# Patient Record
Sex: MALE | Race: WHITE | NOT HISPANIC OR LATINO | Employment: UNEMPLOYED | URBAN - METROPOLITAN AREA
[De-identification: names, ages, dates, MRNs, and addresses within clinical notes are randomized per-mention and may not be internally consistent; named-entity substitution may affect disease eponyms.]

---

## 2019-09-29 ENCOUNTER — OFFICE VISIT (OUTPATIENT)
Dept: URGENT CARE | Facility: URGENT CARE | Age: 29
End: 2019-09-29
Payer: COMMERCIAL

## 2019-09-29 VITALS
BODY MASS INDEX: 42.88 KG/M2 | WEIGHT: 314 LBS | RESPIRATION RATE: 19 BRPM | DIASTOLIC BLOOD PRESSURE: 97 MMHG | TEMPERATURE: 99 F | SYSTOLIC BLOOD PRESSURE: 151 MMHG | HEART RATE: 91 BPM | OXYGEN SATURATION: 96 %

## 2019-09-29 DIAGNOSIS — R07.0 THROAT PAIN: ICD-10-CM

## 2019-09-29 DIAGNOSIS — R60.0 SUBMANDIBULAR GLAND SWELLING: Primary | ICD-10-CM

## 2019-09-29 LAB
DEPRECATED S PYO AG THROAT QL EIA: NORMAL
SPECIMEN SOURCE: NORMAL

## 2019-09-29 PROCEDURE — 99203 OFFICE O/P NEW LOW 30 MIN: CPT | Performed by: FAMILY MEDICINE

## 2019-09-29 PROCEDURE — 87081 CULTURE SCREEN ONLY: CPT | Performed by: FAMILY MEDICINE

## 2019-09-29 PROCEDURE — 87880 STREP A ASSAY W/OPTIC: CPT | Performed by: FAMILY MEDICINE

## 2019-09-29 RX ORDER — NAPROXEN 500 MG/1
500 TABLET ORAL 2 TIMES DAILY WITH MEALS
Qty: 14 TABLET | Refills: 0 | Status: SHIPPED | OUTPATIENT
Start: 2019-09-29 | End: 2019-10-06

## 2019-09-29 NOTE — PROGRESS NOTES
SUBJECTIVE: Ravi Ahmadi is a 29 year old male presenting with a chief complaint of sore throat and swollen neck gland.  Onset of symptoms was 1 day(s) ago.  Course of illness is worsening.    Severity moderate  Current and Associated symptoms: sore throat  Treatment measures tried include Tylenol.  Predisposing factors include None.    Past Medical History:   Diagnosis Date     Atopic dermatitis      Intermittent asthma     exercise     Allergies   Allergen Reactions     Nkda [No Known Drug Allergies]      Social History     Tobacco Use     Smoking status: Former Smoker     Packs/day: 0.50     Years: 7.00     Pack years: 3.50     Types: Cigarettes     Smokeless tobacco: Never Used     Tobacco comment: 7 cigarettes per day   Substance Use Topics     Alcohol use: Yes     Alcohol/week: 12.5 standard drinks     Types: 15 Standard drinks or equivalent per week       ROS:  SKIN: no rash  GI: no vomiting    OBJECTIVE:  BP (!) 151/97   Pulse 91   Temp 99  F (37.2  C) (Oral)   Resp 19   Wt 142.4 kg (314 lb)   SpO2 96%   BMI 42.88 kg/m  GENERAL APPEARANCE: healthy, alert and no distress  EYES: EOMI,  PERRL, conjunctiva clear  HENT: TM's normal bilaterally, rhinorrhea clear, tonsillar hypertrophy and tonsillar erythema  NECK: cervical adenopathy left mostly  RESP: lungs clear to auscultation - no rales, rhonchi or wheezes  SKIN: no suspicious lesions or rashes      ICD-10-CM    1. Submandibular gland swelling R60.9 naproxen (NAPROSYN) 500 MG tablet     amoxicillin-clavulanate (AUGMENTIN) 875-125 MG tablet     OTOLARYNGOLOGY REFERRAL   2. Throat pain R07.0 Rapid strep screen     Beta strep group A culture     naproxen (NAPROSYN) 500 MG tablet       Fluids/Rest, f/u if worse/not any better

## 2019-09-30 LAB
BACTERIA SPEC CULT: NORMAL
SPECIMEN SOURCE: NORMAL

## 2020-04-20 ENCOUNTER — OFFICE VISIT (OUTPATIENT)
Dept: INTERNAL MEDICINE | Facility: CLINIC | Age: 30
End: 2020-04-20
Payer: COMMERCIAL

## 2020-04-20 VITALS — TEMPERATURE: 99.5 F | OXYGEN SATURATION: 96 % | HEART RATE: 94 BPM

## 2020-04-20 DIAGNOSIS — R19.7 VOMITING AND DIARRHEA: ICD-10-CM

## 2020-04-20 DIAGNOSIS — Z20.828 CONTACT WITH OR EXPOSURE TO VIRAL DISEASE: ICD-10-CM

## 2020-04-20 DIAGNOSIS — J45.20 MILD INTERMITTENT ASTHMA WITHOUT COMPLICATION: ICD-10-CM

## 2020-04-20 DIAGNOSIS — J22 LOWER RESPIRATORY TRACT INFECTION: Primary | ICD-10-CM

## 2020-04-20 DIAGNOSIS — R11.10 VOMITING AND DIARRHEA: ICD-10-CM

## 2020-04-20 PROCEDURE — 99214 OFFICE O/P EST MOD 30 MIN: CPT | Performed by: INTERNAL MEDICINE

## 2020-04-20 RX ORDER — ALBUTEROL SULFATE 90 UG/1
1-2 AEROSOL, METERED RESPIRATORY (INHALATION) 4 TIMES DAILY PRN
Qty: 1 INHALER | Refills: 5 | Status: SHIPPED | OUTPATIENT
Start: 2020-04-20 | End: 2021-01-07

## 2020-04-20 ASSESSMENT — ENCOUNTER SYMPTOMS
VOMITING: 1
HEMATOCHEZIA: 0
CHEST TIGHTNESS: 0
FATIGUE: 1
SHORTNESS OF BREATH: 1
WHEEZING: 0
COUGH: 0
FEVER: 1
MUSCULOSKELETAL NEGATIVE: 1
DIARRHEA: 1
NAUSEA: 1
DIAPHORESIS: 1
CHILLS: 1

## 2020-04-20 NOTE — PROGRESS NOTES
Subjective     Ravi Ahmadi is a 30 year old male who presents to clinic today for the following health issues:    HPI   RESPIRATORY SYMPTOMS      Duration: 1 week    Description  cough, fever, headache, fatigue/malaise, nausea and stomach ache    Severity: mild    Accompanying signs and symptoms: None    History (predisposing factors):  asthma    Precipitating or alleviating factors: None    Therapies tried and outcome:  none              Reviewed and updated as needed this visit by Provider         Review of Systems   Constitutional: Positive for chills, diaphoresis, fatigue and fever.   HENT: Negative.    Respiratory: Positive for shortness of breath. Negative for cough, chest tightness and wheezing.    Gastrointestinal: Positive for diarrhea, nausea and vomiting. Negative for hematochezia.   Musculoskeletal: Negative.    Skin: Negative.             Objective    Pulse 94   Temp 99.5  F (37.5  C) (Temporal)   SpO2 96%   There is no height or weight on file to calculate BMI.  Physical Exam   GENERAL APPEARANCE: alert, no distress and over weight  HENT: normal cephalic/atraumatic  NECK: no adenopathy and no asymmetry, masses, or scars  RESP: a few scattered wheezes  CV: regular rates and rhythm, normal S1 S2, no S3 or S4 and no murmur, click or rub  ABDOMEN: soft, nontender with moderate pressure, some lower abd tenderness w/deep palpation the LLQ and RLQ, without hepatosplenomegaly or masses and bowel sounds normal  MS: extremities normal- no gross deformities noted  SKIN: no suspicious lesions or rashes  NEURO: Normal strength and tone, mentation intact and speech normal  PSYCH: mentation appears normal and affect normal/bright    none         Assessment & Plan     1. Lower respiratory tract infection  2. Contact with or exposure to viral disease  3. Vomiting and diarrhea  -Differential here is either gastroenteritis on top of his underlying asthma or viral syndrome which gives both GI and respiratory  symptoms.  COVID is in the differential and therefore was given recommendations for self-isolation.  See patient education materials.     4. Mild intermittent asthma without complication  - albuterol (PROAIR HFA) 108 (90 Base) MCG/ACT inhaler; Inhale 1-2 puffs into the lungs 4 times daily as needed for shortness of breath / dyspnea or wheezing  Dispense: 1 Inhaler; Refill: 5     Tobacco Cessation:   reports that he has been smoking cigarettes. He has a 3.50 pack-year smoking history. He has never used smokeless tobacco.  Tobacco Cessation Action Plan: Information offered: Patient not interested at this time    Return for Follow up visit via [email or web address] if needed.    Juancarlos Sood MD  [facility]

## 2020-04-20 NOTE — PATIENT INSTRUCTIONS
Based on the information you have provided, you do have symptoms that are consistent with Coronavirus (COVID-19).    The coronavirus causes mild to severe respiratory illness with the most common symptoms including fever, cough and difficulty breathing. Unfortunately, many viruses cause similar symptoms and it can be difficult to distinguish between viruses, especially in mild cases, so we are presuming that anyone with cough or fever has coronavirus at this time.    Coronavirus/COVID-19 has reached the point of community spread in [place], meaning that we are finding the virus in people with no known exposure risk for jesus manuel the virus. Given the increasing commonness of coronavirus in the community we are no longer testing patients who are not critically ill.    If you are a health care worker, you should refer to your employee health office for instructions about returning to work.    For everyone else who has cough or fever, you should assume you are infected with coronavirus. Accordingly, you should self-quarantine for seven days from the first day your symptoms started OR 72 hours after your cough and fever completely resolve - WHICHEVER is LONGER. You should call if you find increasing shortness of breath, wheezing or sustained fever above 101.5. If you are significantly short of breath or experience chest pain you should call 911 or report to the nearest emergency department for urgent evaluation.    Isolate yourself at home.  Do Not allow any visitors  Do Not go to work or school  Do Not go to Islam,  centers, shopping, or other public places.  Do Not shake hands.  Avoid close contact with others (hugging, kissing).  Protect Others:    Cover Your Mouth and Nose with a mask, disposable tissue or wash cloth to avoid spreading germs to others.  Wash your hands and face frequently with soap and water.  If you develop significant shortness of breath that prevents you from doing normal  activities, please call 911 or proceed to the nearest emergency room and alert them immediately that you have been in self-isolation for possible coronavirus.    For more information about COVID19 and options for caring for yourself at home, please visit the CDC website at https://www.cdc.gov/coronavirus/2019-ncov/about/steps-when-sick.htmlFor more options for care at [facility], please visit our website at [email or web address]    For your parents/anyone who lives with you:    Based on the information you have provided about potential exposure to Coronavirus (Covid-19) but without any symptoms of the virus (cough, fever, shortness of breath are the most common symptoms), it does not appear you need Coronavirus (COVID-19) testing at this time.    But your possible exposure to Coronavirus means that we do recommend self-isolation for 14 days from the last day you may have been exposed.    What does this mean?    Isolate Yourself:    Isolate yourself at home.  Do Not allow any visitors  Do Not go to work or school  Do Not go to Yaz[employer],  centers, shopping, or other public places.  Do Not shake hands.  Avoid close contact with others (hugging, kissing).  Protect Others:    Cover Your Mouth and Nose with a mask, disposable tissue or wash cloth to avoid spreading germs to others.  Wash your hands and face frequently with soap and water.  If you have not developed a cough with fever by day 15 of isolation you are considered uninfected.    Thank you for limiting contact with others, wearing a simple mask to cover your cough, practice good hand hygiene habits and accessing our virtual services where possible to limit the spread of this virus.    For more information about COVID19 and options for caring for yourself at home, please visit the CDC website at https://www.cdc.gov/coronavirus/2019-ncov/about/steps-when-sick.html    For more options for care at [facility], please  visit our website at [email or web address]      Patient Education     Diet for Vomiting or Diarrhea (Adult)    Your symptoms may return or get worse after eating certain foods listed below. If this happens, stop eating these foods until your symptoms ease and you feel better.  Once the vomiting stops, follow the steps below.   During the first 12 to 24 hours  During the first 12 to 24 hours, follow this diet:    Drinks. Plain water, sport drinks like electrolyte solutions, soft drinks without caffeine, mineral water (plain or flavored), clear fruit juices, and decaffeinated tea and coffee.    Soups. Clear broth.    Desserts. Plain gelatin, popsicles, and fruit juice bars. As you feel better, you may add 6 to 8 ounces of yogurt per day. If you have diarrhea, don't have foods or drinks that contain sugar, high-fructose corn syrup, or sugar alcohols.  During the next 24 hours  During the next 24 hours you may add the following to the above:    Hot cereal, plain toast, bread, rolls, and crackers    Plain noodles, rice, mashed potatoes, and chicken noodle or rice soup    Unsweetened canned fruit (but not pineapple) and bananas  Don't eat more than 15 grams of fat a day. Do this by staying away from margarine, butter, oils, mayonnaise, sauces, gravies, fried foods, peanut butter, meat, poultry, and fish.  Don't eat much fiber. Stay away from raw or cooked vegetables, fresh fruits (except bananas), and bran cereals.  Limit how much caffeine and chocolate you have. Do not use any spices or seasonings except salt.  During the next 24 hours  Slowly go back to your normal diet, as you feel better and your symptoms ease.  Date Last Reviewed: 8/1/2016 2000-2019 [employer]. [address] [place] [address], [place] [address]. All rights reserved. This information is not intended as a substitute for professional medical care. Always follow your healthcare professional's instructions.

## 2020-04-30 ENCOUNTER — TELEPHONE (OUTPATIENT)
Dept: INTERNAL MEDICINE | Facility: CLINIC | Age: 30
End: 2020-04-30

## 2020-04-30 NOTE — TELEPHONE ENCOUNTER
Patient's mother Julia called and states dizzy spells on/ off for past few days.  Decreased appetite.  Sched to return to work Monday 5/4/20.  Pls advise.      Julia's contact phone on file.  Okay to lv msg

## 2020-04-30 NOTE — TELEPHONE ENCOUNTER
Julia called back.     Patient was sleeping when triage RN called. Patient became upset when mother spoke with him.    Patient in back ground telling mom to hang up phone. Per mom she was concerned He has told mom that he was dizzy and groggy.    Per mom he has reported having diarrhea and coughing: patient broke temp last week.    Triage advised to continue to monitor sx. Can try OTC Mucinex DM for mucus like cough. Encourage rest and fluids. Advised to f/u if sx persist and if needs to return to work  When upper respiratory sx have resolved.    Patient refused to speak with nurse. Advised mom can call 911 if patient becomes disoriented and/ or shortness of breath.      Aleisha CLAUDION, RN, PHN

## 2020-07-15 ENCOUNTER — VIRTUAL VISIT (OUTPATIENT)
Dept: FAMILY MEDICINE | Facility: OTHER | Age: 30
End: 2020-07-15
Payer: COMMERCIAL

## 2020-07-15 PROCEDURE — 99421 OL DIG E/M SVC 5-10 MIN: CPT | Performed by: PHYSICIAN ASSISTANT

## 2020-07-15 NOTE — PROGRESS NOTES
"Date: 07/15/2020 13:07:09  Clinician: Flaco Leblanc  Clinician NPI: [id number]  Patient: Ravi Ahmadi  Patient : 1990  Patient Address: [address], [place] [address]  Patient Phone: [phone]  Visit Protocol: GERD  Patient Summary:  Ravi is a 30 year old ( : 1990 ) male who initiated a Visit for evaluation of heartburn or reflux (GERD).  When asked the question \"Please sign me up to receive news, health information and promotions from [employer].\", Ravi responded \"No\".    Symptom Details  His symptoms have persisted for 1 - 2 months.  The patient currently feels his symptoms are about the same as usual and last for only a few minutes.   Over the past 7 days, the patient had experienced:      Heartburn: once     Regurgitation: for 2-3 days     Nausea: for 2-3 days     Pain the center of the upper stomach: once     Difficulty getting a good night's sleep because of heartburn and/or regurgitation: for 2-3 days      He took medications to treat heartburn and/or regurgitation other than what was previously prescribed for 2-3 days over the past 7 days.    Ravi claims:     Lying down aggravates symptoms     Bending over does not aggravate symptoms.      He denies:     Heartburn with unintentional weight loss.    Difficulty swallowing solid foods    Vomiting blood    Persistent vomiting.    Chest Pain    Wheezing associated with heartburn    Crohn's disease    Ulcerative colitis    Tumors in colon     He has not tried modifying his diet to treat symptoms.   The patient weighs approximately: 320 pounds and is 6 inches tall.   PAST TREATMENTS:      Tums or Rolaids: somewhat effective (patient used for 1 day)    Ravi smokes or uses smokeless tobacco.   Height: 0 ft 6 in  Weight: 320 lbs    MEDICATIONS: No current medications, ALLERGIES: NKDA  Clinician Response:  Dear Ravi,  Based on the information you provided, you likely have GERD (Esophageal reflux), " also known as heartburn.  GERD or heartburn occurs when stomach acid comes up from the stomach into the throat or esophagus. This often causes burning in the chest behind the breastbone and a burning sensation in the throat.    Medication Information  I am prescribing:     Pantoprazole (Protonix) 40 mg oral tablet, delayed release (DR/EC). Take 1 tablet by mouth 1 time per day for 30 days. There are no refills with this prescription.   Unless you are allergic to the over-the-counter medication(s) below, I recommend using:       Tums or Rolaids take 2 to 4 tablets as needed, up to 12 tablets in 24 hours.       Maalox, Mylanta, or store brand take 10 to 20ml up to 4 times a day.     Over-the-counter medications do not require a prescription. Ask the pharmacist if you have any questions.  Self Care  Try the following to help reduce your GERD symptoms:     Elevate the head of your bed with wood blocks by 6 inches if you are having nighttime symptoms    Decrease fat intake    Avoid eating large meals    Smoking, chewing tobacco, caffeine, alcohol, and anti-inflammatory medications (such as ibuprofen or aspirin) can increase stomach acid which can make GERD worse.    Stay upright for 2-3 hours after eating. Eating a large meal and then laying down, increases GERD symptoms.    Avoid spicy foods and acidic foods such as tomato and citrus      When to Seek Care  Sometimes long-standing uncontrolled GERD (symptoms more than one year) can cause stomach ulcers, cancerous, or precancerous changes in the throat. Be seen in a clinic if you notice any of the following symptoms:      Weight loss    Blood in your stool or throwing up blood    Vomiting    Trouble swallowing    Pain with swallowing     Rarely, heart-related symptoms can be mistaken for GERD. Heart-related symptoms would include chest pain with exertion or chest pain with radiation to the jaw or arm, shortness of breath, or sweats. If you are having any of these  symptoms, seek immediate medical care by calling 911 or contact your clinic.  Finally, becoming tobacco-free is one of the best things you can do to improve your health. For help with quitting tobacco, you can call 0-269-QUIT-NOW (1-791.379.5878) or visit smokefree.gov.   Diagnosis: GERD (Esophageal reflux)  Diagnosis ICD: K21.9  Prescription: pantoprazole (Protonix) 40 mg oral tablet,delayed release (DR/EC) 30 tablet, 30 days supply. Take 1 tablet by mouth 1 time per day for 30 days. Refills: 0, Refill as needed: no, Allow substitutions: yes

## 2020-07-16 ENCOUNTER — TELEPHONE (OUTPATIENT)
Dept: INTERNAL MEDICINE | Facility: CLINIC | Age: 30
End: 2020-07-16

## 2020-07-16 ENCOUNTER — VIRTUAL VISIT (OUTPATIENT)
Dept: FAMILY MEDICINE | Facility: OTHER | Age: 30
End: 2020-07-16

## 2020-07-16 NOTE — TELEPHONE ENCOUNTER
Reason for Call:  Other call back    Detailed comments: Patient received care through [facility] 7/15 during this visit he was given prescription for gerd.  Patient indicated he wants a note from Dr. Sood indicating he is okay to return to work.  Patient did not want to contact [facility] again, wants note from his primary care physician.  Patient cannot return to work without this note.    Phone Number Patient can be reached at: Cell number on file:    Telephone Information:   Mobile [phone]       Best Time:any time  Can we leave a detailed message on this number? YES    Call taken on 7/16/2020 at 9:58 AM by Olivia Nugent

## 2020-07-16 NOTE — PROGRESS NOTES
"Date: 2020 14:23:52  Clinician: Lazaro Lynch  Clinician NPI: [id number]  Patient: Ravi Ahmadi  Patient : 1990  Patient Address: [address], [place] [address]  Patient Phone: [phone]  Visit Protocol: GERD  Patient Summary:  Ravi is a 30 year old ( : 1990 ) male who initiated a Visit for evaluation of heartburn or reflux (GERD).  When asked the question \"Please sign me up to receive news, health information and promotions from [employer].\", Ravi responded \"No\".    Symptom Details  His symptoms have persisted for 1 - 2 months.  The patient currently feels his symptoms are worse than usual and last for only a few minutes.   Over the past 7 days, the patient had experienced:      Heartburn: once     Regurgitation: for 2-3 days     Nausea: for 2-3 days     Pain the center of the upper stomach: for 2-3 days     Difficulty getting a good night's sleep because of heartburn and/or regurgitation: for 2-3 days      He took medications to treat heartburn and/or regurgitation other than what was previously prescribed for 2-3 days over the past 7 days.    Ravi claims:     Lying down aggravates symptoms     Bending over does not aggravate symptoms.      He denies:     Heartburn with unintentional weight loss.    Difficulty swallowing solid foods    Vomiting blood    Persistent vomiting.    Chest Pain    Wheezing associated with heartburn    Crohn's disease    Ulcerative colitis    Tumors in colon     He has not tried modifying his diet to treat symptoms.   The patient weighs approximately: 320 pounds and is 72 inches tall.   PAST TREATMENTS:      Tums or Rolaids: somewhat effective (patient used for less than 3 days)    Ravi smokes or uses smokeless tobacco.   Height: 6 ft 0 in  Weight: 320 lbs    MEDICATIONS: No current medications, ALLERGIES: NKDA  Clinician Response:  Dear Ravi,   I am sorry you are not feeling well. To determine the most appropriate " care for you, I would like you to be seen in person to further discuss your health history and symptoms.  You will not be charged for this Visit. Thank you for trusting us with your care.   Diagnosis: Refer for additional evaluation  Diagnosis ICD: R69

## 2020-09-02 ENCOUNTER — TELEPHONE (OUTPATIENT)
Dept: INTERNAL MEDICINE | Facility: CLINIC | Age: 30
End: 2020-09-02

## 2020-09-02 NOTE — LETTER
September 2, 2020    Ravi Ahmadi  [address]  [facility] [place] [address]      Dear Ravi,      In order to ensure we are providing the best quality care, we have reviewed your chart and see that you are due for:  1. Preventative Care Visit (Physical)  2. Asthma Control Test  3. Vaccines    Please call the clinic at your earliest convenience to schedule an appointment.    Thank you for trusting us with your health care.    Sincerely,    Care Team for Dr. Sood

## 2020-09-02 NOTE — TELEPHONE ENCOUNTER
Patient Quality Outreach      Summary:    Patient is due/failing the following:   ACT needed and Asthma follow-up visit    Type of outreach:    Sent letter.    Questions for provider review:    None                                                                                   **Start Working phrase here:**       Patient has the following on his problem list/HM:     Asthma review       ACT Total Scores 2/5/2015   ACT TOTAL SCORE 20   ASTHMA ER VISITS 0 = None   ASTHMA HOSPITALIZATIONS 0 = None                                                      Joanna Flowers CMA       Chart routed to n/a.

## 2020-12-10 ENCOUNTER — VIRTUAL VISIT (OUTPATIENT)
Dept: INTERNAL MEDICINE | Facility: CLINIC | Age: 30
End: 2020-12-10
Payer: COMMERCIAL

## 2020-12-10 DIAGNOSIS — R51.9 ACUTE NONINTRACTABLE HEADACHE, UNSPECIFIED HEADACHE TYPE: Primary | ICD-10-CM

## 2020-12-10 DIAGNOSIS — R11.0 NAUSEA: ICD-10-CM

## 2020-12-10 PROCEDURE — 99214 OFFICE O/P EST MOD 30 MIN: CPT | Mod: 95 | Performed by: INTERNAL MEDICINE

## 2020-12-10 RX ORDER — METOCLOPRAMIDE 10 MG/1
TABLET ORAL
Qty: 10 TABLET | Refills: 0 | Status: SHIPPED | OUTPATIENT
Start: 2020-12-10 | End: 2021-01-07 | Stop reason: SINTOL

## 2020-12-10 ASSESSMENT — ASTHMA QUESTIONNAIRES
QUESTION_1 LAST FOUR WEEKS HOW MUCH OF THE TIME DID YOUR ASTHMA KEEP YOU FROM GETTING AS MUCH DONE AT WORK, SCHOOL OR AT HOME: NONE OF THE TIME
QUESTION_2 LAST FOUR WEEKS HOW OFTEN HAVE YOU HAD SHORTNESS OF BREATH: ONCE OR TWICE A WEEK
QUESTION_4 LAST FOUR WEEKS HOW OFTEN HAVE YOU USED YOUR RESCUE INHALER OR NEBULIZER MEDICATION (SUCH AS ALBUTEROL): ONCE A WEEK OR LESS
QUESTION_5 LAST FOUR WEEKS HOW WOULD YOU RATE YOUR ASTHMA CONTROL: SOMEWHAT CONTROLLED
ACT_TOTALSCORE: 21
QUESTION_3 LAST FOUR WEEKS HOW OFTEN DID YOUR ASTHMA SYMPTOMS (WHEEZING, COUGHING, SHORTNESS OF BREATH, CHEST TIGHTNESS OR PAIN) WAKE YOU UP AT NIGHT OR EARLIER THAN USUAL IN THE MORNING: NOT AT ALL

## 2020-12-10 NOTE — PROGRESS NOTES
"Ravi Ahmadi is a 30 year old male who is being evaluated via a billable video visit.      The patient has been notified of following:     \"This video visit will be conducted via a call between you and your physician/provider. We have found that certain health care needs can be provided without the need for an in-person physical exam.  This service lets us provide the care you need with a video conversation.  If a prescription is necessary we can send it directly to your pharmacy.  If lab work is needed we can place an order for that and you can then stop by our lab to have the test done at a later time.    Video visits are billed at different rates depending on your insurance coverage.  Please reach out to your insurance provider with any questions.    If during the course of the call the physician/provider feels a video visit is not appropriate, you will not be charged for this service.\"    Patient has given verbal consent for Video visit? Yes  How would you like to obtain your AVS? MyChart  If you are dropped from the video visit, the video invite should be resent to: Text to cell phone: [phone]  Will anyone else be joining your video visit? No      Subjective     Ravi Ahmadi is a 30 year old male who presents today via video visit for the following health issues:    HPI     Headache  Onset/Duration: 4 days  Description  Location: unilateral in the right frontal area, unilateral in the right occipital area   Character: throbbing pain, dull pain  Frequency:  Better since last night, and better today than earlier this week  Duration:  4  Wake with headaches: YES  Able to do daily activities when headache present: no   Intensity:  moderate  Progression of Symptoms:  improving  Accompanying signs and symptoms:  Stiff neck: YES  Neck or upper back pain: YES- upper back pain, minor, moderate lower back pain  Sinus or URI symptoms no   Fever: no  Nausea or vomiting: YES  Dizziness: YES- 2 d " ago  Numbness/tingling: YES  Weakness: YES  Visual changes: none  History  Head trauma: no  Family history of migraines: YES- grandmother and mother  Daily pain medication use: YES- taking tylenol about 3-4 times week  Previous tests for headaches: no  Neurologist evaluation: no  Precipitating or Alleviating factors (light/sound/sleep/caffeine): light sensitive  Therapies tried and outcome: Tylenol and Excedrin              Outcome - somewhat effective  Frequent/daily pain medication use: YES- tylenol about 3-4 times week    Denies below:  Systemic symptoms including fever  ?Neoplasm history  ?Neurologic deficit (including decreased consciousness)  ?Positional headache  ?Precipitated by sneezing, coughing, or exercise  ?Painful eyes     No stimulants  Alcohol: 7-8 drinks/wk.      Video Start Time: 3:03 PM        Review of Systems   Constitutional, HEENT, cardiovascular, pulmonary, gi and gu systems are negative, except as otherwise noted.      Objective           Vitals:  No vitals were obtained today due to virtual visit.    Physical Exam     GENERAL: alert, no distress and over weight  EYES: Eyes grossly normal to inspection.  No discharge or erythema, or obvious scleral/conjunctival abnormalities.  HENT: Normal cephalic/atraumatic.  External ears, nose and mouth without ulcers or lesions.  No nasal drainage visible.  NECK: No asymmetry, visible masses or scars  RESP: No audible wheeze, cough, or visible cyanosis.  No visible retractions or increased work of breathing.    SKIN: Visible skin clear. No significant rash, abnormal pigmentation or lesions.  NEURO: Cranial nerves grossly intact.  Mentation and speech appropriate for age.  PSYCH: Mentation appears normal, affect normal/bright, judgement and insight intact, normal speech and appearance well-groomed.            Assessment & Plan     Acute nonintractable headache, unspecified headache type  Nausea  Seems most consistent with migraine.  Like to have his  blood pressure checked before considering the use of triptans.  He will come in tomorrow to the pharmacy to have that checked when he picks up the metoclopramide.  That can be used for nausea may even have some benefit on a limited basis for the headache as well.  If his BP is normal we will go ahead and start the triptan.  If BP is elevated considerably consider Treatment of the BP itself.  If lack of response CNS imaging  - metoclopramide (REGLAN) 10 MG tablet; 1 tablet at onset of HA/nausea, may be repeated 4 hours later.        See Patient Instructions    No follow-ups on file.    Juancarlos Sood MD  [facility]      Video-Visit Details    Type of service:  Video Visit    Video End Time:3:41 PM    Originating Location (pt. Location): Home    Distant Location (provider location):  [facility]     Platform used for Video Visit: Kerwin

## 2020-12-11 ENCOUNTER — TELEPHONE (OUTPATIENT)
Dept: INTERNAL MEDICINE | Facility: CLINIC | Age: 30
End: 2020-12-11

## 2020-12-11 ENCOUNTER — ALLIED HEALTH/NURSE VISIT (OUTPATIENT)
Dept: INTERNAL MEDICINE | Facility: CLINIC | Age: 30
End: 2020-12-11
Payer: COMMERCIAL

## 2020-12-11 VITALS — DIASTOLIC BLOOD PRESSURE: 94 MMHG | SYSTOLIC BLOOD PRESSURE: 144 MMHG

## 2020-12-11 DIAGNOSIS — Z01.30 BP CHECK: Primary | ICD-10-CM

## 2020-12-11 PROCEDURE — 99207 PR NO CHARGE NURSE ONLY: CPT | Performed by: INTERNAL MEDICINE

## 2020-12-11 RX ORDER — SUMATRIPTAN 50 MG/1
50 TABLET, FILM COATED ORAL
Qty: 10 TABLET | Refills: 0 | Status: SHIPPED | OUTPATIENT
Start: 2020-12-11 | End: 2023-06-12

## 2020-12-11 ASSESSMENT — ASTHMA QUESTIONNAIRES: ACT_TOTALSCORE: 21

## 2020-12-11 NOTE — TELEPHONE ENCOUNTER
Reason for Call:  Other AVS & ltr for being out of work for this week    Detailed comments: the patient walked into the clinic today and is requesting a AVS for dos 12.10.2020, and needs a doctor written note for him being out of work this week, he is returning back to work on 12.14.2020.  pls call him when ready, hopefully today, and he will come in and pick them up at the bouncing station on the first floor.    Phone Number Patient can be reached at: Home number on file [phone] (home)    Best Time: anytime    Can we leave a detailed message on this number? YES    Call taken on 12/11/2020 at 11:40 AM by Felicia De La Torre

## 2020-12-11 NOTE — TELEPHONE ENCOUNTER
MR, see request for notes below    Pt did come to pharm today and had bp rechecked  144/94.      Letter pended, please auth if appropriate and I can inform pt, and also print AVS with letter    Joanna Flowers CMA

## 2020-12-11 NOTE — TELEPHONE ENCOUNTER
Pt can access letter on [employer] and print as well.   Given BP - rec'd f/u 1 mo   Ok to use triptan for HAs. rx for sumatriptan written sent to pharmacy. Can bed used along with antiemetic already rx'd.   If this doesn't help HA contact us.

## 2020-12-11 NOTE — PROGRESS NOTES
Routing message to PCP for review because BP checked at pharmacy is above goal. Recommended patient to follow-up with PCP.  PCP please close this encounter.      ~Thank you  Yessi Ware, PharmD  Retail Pharmacist  For [facility]

## 2021-01-07 ENCOUNTER — OFFICE VISIT (OUTPATIENT)
Dept: INTERNAL MEDICINE | Facility: CLINIC | Age: 31
End: 2021-01-07
Payer: COMMERCIAL

## 2021-01-07 VITALS
WEIGHT: 315 LBS | SYSTOLIC BLOOD PRESSURE: 146 MMHG | BODY MASS INDEX: 42.66 KG/M2 | HEIGHT: 72 IN | DIASTOLIC BLOOD PRESSURE: 98 MMHG | TEMPERATURE: 97.4 F | HEART RATE: 92 BPM | OXYGEN SATURATION: 95 %

## 2021-01-07 DIAGNOSIS — E66.01 MORBID OBESITY (H): ICD-10-CM

## 2021-01-07 DIAGNOSIS — Z00.00 ROUTINE GENERAL MEDICAL EXAMINATION AT A HEALTH CARE FACILITY: Primary | ICD-10-CM

## 2021-01-07 DIAGNOSIS — Z11.59 NEED FOR HEPATITIS C SCREENING TEST: ICD-10-CM

## 2021-01-07 DIAGNOSIS — J45.20 MILD INTERMITTENT ASTHMA WITHOUT COMPLICATION: ICD-10-CM

## 2021-01-07 DIAGNOSIS — Z11.3 SCREEN FOR STD (SEXUALLY TRANSMITTED DISEASE): ICD-10-CM

## 2021-01-07 DIAGNOSIS — I10 BENIGN ESSENTIAL HYPERTENSION: ICD-10-CM

## 2021-01-07 LAB
ANION GAP SERPL CALCULATED.3IONS-SCNC: 6 MMOL/L (ref 3–14)
BUN SERPL-MCNC: 10 MG/DL (ref 7–30)
CALCIUM SERPL-MCNC: 9.3 MG/DL (ref 8.5–10.1)
CHLORIDE SERPL-SCNC: 106 MMOL/L (ref 94–109)
CO2 SERPL-SCNC: 28 MMOL/L (ref 20–32)
CREAT SERPL-MCNC: 0.81 MG/DL (ref 0.66–1.25)
GFR SERPL CREATININE-BSD FRML MDRD: >90 ML/MIN/{1.73_M2}
GLUCOSE SERPL-MCNC: 133 MG/DL (ref 70–99)
HCV AB SERPL QL IA: NONREACTIVE
HIV 1+2 AB+HIV1 P24 AG SERPL QL IA: NONREACTIVE
POTASSIUM SERPL-SCNC: 3.9 MMOL/L (ref 3.4–5.3)
SODIUM SERPL-SCNC: 140 MMOL/L (ref 133–144)

## 2021-01-07 PROCEDURE — 99213 OFFICE O/P EST LOW 20 MIN: CPT | Mod: 25 | Performed by: INTERNAL MEDICINE

## 2021-01-07 PROCEDURE — 87389 HIV-1 AG W/HIV-1&-2 AB AG IA: CPT | Performed by: INTERNAL MEDICINE

## 2021-01-07 PROCEDURE — 86803 HEPATITIS C AB TEST: CPT | Performed by: INTERNAL MEDICINE

## 2021-01-07 PROCEDURE — 99395 PREV VISIT EST AGE 18-39: CPT | Performed by: INTERNAL MEDICINE

## 2021-01-07 PROCEDURE — 36415 COLL VENOUS BLD VENIPUNCTURE: CPT | Performed by: INTERNAL MEDICINE

## 2021-01-07 PROCEDURE — 80048 BASIC METABOLIC PNL TOTAL CA: CPT | Performed by: INTERNAL MEDICINE

## 2021-01-07 RX ORDER — LISINOPRIL/HYDROCHLOROTHIAZIDE 10-12.5 MG
1 TABLET ORAL DAILY
Qty: 30 TABLET | Refills: 1 | Status: SHIPPED | OUTPATIENT
Start: 2021-01-07 | End: 2023-06-12

## 2021-01-07 RX ORDER — ALBUTEROL SULFATE 90 UG/1
1-2 AEROSOL, METERED RESPIRATORY (INHALATION) 4 TIMES DAILY PRN
Qty: 1 INHALER | Refills: 11 | Status: ON HOLD | OUTPATIENT
Start: 2021-01-07 | End: 2023-10-25

## 2021-01-07 SDOH — HEALTH STABILITY: MENTAL HEALTH: HOW OFTEN DO YOU HAVE A DRINK CONTAINING ALCOHOL?: 4 OR MORE TIMES A WEEK

## 2021-01-07 SDOH — HEALTH STABILITY: MENTAL HEALTH: HOW MANY STANDARD DRINKS CONTAINING ALCOHOL DO YOU HAVE ON A TYPICAL DAY?: 1 OR 2

## 2021-01-07 SDOH — HEALTH STABILITY: MENTAL HEALTH: HOW OFTEN DO YOU HAVE 6 OR MORE DRINKS ON ONE OCCASION?: MONTHLY

## 2021-01-07 ASSESSMENT — ENCOUNTER SYMPTOMS
EYES NEGATIVE: 1
MUSCULOSKELETAL NEGATIVE: 1
SHORTNESS OF BREATH: 1
ENDOCRINE NEGATIVE: 1
WHEEZING: 1
NEUROLOGICAL NEGATIVE: 1
COUGH: 1
CONSTITUTIONAL NEGATIVE: 1

## 2021-01-07 ASSESSMENT — MIFFLIN-ST. JEOR: SCORE: 2532.16

## 2021-01-07 NOTE — PATIENT INSTRUCTIONS
Preventive Health Recommendations  Male Ages 26 - 39    Yearly exam:             See your health care provider every year in order to  o   Review health changes.   o   Discuss preventive care.    o   Review your medicines if your doctor has prescribed any.    You should be tested each year for STDs (sexually transmitted diseases), if you re at risk.     After age 35, talk to your provider about cholesterol testing. If you are at risk for heart disease, have your cholesterol tested at least every 5 years.     If you are at risk for diabetes, you should have a diabetes test (fasting glucose).  Shots: Get a flu shot each year. Get a tetanus shot every 10 years.     Nutrition:    Eat at least 5 servings of fruits and vegetables daily.     Eat whole-grain bread, whole-wheat pasta and brown rice instead of white grains and rice.     Get adequate Calcium and Vitamin D.     Lifestyle    Exercise for at least 150 minutes a week (30 minutes a day, 5 days a week). This will help you control your weight and prevent disease.     Limit alcohol to one drink per day.     No smoking.     Wear sunscreen to prevent skin cancer.     See your dentist every six months for an exam and cleaning.     Patient Education     Eating Heart-Healthy Food: Using the DASH Plan    Eating for your heart doesn t have to be hard or boring. You just need to know how to make healthier choices. The DASH eating plan has been developed to help you do just that. DASH stands for Dietary Approaches to Stop Hypertension. It is a plan that has been proven to be healthier for your heart and to lower your risk for high blood pressure. It can also help lower your risk for cancer, heart disease, osteoporosis, and diabetes.  Choosing from each food group  Choose foods from each of the food groups below each day. Try to get the recommended number of servings for each food group. The serving numbers are based on a diet of 2,000 calories a day. Talk with your  healthcare provider if you re not sure about your calorie needs. Along with getting the correct servings, the DASH plan also advises less than 2,300 mg of salt (sodium) per day. Lowering sodium intake to 1,500 mg per day lowers blood pressure even more. (There's about 2,300 mg of sodium in 1 teaspoon of salt.)      Grains  Servings: 6 to 8 a day  A serving is:    1 slice bread    1 ounce dry cereal    Half a cup cooked rice, pasta or cereal  Best choices: Whole grains and any grains high in fiber. Vegetables  Servings: 4 to 5 a day  A serving is:    1 cup raw leafy vegetable    Half a cup cut-up raw or cooked vegetable    Half a cup vegetable juice  Best choices: Fresh or frozen vegetables prepared without added salt or fat.   Fruits  Servings: 4 to 5 a day  A serving is:    1 medium fruit    One-quarter cup dried fruit    Half a cup fresh, frozen, or canned fruit    Half a cup of 100% fruit juices  Best choices: A variety of fresh fruits of different colors. Whole fruits are a better choice than fruit juices. Low-fat or fat-free dairy  Servings: 2 to 3 a day  A serving is:    1 cup milk    1 cup yogurt    One and a half ounces cheese  Best choices: Skim or 1% milk, low-fat or fat-free yogurt or buttermilk, and low-fat cheeses.         Lean meats, poultry, fish  Servings: 6 or fewer a day  A serving is:    1 ounce cooked meats, poultry, or fish    1 egg  Best choices: Lean poultry and fish. Trim away visible fat. Broil, grill, roast, or boil instead of frying. Remove skin from poultry before eating. Limit how much red meat you eat.  Nuts, seeds, beans  Servings: 4 to 5 a week  A serving is:    One-third cup nuts (one and a half ounces)    2 tablespoons nut butter or seeds    Half a cup cooked dry beans or legumes  Best choices: Dry roasted nuts with no salt added, lentils, kidney beans, garbanzo beans, and whole hdz beans.   Fats and oils  Servings: 2 to 3 a day  A serving is:    1 teaspoon vegetable oil    1  teaspoon soft margarine    1 tablespoon mayonnaise    2 tablespoons salad dressing  Best choices: Nut and vegetable oils (nontropical vegetable oils), such as olive and canola oil. Sweets  Servings: 5 a week or fewer  A serving is:    1 tablespoon sugar, maple syrup, or honey    1 tablespoon jam or jelly    1 half-ounce jelly beans (about 15)    1 cup lemonade  Best choices: Dried fruit can be a satisfying sweet. Choose low-fat sweets. And watch your serving sizes!      For more on the DASH eating plan, visit:  www.nhlbi.nih.gov/health/health-topics/topics/dash   Marisol last reviewed this educational content on 7/1/2019 2000-2020 [employer]. [address] [place] [address], [place] [address]. All rights reserved. This information is not intended as a substitute for professional medical care. Always follow your healthcare professional's instructions.

## 2021-01-07 NOTE — PROGRESS NOTES
SUBJECTIVE:   CC: Ravi Ahmadi is an 30 year old male who presents for preventative health visit.       Patient has been advised of split billing requirements and indicates understanding: Yes  HPI        Today's PHQ-2 Score:   PHQ-2 ( 1999 Pfizer) 1/7/2021   Q1: Little interest or pleasure in doing things 0   Q2: Feeling down, depressed or hopeless 0   PHQ-2 Score 0   Q1: Little interest or pleasure in doing things Not at all   Q2: Feeling down, depressed or hopeless Not at all   PHQ-2 Score 0       Abuse: Current or Past(Physical, Sexual or Emotional)- NO  Do you feel safe in your environment? YES        Social History     Tobacco Use     Smoking status: Current Some Day Smoker     Packs/day: 0.50     Years: 7.00     Pack years: 3.50     Types: Cigarettes     Smokeless tobacco: Never Used     Tobacco comment: 7 cigarettes per day   Substance Use Topics     Alcohol use: Yes     Alcohol/week: 12.5 standard drinks     Types: 15 Standard drinks or equivalent per week     Frequency: 4 or more times a week     Drinks per session: 1 or 2     Binge frequency: Monthly         Alcohol Use 1/7/2021   Prescreen: >3 drinks/day or >7 drinks/week? Yes   Prescreen: >3 drinks/day or >7 drinks/week? -   AUDIT SCORE  13       Last PSA: No results found for: PSA    Reviewed orders with patient. Reviewed health maintenance and updated orders accordingly - Yes  Lab work is in process    Reviewed and updated as needed this visit by clinical staff  Tobacco  Allergies  Meds  Problems  Med Hx  Surg Hx  Fam Hx  Soc Hx          Reviewed and updated as needed this visit by Provider   Allergies  Meds  Problems                Review of Systems   Constitutional: Negative.    HENT: Negative.    Eyes: Negative.    Respiratory: Positive for cough, shortness of breath and wheezing.    Endocrine: Negative.    Musculoskeletal: Negative.    Skin: Negative.    Neurological: Negative.          OBJECTIVE:   BP (!) 146/98   Pulse 92   Temp  "97.4  F (36.3  C) (Temporal)   Ht 1.822 m (5' 11.75\")   Wt (!) 154.8 kg (341 lb 4.8 oz)   SpO2 95%   BMI 46.61 kg/m      Physical Exam  GENERAL: alert, no distress and obese  EYES: Eyes grossly normal to inspection, PERRL and conjunctivae and sclerae normal  HENT: ear canals and TM's normal, nose and mouth without ulcers or lesions  NECK: no adenopathy, no asymmetry, masses, or scars and thyroid normal to palpation  RESP: lungs clear to auscultation - no rales, rhonchi or wheezes  CV: regular rate and rhythm, normal S1 S2, no S3 or S4, no murmur, click or rub, no peripheral edema and peripheral pulses strong  ABDOMEN: soft, nontender, no hepatosplenomegaly, no masses and bowel sounds normal  MS: no gross musculoskeletal defects noted, no edema  SKIN: no suspicious lesions or rashes  NEURO: Normal strength and tone, mentation intact and speech normal  PSYCH: mentation appears normal, affect normal/bright  LYMPH: no cervical, supraclavicular, axillary, or inguinal adenopathy    Labs reviewed in Epic  No results found for this or any previous visit (from the past 24 hour(s)).    ASSESSMENT/PLAN:   1. Routine general medical examination at a health care facility  Updated screening, immunizations, prevention.  Please see health maintenance list, care gaps   Focus on weight loss    2. Mild intermittent asthma without complication  Weight loss rec'd . otherwise mostly exercise induced  - albuterol (PROAIR HFA) 108 (90 Base) MCG/ACT inhaler; Inhale 1-2 puffs into the lungs 4 times daily as needed for shortness of breath / dyspnea or wheezing  Dispense: 1 Inhaler; Refill: 11    3. Benign essential hypertension  New- cut back on alcohol , must focus on diet/weight loss  Start rx- f/u 1 mo  - lisinopril-hydrochlorothiazide (ZESTORETIC) 10-12.5 MG tablet; Take 1 tablet by mouth daily  Dispense: 30 tablet; Refill: 1  - Basic metabolic panel    4. Morbid obesity (H)  Weight loss  - COMPREHENSIVE WEIGHT MANAGEMENT    5. Need " "for hepatitis C screening test  - Hepatitis C Screen Reflex to HCV RNA Quant and Genotype    6. Screen for STD (sexually transmitted disease)  - HIV Antigen Antibody Combo    Patient has been advised of split billing requirements and indicates understanding: Yes  COUNSELING:   Reviewed preventive health counseling, as reflected in patient instructions    Estimated body mass index is 46.61 kg/m  as calculated from the following:    Height as of this encounter: 1.822 m (5' 11.75\").    Weight as of this encounter: 154.8 kg (341 lb 4.8 oz).     Weight management plan: Patient referred to endocrine and/or weight management specialty Discussed healthy diet and exercise guidelines    He reports that he has been smoking cigarettes. He has a 3.50 pack-year smoking history. He has never used smokeless tobacco.  Tobacco Cessation Action Plan:   cut already cutting back      Counseling Resources:  ATP IV Guidelines  Pooled Cohorts Equation Calculator  FRAX Risk Assessment  ICSI Preventive Guidelines  Dietary Guidelines for Americans, 2010  USDA's MyPlate  ASA Prophylaxis  Lung CA Screening    Juancarlos Sood MD  [facility]  "

## 2021-03-17 ENCOUNTER — TELEPHONE (OUTPATIENT)
Dept: URGENT CARE | Facility: URGENT CARE | Age: 31
End: 2021-03-17

## 2021-03-17 ENCOUNTER — MYC MEDICAL ADVICE (OUTPATIENT)
Dept: URGENT CARE | Facility: URGENT CARE | Age: 31
End: 2021-03-17

## 2021-03-17 DIAGNOSIS — R11.10 VOMITING AND DIARRHEA: ICD-10-CM

## 2021-03-17 DIAGNOSIS — R19.7 VOMITING AND DIARRHEA: ICD-10-CM

## 2021-03-17 LAB
LABORATORY COMMENT REPORT: NORMAL
SARS-COV-2 RNA RESP QL NAA+PROBE: NEGATIVE
SARS-COV-2 RNA RESP QL NAA+PROBE: NORMAL
SPECIMEN SOURCE: NORMAL
SPECIMEN SOURCE: NORMAL

## 2021-03-17 PROCEDURE — U0005 INFEC AGEN DETEC AMPLI PROBE: HCPCS | Performed by: NURSE PRACTITIONER

## 2021-03-17 PROCEDURE — U0003 INFECTIOUS AGENT DETECTION BY NUCLEIC ACID (DNA OR RNA); SEVERE ACUTE RESPIRATORY SYNDROME CORONAVIRUS 2 (SARS-COV-2) (CORONAVIRUS DISEASE [COVID-19]), AMPLIFIED PROBE TECHNIQUE, MAKING USE OF HIGH THROUGHPUT TECHNOLOGIES AS DESCRIBED BY CMS-2020-01-R: HCPCS | Performed by: NURSE PRACTITIONER

## 2021-03-17 PROCEDURE — 99207 PR NO CHARGE LOS: CPT

## 2021-03-17 NOTE — TELEPHONE ENCOUNTER
LM on VM.    Letter created in Epic. Would like to make sure if patient is able to see it in [employer]. If not we can email to him from fax machine if he is ok with that or mail to his home.    Aleisha KOLB, RN, PHN

## 2021-03-17 NOTE — TELEPHONE ENCOUNTER
Patient tried checking my chart for letter but asked triage if letter can be emailed to him. Letter dated 03/17/2021 was emailed to patient at [email or web address].

## 2021-03-17 NOTE — TELEPHONE ENCOUNTER
Patient calling after he submitted an E-visit.     Patient in need a letter for work since he was out of work. Mon-Wednesday. Hoping to return to work tomorrow. Please release letter to [employer].     If any questions please call.    Aleisha KOLB, RN, PHN

## 2021-03-17 NOTE — PATIENT INSTRUCTIONS
March 17, 2021      Ravi Ahmadi  [address]  [place] [address]        To Whom It May Concern:    Ravi Ahmadi  was seen on 3/17/2021.  Please excuse him  until he has a negative COVID 19 test . He will be tested today or tomorrow and should be excused for the last 3 days as well due to illness.        Sincerely,        MAKENNA Beard, CNP  [facility]

## 2021-03-17 NOTE — LETTER
[facility]  [address]  [address]  [place] [address]  Phone: [phone]  Fax: [phone]    March 17, 2021        Ravi VELEZ Daiana  [address]  [place] [facility] [address]    March 17, 2021        Ravi R Daiana  [address]  [place] [facility] [address]           To Whom It May Concern:     Ravi Ahmadi  was seen on 3/17/2021.  Please excuse him until he has a negative COVID 19 test . He will be tested today or tomorrow and should be excused for the last 3 days as well due to illness.           Sincerely,           MAKENNA Beard, CNP  [facility]

## 2021-04-20 ENCOUNTER — E-VISIT (OUTPATIENT)
Dept: INTERNAL MEDICINE | Facility: CLINIC | Age: 31
End: 2021-04-20
Payer: COMMERCIAL

## 2021-04-20 DIAGNOSIS — R51.9 ACUTE NONINTRACTABLE HEADACHE, UNSPECIFIED HEADACHE TYPE: Primary | ICD-10-CM

## 2021-04-20 PROCEDURE — 99421 OL DIG E/M SVC 5-10 MIN: CPT | Performed by: INTERNAL MEDICINE

## 2021-04-20 NOTE — LETTER
April 20, 2021      Ravi VELEZ Daiana  [address]  [place] [address]        To Whom It May Concern:    Ravi VELEZ Daiana reported to us that he was ill requiring his absence from work on 4/19 and 4/20.  He may return to work without restrictions.      Sincerely,       Juancarlos Sood MD

## 2021-10-24 ENCOUNTER — HEALTH MAINTENANCE LETTER (OUTPATIENT)
Age: 31
End: 2021-10-24

## 2022-01-07 ENCOUNTER — MYC MEDICAL ADVICE (OUTPATIENT)
Dept: INTERNAL MEDICINE | Facility: CLINIC | Age: 32
End: 2022-01-07
Payer: COMMERCIAL

## 2022-01-07 NOTE — TELEPHONE ENCOUNTER
Patient Quality Outreach      Summary:    Patient has the following on his problem list/HM:   Asthma review       ACT Total Scores 12/10/2020   ACT TOTAL SCORE -   ASTHMA ER VISITS -   ASTHMA HOSPITALIZATIONS -   ACT TOTAL SCORE (Goal Greater than or Equal to 20) 21   In the past 12 months, how many times did you visit the emergency room for your asthma without being admitted to the hospital? 0   In the past 12 months, how many times were you hospitalized overnight because of your asthma? 0          Patient is due/failing the following:   Asthma  -  ACT needed    Type of outreach:    Sent Broad[employer] message.    Questions for provider review:    None                                                                                                                                     Joanna Flowesr CMA       Chart routed to .           (1) obesity (BMI greater than 25)

## 2022-01-17 ENCOUNTER — LAB (OUTPATIENT)
Dept: URGENT CARE | Facility: URGENT CARE | Age: 32
End: 2022-01-17

## 2022-01-17 DIAGNOSIS — Z20.822 SUSPECTED COVID-19 VIRUS INFECTION: ICD-10-CM

## 2022-01-17 LAB — SARS-COV-2 RNA RESP QL NAA+PROBE: NEGATIVE

## 2022-01-17 PROCEDURE — U0003 INFECTIOUS AGENT DETECTION BY NUCLEIC ACID (DNA OR RNA); SEVERE ACUTE RESPIRATORY SYNDROME CORONAVIRUS 2 (SARS-COV-2) (CORONAVIRUS DISEASE [COVID-19]), AMPLIFIED PROBE TECHNIQUE, MAKING USE OF HIGH THROUGHPUT TECHNOLOGIES AS DESCRIBED BY CMS-2020-01-R: HCPCS

## 2022-01-17 PROCEDURE — U0005 INFEC AGEN DETEC AMPLI PROBE: HCPCS

## 2022-02-13 ENCOUNTER — HEALTH MAINTENANCE LETTER (OUTPATIENT)
Age: 32
End: 2022-02-13

## 2022-10-15 ENCOUNTER — HEALTH MAINTENANCE LETTER (OUTPATIENT)
Age: 32
End: 2022-10-15

## 2023-03-26 ENCOUNTER — HEALTH MAINTENANCE LETTER (OUTPATIENT)
Age: 33
End: 2023-03-26

## 2023-06-12 ENCOUNTER — APPOINTMENT (OUTPATIENT)
Dept: CT IMAGING | Facility: CLINIC | Age: 33
End: 2023-06-12
Attending: INTERNAL MEDICINE
Payer: MEDICAID

## 2023-06-12 ENCOUNTER — HOSPITAL ENCOUNTER (INPATIENT)
Facility: CLINIC | Age: 33
LOS: 3 days | Discharge: HOME | End: 2023-06-15
Attending: EMERGENCY MEDICINE | Admitting: INTERNAL MEDICINE
Payer: MEDICAID

## 2023-06-12 DIAGNOSIS — R11.2 NAUSEA AND VOMITING, UNSPECIFIED VOMITING TYPE: ICD-10-CM

## 2023-06-12 DIAGNOSIS — E87.6 HYPOKALEMIA: ICD-10-CM

## 2023-06-12 DIAGNOSIS — R73.9 HYPERGLYCEMIA: ICD-10-CM

## 2023-06-12 DIAGNOSIS — I10 BENIGN ESSENTIAL HYPERTENSION: ICD-10-CM

## 2023-06-12 DIAGNOSIS — F10.90 ALCOHOL USE DISORDER: ICD-10-CM

## 2023-06-12 DIAGNOSIS — F10.930 ALCOHOL WITHDRAWAL SYNDROME WITHOUT COMPLICATION (H): ICD-10-CM

## 2023-06-12 DIAGNOSIS — E13.65 UNCONTROLLED OTHER SPECIFIED DIABETES MELLITUS WITH HYPERGLYCEMIA (H): Primary | ICD-10-CM

## 2023-06-12 PROBLEM — K70.10 ALCOHOLIC HEPATITIS WITHOUT ASCITES (H): Status: ACTIVE | Noted: 2023-06-12

## 2023-06-12 PROBLEM — F10.929 ALCOHOLIC INTOXICATION WITH COMPLICATION (H): Status: ACTIVE | Noted: 2023-06-12

## 2023-06-12 PROBLEM — K85.20 ALCOHOL-INDUCED ACUTE PANCREATITIS: Status: ACTIVE | Noted: 2023-06-12

## 2023-06-12 LAB
ALBUMIN SERPL BCG-MCNC: 4.4 G/DL (ref 3.5–5.2)
ALP SERPL-CCNC: 121 U/L (ref 40–129)
ALT SERPL W P-5'-P-CCNC: 129 U/L (ref 10–50)
ANION GAP SERPL CALCULATED.3IONS-SCNC: 23 MMOL/L (ref 7–15)
AST SERPL W P-5'-P-CCNC: 235 U/L (ref 10–50)
BASOPHILS # BLD AUTO: 0.1 10E3/UL (ref 0–0.2)
BASOPHILS NFR BLD AUTO: 1 %
BILIRUB SERPL-MCNC: 1.5 MG/DL
BUN SERPL-MCNC: 4 MG/DL (ref 6–20)
CALCIUM SERPL-MCNC: 9.5 MG/DL (ref 8.6–10)
CHLORIDE SERPL-SCNC: 92 MMOL/L (ref 98–107)
CREAT SERPL-MCNC: 0.47 MG/DL (ref 0.67–1.17)
DEPRECATED HCO3 PLAS-SCNC: 20 MMOL/L (ref 22–29)
EOSINOPHIL # BLD AUTO: 0.1 10E3/UL (ref 0–0.7)
EOSINOPHIL NFR BLD AUTO: 1 %
ERYTHROCYTE [DISTWIDTH] IN BLOOD BY AUTOMATED COUNT: 13.4 % (ref 10–15)
ETHANOL SERPL-MCNC: 0.26 G/DL
GFR SERPL CREATININE-BSD FRML MDRD: >90 ML/MIN/1.73M2
GLUCOSE BLDC GLUCOMTR-MCNC: 217 MG/DL (ref 70–99)
GLUCOSE BLDC GLUCOMTR-MCNC: 278 MG/DL (ref 70–99)
GLUCOSE BLDC GLUCOMTR-MCNC: 307 MG/DL (ref 70–99)
GLUCOSE SERPL-MCNC: 310 MG/DL (ref 70–99)
HBA1C MFR BLD: 10.8 %
HCT VFR BLD AUTO: 46.7 % (ref 40–53)
HGB BLD-MCNC: 15.6 G/DL (ref 13.3–17.7)
IMM GRANULOCYTES # BLD: 0 10E3/UL
IMM GRANULOCYTES NFR BLD: 0 %
LIPASE SERPL-CCNC: 95 U/L (ref 13–60)
LYMPHOCYTES # BLD AUTO: 2 10E3/UL (ref 0.8–5.3)
LYMPHOCYTES NFR BLD AUTO: 21 %
MAGNESIUM SERPL-MCNC: 2.1 MG/DL (ref 1.7–2.3)
MAGNESIUM SERPL-MCNC: 2.2 MG/DL (ref 1.7–2.3)
MCH RBC QN AUTO: 30.4 PG (ref 26.5–33)
MCHC RBC AUTO-ENTMCNC: 33.4 G/DL (ref 31.5–36.5)
MCV RBC AUTO: 91 FL (ref 78–100)
MONOCYTES # BLD AUTO: 0.7 10E3/UL (ref 0–1.3)
MONOCYTES NFR BLD AUTO: 7 %
NEUTROPHILS # BLD AUTO: 6.5 10E3/UL (ref 1.6–8.3)
NEUTROPHILS NFR BLD AUTO: 70 %
NRBC # BLD AUTO: 0 10E3/UL
NRBC BLD AUTO-RTO: 0 /100
PHOSPHATE SERPL-MCNC: 3.6 MG/DL (ref 2.5–4.5)
PHOSPHATE SERPL-MCNC: 4 MG/DL (ref 2.5–4.5)
PLATELET # BLD AUTO: 213 10E3/UL (ref 150–450)
POTASSIUM SERPL-SCNC: 3.2 MMOL/L (ref 3.4–5.3)
POTASSIUM SERPL-SCNC: 3.5 MMOL/L (ref 3.4–5.3)
PROT SERPL-MCNC: 8.1 G/DL (ref 6.4–8.3)
RBC # BLD AUTO: 5.13 10E6/UL (ref 4.4–5.9)
SODIUM SERPL-SCNC: 135 MMOL/L (ref 136–145)
WBC # BLD AUTO: 9.5 10E3/UL (ref 4–11)

## 2023-06-12 PROCEDURE — 85025 COMPLETE CBC W/AUTO DIFF WBC: CPT | Performed by: EMERGENCY MEDICINE

## 2023-06-12 PROCEDURE — 250N000011 HC RX IP 250 OP 636: Performed by: EMERGENCY MEDICINE

## 2023-06-12 PROCEDURE — 83036 HEMOGLOBIN GLYCOSYLATED A1C: CPT | Performed by: INTERNAL MEDICINE

## 2023-06-12 PROCEDURE — 83690 ASSAY OF LIPASE: CPT | Performed by: EMERGENCY MEDICINE

## 2023-06-12 PROCEDURE — 99223 1ST HOSP IP/OBS HIGH 75: CPT | Performed by: INTERNAL MEDICINE

## 2023-06-12 PROCEDURE — HZ2ZZZZ DETOXIFICATION SERVICES FOR SUBSTANCE ABUSE TREATMENT: ICD-10-PCS | Performed by: INTERNAL MEDICINE

## 2023-06-12 PROCEDURE — 250N000011 HC RX IP 250 OP 636: Performed by: INTERNAL MEDICINE

## 2023-06-12 PROCEDURE — 83735 ASSAY OF MAGNESIUM: CPT | Performed by: INTERNAL MEDICINE

## 2023-06-12 PROCEDURE — 82077 ASSAY SPEC XCP UR&BREATH IA: CPT | Performed by: EMERGENCY MEDICINE

## 2023-06-12 PROCEDURE — 74177 CT ABD & PELVIS W/CONTRAST: CPT

## 2023-06-12 PROCEDURE — 120N000001 HC R&B MED SURG/OB

## 2023-06-12 PROCEDURE — 84100 ASSAY OF PHOSPHORUS: CPT | Performed by: EMERGENCY MEDICINE

## 2023-06-12 PROCEDURE — 83735 ASSAY OF MAGNESIUM: CPT | Performed by: EMERGENCY MEDICINE

## 2023-06-12 PROCEDURE — 250N000013 HC RX MED GY IP 250 OP 250 PS 637: Performed by: INTERNAL MEDICINE

## 2023-06-12 PROCEDURE — 250N000012 HC RX MED GY IP 250 OP 636 PS 637: Performed by: INTERNAL MEDICINE

## 2023-06-12 PROCEDURE — 36415 COLL VENOUS BLD VENIPUNCTURE: CPT | Performed by: EMERGENCY MEDICINE

## 2023-06-12 PROCEDURE — 96365 THER/PROPH/DIAG IV INF INIT: CPT

## 2023-06-12 PROCEDURE — 84132 ASSAY OF SERUM POTASSIUM: CPT | Performed by: INTERNAL MEDICINE

## 2023-06-12 PROCEDURE — 258N000003 HC RX IP 258 OP 636: Performed by: EMERGENCY MEDICINE

## 2023-06-12 PROCEDURE — 258N000003 HC RX IP 258 OP 636: Performed by: INTERNAL MEDICINE

## 2023-06-12 PROCEDURE — 250N000009 HC RX 250: Performed by: EMERGENCY MEDICINE

## 2023-06-12 PROCEDURE — 36415 COLL VENOUS BLD VENIPUNCTURE: CPT | Performed by: INTERNAL MEDICINE

## 2023-06-12 PROCEDURE — 250N000009 HC RX 250: Performed by: INTERNAL MEDICINE

## 2023-06-12 PROCEDURE — 84100 ASSAY OF PHOSPHORUS: CPT | Performed by: INTERNAL MEDICINE

## 2023-06-12 PROCEDURE — 80053 COMPREHEN METABOLIC PANEL: CPT | Performed by: EMERGENCY MEDICINE

## 2023-06-12 PROCEDURE — 99285 EMERGENCY DEPT VISIT HI MDM: CPT | Mod: 25

## 2023-06-12 RX ORDER — POLYETHYLENE GLYCOL 3350 17 G/17G
17 POWDER, FOR SOLUTION ORAL DAILY PRN
Status: DISCONTINUED | OUTPATIENT
Start: 2023-06-12 | End: 2023-06-15 | Stop reason: HOSPADM

## 2023-06-12 RX ORDER — DIAZEPAM 10 MG/2ML
5-10 INJECTION, SOLUTION INTRAMUSCULAR; INTRAVENOUS EVERY 30 MIN PRN
Status: DISCONTINUED | OUTPATIENT
Start: 2023-06-12 | End: 2023-06-14

## 2023-06-12 RX ORDER — NALOXONE HYDROCHLORIDE 0.4 MG/ML
0.4 INJECTION, SOLUTION INTRAMUSCULAR; INTRAVENOUS; SUBCUTANEOUS
Status: DISCONTINUED | OUTPATIENT
Start: 2023-06-12 | End: 2023-06-15 | Stop reason: HOSPADM

## 2023-06-12 RX ORDER — DIAZEPAM 5 MG
10 TABLET ORAL EVERY 30 MIN PRN
Status: DISCONTINUED | OUTPATIENT
Start: 2023-06-12 | End: 2023-06-15 | Stop reason: HOSPADM

## 2023-06-12 RX ORDER — IOPAMIDOL 755 MG/ML
135 INJECTION, SOLUTION INTRAVASCULAR ONCE
Status: COMPLETED | OUTPATIENT
Start: 2023-06-12 | End: 2023-06-12

## 2023-06-12 RX ORDER — FLUMAZENIL 0.1 MG/ML
0.2 INJECTION, SOLUTION INTRAVENOUS
Status: DISCONTINUED | OUTPATIENT
Start: 2023-06-12 | End: 2023-06-15 | Stop reason: HOSPADM

## 2023-06-12 RX ORDER — POTASSIUM CHLORIDE 7.45 MG/ML
10 INJECTION INTRAVENOUS ONCE
Status: COMPLETED | OUTPATIENT
Start: 2023-06-12 | End: 2023-06-12

## 2023-06-12 RX ORDER — DIAZEPAM 5 MG
10 TABLET ORAL EVERY 30 MIN PRN
Status: DISCONTINUED | OUTPATIENT
Start: 2023-06-12 | End: 2023-06-14

## 2023-06-12 RX ORDER — AMOXICILLIN 250 MG
2 CAPSULE ORAL 2 TIMES DAILY
Status: DISCONTINUED | OUTPATIENT
Start: 2023-06-12 | End: 2023-06-15 | Stop reason: HOSPADM

## 2023-06-12 RX ORDER — NICOTINE 21 MG/24HR
1 PATCH, TRANSDERMAL 24 HOURS TRANSDERMAL DAILY PRN
Status: DISCONTINUED | OUTPATIENT
Start: 2023-06-12 | End: 2023-06-15 | Stop reason: HOSPADM

## 2023-06-12 RX ORDER — DEXTROSE MONOHYDRATE 25 G/50ML
25-50 INJECTION, SOLUTION INTRAVENOUS
Status: DISCONTINUED | OUTPATIENT
Start: 2023-06-12 | End: 2023-06-15 | Stop reason: HOSPADM

## 2023-06-12 RX ORDER — GABAPENTIN 600 MG/1
1200 TABLET ORAL ONCE
Status: COMPLETED | OUTPATIENT
Start: 2023-06-12 | End: 2023-06-12

## 2023-06-12 RX ORDER — FOLIC ACID 1 MG/1
1 TABLET ORAL DAILY
Status: DISCONTINUED | OUTPATIENT
Start: 2023-06-13 | End: 2023-06-15 | Stop reason: HOSPADM

## 2023-06-12 RX ORDER — LORAZEPAM 0.5 MG/1
1 TABLET ORAL ONCE
Status: COMPLETED | OUTPATIENT
Start: 2023-06-12 | End: 2023-06-12

## 2023-06-12 RX ORDER — GABAPENTIN 300 MG/1
900 CAPSULE ORAL EVERY 8 HOURS
Status: COMPLETED | OUTPATIENT
Start: 2023-06-12 | End: 2023-06-15

## 2023-06-12 RX ORDER — DIAZEPAM 10 MG/2ML
5-10 INJECTION, SOLUTION INTRAMUSCULAR; INTRAVENOUS EVERY 30 MIN PRN
Status: DISCONTINUED | OUTPATIENT
Start: 2023-06-12 | End: 2023-06-15 | Stop reason: HOSPADM

## 2023-06-12 RX ORDER — HYDROMORPHONE HCL IN WATER/PF 6 MG/30 ML
0.4 PATIENT CONTROLLED ANALGESIA SYRINGE INTRAVENOUS
Status: DISCONTINUED | OUTPATIENT
Start: 2023-06-12 | End: 2023-06-15 | Stop reason: HOSPADM

## 2023-06-12 RX ORDER — OLANZAPINE 5 MG/1
5-10 TABLET, ORALLY DISINTEGRATING ORAL EVERY 6 HOURS PRN
Status: DISCONTINUED | OUTPATIENT
Start: 2023-06-12 | End: 2023-06-15 | Stop reason: HOSPADM

## 2023-06-12 RX ORDER — HALOPERIDOL 5 MG/ML
2.5-5 INJECTION INTRAMUSCULAR EVERY 6 HOURS PRN
Status: DISCONTINUED | OUTPATIENT
Start: 2023-06-12 | End: 2023-06-15 | Stop reason: HOSPADM

## 2023-06-12 RX ORDER — HYDROMORPHONE HCL IN WATER/PF 6 MG/30 ML
0.2 PATIENT CONTROLLED ANALGESIA SYRINGE INTRAVENOUS
Status: DISCONTINUED | OUTPATIENT
Start: 2023-06-12 | End: 2023-06-15 | Stop reason: HOSPADM

## 2023-06-12 RX ORDER — ONDANSETRON 2 MG/ML
4 INJECTION INTRAMUSCULAR; INTRAVENOUS EVERY 6 HOURS PRN
Status: DISCONTINUED | OUTPATIENT
Start: 2023-06-12 | End: 2023-06-15 | Stop reason: HOSPADM

## 2023-06-12 RX ORDER — GABAPENTIN 300 MG/1
300 CAPSULE ORAL EVERY 8 HOURS
Status: DISCONTINUED | OUTPATIENT
Start: 2023-06-17 | End: 2023-06-15 | Stop reason: HOSPADM

## 2023-06-12 RX ORDER — NALOXONE HYDROCHLORIDE 0.4 MG/ML
0.2 INJECTION, SOLUTION INTRAMUSCULAR; INTRAVENOUS; SUBCUTANEOUS
Status: DISCONTINUED | OUTPATIENT
Start: 2023-06-12 | End: 2023-06-15 | Stop reason: HOSPADM

## 2023-06-12 RX ORDER — LIDOCAINE 40 MG/G
CREAM TOPICAL
Status: DISCONTINUED | OUTPATIENT
Start: 2023-06-12 | End: 2023-06-15 | Stop reason: HOSPADM

## 2023-06-12 RX ORDER — SODIUM CHLORIDE AND POTASSIUM CHLORIDE 150; 900 MG/100ML; MG/100ML
INJECTION, SOLUTION INTRAVENOUS CONTINUOUS
Status: DISCONTINUED | OUTPATIENT
Start: 2023-06-12 | End: 2023-06-13

## 2023-06-12 RX ORDER — MULTIPLE VITAMINS W/ MINERALS TAB 9MG-400MCG
1 TAB ORAL DAILY
Status: DISCONTINUED | OUTPATIENT
Start: 2023-06-13 | End: 2023-06-15 | Stop reason: HOSPADM

## 2023-06-12 RX ORDER — HALOPERIDOL 5 MG/ML
2.5-5 INJECTION INTRAMUSCULAR EVERY 6 HOURS PRN
Status: DISCONTINUED | OUTPATIENT
Start: 2023-06-12 | End: 2023-06-14

## 2023-06-12 RX ORDER — FLUMAZENIL 0.1 MG/ML
0.2 INJECTION, SOLUTION INTRAVENOUS
Status: DISCONTINUED | OUTPATIENT
Start: 2023-06-12 | End: 2023-06-14

## 2023-06-12 RX ORDER — OLANZAPINE 5 MG/1
5-10 TABLET, ORALLY DISINTEGRATING ORAL EVERY 6 HOURS PRN
Status: DISCONTINUED | OUTPATIENT
Start: 2023-06-12 | End: 2023-06-14

## 2023-06-12 RX ORDER — AMOXICILLIN 250 MG
1 CAPSULE ORAL 2 TIMES DAILY
Status: DISCONTINUED | OUTPATIENT
Start: 2023-06-12 | End: 2023-06-15 | Stop reason: HOSPADM

## 2023-06-12 RX ORDER — OXYCODONE HYDROCHLORIDE 5 MG/1
5 TABLET ORAL EVERY 4 HOURS PRN
Status: DISCONTINUED | OUTPATIENT
Start: 2023-06-12 | End: 2023-06-15 | Stop reason: HOSPADM

## 2023-06-12 RX ORDER — ONDANSETRON 4 MG/1
4 TABLET, ORALLY DISINTEGRATING ORAL EVERY 6 HOURS PRN
Status: DISCONTINUED | OUTPATIENT
Start: 2023-06-12 | End: 2023-06-15 | Stop reason: HOSPADM

## 2023-06-12 RX ORDER — SODIUM CHLORIDE 9 MG/ML
INJECTION, SOLUTION INTRAVENOUS CONTINUOUS
Status: DISCONTINUED | OUTPATIENT
Start: 2023-06-12 | End: 2023-06-13

## 2023-06-12 RX ORDER — PROCHLORPERAZINE 25 MG
25 SUPPOSITORY, RECTAL RECTAL EVERY 12 HOURS PRN
Status: DISCONTINUED | OUTPATIENT
Start: 2023-06-12 | End: 2023-06-15 | Stop reason: HOSPADM

## 2023-06-12 RX ORDER — GABAPENTIN 100 MG/1
100 CAPSULE ORAL EVERY 8 HOURS
Status: DISCONTINUED | OUTPATIENT
Start: 2023-06-19 | End: 2023-06-15 | Stop reason: HOSPADM

## 2023-06-12 RX ORDER — PROCHLORPERAZINE MALEATE 10 MG
10 TABLET ORAL EVERY 6 HOURS PRN
Status: DISCONTINUED | OUTPATIENT
Start: 2023-06-12 | End: 2023-06-15 | Stop reason: HOSPADM

## 2023-06-12 RX ORDER — NICOTINE POLACRILEX 4 MG
15-30 LOZENGE BUCCAL
Status: DISCONTINUED | OUTPATIENT
Start: 2023-06-12 | End: 2023-06-15 | Stop reason: HOSPADM

## 2023-06-12 RX ORDER — GABAPENTIN 300 MG/1
600 CAPSULE ORAL EVERY 8 HOURS
Status: DISCONTINUED | OUTPATIENT
Start: 2023-06-15 | End: 2023-06-15 | Stop reason: HOSPADM

## 2023-06-12 RX ADMIN — IOPAMIDOL 135 ML: 755 INJECTION, SOLUTION INTRAVENOUS at 20:12

## 2023-06-12 RX ADMIN — POTASSIUM CHLORIDE 10 MEQ: 10 INJECTION, SOLUTION INTRAVENOUS at 07:39

## 2023-06-12 RX ADMIN — POTASSIUM CHLORIDE AND SODIUM CHLORIDE: 900; 150 INJECTION, SOLUTION INTRAVENOUS at 11:28

## 2023-06-12 RX ADMIN — FOLIC ACID: 5 INJECTION, SOLUTION INTRAMUSCULAR; INTRAVENOUS; SUBCUTANEOUS at 07:26

## 2023-06-12 RX ADMIN — GABAPENTIN 1200 MG: 600 TABLET, FILM COATED ORAL at 12:17

## 2023-06-12 RX ADMIN — GABAPENTIN 900 MG: 300 CAPSULE ORAL at 17:22

## 2023-06-12 RX ADMIN — INSULIN GLARGINE 12 UNITS: 100 INJECTION, SOLUTION SUBCUTANEOUS at 22:10

## 2023-06-12 RX ADMIN — SODIUM CHLORIDE 79 ML: 9 INJECTION, SOLUTION INTRAVENOUS at 20:12

## 2023-06-12 RX ADMIN — FOLIC ACID: 5 INJECTION, SOLUTION INTRAMUSCULAR; INTRAVENOUS; SUBCUTANEOUS at 12:17

## 2023-06-12 RX ADMIN — INSULIN ASPART 3 UNITS: 100 INJECTION, SOLUTION INTRAVENOUS; SUBCUTANEOUS at 17:22

## 2023-06-12 RX ADMIN — SODIUM CHLORIDE: 9 INJECTION, SOLUTION INTRAVENOUS at 08:40

## 2023-06-12 ASSESSMENT — ACTIVITIES OF DAILY LIVING (ADL)
ADLS_ACUITY_SCORE: 35
DIFFICULTY_EATING/SWALLOWING: NO
CONCENTRATING,_REMEMBERING_OR_MAKING_DECISIONS_DIFFICULTY: NO
ADLS_ACUITY_SCORE: 22
DIFFICULTY_COMMUNICATING: NO
CHANGE_IN_FUNCTIONAL_STATUS_SINCE_ONSET_OF_CURRENT_ILLNESS/INJURY: NO
WEAR_GLASSES_OR_BLIND: YES
ADLS_ACUITY_SCORE: 22
DOING_ERRANDS_INDEPENDENTLY_DIFFICULTY: NO
ADLS_ACUITY_SCORE: 22
ADLS_ACUITY_SCORE: 35
WALKING_OR_CLIMBING_STAIRS_DIFFICULTY: NO
FALL_HISTORY_WITHIN_LAST_SIX_MONTHS: NO
ADLS_ACUITY_SCORE: 22
TOILETING_ISSUES: NO
DRESSING/BATHING_DIFFICULTY: NO
HEARING_DIFFICULTY_OR_DEAF: NO
VISION_MANAGEMENT: GLASSES
ADLS_ACUITY_SCORE: 22
ADLS_ACUITY_SCORE: 22
ADLS_ACUITY_SCORE: 35

## 2023-06-12 NOTE — PROGRESS NOTES
Admission    Patient arrives to [address] from ED  Care plan note:done  Inpatient nursing criteria listed below were met:    Did you put disposition on whiteboard and in sticky note: no  Full skin assessment done yes  Isolation education started/completed NA   Patient allergies verified with patient: NA  Fall Risk? yes}  Primary Care Plan initiated: yes  Home medications documented in belongings flowsheet:yes  Patient belongings documented in belongings flowsheet:yes  Reminder note (belongings/ medications) placed in discharge instructionsyes  Admission profile/ required documentation complete:yes  If patient is a 72 hour hold/Commitment are belongings removed from room and locked up?NA

## 2023-06-12 NOTE — PROGRESS NOTES
RECEIVING UNIT ED HANDOFF REVIEW    ED Nurse Handoff Report was reviewed by: Kira Kunz RN on June 12, 2023 at 9:57 AM

## 2023-06-12 NOTE — PHARMACY-ADMISSION MEDICATION HISTORY
Pharmacist Admission Medication History    Admission medication history is complete. The information provided in this note is only as accurate as the sources available at the time of the update.    Medication reconciliation/reorder completed by provider prior to medication history? No    Information Source(s): Patient, Hospital records and CareEverywhere/SureScripts via in-person    Pertinent Information: None    Changes made to PTA medication list:    Added: None    Deleted: Lisinopril/HCTZ, loperamide, Imitrex    Changed: None    Medication Affordability:  Not including over the counter (OTC) medications, was there a time in the past 3 months when you did not take your medications as prescribed because of cost?: No    Allergies reviewed with patient and updates made in EHR: yes    Medication History Completed By: Abilio Mcnamara RPH 6/12/2023 9:59 AM    Prior to Admission medications    Medication Sig Last Dose Taking? Auth Provider Long Term End Date   albuterol (PROAIR HFA) 108 (90 Base) MCG/ACT inhaler Inhale 1-2 puffs into the lungs 4 times daily as needed for shortness of breath / dyspnea or wheezing Past Month at prn Yes Juancarlos Sood MD Yes

## 2023-06-12 NOTE — H&P
[facility]    History and Physical - Hospitalist Service       Date of Admission:  6/12/2023    Assessment & Plan      Ravi Ahmadi is a 33 year old male with multiple medical problems including hypertension, morbid obesity, tobacco use disorder, mild intermittent asthma, alcohol use disorder, migraine headaches who presents the emergency department complaining of persistent nausea vomiting.  Symptoms began on Friday when he stopped drinking.     Principal Problem:    Alcohol withdrawal syndrome with complication (H)    Alcoholic intoxication with complication (H)    Alcohol use disorder    Probable alcoholic hepatitis without ascites    Admit as inpatient    Treat alcohol withdrawal per [facility] protocol    chem dep consult requested, he is agreeable    Recheck comprehensive metabolic panel in a.m.  Active Problems:   Nausea and vomiting, unspecified vomiting type   Alcohol-induced acute pancreatitis    Labs suggest at least mild alcoholic pancreatitis.  Based on persistent symptoms, including hyperglycemia suggesting pancreatic insufficiency, check CT scan of the abdomen/pelvis    Treat with IV fluids, antiemetics    Allow clear liquids, sparingly    Hyperglycemia   Diabetes mellitus, new diagnosis    Initially felt to be likely due to alcoholic ketosis.  Checked hemoglobin A1c and it is very elevated consistent with a new diagnosis of diabetes mellitus    Treat with IV fluids, volume expansion will likely go a long way to lowering blood sugars    Begin basal and corrective dose insulin.  He will need diabetes education and early follow up with PMD    RN care coordinator consult requested    Hypokalemia    Replace potassium per protocol    Benign essential hypertension    Resume ACE inhibitor    Hold thiazide diuretic in the setting of volume depletion    Intermittent asthma    Resume metered-dose inhalers as needed    Tobacco abuse    Encourage smoking cessation    Nicotine replacement  "therapy ordered as needed    Morbid obesity (H)    Patient would benefit from even modest weight loss    # Hypokalemia: Lowest K = 3.2 mmol/L in last 2 days, will replace as needed      # Anion Gap Metabolic Acidosis: Highest Anion Gap = 23 mmol/L in last 2 days, will monitor and treat as appropriate      # Hypertension: Noted on problem list      # Obesity: Estimated body mass index is 36.62 kg/m  as calculated from the following:    Height as of this encounter: 1.829 m (6').    Weight as of this encounter: 122.5 kg (270 lb).          Diet: Regular Diet Adult    DVT Prophylaxis: Pneumatic Compression Devices  Abernathy Catheter: Not present  Lines: None     Cardiac Monitoring: None  Code Status:   Full    Clinically Significant Risk Factors Present on Admission        Disposition Plan      Expected Discharge Date: 06/14/2023                Katerin Romero MD  Hospitalist Service  [facility]  Securely message with [employer] (more info)  Text page via [employer] Paging/Directory     ______________________________________________________________________    Chief Complaint   \"I was throwing up nonstop, then just gagging.\"    History of Present Illness   Ravi Ahmadi is a 33 year old male with multiple medical problems including hypertension, morbid obesity, tobacco use disorder, mild intermittent asthma, alcohol use disorder, migraine headaches who presents the emergency department complaining of persistent nausea vomiting.      Patient says he wants to stop drinking alcohol and stopped drinking on Friday.  After his last drink, he developed progressive nausea and vomiting, could not keep anything down.  Says that he drinks some water and it came up almost immediately.  Heeports he has a \"pressure\" feeling in the left upper abdomen, \"like discomfort, not really pain.\"    He says he wanted to be able to go to work this morning so he \"finally caved in\" and had about 8 ounces of whiskey, \"I was " "microdosing.\"  He has some discomfort in the left upper abdomen.  He feels \"twitchy,\" and has cold sweats.  He says he has never had DTs, hallucinations, or seizures.  He has never been in chemical dependency treatment.    He came to the emergency department for evaluation where labs show elevated liver function tests, suggesting alcoholic hepatitis.  Lipase is elevated.  He has hyperglycemia.  He is admitted for further evaluation and treatment of alcohol withdrawal, possible alcoholic hepatitis and pancreatitis.    Past Medical History    Past Medical History:   Diagnosis Date     Atopic dermatitis      Intermittent asthma     exercise       Past Surgical History   Past Surgical History:   Procedure Laterality Date     CL AFF SURGICAL PATHOLOGY      gravel/foreign body removal in abdomen after accident       Prior to Admission Medications   Prior to Admission Medications   Prescriptions Last Dose Informant Patient Reported? Taking?   SUMAtriptan (IMITREX) 50 MG tablet   No No   Sig: Take 1 tablet (50 mg) by mouth at onset of headache for migraine May repeat in 2 hours. Max 4 tablets/24 hours.   albuterol (PROAIR HFA) 108 (90 Base) MCG/ACT inhaler   No No   Sig: Inhale 1-2 puffs into the lungs 4 times daily as needed for shortness of breath / dyspnea or wheezing   lisinopril-hydrochlorothiazide (ZESTORETIC) 10-12.5 MG tablet   No No   Sig: Take 1 tablet by mouth daily   loperamide (LOPERAMIDE A-D) 2 MG tablet   No No   Sig: Take 1 tablet (2 mg) by mouth 4 times daily as needed for diarrhea      Facility-Administered Medications: None        Physical Exam   Vital Signs: Temp: 98  F (36.7  C) Temp src: Oral BP: 136/82 Pulse: 93   Resp: 16 SpO2: 91 % O2 Device: None (Room air)    Weight: 270 lbs 0 oz    Constitutional: Awake, alert, conversant  Respiratory: Clear to auscultation bilaterally, no crackles or wheezing  Cardiovascular: Regular rate and rhythm, normal S1 and S2, and no murmur noted  GI: Abdomen is obese, " distended, firm, tender to deep palpation in the left upper quadrant without rebound or guarding  Skin/Integumen: No rash on exposed skin.  No lower extremity edema  Other: Mood is pleasant, he seems slightly anxious      Medical Decision Making       70 MINUTES SPENT BY ME on the date of service doing chart review, history, exam, documentation & further activities per the note.      Data     I have personally reviewed the following data over the past 24 hrs:    9.5  \   15.6   / 213     135 (L) 92 (L) 4.0 (L) /  310 (H)   3.2 (L) 20 (L) 0.47 (L) \       ALT: 129 (H) AST: 235 (H) AP: 121 TBILI: 1.5 (H)   ALB: 4.4 TOT PROTEIN: 8.1 LIPASE: 95 (H)       Imaging results reviewed over the past 24 hrs:   No results found for this or any previous visit (from the past 24 hour(s)).

## 2023-06-12 NOTE — ED NOTES
[facility]  ED Nurse Handoff Report    ED Chief complaint: Nausea & Vomiting      ED Diagnosis:   Final diagnoses:   Alcohol use disorder   Alcohol withdrawal syndrome without complication (H)   Nausea and vomiting, unspecified vomiting type   Benign essential hypertension   Hyperglycemia   Hypokalemia       Code Status: Full Code    Allergies:   Allergies   Allergen Reactions     Nkda [No Known Drug Allergy]        Patient Story: Patient here with nausea and vomiting which started on Friday. Patient stated he stopped drinking. Pt states he had his last drink one hour ago and it consisted of 8 oz. Of whiskey, his drink of choice.  Pt states he has never been to treatment but wants to stop drinking.  Pt is polite and cooperative during assessments.  Focused Assessment:  CIWA- 3, mild nausea, alert and orientated X4, stady on feet. Mildly anxious    Treatments and/or interventions provided: IV potassium, IV fluids, labs  Patient's response to treatments and/or interventions: patient states he feels better. Would like to go to treatment to stop drinking. Eager to accept education.    To be done/followed up on inpatient unit:  Monitor CIWA     Does this patient have any cognitive concerns?: none     Activity level - Baseline/Home:  Independent  Activity Level - Current:   Independent    Patient's Preferred language: English   Needed?: No    Isolation: None  Infection: Not Applicable  Patient tested for COVID 19 prior to admission: NO  Bariatric?: No    Vital Signs:   Vitals:    06/12/23 0700 06/12/23 0705 06/12/23 0730 06/12/23 0731   BP: 136/80 136/80 136/82    Pulse:  95 93    Resp:  16     Temp:       TempSrc:       SpO2:  93%  91%   Weight:       Height:           Cardiac Rhythm:     Was the PSS-3 completed:   Yes  What interventions are required if any?               Family Comments: N/A  OBS brochure/video discussed/provided to patient/family: N/A                 For the majority of the  shift this patient's behavior was Green.   Behavioral interventions performed were none.    ED NURSE PHONE NUMBER: [phone]

## 2023-06-12 NOTE — ED NOTES
Pt states he had his last drink one hour ago and it consisted of 8 oz. Of whiskey, his drink of choice.  Pt states he has never been to treatment but wants to stop drinking.  Pt is polite and cooperative during assessments.

## 2023-06-12 NOTE — PLAN OF CARE
Goal Outcome Evaluation:         Summary:  admitted  from Ed with ETOH, nausea, vomiting  DATE & TIME: 6/12/23 1000- 1530  Cognitive Concerns/ Orientation :A&O x 4  BEHAVIOR & AGGRESSION TOOL COLOR: calm, co operative  CIWA SCORE: 1,3  ABNL VS/O2:vss, on RA, / 95, HR tachy in 100s  MOBILITY:SBA, unsteady on feet.  PAIN MANAGMENT:denies  DIET: CL  BOWEL/BLADDER: continent  ABNL LAB/BG: Lipase 95, LFTs elevated, , Hgb a1c 10.8  DRAIN/DEVICES;PIV infusing  TELEMETRY RHYTHM: NA  SKIN:scattered, scabs and bruising  TESTS/PROCEDURES: CT abdomen  D/C DAY/GOALS/PLACE: pending progress  OTHER IMPORTANT INFO: new diabetic, needs education, started on sliding scale and Lantus, Chem dep and SW consult pending.

## 2023-06-12 NOTE — ED TRIAGE NOTES
Patient here with nausea and vomiting which started on Friday. Patient stated he stopped drinking  Last monday     Triage Assessment     Row Name 06/12/23 0621       Triage Assessment (Adult)    Airway WDL WDL       Respiratory WDL    Respiratory WDL WDL       Skin Circulation/Temperature WDL    Skin Circulation/Temperature WDL WDL       Cardiac WDL    Cardiac WDL WDL       Peripheral/Neurovascular WDL    Peripheral Neurovascular WDL WDL       Cognitive/Neuro/Behavioral WDL    Cognitive/Neuro/Behavioral WDL WDL

## 2023-06-12 NOTE — ED PROVIDER NOTES
"  History     Chief Complaint:  Nausea & Vomiting     The history is provided by the patient.      Ravi Ahmadi is a 33 year old male with a history of hypertension who presents with nausea and vomiting. The patient reports that he has been struggling with alcohol addiction, but has been struggling to quit due to withdrawal symptoms. He reports that he started micro dosing 2-3 weeks ago, but had decided to cut alcohol out completely 7 days ago before beginning to experience cold sweats, \"twitching,\" and nausea and vomiting every few hours throughout the day. He states that these symptoms occurring during his workdays prompted him to restart micro dosing 2 days ago, adding that he doesn't drink a specific amount, just drinking until he feels better. His last drink was at [id number] this morning. He notes that prior to cutting back on alcohol, he had been drinking half of a 175 on a daily basis. He adds that with his symptoms, he has been eating less, as eating makes vomit. He reports that he has not sought out treatment for alcohol addiction before. Of note, he also reports that he fell four days ago, stating that his mattress had shifted due to him twitching in his sleep, and when he got up,, the mattress collapsed towards the floor, causing him to fall. He did not lose consciousness. He denies any history of abdominal surgeries. He also denies any drug use and is not on a blood thinner.  No history of seizures with withdrawal.    Independent Historian:   None - Patient Only    Review of External Notes:   ED visit from 4/20/2021 and 3/17/2021    Medications:    Albuterol  Lisinopril-hydrochlorothiazide    Past Medical History:    Atopic dermatitis  Asthma  Tobacco abuse  Benign essential hypertension    Past Surgical History:    Foreign body removal     Physical Exam     Patient Vitals for the past 24 hrs:   BP Temp Temp src Pulse Resp SpO2 Height Weight   06/12/23 0731 -- -- -- -- -- 91 % -- --   06/12/23 0730 " 136/82 -- -- 93 -- -- -- --   06/12/23 0705 136/80 -- -- 95 16 93 % -- --   06/12/23 0700 136/80 -- -- -- -- -- -- --   06/12/23 0624 (!) 157/101 98  F (36.7  C) Oral 110 18 96 % 1.829 m (6') 122.5 kg (270 lb)        Physical Exam  General: Patient is alert and mildly anxious appearing  HEENT: Head atraumatic    Eyes: pupils equal and reactive. Conjunctiva clear   Nares: patent   Oropharynx: no lesions, uvula midline, no palatal draping, normal voice, no trismus  Neck: Supple without lymphadenopathy, no meningismus  Chest: Tachycardic but regular  Lungs: Equal clear to auscultation with no wheeze or rales  Abdomen: Soft, non tender, nondistended, normal bowel sounds  Back: No costovertebral angle tenderness, no midline C, T or L spine tenderness  Neuro: Mildly tremulous, grossly nonfocal, normal speech, strength equal bilaterally, CN 2-12 intact  Extremities: No deformities, equal radial and DP pulses. No clubbing, cyanosis.  No edema  Skin: Warm and dry with no rash.       Emergency Department Course     Laboratory:  Labs Ordered and Resulted from Time of ED Arrival to Time of ED Departure   COMPREHENSIVE METABOLIC PANEL - Abnormal       Result Value    Sodium 135 (*)     Potassium 3.2 (*)     Chloride 92 (*)     Carbon Dioxide (CO2) 20 (*)     Anion Gap 23 (*)     Urea Nitrogen 4.0 (*)     Creatinine 0.47 (*)     Calcium 9.5      Glucose 310 (*)     Alkaline Phosphatase 121       (*)      (*)     Protein Total 8.1      Albumin 4.4      Bilirubin Total 1.5 (*)     GFR Estimate >90     LIPASE - Abnormal    Lipase 95 (*)    ETHYL ALCOHOL LEVEL - Abnormal    Alcohol ethyl 0.26 (*)    MAGNESIUM - Normal    Magnesium 2.2     CBC WITH PLATELETS AND DIFFERENTIAL    WBC Count 9.5      RBC Count 5.13      Hemoglobin 15.6      Hematocrit 46.7      MCV 91      MCH 30.4      MCHC 33.4      RDW 13.4      Platelet Count 213      % Neutrophils 70      % Lymphocytes 21      % Monocytes 7      % Eosinophils 1      %  "Basophils 1      % Immature Granulocytes 0      NRBCs per 100 WBC 0      Absolute Neutrophils 6.5      Absolute Lymphocytes 2.0      Absolute Monocytes 0.7      Absolute Eosinophils 0.1      Absolute Basophils 0.1      Absolute Immature Granulocytes 0.0      Absolute NRBCs 0.0     PHOSPHORUS        Emergency Department Course & Assessments:     Interventions:  Medications   potassium chloride 10 mEq in 100 mL sterile water infusion (10 mEq Intravenous $New Bag 6/12/23 0739)   OLANZapine zydis (zyPREXA) ODT tab 5-10 mg (has no administration in time range)     Or   haloperidol lactate (HALDOL) injection 2.5-5 mg (has no administration in time range)   flumazenil (ROMAZICON) injection 0.2 mg (has no administration in time range)   diazepam (VALIUM) tablet 10 mg (has no administration in time range)     Or   diazepam (VALIUM) injection 5-10 mg (has no administration in time range)   sodium chloride 0.9% infusion (has no administration in time range)   sodium chloride 0.9 % 1,000 mL with Infuvite Adult 10 mL, thiamine 100 mg, folic acid 1 mg infusion ( Intravenous $New Bag 6/12/23 0791)   LORazepam (ATIVAN) tablet 1 mg (1 mg Oral Not Given 6/12/23 0657)      Independent Interpretation (X-rays, CTs, rhythm strip):  None    Consultations/Discussion of Management or Tests:  0805 I spoke with Dr. Romero, hospitalist, who accepts the patient for admission.     Assessments:  ED Course as of 06/12/23 0807   Mon Jun 12, 2023 0627 I obtained history and examined the patient as noted above.    0800 I reevaluated the patient    Social Determinants of Health affecting care:   None    Disposition:  The patient was admitted to the hospital under the care of Dr. Romero.     Impression & Plan      Medical Decision Making:  Ravi Ahmadi is a 33 year old male who presents for evaluation of alcohol abuse.  He is  intoxicated here in ED by blood work and states he has been \"microdosing\" to treat withdrawal symptoms which were " "severe over the weekend including nausea, vomiting, tremulousness.  Patient with evidence of elevated liver function test, hyponatremia, hypokalemia and mild hyperglycemia.  Suspect all reactive to chronic alcohol use disorder and recent nausea, vomiting and withdrawal..    He appears on exam to be going through acute alcohol withdrawal despite an alcohol level of 0.26 from \"microdosing\" to treat his withdrawal symptoms..  We discussed this and decided on treatment w/ benzodiazepines, see medications given in ED above.     There are no signs of co-ingestion including acetaminophen, drugs, medications, volatile alcohols. He has no signs of trauma related to alcohol use and no further workup is needed including head CT.     Admit to medicine for further cares given history, tachycardia and withdrawal here. Likely will be a prolonged withdrawal course given this and do not feel patient can safely manage at home nor a more ambulatory setting like detox.           Diagnosis:    ICD-10-CM    1. Alcohol use disorder  F10.90       2. Alcohol withdrawal syndrome without complication (H)  F10.930       3. Nausea and vomiting, unspecified vomiting type  R11.2       4. Benign essential hypertension  I10            Scribe Disclosure:  YOLA MOORE, am serving as a scribe at 6:24 AM on 6/12/2023 to document services personally performed by Marily Mckinney MD based on my observations and the provider's statements to me.      6/12/2023   Marily Mckinney MD Neuner, Maria Bea, MD  06/12/23 0822    "

## 2023-06-13 LAB
ALBUMIN SERPL BCG-MCNC: 3.9 G/DL (ref 3.5–5.2)
ALP SERPL-CCNC: 106 U/L (ref 40–129)
ALT SERPL W P-5'-P-CCNC: 104 U/L (ref 10–50)
ANION GAP SERPL CALCULATED.3IONS-SCNC: 16 MMOL/L (ref 7–15)
AST SERPL W P-5'-P-CCNC: 151 U/L (ref 10–50)
BILIRUB SERPL-MCNC: 2.7 MG/DL
BUN SERPL-MCNC: 3.9 MG/DL (ref 6–20)
CALCIUM SERPL-MCNC: 9.7 MG/DL (ref 8.6–10)
CHLORIDE SERPL-SCNC: 95 MMOL/L (ref 98–107)
CREAT SERPL-MCNC: 0.47 MG/DL (ref 0.67–1.17)
DEPRECATED HCO3 PLAS-SCNC: 23 MMOL/L (ref 22–29)
GFR SERPL CREATININE-BSD FRML MDRD: >90 ML/MIN/1.73M2
GLUCOSE BLDC GLUCOMTR-MCNC: 199 MG/DL (ref 70–99)
GLUCOSE BLDC GLUCOMTR-MCNC: 219 MG/DL (ref 70–99)
GLUCOSE BLDC GLUCOMTR-MCNC: 221 MG/DL (ref 70–99)
GLUCOSE BLDC GLUCOMTR-MCNC: 251 MG/DL (ref 70–99)
GLUCOSE BLDC GLUCOMTR-MCNC: 301 MG/DL (ref 70–99)
GLUCOSE SERPL-MCNC: 216 MG/DL (ref 70–99)
LIPASE SERPL-CCNC: 94 U/L (ref 13–60)
POTASSIUM SERPL-SCNC: 3.9 MMOL/L (ref 3.4–5.3)
PROT SERPL-MCNC: 7.3 G/DL (ref 6.4–8.3)
SODIUM SERPL-SCNC: 134 MMOL/L (ref 136–145)

## 2023-06-13 PROCEDURE — 99233 SBSQ HOSP IP/OBS HIGH 50: CPT | Performed by: INTERNAL MEDICINE

## 2023-06-13 PROCEDURE — 250N000013 HC RX MED GY IP 250 OP 250 PS 637: Performed by: INTERNAL MEDICINE

## 2023-06-13 PROCEDURE — 250N000011 HC RX IP 250 OP 636: Performed by: INTERNAL MEDICINE

## 2023-06-13 PROCEDURE — 36415 COLL VENOUS BLD VENIPUNCTURE: CPT | Performed by: INTERNAL MEDICINE

## 2023-06-13 PROCEDURE — 83690 ASSAY OF LIPASE: CPT | Performed by: INTERNAL MEDICINE

## 2023-06-13 PROCEDURE — 80053 COMPREHEN METABOLIC PANEL: CPT | Performed by: INTERNAL MEDICINE

## 2023-06-13 PROCEDURE — 999N000216 HC STATISTIC ADULT CD FACE TO FACE-NO CHRG

## 2023-06-13 PROCEDURE — 120N000001 HC R&B MED SURG/OB

## 2023-06-13 RX ORDER — LISINOPRIL 5 MG/1
5 TABLET ORAL DAILY
Status: DISCONTINUED | OUTPATIENT
Start: 2023-06-13 | End: 2023-06-14

## 2023-06-13 RX ADMIN — MELATONIN 5 MG TABLET 5 MG: at 01:52

## 2023-06-13 RX ADMIN — POTASSIUM CHLORIDE AND SODIUM CHLORIDE: 900; 150 INJECTION, SOLUTION INTRAVENOUS at 08:20

## 2023-06-13 RX ADMIN — INSULIN ASPART 2 UNITS: 100 INJECTION, SOLUTION INTRAVENOUS; SUBCUTANEOUS at 08:31

## 2023-06-13 RX ADMIN — GABAPENTIN 900 MG: 300 CAPSULE ORAL at 08:36

## 2023-06-13 RX ADMIN — GABAPENTIN 900 MG: 300 CAPSULE ORAL at 17:07

## 2023-06-13 RX ADMIN — GABAPENTIN 900 MG: 300 CAPSULE ORAL at 01:52

## 2023-06-13 RX ADMIN — INSULIN ASPART 2 UNITS: 100 INJECTION, SOLUTION INTRAVENOUS; SUBCUTANEOUS at 17:59

## 2023-06-13 RX ADMIN — MULTIPLE VITAMINS W/ MINERALS TAB 1 TABLET: TAB at 08:22

## 2023-06-13 RX ADMIN — THIAMINE HCL TAB 100 MG 100 MG: 100 TAB at 08:21

## 2023-06-13 RX ADMIN — SENNOSIDES AND DOCUSATE SODIUM 2 TABLET: 50; 8.6 TABLET ORAL at 08:21

## 2023-06-13 RX ADMIN — DIAZEPAM 10 MG: 5 TABLET ORAL at 12:41

## 2023-06-13 RX ADMIN — LISINOPRIL 5 MG: 5 TABLET ORAL at 17:06

## 2023-06-13 RX ADMIN — INSULIN ASPART 5 UNITS: 100 INJECTION, SOLUTION INTRAVENOUS; SUBCUTANEOUS at 12:34

## 2023-06-13 RX ADMIN — FOLIC ACID 1 MG: 1 TABLET ORAL at 08:21

## 2023-06-13 ASSESSMENT — ACTIVITIES OF DAILY LIVING (ADL)
ADLS_ACUITY_SCORE: 22
DEPENDENT_IADLS:: INDEPENDENT
ADLS_ACUITY_SCORE: 22

## 2023-06-13 NOTE — PROGRESS NOTES
[facility]    Hospitalist Progress Note    Interval History   - Mild withdrawal today, received diazepam at 1240. He notes he had withdrawal at home about 1.5 months ago, that lasted 5 days.  - Reports approx 80lbs weight loss in the past year (was 350 a year ago).  - Discussed new diagnosis of diabetes  - He seems interested in maintaining sobriety    Assessment & Plan   Summary: Ravi Ahmadi is a 33 year old male with PMH  hypertension, morbid obesity, tobacco use disorder, mild intermittent asthma, alcohol use disorder, migraine headaches who was admitted on 6/12/2023 complaining of nausea, vomiting, and alcohol withdrawal.    Alcohol withdrawal syndrome with complication (H)  Alcoholic intoxication with complication (H)  Alcohol-induced acute pancreatitis  Alcoholic hepatitis without ascites  LFts improving on 6/13 but bili climbing up--likely delayed bili rise, expected to improve. Nausea vomiting already improved and taking PO as of 6/13.  - Continue CIWA protocol, diazepam PRN, gabapentin, Vitamins  - No clonidine was ordered  - Chem dep  - Okay to stop IVF    Alcohol use disorder    Hyperglycemia  Diabetes mellitus, new diagnosis  Initially felt to be likely due to alcoholic ketosis.  Checked hemoglobin A1c and it is very elevated consistent with a new diagnosis of diabetes mellitus. Could be related to pancreatic insufficiency.  - Increase Glargine to 18 units at bedtime  - MDSSI + add carb counting    Hypokalemia: Replace potassium per protocol  - Monitor for refeeding    Benign essential hypertension: /98  - Monitor, consider starting ACEi at discharge if BP remains significantly elevated, given new diagnosis of diabetes    Intermittent asthma: Resume metered-dose inhalers as needed    Tobacco abuse: Encourage smoking cessation    Morbid obesity (H): Reports 80lbs weight loss in one year from 350lbs    DVT Prophylaxis: Pneumatic Compression Devices  Code Status: Full  Code  PT/OT: not needed  Diet: Clear Liquid Diet      Disposition: Expected discharge 1-2 days pending improvement in withdrawal    Nadir Lynch MD, MD  Text Page  (7am to 6pm)  -Data reviewed today: I reviewed all new labs and imaging results over the last 24 hours.    Physical Exam   Temp: 98.2  F (36.8  C) Temp src: Oral BP: (!) 160/98 Pulse: 88   Resp: 18 SpO2: 94 % O2 Device: None (Room air)    Vitals:    06/12/23 0624   Weight: 122.5 kg (270 lb)     Vital Signs with Ranges  Temp:  [97.7  F (36.5  C)-98.3  F (36.8  C)] 98.2  F (36.8  C)  Pulse:  [] 88  Resp:  [17-18] 18  BP: (130-163)/() 160/98  SpO2:  [94 %-96 %] 94 %  I/O last 3 completed shifts:  In: 240 [P.O.:240]  Out: -   O2 requirements: none    Constitutional: Male in NAD, mildly tremulous  HEENT: Eyes nonicteric, oral mucosa moist  Cardiovascular: RRR, normal S1/2, no m/r/g  Respiratory: CTAB, no wheezing or crackles  Vascular: No LE pitting edema  GI: Normoactive bowel sounds, nontender, nondistended  Skin/Integumen: No rashes, hyperpigmented scabs LE which patient reports he picks at  Neuro/Psych: Appropriate affect and mood. A&Ox3, moves all extremities    Medications     0.9% sodium chloride + KCl 20 mEq/L 100 mL/hr at 06/13/23 0820     sodium chloride 100 mL/hr at 06/12/23 0840       folic acid  1 mg Oral Daily     [START ON 6/19/2023] gabapentin  100 mg Oral Q8H     [START ON 6/17/2023] gabapentin  300 mg Oral Q8H     [START ON 6/15/2023] gabapentin  600 mg Oral Q8H     gabapentin  900 mg Oral Q8H     insulin aspart  1-7 Units Subcutaneous TID AC     insulin aspart  1-5 Units Subcutaneous At Bedtime     insulin glargine  18 Units Subcutaneous At Bedtime     multivitamin w/minerals  1 tablet Oral Daily     nicotine   Transdermal Q8H     senna-docusate  1 tablet Oral BID    Or     senna-docusate  2 tablet Oral BID     sodium chloride (PF)  3 mL Intracatheter Q8H     thiamine  100 mg Oral Daily       Data   Recent Labs   Lab  06/13/23  1149 06/13/23  0814 06/13/23  0710 06/12/23  1249 06/12/23  1116 06/12/23  0654   WBC  --   --   --   --   --  9.5   HGB  --   --   --   --   --  15.6   MCV  --   --   --   --   --  91   PLT  --   --   --   --   --  213   NA  --   --  134*  --   --  135*   POTASSIUM  --   --  3.9  --  3.5 3.2*   CHLORIDE  --   --  95*  --   --  92*   CO2  --   --  23  --   --  20*   BUN  --   --  3.9*  --   --  4.0*   CR  --   --  0.47*  --   --  0.47*   ANIONGAP  --   --  16*  --   --  23*   RAEANN  --   --  9.7  --   --  9.5   * 199* 216*   < >  --  310*   ALBUMIN  --   --  3.9  --   --  4.4   PROTTOTAL  --   --  7.3  --   --  8.1   BILITOTAL  --   --  2.7*  --   --  1.5*   ALKPHOS  --   --  106  --   --  121   ALT  --   --  104*  --   --  129*   AST  --   --  151*  --   --  235*   LIPASE  --   --  94*  --   --  95*    < > = values in this interval not displayed.       Imaging:   Recent Results (from the past 24 hour(s))   CT Abdomen Pelvis w Contrast    Narrative    EXAM: CT ABDOMEN PELVIS W CONTRAST  LOCATION: [facility]  DATE/TIME: 6/12/2023 8:27 PM CDT    INDICATION: abdominal pain, elevated lipase, pancreatitis?  COMPARISON: None.  TECHNIQUE: CT scan of the abdomen and pelvis was performed following injection of IV contrast. Multiplanar reformats were obtained. Dose reduction techniques were used.  CONTRAST: 135 mL Isovue 370      FINDINGS:   LOWER CHEST: Normal.    HEPATOBILIARY: Hepatomegaly and hepatic steatosis.     PANCREAS: Normal appearance of the pancreas. No adjacent stranding or fluid collections.     SPLEEN: Mild elongation of the spleen.     ADRENAL GLANDS: Normal.    KIDNEYS/BLADDER: Normal.    BOWEL: Mild colonic diverticulosis. Normal appendix.     LYMPH NODES: Normal.    VASCULATURE: Unremarkable.    PELVIC ORGANS: Normal.    MUSCULOSKELETAL: Normal.        Impression    IMPRESSION:   1.  No CT evidence of acute pancreatitis. No adjacent stranding or fluid collections.    2.  Hepatomegaly and hepatic steatosis.  3.  Mild splenomegaly.

## 2023-06-13 NOTE — CONSULTS
Care Management Initial Consult    General Information  Assessment completed with: Patient,    Type of CM/SW Visit: Offer D/C Planning    Primary Care Provider verified and updated as needed: Yes   Readmission within the last 30 days: no previous admission in last 30 days      Reason for Consult: discharge planning  Advance Care Planning:            Communication Assessment  Patient's communication style: spoken language (English or Bilingual)    Hearing Difficulty or Deaf: no   Wear Glasses or Blind: yes    Cognitive  Cognitive/Neuro/Behavioral: WDL                      Living Environment:   People in home: parent(s)     Current living Arrangements: apartment      Able to return to prior arrangements: yes       Family/Social Support:  Care provided by: self  Provides care for: no one  Marital Status: Single  Parent(s)          Description of Support System: Supportive    Support Assessment: Adequate family and caregiver support    Current Resources:   Patient receiving home care services: No     Community Resources:    Equipment currently used at home: none  Supplies currently used at home:      Employment/Financial:  Employment Status: employed full-time        Financial Concerns:             Does the patient's insurance plan have a 3 day qualifying hospital stay waiver?  No    Lifestyle & Psychosocial Needs:  Social Determinants of Health     Tobacco Use: High Risk (1/7/2021)    Patient History      Smoking Tobacco Use: Some Days      Smokeless Tobacco Use: Never      Passive Exposure: Not on file   Alcohol Use: Heavy Drinker (1/7/2021)    AUDIT-C      Frequency of Alcohol Consumption: 4 or more times a week      Average Number of Drinks: 1 or 2      Frequency of Binge Drinking: Monthly   Financial Resource Strain: Not on file   Food Insecurity: Not on file   Transportation Needs: Not on file   Physical Activity: Not on file   Stress: Not on file   Social Connections: Not on file   Intimate Partner Violence: Not  on file   Depression: Not at risk (1/7/2021)    PHQ-2      PHQ-2 Score: 0   Housing Stability: Not on file       Functional Status:  Prior to admission patient needed assistance:   Dependent ADLs:: Independent  Dependent IADLs:: Independent  Assesssment of Functional Status: At functional baseline    Mental Health Status:  Mental Health Status: No Current Concerns       Chemical Dependency Status:  Chemical Dependency Status: Current Concern  Chemical Dependency Management: Sponsor          Values/Beliefs:  Spiritual, Cultural Beliefs, Christian Practices, Values that affect care: no               Additional Information:  Met with patient in room to discuss plan of care and discharge plans. Patient stating,he was adm due to persistent nausea ,dizziness and headache,states he has a with alcohol and has been sucessful in quitting on his own. CD counselor has met with patient and offered resources.   Patient does have a primary physician at [facility] and intends to follow through as outpt..  Patient reports that he has made a choice on living healthy and has a good friend that he is accountable to.   Patient will need a glucometer with strips and lancets prior to discharge. Bedside RN to start education prior to discharge     Ashu Oleary RN [phone]

## 2023-06-13 NOTE — CONSULTS
6/13/2023    Met with pt via telephone to offer CD services. Pt reports he has never attended CD treatment. Pt reports he has quit drinking on his own several times in the past. Pt reports he would not be interested in attending CD treatment at this time. Pt reports he doesn't have insurance. Pt reports he has supportive friends who have been through treatment and are willing to help him. Pt reports he would like to receive CD resources. Email has been sent to pt with CD resources. Pt is able to call Mental Health and Addiction Services Line: [phone] and make an appt through [employer] when he gets his funding in place if he would like an evaluation and referrals to CD treatment.    Home  Oration  https://www.[email or web address]    Supportive Services   [employer]  https://www.smartrecovery.org    Alcoholics Anonymous  http://www.aa.org  Community Drug & Alcohol Support Resources   Alcoholics Anonymous   24/7 Phone Line: [phone]   https://[place].org/   For additional list of online meetings: http://aa-intergroup.org      [place] [facility]   [address]   [place] [address]   Phone: [phone]  [email or web address]      EILEEN Romo  Phone: [phone]  Email: [email or web address]

## 2023-06-13 NOTE — PLAN OF CARE
Summary: ETOH, nausea, vomiting  DATE & TIME: 6/12/23 4605-2432  Cognitive Concerns/ Orientation :A&O x 4, calm and cooperative  BEHAVIOR & AGGRESSION TOOL COLOR: Green  CIWA SCORE: 3, 1 (for sweat, nausea, tremor and headache  ABNL VS/O2:vss, on RA, /99, HR tachy in 100s  MOBILITY:SBA, unsteady on feet.  PAIN MANAGMENT:denies  DIET: clear liquid  BOWEL/BLADDER: continent, BM x1  ABNL LAB/BG: Lipase 95, LFTs elevated, , 307, Hgb a1c 10.8  DRAIN/DEVICES;PIV infusing NS w/ 20 mEq KCL @ 100 mL/hr  TELEMETRY RHYTHM: NA  SKIN:scattered, scabs and bruising  TESTS/PROCEDURES: CT abdomen done this evening  D/C DAY/GOALS/PLACE: pending progress  OTHER IMPORTANT INFO: new diabetic, needs education, started on sliding scale and Lantus, Chem dep and SW consult pending.

## 2023-06-13 NOTE — PROVIDER NOTIFICATION
MD Notification    Notified Person: MD    Notified Person Name: Dr. Lynch    Notification Date/Time: 6/13/23 1530    Notification Interaction:  Arianna    Purpose of Notification:  Hi, do you want 601 to be on a Mod Carb diet since he is diabetic? Also can you please order him a BG glucometer for home? Thanks   I'll change it     Hi,  601 BP is pretty high. Can you order something PRN for him. R. arm was 164/113 and left arm was 184/126, it has been consistently high throughout the day.     Orders Received: Lisinopril 5 mg    Comments:

## 2023-06-13 NOTE — UTILIZATION REVIEW
Admission Status; Secondary Review Determination    Under the authority of the Utilization Management Committee, the utilization review process indicated a secondary review on the above patient. The review outcome is based on review of the medical records, discussions with staff, and applying clinical experience noted on the date of the review.    (x) Inpatient Status Appropriate - This patient's medical care is consistent with medical management for inpatient care and reasonable inpatient medical practice.    RATIONALE FOR DETERMINATION: 33-year-old male with history of hypertension and alcoholism who had attempted to reduced his consumption gradually but discontinued completely 3 to 4 days prior to admission now presented to hospital due to recurrent diaphoresis, restlessness and twitching, nausea and vomiting intermittently throughout the day.  Prior to this patient had been drinking approximately 85 mm of hard alcohol daily.  With patient's significant nausea recurrent vomiting, requiring IV fluids, found to have new onset diabetes requiring acute management as well as significant withdrawal symptoms and hospital day 2 afternoon requiring 10 mg of diazepam.  Therefore patient expected to require greater than 2 nights in the hospital with active management.  If patient's symptoms resolve quickly and is able to be discharged on hospital day 2, then at that time patient would be most appropriate to be observation care.    At the time of admission with the information available to the attending physician more than 2 nights Hospital complex care was anticipated, based on patient risk of adverse outcome if treated as outpatient and complex care required. Inpatient admission is appropriate based on the Medicare guidelines.    This document was produced using voice recognition software    The information on this document is developed by the utilization review team in order for the business office to ensure  compliance. This only denotes the appropriateness of proper admission status and does not reflect the quality of care rendered.    The definitions of Inpatient Status and Observation Status used in making the determination above are those provided in the CMS Coverage Manual, Chapter 1 and Chapter 6, section 70.4.    Sincerely,    Lazaro Deleon MD  Utilization Review  Physician Advisor  [facility].

## 2023-06-13 NOTE — PLAN OF CARE
Goal Outcome Evaluation:  Summary: ETOH withdrawal, nausea, vomiting  DATE & TIME: 6/13/23 6171-7004  Cognitive Concerns/ Orientation: A&O x 4, calm and cooperative  BEHAVIOR & AGGRESSION TOOL COLOR: Green  CIWA SCORE: 6,9,2,2  (scoring for sweat, nausea, tremor and headache) PO Valium given x1. Effective  ABNL VS/O2: VSS on RA, Elevated /113, MD aware Lisinopril ordered and given x1, (effective) recheck /99, HR tachy at times  MOBILITY: Independent   PAIN MANAGMENT: denies  DIET: Mod Carb, tolerating (on carb count)  BOWEL/BLADDER: Continent  ABNL LAB/BG: ALT//151, , 301, 221, Na 134, Bili 2.7  DRAIN/DEVICES: L PIV SL (fluids discontinued this shift)  TELEMETRY RHYTHM: NA  SKIN: scattered, scabs, and bruising  TESTS/PROCEDURES: None  D/C DAY/GOALS/PLACE: pending progress  OTHER IMPORTANT INFO: new diabetic, needs education, started on sliding scale and Lantus, Chem dep following (pt is not interested in treatment per CD) and SW consult pending. Pt declined nicotine patch.

## 2023-06-13 NOTE — PLAN OF CARE
Goal Outcome Evaluation:       Summary: ETOH withdrawal, nausea, vomiting  DATE & TIME: 6/12/23 6777-4769  Cognitive Concerns/ Orientation: A&O x 4, calm and cooperative  BEHAVIOR & AGGRESSION TOOL COLOR: Green  CIWA SCORE: 4, 6 (for sweat, nausea, tremor and headache  ABNL VS/O2: VSS on RA, /103, HR tachy at times  MOBILITY: Independent with IV pole  PAIN MANAGMENT: denies  DIET: clear liquid  BOWEL/BLADDER: continent  ABNL LAB/BG: LFTs elevated, , Hgb a1c 10.8  DRAIN/DEVICES: L PIV infusing NS w/ 20 mEq KCL @ 100 mL/hr  TELEMETRY RHYTHM: NA  SKIN: scattered, scabs, and bruising  TESTS/PROCEDURES: None  D/C DAY/GOALS/PLACE: pending progress  OTHER IMPORTANT INFO: new diabetic, needs education, started on sliding scale and Lantus, Chem dep and SW consult pending.

## 2023-06-14 ENCOUNTER — APPOINTMENT (OUTPATIENT)
Dept: ULTRASOUND IMAGING | Facility: CLINIC | Age: 33
End: 2023-06-14
Attending: INTERNAL MEDICINE
Payer: MEDICAID

## 2023-06-14 LAB
ALBUMIN SERPL BCG-MCNC: 4.2 G/DL (ref 3.5–5.2)
ALP SERPL-CCNC: 112 U/L (ref 40–129)
ALT SERPL W P-5'-P-CCNC: 94 U/L (ref 0–70)
ANION GAP SERPL CALCULATED.3IONS-SCNC: 16 MMOL/L (ref 7–15)
AST SERPL W P-5'-P-CCNC: 121 U/L (ref 0–45)
BILIRUB SERPL-MCNC: 2.9 MG/DL
BUN SERPL-MCNC: 4.7 MG/DL (ref 6–20)
CALCIUM SERPL-MCNC: 9.8 MG/DL (ref 8.6–10)
CHLORIDE SERPL-SCNC: 97 MMOL/L (ref 98–107)
CREAT SERPL-MCNC: 0.48 MG/DL (ref 0.67–1.17)
DEPRECATED HCO3 PLAS-SCNC: 23 MMOL/L (ref 22–29)
GFR SERPL CREATININE-BSD FRML MDRD: >90 ML/MIN/1.73M2
GLUCOSE BLDC GLUCOMTR-MCNC: 183 MG/DL (ref 70–99)
GLUCOSE BLDC GLUCOMTR-MCNC: 183 MG/DL (ref 70–99)
GLUCOSE BLDC GLUCOMTR-MCNC: 184 MG/DL (ref 70–99)
GLUCOSE BLDC GLUCOMTR-MCNC: 185 MG/DL (ref 70–99)
GLUCOSE BLDC GLUCOMTR-MCNC: 195 MG/DL (ref 70–99)
GLUCOSE SERPL-MCNC: 171 MG/DL (ref 70–99)
MAGNESIUM SERPL-MCNC: 2.2 MG/DL (ref 1.7–2.3)
POTASSIUM SERPL-SCNC: 3.9 MMOL/L (ref 3.4–5.3)
PROT SERPL-MCNC: 7.5 G/DL (ref 6.4–8.3)
SODIUM SERPL-SCNC: 136 MMOL/L (ref 136–145)

## 2023-06-14 PROCEDURE — 250N000013 HC RX MED GY IP 250 OP 250 PS 637: Performed by: INTERNAL MEDICINE

## 2023-06-14 PROCEDURE — 76705 ECHO EXAM OF ABDOMEN: CPT

## 2023-06-14 PROCEDURE — 36415 COLL VENOUS BLD VENIPUNCTURE: CPT | Performed by: INTERNAL MEDICINE

## 2023-06-14 PROCEDURE — 83735 ASSAY OF MAGNESIUM: CPT | Performed by: INTERNAL MEDICINE

## 2023-06-14 PROCEDURE — 80053 COMPREHEN METABOLIC PANEL: CPT | Performed by: INTERNAL MEDICINE

## 2023-06-14 PROCEDURE — 99232 SBSQ HOSP IP/OBS MODERATE 35: CPT | Performed by: INTERNAL MEDICINE

## 2023-06-14 PROCEDURE — 120N000001 HC R&B MED SURG/OB

## 2023-06-14 RX ORDER — CONTAINER,EMPTY
EACH MISCELLANEOUS
Qty: 1 EACH | Refills: 0 | Status: ON HOLD | OUTPATIENT
Start: 2023-06-14 | End: 2023-12-12

## 2023-06-14 RX ORDER — LISINOPRIL 10 MG/1
10 TABLET ORAL DAILY
Status: DISCONTINUED | OUTPATIENT
Start: 2023-06-15 | End: 2023-06-15 | Stop reason: HOSPADM

## 2023-06-14 RX ORDER — BLOOD PRESSURE TEST KIT
KIT MISCELLANEOUS
Qty: 100 EACH | Refills: 0 | Status: ON HOLD | OUTPATIENT
Start: 2023-06-14 | End: 2023-12-12

## 2023-06-14 RX ADMIN — LISINOPRIL 5 MG: 5 TABLET ORAL at 08:32

## 2023-06-14 RX ADMIN — GABAPENTIN 900 MG: 300 CAPSULE ORAL at 18:22

## 2023-06-14 RX ADMIN — GABAPENTIN 900 MG: 300 CAPSULE ORAL at 08:41

## 2023-06-14 RX ADMIN — FOLIC ACID 1 MG: 1 TABLET ORAL at 08:32

## 2023-06-14 RX ADMIN — SENNOSIDES AND DOCUSATE SODIUM 1 TABLET: 50; 8.6 TABLET ORAL at 08:32

## 2023-06-14 RX ADMIN — MULTIPLE VITAMINS W/ MINERALS TAB 1 TABLET: TAB at 08:32

## 2023-06-14 RX ADMIN — THIAMINE HCL TAB 100 MG 100 MG: 100 TAB at 08:32

## 2023-06-14 RX ADMIN — INSULIN ASPART 1 UNITS: 100 INJECTION, SOLUTION INTRAVENOUS; SUBCUTANEOUS at 19:18

## 2023-06-14 RX ADMIN — GABAPENTIN 900 MG: 300 CAPSULE ORAL at 01:35

## 2023-06-14 RX ADMIN — INSULIN ASPART 1 UNITS: 100 INJECTION, SOLUTION INTRAVENOUS; SUBCUTANEOUS at 08:32

## 2023-06-14 RX ADMIN — SENNOSIDES AND DOCUSATE SODIUM 1 TABLET: 50; 8.6 TABLET ORAL at 20:59

## 2023-06-14 ASSESSMENT — ACTIVITIES OF DAILY LIVING (ADL)
ADLS_ACUITY_SCORE: 22

## 2023-06-14 NOTE — PLAN OF CARE
Goal Outcome Evaluation:       Summary: ETOH withdrawal, nausea, vomiting  DATE & TIME: 6/14/23 1480-9165  Cognitive Concerns/ Orientation: A&O x 4, calm and cooperative  BEHAVIOR & AGGRESSION TOOL COLOR: Green  CIWA SCORE: 0, 0, 0  ABNL VS/O2: VSS on RA. Tachy at times  MOBILITY: Independent, ambulating in room/hallways, showered   PAIN MANAGMENT: Denies  DIET: Mod Carb, tolerating (on carb count, new diabetic)  BOWEL/BLADDER: Continent  ABNL LAB/BG: Elevated LFT's/-ALT 94, , bili 2.9 , 185, 183,  K 3.9, Mg 2.2-recheck levels in am per protocol  DRAIN/DEVICES: L PIV SL   SKIN: Scattered, scabs, and bruising  D/C DAY/GOALS/PLACE: Potentially tomorrow 6/15  OTHER IMPORTANT INFO: Diabetic education started.  Diabetes booklet given to patient and reviewed.  Pt demonstrated back to nursing how to given self insulin injections.  Glucometer ordered and will educate this evening on use. Abdominal US ordered today.

## 2023-06-14 NOTE — PLAN OF CARE
Goal Outcome Evaluation:      Plan of Care Reviewed With: patient    Overall Patient Progress: improving    DATE & TIME: 6/13/23 1437-0026  Cognitive Concerns/ Orientation: A&O x 4, calm and cooperative  BEHAVIOR & AGGRESSION TOOL COLOR: Green  CIWA SCORE: 2, 2, 1  ABNL VS/O2: VSS on RA. Tachy at times  MOBILITY: Independent   PAIN MANAGMENT: Denies  DIET: Mod Carb, tolerating (on carb count, new diabetic)  BOWEL/BLADDER: Continent  ABNL LAB/BG: Elevated LFT's,  & 195  DRAIN/DEVICES: L PIV SL   SKIN: Scattered, scabs, and bruising  D/C DAY/GOALS/PLACE: Potentially Wednesday  OTHER IMPORTANT INFO: Needs continued diabetic education.

## 2023-06-14 NOTE — PROGRESS NOTES
[facility]    Hospitalist Progress Note    Assessment & Plan   Summary: Ravi Ahmadi is a 33 year old male with PMH  hypertension, morbid obesity, tobacco use disorder, mild intermittent asthma, alcohol use disorder, migraine headaches who was admitted on 6/12/2023 complaining of nausea, vomiting, and alcohol withdrawal.    Alcohol withdrawal syndrome with complication (H)  Alcoholic intoxication with complication (H)  Alcohol-induced acute pancreatitis  Alcoholic hepatitis without ascites  Alcohol use disorder  LFts improving on 6/13 but bili climbing up--likely delayed bili rise, expected to improve. Nausea vomiting already improved and taking PO as of 6/13.    Continue CIWA protocol, diazepam PRN, gabapentin, Vitamins    Chem dep consult completed    Transaminases are improving but bilirubin is increasing.  Check right upper quadrant ultrasound, exclude obstructing common duct stone    Hyperglycemia  Diabetes mellitus, new diagnosis  Initially felt to be likely due to alcoholic ketosis.  Checked hemoglobin A1c and it is very elevated consistent with a new diagnosis of diabetes mellitus. Could be related to pancreatic insufficiency.    Increased Glargine to 18 units at bedtime    MDSSI + add carb counting    Ordered glucometer and all diabetes supplies for home.  RN will instruct patient on use prior to discharge    Hypokalemia: Replace potassium per protocol    Benign essential hypertension: /98    Added ACEi  Since BP remains significantly elevated, given new diagnosis of diabetes    Intermittent asthma: Resume metered-dose inhalers as needed    Tobacco abuse: Encourage smoking cessation    Morbid obesity (H): Reports 80lbs weight loss in one year from 350lbs    DVT Prophylaxis: Pneumatic Compression Devices  Code Status: Full Code  PT/OT: not needed  Diet: Moderate Consistent Carb (60 g CHO per Meal) Diet      Disposition: Anticipate discharge home tomorrow unless right upper  "quadrant ultrasound suggests symptomatic choledocholithiasis/cholecystitis or blood sugars are uncontrolled    Katerin Romero M.D.  Hospitalist  [facility]  Securely message with the Vocera Web Console (learn more here)  Text page via [employer] Paging/Directory       Interval History   \"That pain is totally gone.\"  Goran says he has no abdominal discomfort.  He does not feel worried about learning principles of diabetes management, including glucometer use, asks, \"is there an fernando that will tell me what I should eat?\"  He asks about returning to work.  He has no new respiratory complaints.    -Data reviewed today: I reviewed all new labs and imaging results over the last 24 hours.    Physical Exam   Temp: 97.9  F (36.6  C) Temp src: Oral BP: (!) 153/100 Pulse: 82   Resp: 17 SpO2: 95 % O2 Device: None (Room air)    Vitals:    06/12/23 0624   Weight: 122.5 kg (270 lb)     Vital Signs with Ranges  Temp:  [97.9  F (36.6  C)-98.2  F (36.8  C)] 97.9  F (36.6  C)  Pulse:  [81-88] 82  Resp:  [16-18] 17  BP: (142-184)/() 153/100  SpO2:  [93 %-96 %] 95 %  No intake/output data recorded.  O2 requirements: none    Constitutional: Awake, alert.  Looks comfortable.  HEENT: Eyes nonicteric, oral mucosa moist  Cardiovascular: RRR, normal S1/2, no m/r/g  Respiratory: CTAB, no wheezing or crackles  Vascular: No LE pitting edema  GI: Normoactive bowel sounds, nontender, nondistended  Skin/Integumen: No rashes, hyperpigmented scabs LE which patient reports he picks at  Neuro/Psych: Mood is very pleasant and cooperative    Medications       folic acid  1 mg Oral Daily     [START ON 6/19/2023] gabapentin  100 mg Oral Q8H     [START ON 6/17/2023] gabapentin  300 mg Oral Q8H     [START ON 6/15/2023] gabapentin  600 mg Oral Q8H     gabapentin  900 mg Oral Q8H     insulin aspart   Subcutaneous TID w/meals     insulin aspart  1-7 Units Subcutaneous TID AC     insulin aspart  1-5 Units Subcutaneous At Bedtime     insulin " glargine  18 Units Subcutaneous At Bedtime     lisinopril  5 mg Oral Daily     multivitamin w/minerals  1 tablet Oral Daily     nicotine   Transdermal Q8H     senna-docusate  1 tablet Oral BID    Or     senna-docusate  2 tablet Oral BID     sodium chloride (PF)  3 mL Intracatheter Q8H     thiamine  100 mg Oral Daily       Data   Recent Labs   Lab 06/14/23  0748 06/14/23  0137 06/13/23  0814 06/13/23  0710 06/12/23  1249 06/12/23  1116 06/12/23  0654   WBC  --   --   --   --   --   --  9.5   HGB  --   --   --   --   --   --  15.6   MCV  --   --   --   --   --   --  91   PLT  --   --   --   --   --   --  213     --   --  134*  --   --  135*   POTASSIUM 3.9  --   --  3.9  --  3.5 3.2*   CHLORIDE 97*  --   --  95*  --   --  92*   CO2 23  --   --  23  --   --  20*   BUN 4.7*  --   --  3.9*  --   --  4.0*   CR 0.48*  --   --  0.47*  --   --  0.47*   ANIONGAP 16*  --   --  16*  --   --  23*   RAEANN 9.8  --   --  9.7  --   --  9.5   *  171* 195*   < > 216*   < >  --  310*   ALBUMIN 4.2  --   --  3.9  --   --  4.4   PROTTOTAL 7.5  --   --  7.3  --   --  8.1   BILITOTAL 2.9*  --   --  2.7*  --   --  1.5*   ALKPHOS 112  --   --  106  --   --  121   ALT 94*  --   --  104*  --   --  129*   *  --   --  151*  --   --  235*   LIPASE  --   --   --  94*  --   --  95*    < > = values in this interval not displayed.       Imaging:   No results found for this or any previous visit (from the past 24 hour(s)).

## 2023-06-15 VITALS
OXYGEN SATURATION: 97 % | HEART RATE: 85 BPM | DIASTOLIC BLOOD PRESSURE: 92 MMHG | BODY MASS INDEX: 36.57 KG/M2 | TEMPERATURE: 98.1 F | SYSTOLIC BLOOD PRESSURE: 133 MMHG | HEIGHT: 72 IN | WEIGHT: 270 LBS | RESPIRATION RATE: 16 BRPM

## 2023-06-15 LAB
GLUCOSE BLDC GLUCOMTR-MCNC: 160 MG/DL (ref 70–99)
MAGNESIUM SERPL-MCNC: 2.3 MG/DL (ref 1.7–2.3)
POTASSIUM SERPL-SCNC: 4 MMOL/L (ref 3.4–5.3)

## 2023-06-15 PROCEDURE — 250N000013 HC RX MED GY IP 250 OP 250 PS 637: Performed by: INTERNAL MEDICINE

## 2023-06-15 PROCEDURE — 36415 COLL VENOUS BLD VENIPUNCTURE: CPT | Performed by: INTERNAL MEDICINE

## 2023-06-15 PROCEDURE — 99239 HOSP IP/OBS DSCHRG MGMT >30: CPT | Performed by: INTERNAL MEDICINE

## 2023-06-15 PROCEDURE — 83735 ASSAY OF MAGNESIUM: CPT | Performed by: INTERNAL MEDICINE

## 2023-06-15 PROCEDURE — 84132 ASSAY OF SERUM POTASSIUM: CPT | Performed by: INTERNAL MEDICINE

## 2023-06-15 RX ORDER — LANOLIN ALCOHOL/MO/W.PET/CERES
100 CREAM (GRAM) TOPICAL DAILY
Qty: 4 TABLET | Refills: 0 | Status: SHIPPED | OUTPATIENT
Start: 2023-06-16 | End: 2023-06-20

## 2023-06-15 RX ORDER — LISINOPRIL 10 MG/1
10 TABLET ORAL DAILY
Qty: 30 TABLET | Refills: 0 | Status: ON HOLD | OUTPATIENT
Start: 2023-06-16 | End: 2023-10-25

## 2023-06-15 RX ADMIN — INSULIN ASPART 2 UNITS: 100 INJECTION, SOLUTION INTRAVENOUS; SUBCUTANEOUS at 08:36

## 2023-06-15 RX ADMIN — LISINOPRIL 10 MG: 10 TABLET ORAL at 08:35

## 2023-06-15 RX ADMIN — FOLIC ACID 1 MG: 1 TABLET ORAL at 08:35

## 2023-06-15 RX ADMIN — MULTIPLE VITAMINS W/ MINERALS TAB 1 TABLET: TAB at 08:34

## 2023-06-15 RX ADMIN — GABAPENTIN 900 MG: 300 CAPSULE ORAL at 01:46

## 2023-06-15 RX ADMIN — SENNOSIDES AND DOCUSATE SODIUM 1 TABLET: 50; 8.6 TABLET ORAL at 08:36

## 2023-06-15 RX ADMIN — GABAPENTIN 900 MG: 300 CAPSULE ORAL at 08:34

## 2023-06-15 RX ADMIN — THIAMINE HCL TAB 100 MG 100 MG: 100 TAB at 08:34

## 2023-06-15 ASSESSMENT — ACTIVITIES OF DAILY LIVING (ADL)
ADLS_ACUITY_SCORE: 22

## 2023-06-15 NOTE — PROGRESS NOTES
Care Management Discharge Note    Discharge Date: 06/15/2023       Discharge Disposition: Home    Discharge Services: Chemical Dependency Resources    Discharge DME: Diabetc supplioes    Discharge Transportation: family or friend will provide    Private pay costs discussed: Not applicable    Does the patient's insurance plan have a 3 day qualifying hospital stay waiver?  No         Education Provided on the Discharge Plan: Yes  Persons Notified of Discharge Plans: YES  Patient/Family in Agreement with the Plan: yes    Handoff Referral Completed: Yes    Additional Information:  Patient discharging today. Patient stating he is confident in monitoring his glucoses and administering insulin.   Family transporting patient. Follow up appointment completed    June 26, 2023 ED/Hospital Follow Up 1:15 PM   Mayte Hill MD  [address] [id number] [phone]             Ashu Oleary RN [phone]

## 2023-06-15 NOTE — PLAN OF CARE
Summary: N/V, ETOH withdrawal  DATE & TIME: 230-2330  Cognitive Concerns/ Orientation: A&Ox4  BEHAVIOR & AGGRESSION TOOL COLOR: Green  CIWA SCORE: 0  ABNL VS/O2: VSS on RA.   MOBILITY: Independent/steady  PAIN MANAGMENT: Denies  DIET: Mod Carb, carb count and insulin sliding scale  BOWEL/BLADDER: Continent  ABNL LAB/BG: Elevated LFT's/-ALT 94, , bili 2.9 B,  K 3.9, Mg 2.2-recheck levels in am per protocol  DRAIN/DEVICES: L PIV SL   SKIN: Scattered bruising  D/C DAY/GOALS/PLACE: Potentially tomorrow 6/15  OTHER IMPORTANT INFO: Newly Dx diabetic, education started by previous RN. US of abdomen completed--see results.

## 2023-06-15 NOTE — PLAN OF CARE
Goal Outcome Evaluation:         Summary: ETOH withdrawal, nausea, vomiting  DATE & TIME: 6/15/23 3291-0972  Cognitive Concerns/ Orientation: A&O x 4, calm and cooperative  BEHAVIOR & AGGRESSION TOOL COLOR: Green  CIWA SCORE: 0  ABNL VS/O2: VSS on RA.   MOBILITY: Independent  PAIN MANAGMENT: Denies  DIET: Mod Carb, tolerating (on carb count, new diabetic)  BOWEL/BLADDER: Continent  ABNL LAB/BG:   DRAIN/DEVICES: L PIV SL removed   SKIN: Scattered, scabs, and bruising  D/C DAY/GOALS/PLACE: Home today 6/15/23  OTHER IMPORTANT INFO: New diabetic. Education materials at bedside.  All education completed with patient.    .Discharge    Patient discharged to home via car with family  Care plan note  complete    Listed belongings gathered and given to patient (including from security/pharmacy). Yes  Care Plan and Patient education resolved: Yes  Prescriptions if needed, hard copies sent with patient  Yes  Medication Bin checked and emptied on discharge Yes  SW/care coordinator/charge RN aware of discharge: Yes

## 2023-06-15 NOTE — PLAN OF CARE
Summary: ETOH withdrawal, nausea, vomiting  DATE & TIME: 6/15/23 6268-1156  Cognitive Concerns/ Orientation: A&O x 4, calm and cooperative  BEHAVIOR & AGGRESSION TOOL COLOR: Green  CIWA SCORE: 1  ABNL VS/O2: VSS on RA.   MOBILITY: Independent  PAIN MANAGMENT: Denies  DIET: Mod Carb, tolerating (on carb count, new diabetic)  BOWEL/BLADDER: Continent  ABNL LAB/BG:   DRAIN/DEVICES: L PIV SL   SKIN: Scattered, scabs, and bruising  D/C DAY/GOALS/PLACE: Potential discharge today  OTHER IMPORTANT INFO: New diabetic. Education materials at bedside.

## 2023-06-15 NOTE — DISCHARGE SUMMARY
[facility]  Hospitalist Discharge Summary      Date of Admission:  6/12/2023  Date of Discharge:  6/15/2023  Discharging Provider: Katerin Romero MD  Discharge Service: Hospitalist Service    Discharge Diagnoses   Alcohol withdrawal syndrome with complication (H)  Alcoholic intoxication with complication (H)  Alcohol-induced acute pancreatitis  Alcoholic hepatitis without ascites  Alcohol use disorder  Hyperglycemia  Diabetes mellitus, new diagnosis  Hypokalemia  Hypertension  Intermittent asthma  Tobacco abuse  Morbid obesity    Clinically Significant Risk Factors     # DMII: A1C = 10.8 % (Ref range: <5.7 %) within past 6 months  # Obesity: Estimated body mass index is 36.62 kg/m  as calculated from the following:    Height as of this encounter: 1.829 m (6').    Weight as of this encounter: 122.5 kg (270 lb).       Follow-ups Needed After Discharge   Follow-up Appointments     Follow-up and recommended labs and tests       Follow up with primary care provider, Juancarlos Sood, within 7 days   to evaluate medication change, for hospital follow- up, and regarding new   diagnosis.  The following labs/tests are recommended: BMP (newly on Ace   inhibitor).             Unresulted Labs Ordered in the Past 30 Days of this Admission     No orders found from 5/13/2023 to 6/13/2023.          Discharge Disposition   Discharged to home  Condition at discharge: Stable    Hospital Course   Summary: Ravi Ahmadi is a 33 year old male with PMH  hypertension, morbid obesity, tobacco use disorder, mild intermittent asthma, alcohol use disorder, migraine headaches who was admitted on 6/12/2023 complaining of nausea, vomiting, and alcohol withdrawal.    Alcohol withdrawal syndrome with complication (H)  Alcoholic intoxication with complication (H)  Alcohol-induced acute pancreatitis  Alcoholic hepatitis without ascites  Alcohol use disorder  LFts improving on 6/13 but bili climbing up--likely delayed  bili rise, expected to improve. Nausea vomiting already improved and taking PO as of 6/13.    Treated according to CIWA protocol, diazepam PRN, gabapentin, Vitamins    Chem dep consult completed    Transaminases are improving but bilirubin increased.  Checkws right upper quadrant ultrasound, exclude obstructing common duct stone.  It is within normal limits.    Hyperglycemia  Diabetes mellitus, new diagnosis  Initially felt to be likely due to alcoholic ketosis.  Checked hemoglobin A1c and it is very elevated consistent with a new diagnosis of diabetes mellitus. Could be related to pancreatic insufficiency.    Increased Glargine to 18 units at bedtime    MDSSI + add carb counting    Ordered glucometer and all diabetes supplies for home.  RN instructed patient on use prior to discharge    Hypokalemia: Replaced potassium per protocol    Benign essential hypertension: /98    Added ACEI since BP remains significantly elevated, and given new diagnosis of diabetes    Intermittent asthma: Resume metered-dose inhalers as needed    Tobacco abuse: Encouraged smoking cessation    Morbid obesity (H): Reports 80lbs weight loss in one year from 350lbs    Consultations This Hospital Stay   DIABETES EDUCATION IP CONSULT  CARE MANAGEMENT / SOCIAL WORK IP CONSULT  CHEMICAL DEPENDENCY IP CONSULT    Code Status   Full Code    Time Spent on this Encounter   I, Katerin Romero MD, personally saw the patient today and spent greater than 30 minutes discharging this patient.       Katerin Romero MD  [place] [address] [facility]  [address] [place] [address]  Phone: [phone]  ______________________________________________________________________    Physical Exam   Vital Signs: Temp: 98.1  F (36.7  C) Temp src: Oral BP: (!) 133/92 Pulse: 85   Resp: 16 SpO2: 97 % O2 Device: None (Room air)    Weight: 270 lbs 0 oz  I saw and examined the patient on the date of discharge.         Primary Care  Physician   Juancarlos Sood    Discharge Orders      Diabetes Educator Referral      Reason for your hospital stay    You had nausea, vomiting and other uncomfortable symptoms related to alcohol withdrawal.  Your blood sugar was high and you discovered you have diabetes.     Follow-up and recommended labs and tests     Follow up with primary care provider, Juancarlos Sood, within 7 days to evaluate medication change, for hospital follow- up, and regarding new diagnosis.  The following labs/tests are recommended: BMP (newly on Ace inhibitor).     Activity    Your activity upon discharge: activity as tolerated     Diet    Follow this diet upon discharge: Orders Placed This Encounter      Moderate Consistent Carb (60 g CHO per Meal) Diet       Significant Results and Procedures   Most Recent 3 CBC's:Recent Labs   Lab Test 06/12/23  0654   WBC 9.5   HGB 15.6   MCV 91        Most Recent 3 BMP's:Recent Labs   Lab Test 06/15/23  0747 06/15/23  0201 06/14/23  2147 06/14/23  1720 06/14/23  1223 06/14/23  0748 06/13/23  0814 06/13/23  0710 06/12/23  1116 06/12/23  0654   NA  --   --   --   --   --  136  --  134*  --  135*   POTASSIUM 4.0  --   --   --   --  3.9  --  3.9   < > 3.2*   CHLORIDE  --   --   --   --   --  97*  --  95*  --  92*   CO2  --   --   --   --   --  23  --  23  --  20*   BUN  --   --   --   --   --  4.7*  --  3.9*  --  4.0*   CR  --   --   --   --   --  0.48*  --  0.47*  --  0.47*   ANIONGAP  --   --   --   --   --  16*  --  16*  --  23*   RAEANN  --   --   --   --   --  9.8  --  9.7  --  9.5   GLC  --  160* 184* 183*   < > 183*  171*   < > 216*   < > 310*    < > = values in this interval not displayed.     Most Recent Hemoglobin A1c:Recent Labs   Lab Test 06/12/23  0654   A1C 10.8*   ,   Results for orders placed or performed during the hospital encounter of 06/12/23   CT Abdomen Pelvis w Contrast    Narrative    EXAM: CT ABDOMEN PELVIS W CONTRAST  LOCATION: [facility]  HOSPITAL  DATE/TIME: 6/12/2023 8:27 PM CDT    INDICATION: abdominal pain, elevated lipase, pancreatitis?  COMPARISON: None.  TECHNIQUE: CT scan of the abdomen and pelvis was performed following injection of IV contrast. Multiplanar reformats were obtained. Dose reduction techniques were used.  CONTRAST: 135 mL Isovue 370      FINDINGS:   LOWER CHEST: Normal.    HEPATOBILIARY: Hepatomegaly and hepatic steatosis.     PANCREAS: Normal appearance of the pancreas. No adjacent stranding or fluid collections.     SPLEEN: Mild elongation of the spleen.     ADRENAL GLANDS: Normal.    KIDNEYS/BLADDER: Normal.    BOWEL: Mild colonic diverticulosis. Normal appendix.     LYMPH NODES: Normal.    VASCULATURE: Unremarkable.    PELVIC ORGANS: Normal.    MUSCULOSKELETAL: Normal.        Impression    IMPRESSION:   1.  No CT evidence of acute pancreatitis. No adjacent stranding or fluid collections.   2.  Hepatomegaly and hepatic steatosis.  3.  Mild splenomegaly.    US Abdomen Limited    Narrative    EXAM: US ABDOMEN LIMITED  LOCATION: [facility]  DATE: 6/14/2023    INDICATION: Heavy alcohol use. Rising bilirubin. Right upper quadrant abdominal pain.  COMPARISON: CT abdomen pelvis 06/12/2023  TECHNIQUE: Limited abdominal ultrasound.    FINDINGS:    GALLBLADDER: Normal. No gallstones, wall thickening, or pericholecystic fluid. Negative sonographic Dewey's sign.    BILE DUCTS: No biliary dilatation. The common duct measures 5 mm.    LIVER: Diffuse hepatic steatosis with increased echogenicity throughout both lobes. Hepatomegaly. This makes evaluation for hepatic mass difficult, no mass identified.    RIGHT KIDNEY: No hydronephrosis.    PANCREAS: The pancreas is largely obscured by overlying gas.    No ascites.      Impression    IMPRESSION:  1.  Hepatic steatosis and hepatomegaly similar to the CT.  2.  No cholelithiasis and no bile duct dilatation.             Discharge Medications   Current Discharge  Medication List      START taking these medications    Details   Alcohol Swabs PADS Use to swab the area of the injection or steven as directed  Per insurance coverage  Qty: 100 each, Refills: 0    Associated Diagnoses: Uncontrolled other specified diabetes mellitus with hyperglycemia (H)      blood glucose (NO BRAND SPECIFIED) lancets standard To use to test glucose level in the blood.  Use to test blood sugar  4  times daily as directed. To accompany glucose monitor brands per insurance coverage.  Qty: 200 each, Refills: 0    Associated Diagnoses: Uncontrolled other specified diabetes mellitus with hyperglycemia (H)      blood glucose (NO BRAND SPECIFIED) test strip To use to test glucose level in the blood. Use to test blood sugar  4 times daily as directed. To accompany glucose monitor brands per insurance coverage.  Qty: 200 strip, Refills: 0    Associated Diagnoses: Uncontrolled other specified diabetes mellitus with hyperglycemia (H)      blood glucose calibration (NO BRAND SPECIFIED) solution Used to calibrate the blood glucose monitor as needed and as directed.  To accompany  blood glucose brands per insurance coverage  Qty: 1 each, Refills: 0    Associated Diagnoses: Uncontrolled other specified diabetes mellitus with hyperglycemia (H)      blood glucose monitoring (NO BRAND SPECIFIED) meter device kit Use as directed Per insurance coverage  Qty: 1 kit, Refills: 0    Associated Diagnoses: Uncontrolled other specified diabetes mellitus with hyperglycemia (H)      glucose (BD GLUCOSE) 4 g chewable tablet Take 4 tablets by mouth every 15 minutes as needed for low blood sugar  Qty: 50 tablet, Refills: 0    Associated Diagnoses: Uncontrolled other specified diabetes mellitus with hyperglycemia (H)      insulin glargine (LANTUS PEN) 100 UNIT/ML pen Inject 17 Units Subcutaneous At Bedtime  Qty: 30 mL, Refills: 0    Comments: If Lantus is not covered by insurance, may substitute Basaglar or Semglee or other insulin  glargine product per insurance preference at same dose and frequency.    Associated Diagnoses: Uncontrolled other specified diabetes mellitus with hyperglycemia (H)      insulin pen needle (32G X 4 MM) 32G X 4 MM miscellaneous Use as directed by provider Per insurance coverage  Qty: 200 each, Refills: 0    Associated Diagnoses: Uncontrolled other specified diabetes mellitus with hyperglycemia (H)      lisinopril (ZESTRIL) 10 MG tablet Take 1 tablet (10 mg) by mouth daily  Qty: 30 tablet, Refills: 0    Associated Diagnoses: Benign essential hypertension      Sharps Container MISC Use as directed to dispose of needles, lancets and other sharps  Qty: 1 each, Refills: 0    Associated Diagnoses: Uncontrolled other specified diabetes mellitus with hyperglycemia (H)      thiamine (B-1) 100 MG tablet Take 1 tablet (100 mg) by mouth daily for 4 days  Qty: 4 tablet, Refills: 0    Associated Diagnoses: Alcohol withdrawal syndrome without complication (H)         CONTINUE these medications which have NOT CHANGED    Details   albuterol (PROAIR HFA) 108 (90 Base) MCG/ACT inhaler Inhale 1-2 puffs into the lungs 4 times daily as needed for shortness of breath / dyspnea or wheezing  Qty: 1 Inhaler, Refills: 11    Comments: Pharmacy may dispense brand covered by insurance (Proair, or proventil or ventolin or generic albuterol inhaler)  Associated Diagnoses: Mild intermittent asthma without complication           Allergies   Allergies   Allergen Reactions     Nkda [No Known Drug Allergy]

## 2023-06-16 ENCOUNTER — PATIENT OUTREACH (OUTPATIENT)
Dept: INTERNAL MEDICINE | Facility: CLINIC | Age: 33
End: 2023-06-16
Payer: MEDICAID

## 2023-06-16 NOTE — TELEPHONE ENCOUNTER
What type of discharge? Inpatient  Risk of Hospital admission or ED visit: 58%  Is a TCM episode required? Yes  When should the patient follow up with PCP? 7 days of discharge.    Jazmin Garzon RN  [facility]

## 2023-06-16 NOTE — TELEPHONE ENCOUNTER
"Currently patient is scheduled for an appointment with Dr. Hill on 6/26/23 for hospital follow-up. Patient has a new diagnosis of Diabetes. Will route message to PCP to see if patient can be worked in sooner (possibly next week) in same day spot or plan to keep appointment with Dr. Hill as scheduled?    ED for acute condition Discharge Protocol    \"Hi, my name is Olga Rowland RN, a registered nurse, and I am calling from [facility].  I am calling to follow up and see how things are going for you after your recent emergency visit.\"    Tell me how you are doing now that you are home?\"     Feeling much better, new diagnosis of Diabetes, every time he eats something his blood sugar goes up to 200, checking blood sugars every 3 hours, patient has already made a lot of dietary changes    Blood Sugar Readings:  Today: 199 (2 AM), 226 (6 AM, post meal), 169 (9 AM)    *Advised patient to keep log of blood sugars and bring with him to the office visit with Dr. Hill for further review    Discharge Instructions    \"Let's review your discharge instructions.  What is/are the follow-up recommendations?  Pt. Response: Follow-up with Dr. Sood    \"Has an appointment with your primary care provider been scheduled?\"  Yes. (confirm and remind to bring meds)    Medications    \"Tell me what changed about your medicines when you discharged?\"   B1 vitamin, Lantus, Diabetic Supplies (meter, lancets,  test strips, glucose tabs)    \"What questions do you have about your medications?\"   None   Call Summary    \"What questions or concerns do you have about your recent visit and your follow-up care?\"      Reviewed some diabetic information with the patient. Advised patient he would benefit from meeting with a diabetic educator and patient was completely on board with this plan. Patient was asking about short acting insulin: advised patient to get a log of his blood sugars and bring to his appointment as this could be useful " "information in regards to medication changes/adjustments.     \"If you have questions or things don't continue to improve, we encourage you contact us through the main clinic number (give number).  Even if the clinic is not open, triage nurses are available 24/7 to help you.     We would like you to know that our clinic has extended hours (provide information).  We also have urgent care (provide details on closest location and hours/contact info)\"    \"Thank you for your time and take care!\"    Olga Rowland RN  "

## 2023-06-21 NOTE — TELEPHONE ENCOUNTER
Huddled with Dr Sood due to Fridays appt slot being full.     Per Dr Sood- Can see the pt tomorrow 6/22/23 in the 1300 huddle spot.     Attempted to contact pt to relay Dr Carey message. No answer. Unable to leave VM as VM is not set up.     On callback- please relay Dr Carey message and assist with scheduling.       Patient Contact    Attempt # 1    Was call answered?  No.  Left message on voicemail with information to call me back.

## 2023-06-22 NOTE — TELEPHONE ENCOUNTER
Called and spoke to the patient. Patient states he is not able to come into the clinic today as his car is broken down and he is relying on family members for rides. Patient states he will plan to keep his appointment with Dr. Hill as scheduled and will plan to get a follow-up appointment scheduled with Dr. Sood before he leaves the clinic on Monday.    Olga Rowland RN

## 2023-06-26 ENCOUNTER — OFFICE VISIT (OUTPATIENT)
Dept: INTERNAL MEDICINE | Facility: CLINIC | Age: 33
End: 2023-06-26
Payer: MEDICAID

## 2023-06-26 VITALS
DIASTOLIC BLOOD PRESSURE: 78 MMHG | TEMPERATURE: 97.5 F | SYSTOLIC BLOOD PRESSURE: 136 MMHG | BODY MASS INDEX: 37.34 KG/M2 | HEART RATE: 103 BPM | WEIGHT: 275.3 LBS | OXYGEN SATURATION: 95 %

## 2023-06-26 DIAGNOSIS — Z79.4 TYPE 2 DIABETES MELLITUS WITHOUT COMPLICATION, WITH LONG-TERM CURRENT USE OF INSULIN (H): ICD-10-CM

## 2023-06-26 DIAGNOSIS — I10 BENIGN ESSENTIAL HYPERTENSION: ICD-10-CM

## 2023-06-26 DIAGNOSIS — F10.239 ALCOHOL DEPENDENCE WITH WITHDRAWAL WITH COMPLICATION (H): ICD-10-CM

## 2023-06-26 DIAGNOSIS — Z09 HOSPITAL DISCHARGE FOLLOW-UP: Primary | ICD-10-CM

## 2023-06-26 DIAGNOSIS — E11.9 TYPE 2 DIABETES MELLITUS WITHOUT COMPLICATION, WITH LONG-TERM CURRENT USE OF INSULIN (H): ICD-10-CM

## 2023-06-26 LAB
ALBUMIN SERPL BCG-MCNC: 4.3 G/DL (ref 3.5–5.2)
ALP SERPL-CCNC: 113 U/L (ref 40–129)
ALT SERPL W P-5'-P-CCNC: 82 U/L (ref 0–70)
ANION GAP SERPL CALCULATED.3IONS-SCNC: 15 MMOL/L (ref 7–15)
AST SERPL W P-5'-P-CCNC: 93 U/L (ref 0–45)
BILIRUB SERPL-MCNC: 1.1 MG/DL
BUN SERPL-MCNC: 4.2 MG/DL (ref 6–20)
CALCIUM SERPL-MCNC: 9.5 MG/DL (ref 8.6–10)
CHLORIDE SERPL-SCNC: 98 MMOL/L (ref 98–107)
CHOLEST SERPL-MCNC: 242 MG/DL
CREAT SERPL-MCNC: 0.59 MG/DL (ref 0.67–1.17)
DEPRECATED HCO3 PLAS-SCNC: 24 MMOL/L (ref 22–29)
GFR SERPL CREATININE-BSD FRML MDRD: >90 ML/MIN/1.73M2
GLUCOSE SERPL-MCNC: 258 MG/DL (ref 70–99)
HDLC SERPL-MCNC: 56 MG/DL
LDLC SERPL CALC-MCNC: 150 MG/DL
NONHDLC SERPL-MCNC: 186 MG/DL
POTASSIUM SERPL-SCNC: 3.7 MMOL/L (ref 3.4–5.3)
PROT SERPL-MCNC: 7.6 G/DL (ref 6.4–8.3)
SODIUM SERPL-SCNC: 137 MMOL/L (ref 136–145)
TRIGL SERPL-MCNC: 179 MG/DL

## 2023-06-26 PROCEDURE — 80053 COMPREHEN METABOLIC PANEL: CPT | Performed by: INTERNAL MEDICINE

## 2023-06-26 PROCEDURE — 80061 LIPID PANEL: CPT | Performed by: INTERNAL MEDICINE

## 2023-06-26 PROCEDURE — 36415 COLL VENOUS BLD VENIPUNCTURE: CPT | Performed by: INTERNAL MEDICINE

## 2023-06-26 PROCEDURE — 99495 TRANSJ CARE MGMT MOD F2F 14D: CPT | Performed by: INTERNAL MEDICINE

## 2023-06-26 ASSESSMENT — ASTHMA QUESTIONNAIRES
QUESTION_4 LAST FOUR WEEKS HOW OFTEN HAVE YOU USED YOUR RESCUE INHALER OR NEBULIZER MEDICATION (SUCH AS ALBUTEROL): ONE OR TWO TIMES PER DAY
QUESTION_2 LAST FOUR WEEKS HOW OFTEN HAVE YOU HAD SHORTNESS OF BREATH: THREE TO SIX TIMES A WEEK
ACT_TOTALSCORE: 15
ACT_TOTALSCORE: 15
QUESTION_5 LAST FOUR WEEKS HOW WOULD YOU RATE YOUR ASTHMA CONTROL: SOMEWHAT CONTROLLED
QUESTION_3 LAST FOUR WEEKS HOW OFTEN DID YOUR ASTHMA SYMPTOMS (WHEEZING, COUGHING, SHORTNESS OF BREATH, CHEST TIGHTNESS OR PAIN) WAKE YOU UP AT NIGHT OR EARLIER THAN USUAL IN THE MORNING: TWO OR THREE NIGHTS A WEEK
QUESTION_1 LAST FOUR WEEKS HOW MUCH OF THE TIME DID YOUR ASTHMA KEEP YOU FROM GETTING AS MUCH DONE AT WORK, SCHOOL OR AT HOME: NONE OF THE TIME

## 2023-06-26 ASSESSMENT — PATIENT HEALTH QUESTIONNAIRE - PHQ9
SUM OF ALL RESPONSES TO PHQ QUESTIONS 1-9: 13
SUM OF ALL RESPONSES TO PHQ QUESTIONS 1-9: 13
10. IF YOU CHECKED OFF ANY PROBLEMS, HOW DIFFICULT HAVE THESE PROBLEMS MADE IT FOR YOU TO DO YOUR WORK, TAKE CARE OF THINGS AT HOME, OR GET ALONG WITH OTHER PEOPLE: VERY DIFFICULT

## 2023-06-26 NOTE — PATIENT INSTRUCTIONS
"  Please schedule follow-up with Dr. Sood on the way out today.    Labs today.    Please let your eye doctor know that you have diabetes so they can perform a \"diabetic eye exam.\"    Please check the bottoms of your feet each and every day.    Please INCREASE Lantus from 17 to 25 units daily.    You may take Lantus in the morning or in the evening as long as you are consistent with the timing.    Please don't avoid carbs - just be more careful and focus on whole grains (whole grain breads and cereals, brown rice, and oatmeal) and smaller proportion sizes.     "

## 2023-06-26 NOTE — PROGRESS NOTES
ASSESSMENT/PLAN      (Z09) Hospital discharge follow-up  (primary encounter diagnosis)  Comment: clinically stable.  Plan: see below for details.    (F10.239) Alcohol dependence with withdrawal with complication (H)  Comment: patient continues to abstain from alcohol use since discharge.    (E11.9,  Z79.4) Type 2 diabetes mellitus without complication, with long-term current use of insulin (H)  Comment: recently diagnosed; poorly controlled on Lantus 17 units daily; suspect lightheadedness and headaches are related to excessive carb/calorie restriction.  Plan: INCREASE Lantus from 17 to 25 units daily (can be given morning or at night as long as he is consistent with timing); patient may ultimately benefit from twice daily dosing of Lantus; patient may ultimately benefit from mealtime insulin; diabetes educator visit scheduled for 7/13/2023; patient may benefit from CGM; patient encouraged to not avoid carbs but, rather, to make better carb choices and consume smaller carb portions; patient encouraged to obtain his annual diabetic eye exam (patient declines referral -will see his own eye provider); patient encouraged to check the bottoms of his feet daily; patient to follow-up with PCP in the next several months for routine physical and diabetes follow-up.    (I10) Benign essential hypertension  Comment: recently diagnosed; reasonably controlled on lisinopril 10 mg daily.  Plan: continue present management.    Mayte Hill MD   [address] [address]  T: [phone], F: [phone]    SUBJECTIVE     Ravi Ahmadi is a very pleasant 33 year old male who presents for hospital follow-up:    Accompanied by uncle.    Hospital: [facility]  Date of Admission: 6/12/2023  Date of Discharge: 6/15/2023  Reason(s) for Admission: alcohol withdrawal     Diagnostic tests, treatments/interventions, and discharge summary reviewed.    Summary of hospitalization:  -  presented with nausea, vomiting, and alcohol withdrawal  - admitted and started on CIWA protocol  - chemical dependency consulted  - new diagnosis of type 2 diabetes (HgbA1c 10.8%) - started on Lantus  - new diagnosis of high blood pressure - started on lisinopril    Medication changes since discharge: none  Adherent to discharge medications: yes  Problems taking discharge medications: no    Follow-up: scheduled to meet with diabetes educator 7/13/2023    Update since discharge:   - experiencing frequent lightheadedness and headaches  - blood sugars continue to be elevated  - often in the high 100s and low to mid 200s  - no episodes of hypoglycemia  - patient has been excessively carb restricting - sometimes only eating a few vegetables for a meal  - started smoking again after not smoking for several years  - has continueed to abstain from alcohol since discharge  - back to work    Patient declines tetanus booster and Prevnar 20 vaccines today.    OBJECTIVE      /78   Pulse 103   Temp 97.5  F (36.4  C)   Wt 124.9 kg (275 lb 4.8 oz)   SpO2 95%   BMI 37.34 kg/m    Constitutional: well-appearing  Respiratory: normal respiratory effort; clear to auscultation bilaterally  Cardiovascular: regular rate and rhythm; no edema  Psych: normal judgment and insight; normal mood and affect; recent and remote memory intact  ---  (Note was completed, in part, with [employer] voice-recognition software. Documentation was reviewed, but some grammatical, spelling, and word errors may remain.)

## 2023-06-30 ENCOUNTER — NURSE TRIAGE (OUTPATIENT)
Dept: INTERNAL MEDICINE | Facility: CLINIC | Age: 33
End: 2023-06-30
Payer: MEDICAID

## 2023-06-30 DIAGNOSIS — Z79.4 TYPE 2 DIABETES MELLITUS WITHOUT COMPLICATION, WITH LONG-TERM CURRENT USE OF INSULIN (H): ICD-10-CM

## 2023-06-30 DIAGNOSIS — E11.9 TYPE 2 DIABETES MELLITUS WITHOUT COMPLICATION, WITH LONG-TERM CURRENT USE OF INSULIN (H): ICD-10-CM

## 2023-06-30 NOTE — TELEPHONE ENCOUNTER
"Patient calling following up on recent visit    Since increasing Lantus at recent visit to 25 units at bedtime blood sugars have been:     6/30/23:  Now: 262 mg/dL   401 mg/dL 12:35am (states \"I don't recall checking this\")   321 mg/dL 2am this morning     6/29/23:   296 mg/dL- supper time     6/28/23:   197 mg/dL 11:04am   271 mg/dL  260 mg/dL     6/27/23  309 mg/dL - 9:45pm   259 mg/dL   559 mg/dL 6pm   264 mg/dL 10am   268 mg/dL 5am     6/26/23:    209 mg/dL before bed 7pm   213 mg/dL 5pm   216 mg/dL 10am   259 mg/dL 9am     Patient states he has been feeling goofy/off intermittently since visit. Notices when sugar goes up around 250 mg/dl starts to feel off - \"feels like crud but also trying to work out the alcohol thing\" - some days feels \"like absolute garbage\" - used to not feeling anything due to being drunk most of the time. Pt unsure if symptoms are just sugars or also due to alcohol withdrawal. States he was given B1 for a few days and HTN medications. Not taking anything for withdrawal.     Pt wondering if needing fast-acting insulin. Every time he eats sugar \"peaks\"     Due to sleeping in and fatigue, has been missing lots of work, waking up at \"weird times\" - doesn't remember being awake at late hours     Doing fingerstick sugar checks, during daytime is doing every 3 hours, checks when he does not feel well also more often     Checking before meals and 1 hour post meals     Feeling groggy/sleepy now - slept in until now. Not currently confused or disoriented, but slept through entire work shift     Does not have a working vehicle, uncle taking him to and from work. Called uncle and apologized as he slept through his alarms. Called work. They advised he go to [employer] to go on an administrative leave.     Patient asking what to do/if he needs an adjustment further in insulin. Has diabetic educator visit but not able to get appt until 7/13/23. Did advise if any further episodes of " confusion/disorientation to go directly to ED.     Please advise     Arcelia CRISTOBAL Triage RN  [facility]       Appointments in Next Year    Jul 13, 2023  8:00 AM  Telephone Visit with Mayte Vickers  [facility] [place] ([facility] ) [phone]   Aug 24, 2023  2:30 PM  (Arrive by 2:10 PM)  Adult Preventative Visit with Juancarlos Sood MD  [facility] ([facility] ) [phone]        Reason for Disposition    Blood glucose > 300 mg/dL (16.7 mmol/L) AND two or more times in a row    Additional Information    Negative: Unconscious or difficult to awaken    Negative: Acting confused (e.g., disoriented, slurred speech)    Negative: Very weak (can't stand)    Negative: Sounds like a life-threatening emergency to the triager    Negative: Vomiting and signs of dehydration (e.g., very dry mouth, lightheaded, dark urine)    Negative: Blood glucose > 240 mg/dL (13.3 mmol/L) and rapid breathing    Negative: Blood glucose > 500 mg/dL (27.8 mmol/L)    Negative: Blood glucose > 240 mg/dL (13.3 mmol/L) AND urine ketones moderate-large (or more than 1+)    Negative: Blood glucose > 240 mg/dL (13.3 mmol/L) and blood ketones > 1.4 mmol/L    Negative: Blood glucose > 240 mg/dL (13.3 mmol/L) AND vomiting AND unable to check for ketones (in blood or urine)    Negative: Vomiting lasting > 4 hours    Negative: Patient sounds very sick or weak to the triager    Negative: Fever > 100.4 F (38.0 C)    Negative: Caller has URGENT medication or insulin pump question and triager unable to answer question    Negative: Blood glucose > 400 mg/dL (22.2 mmol/L)    Protocols used: DIABETES - HIGH BLOOD SUGAR-A-OH

## 2023-06-30 NOTE — TELEPHONE ENCOUNTER
Spoke with patient to relay provider recommendations. Patient verbalized understanding and did not have any additional questions.

## 2023-07-13 ENCOUNTER — VIRTUAL VISIT (OUTPATIENT)
Dept: EDUCATION SERVICES | Facility: CLINIC | Age: 33
End: 2023-07-13
Attending: INTERNAL MEDICINE
Payer: MEDICAID

## 2023-07-13 DIAGNOSIS — E13.65 UNCONTROLLED OTHER SPECIFIED DIABETES MELLITUS WITH HYPERGLYCEMIA (H): ICD-10-CM

## 2023-07-13 PROCEDURE — G0108 DIAB MANAGE TRN  PER INDIV: HCPCS | Mod: 95

## 2023-07-13 NOTE — PATIENT INSTRUCTIONS
1) Check blood sugar 2-3 times per day   Fasting/morning goal:  mg/dL  2 Hours after the start of a meal: Less than 180 mg/dL    2) 1 serving of carbohydrate = 15 grams  Aim for 4 servings or 60 grams per meal and 15-20 grams of carbs per snack + a serving of protein    3) Increase physical activity as able- start with walking    Resource to visit: www.diabetes.org

## 2023-07-13 NOTE — LETTER
"    7/13/2023         RE: Ravi Ahmadi  [address]  [facility] [address]        Dear Colleague,    Thank you for referring your patient, Ravi Ahmadi, to the [facility] [place]. Please see a copy of my visit note below.    Diabetes Self-Management Education & Support    Presents for: Initial Assessment for new diagnosis    Type of Service: Telephone Visit    Originating Location (Patient Location): Home  Distant Location (Provider Location): Offsite  Mode of Communication:  Telephone    Telephone Visit Start Time: 8:00am- no answer, voice mail box not set up yet.  Called again at 8:10am  Telephone Visit End Time (telephone visit stop time): 8:55am    How would patient like to obtain AVS? MyChart    Assessment Type:   ASSESSMENT:  Initial visit with ravi for new Dx of DM type 2.  He was Dx during recent hospital stay and started insulin.  Diet is restricted in carbohydrates currently.  Says when he started eating more carbohydrates, his BG increased \"through the roof\".  Started taking Lantus twice daily and BG is now better controlled.    Is testing BG, and tracking results on a log.  Has been testing BG every 3 hours, before meals and an hour after.  Concerned that BG is high after 1 hour post meals.    Before bed last night BG was 99 mg/dL, ate something to bring BG back up and before bed BG was 150 mg/dL  7/10 BG was 190 mg/dL, this has been the highest in the past week.    Reports concern that as he is falling asleep, he feels as if he is fainting.  Is unsure if this is connected to BG or BP.  Recommended he reach out to PCP for this.  Is interested in doing a sleep study.    Also reports blurred vision and sore feet.  Regularly drinks a lot of water, so unsure if this is due to increased thirst.    Reviewed BG goals, testing and medications.  Discussed briefly pathophysiology of diabetes and the role of carbohydrates in the diet.    Activity includes some " walking and squats at work.    Patient's most recent   Lab Results   Component Value Date    A1C 10.8 06/12/2023     is not meeting goal of <7.0    Diabetes knowledge and skills assessment:   Patient is knowledgeable in diabetes management concepts related to: Monitoring and Taking Medication    Continue education with the following diabetes management concepts: Healthy Eating, Being Active, Monitoring, Problem Solving, Reducing Risks and Healthy Coping    Based on learning assessment above, most appropriate setting for further diabetes education would be: Individual setting.      PLAN    1) Check blood sugar 2-3 times per day   Fasting/morning goal:  mg/dL  2 Hours after the start of a meal: Less than 180 mg/dL    2) 1 serving of carbohydrate = 15 grams  Aim for 4 servings or 60 grams per meal and 15-20 grams of carbs per snack + a serving of protein    3) Increase physical activity as able- start with walking    Resource to visit: www.diabetes.org     Topics to cover at upcoming visits: Healthy Eating, Monitoring, Taking Medication and Reducing Risks    Follow-up: August 24th, 8am- telephone visit    See Care Plan for co-developed, patient-state behavior change goals.  AVS provided for patient today.    Education Materials Provided- will send via mail:  [employer] Understanding Diabetes Booklet and My Plate Planner      SUBJECTIVE/OBJECTIVE:  Presents for: Initial Assessment for new diagnosis  Accompanied by: Self  Diabetes education in the past 24mo: No  Focus of Visit: Monitoring, Taking Medication, Healthy Eating, Diabetes Pathophysiology  Diabetes type: Type 2  Disease course: Improving  Cultural Influences/Ethnic Background:  Choose not to answer      Diabetes Symptoms & Complications:  Fatigue: Yes  Neuropathy: Sometimes  Polydipsia: No  Polyphagia: No  Polyuria: No  Visual change: Yes  Slow healing wounds: No  Symptom course: Improving  Complications assessed today?: No    Patient Problem List  and Family Medical History reviewed for relevant medical history, current medical status, and diabetes risk factors.    Vitals:  There were no vitals taken for this visit.  Estimated body mass index is 37.34 kg/m  as calculated from the following:    Height as of 6/12/23: 1.829 m (6').    Weight as of 6/26/23: 124.9 kg (275 lb 4.8 oz).   Last 3 BP:   BP Readings from Last 3 Encounters:   06/26/23 136/78   06/15/23 (!) 133/92   01/07/21 (!) 146/98       History   Smoking Status     Some Days     Packs/day: 0.50     Years: 7.00     Types: Cigarettes   Smokeless Tobacco     Never     Comment: 7 cigarettes per day       Labs:  Lab Results   Component Value Date    A1C 10.8 06/12/2023     Lab Results   Component Value Date     06/26/2023     06/15/2023     01/07/2021     Lab Results   Component Value Date     06/26/2023    LDL 85 02/03/2009     HDL Cholesterol   Date Value Ref Range Status   02/03/2009 40 40 - 110 mg/dL Final     Direct Measure HDL   Date Value Ref Range Status   06/26/2023 56 >=40 mg/dL Final   ]  GFR Estimate   Date Value Ref Range Status   06/26/2023 >90 >60 mL/min/1.73m2 Final   01/07/2021 >90 >60 mL/min/[1.73_m2] Final     Comment:     Non  GFR Calc  Starting 12/18/2018, serum creatinine based estimated GFR (eGFR) will be   calculated using the Chronic Kidney Disease Epidemiology Collaboration   (CKD-EPI) equation.       GFR Estimate If Black   Date Value Ref Range Status   01/07/2021 >90 >60 mL/min/[1.73_m2] Final     Comment:      GFR Calc  Starting 12/18/2018, serum creatinine based estimated GFR (eGFR) will be   calculated using the Chronic Kidney Disease Epidemiology Collaboration   (CKD-EPI) equation.       Lab Results   Component Value Date    CR 0.59 06/26/2023    CR 0.81 01/07/2021     No results found for: MICROALBUMIN    Healthy Eating:  Healthy Eating Assessed Today: Yes  Meals include: Lunch, Breakfast, Dinner, Afternoon  Snack  Breakfast: 2 eggs with veggies  Lunch: Soup- broth based  Dinner: salad  Snacks: vegetables- small handful  Beverages: Water, Diet soda  Has patient met with a dietitian in the past?: No    Being Active:  Being Active Assessed Today: Yes  Exercise:: Yes  How intense was your typical exercise? : Light (like stretching or slow walking)    Monitoring:  Monitoring Assessed Today: Yes  Did patient bring glucose meter to appointment? : Yes  Blood Glucose Meter: Unknown  Times checking blood sugar at home (number): 5+  Times checking blood sugar at home (per): Day  Blood glucose trend: Decreasing    Reported range:  mg/dL.  Is trending down more consistently.    Taking Medications:  Diabetes Medication(s)     Diabetic Other       glucose (BD GLUCOSE) 4 g chewable tablet    Take 4 tablets by mouth every 15 minutes as needed for low blood sugar    Insulin       insulin glargine (LANTUS PEN) 100 UNIT/ML pen    Inject 25 Units Subcutaneous 2 times daily          Taking Medication Assessed Today: Yes  Current Treatments: Insulin Injections  Dose schedule: Pre-breakfast, At bedtime  Given by: Patient  Problems taking diabetes medications regularly?: No  Diabetes medication side effects?: No    Problem Solving:  Problem Solving Assessed Today: Yes  Is the patient at risk for hypoglycemia?: Yes  Hypoglycemia Frequency: Never              Reducing Risks:  Diabetes Risks: Family History  Has dilated eye exam at least once a year?: No    Healthy Coping:  Emotional response to diabetes: Ready to learn  Stage of change: ACTION (Actively working towards change)  Patient Activation Measure Survey Score:       No data to display                  Care Plan and Education Provided:  Care Plan: Diabetes   Updates made by Mayte Vickers since 7/13/2023 12:00 AM      Problem: HbA1C Not In Goal       Goal: Establish Regular Follow-Ups with PCP       Task: Discuss with PCP the recommended timing for patient's next follow up  visit(s)    Responsible User: Mayte Vickers      Task: Discuss schedule for PCP visits with patient    Responsible User: Mayte Vickers      Goal: Get HbA1C Level in Goal       Task: Educate patient on diabetes education self-management topics    Responsible User: Mayte Vickers      Task: Educate patient on benefits of regular glucose monitoring Completed 7/13/2023   Responsible User: Mayte Vickers      Task: Refer patient to appropriate extended care team member, as needed (Medication Therapy Management, Behavioral Health, Physical Therapy, etc.)    Responsible User: Mayte Vickers      Task: Discuss diabetes treatment plan with patient Completed 7/13/2023   Responsible User: Mayet Vickers      Problem: Diabetes Self-Management Education Needed to Optimize Self-Care Behaviors       Goal: Understand diabetes pathophysiology and disease progression       Task: Provide education on diabetes pathophysiology and disease progression specfic to patient's diabetes type Completed 7/13/2023   Responsible User: Mayte Vickers      Goal: Healthy Eating - follow a healthy eating pattern for diabetes    Start Date: 7/13/2023   This Visit's Progress: 0%   Note:    My Goal: I will balance intake with carbohydrates, protein and fat    What I need to meet my goal: My plate    I plan to meet my goal by this date: 3 months      Task: Provide education on portion control and consistency in amount, composition and timing of food intake Completed 7/13/2023   Responsible User: Mayte Vickers      Task: Provide education on managing carbohydrate intake (carbohydrate counting, plate planning method, etc.) Completed 7/13/2023   Responsible User: Mayte Vickers      Task: Provide education on weight management    Responsible User: Mayte Vickers      Task: Provide education on heart healthy eating    Responsible User: [place]  Mayte      Task: Provide education on eating out    Responsible User: Mayte Vickers      Task: Develop individualized healthy eating plan with patient Completed 7/13/2023   Responsible User: Mayte Vickers      Goal: Being Active - get regular physical activity, working up to at least 150 minutes per week       Task: Provide education on relationship of activity to glucose and precautions to take if at risk for low glucose Completed 7/13/2023   Responsible User: Mayte Vickers      Task: Discuss barriers to physical activity with patient    Responsible User: Mayte Vickers      Task: Develop physical activity plan with patient    Responsible User: Mayte Vickers      Task: Explore community resources including walking groups, assistance programs, and home videos    Responsible User: Mayte Vickers      Goal: Monitoring - monitor glucose and ketones as directed    Start Date: 7/13/2023   This Visit's Progress: On track   Note:    Discussed realistic goals for BG testing and to expect BG to increase after meals.     Task: Provide education on blood glucose monitoring (purpose, proper technique, frequency, glucose targets, interpreting results, when to use glucose control solution, sharps disposal) Completed 7/13/2023   Responsible User: Mayte Vickers      Task: Provide education on continuous glucose monitoring (sensor placement, use of fernando or /reader, understanding glucose trends, alerts and alarms, differences between sensor glucose and blood glucose)    Responsible User: Mayte Vickers      Task: Provide education on ketone monitoring (when to monitor, frequency, etc.)    Responsible User: Mayte Vickers      Goal: Taking Medication - patient is consistently taking medications as directed    Start Date: 7/13/2023   This Visit's Progress: On track   Note:    Continue taking insulin.  Reach out to provider if BG  goes below 70 mg/dL.     Task: Provide education on action of prescribed medication, including when to take and possible side effects Completed 7/13/2023   Responsible User: Mayte Vickers      Task: Provide education on insulin and injectable diabetes medications, including administration, storage, site selection and rotation for injection sites    Responsible User: Mayte Vickers      Task: Discuss barriers to medication adherence with patient and provide management technique ideas as appropriate    Responsible User: Mayte Vickers      Task: Provide education on frequency and refill details of medications    Responsible User: Mayte Vickers      Goal: Problem Solving - know how to prevent and manage short-term diabetes complications       Task: Provide education on high blood glucose - causes, signs/symptoms, prevention and treatment    Responsible User: Mayte Vickers      Task: Provide education on low blood glucose - causes, signs/symptoms, prevention, treatment, carrying a carbohydrate source at all times, and medical identification Completed 7/13/2023   Responsible User: Mayte Vickers      Task: Provide education on safe travel with diabetes    Responsible User: Mayte Vickers      Task: Provide education on how to care for diabetes on sick days    Responsible User: Mayte Vickers      Task: Provide education on when to call a health care provider Completed 7/13/2023   Responsible User: Mayte Vickers      Goal: Reducing Risks - know how to prevent and treat long-term diabetes complications       Task: Provide education on major complications of diabetes, prevention, early diagnostic measures and treatment of complications Completed 7/13/2023   Responsible User: Mayte Vickers      Task: Provide education on recommended care for dental, eye and foot health    Responsible User: Mayte Vickers       Task: Provide education on Hemoglobin A1c - goals and relationship to blood glucose levels Completed 7/13/2023   Responsible User: Mayte Vickers      Task: Provide education on recommendations for heart health - lipid levels and goals, blood pressure and goals, and aspirin therapy, if indicated    Responsible User: Mayte Vickers      Task: Provide education on tobacco cessation    Responsible User: Mayte Vickers      Goal: Healthy Coping - use available resources to cope with the challenges of managing diabetes       Task: Discuss recognizing feelings about having diabetes    Responsible User: Mayte Vickers      Task: Provide education on the benefits of making appropriate lifestyle changes    Responsible User: Mayte Vickers      Task: Provide education on benefits of utilizing support systems    Responsible User: Matye Vickers      Task: Discuss methods for coping with stress    Responsible User: Mayte Vickers      Task: Provide education on when to seek professional counseling    Responsible User: Mayte Vickers, JUAREZ, LD, [facility]  Outpatient Diabetes Education  Adult Diabetes Education Triage [phone]      Time Spent: 45 minutes  Encounter Type: Individual    Any diabetes medication dose changes were made via the CDE Protocol per the patient's referring provider. A copy of this encounter was shared with the provider.

## 2023-07-13 NOTE — PROGRESS NOTES
"Diabetes Self-Management Education & Support    Presents for: Initial Assessment for new diagnosis    Type of Service: Telephone Visit    Originating Location (Patient Location): Home  Distant Location (Provider Location): Offsite  Mode of Communication:  Telephone    Telephone Visit Start Time: 8:00am- no answer, voice mail box not set up yet.  Called again at 8:10am  Telephone Visit End Time (telephone visit stop time): 8:55am    How would patient like to obtain AVS? Mora    Assessment Type:   ASSESSMENT:  Initial visit with juan jose for new Dx of DM type 2.  He was Dx during recent hospital stay and started insulin.  Diet is restricted in carbohydrates currently.  Says when he started eating more carbohydrates, his BG increased \"through the roof\".  Started taking Lantus twice daily and BG is now better controlled.    Is testing BG, and tracking results on a log.  Has been testing BG every 3 hours, before meals and an hour after.  Concerned that BG is high after 1 hour post meals.    Before bed last night BG was 99 mg/dL, ate something to bring BG back up and before bed BG was 150 mg/dL  7/10 BG was 190 mg/dL, this has been the highest in the past week.    Reports concern that as he is falling asleep, he feels as if he is fainting.  Is unsure if this is connected to BG or BP.  Recommended he reach out to PCP for this.  Is interested in doing a sleep study.    Also reports blurred vision and sore feet.  Regularly drinks a lot of water, so unsure if this is due to increased thirst.    Reviewed BG goals, testing and medications.  Discussed briefly pathophysiology of diabetes and the role of carbohydrates in the diet.    Activity includes some walking and squats at work.    Patient's most recent   Lab Results   Component Value Date    A1C 10.8 06/12/2023     is not meeting goal of <7.0    Diabetes knowledge and skills assessment:   Patient is knowledgeable in diabetes management concepts related to: Monitoring and " Taking Medication    Continue education with the following diabetes management concepts: Healthy Eating, Being Active, Monitoring, Problem Solving, Reducing Risks and Healthy Coping    Based on learning assessment above, most appropriate setting for further diabetes education would be: Individual setting.      PLAN    1) Check blood sugar 2-3 times per day   Fasting/morning goal:  mg/dL  2 Hours after the start of a meal: Less than 180 mg/dL    2) 1 serving of carbohydrate = 15 grams  Aim for 4 servings or 60 grams per meal and 15-20 grams of carbs per snack + a serving of protein    3) Increase physical activity as able- start with walking    Resource to visit: www.diabetes.org     Topics to cover at upcoming visits: Healthy Eating, Monitoring, Taking Medication and Reducing Risks    Follow-up: August 24th, 8am- telephone visit    See Care Plan for co-developed, patient-state behavior change goals.  AVS provided for patient today.    Education Materials Provided- will send via mail:  [facility] Understanding Diabetes Booklet and My Plate Planner      SUBJECTIVE/OBJECTIVE:  Presents for: Initial Assessment for new diagnosis  Accompanied by: Self  Diabetes education in the past 24mo: No  Focus of Visit: Monitoring, Taking Medication, Healthy Eating, Diabetes Pathophysiology  Diabetes type: Type 2  Disease course: Improving  Cultural Influences/Ethnic Background:  Choose not to answer      Diabetes Symptoms & Complications:  Fatigue: Yes  Neuropathy: Sometimes  Polydipsia: No  Polyphagia: No  Polyuria: No  Visual change: Yes  Slow healing wounds: No  Symptom course: Improving  Complications assessed today?: No    Patient Problem List and Family Medical History reviewed for relevant medical history, current medical status, and diabetes risk factors.    Vitals:  There were no vitals taken for this visit.  Estimated body mass index is 37.34 kg/m  as calculated from the following:    Height as of 6/12/23:  1.829 m (6').    Weight as of 6/26/23: 124.9 kg (275 lb 4.8 oz).   Last 3 BP:   BP Readings from Last 3 Encounters:   06/26/23 136/78   06/15/23 (!) 133/92   01/07/21 (!) 146/98       History   Smoking Status     Some Days     Packs/day: 0.50     Years: 7.00     Types: Cigarettes   Smokeless Tobacco     Never     Comment: 7 cigarettes per day       Labs:  Lab Results   Component Value Date    A1C 10.8 06/12/2023     Lab Results   Component Value Date     06/26/2023     06/15/2023     01/07/2021     Lab Results   Component Value Date     06/26/2023    LDL 85 02/03/2009     HDL Cholesterol   Date Value Ref Range Status   02/03/2009 40 40 - 110 mg/dL Final     Direct Measure HDL   Date Value Ref Range Status   06/26/2023 56 >=40 mg/dL Final   ]  GFR Estimate   Date Value Ref Range Status   06/26/2023 >90 >60 mL/min/1.73m2 Final   01/07/2021 >90 >60 mL/min/[1.73_m2] Final     Comment:     Non  GFR Calc  Starting 12/18/2018, serum creatinine based estimated GFR (eGFR) will be   calculated using the Chronic Kidney Disease Epidemiology Collaboration   (CKD-EPI) equation.       GFR Estimate If Black   Date Value Ref Range Status   01/07/2021 >90 >60 mL/min/[1.73_m2] Final     Comment:      GFR Calc  Starting 12/18/2018, serum creatinine based estimated GFR (eGFR) will be   calculated using the Chronic Kidney Disease Epidemiology Collaboration   (CKD-EPI) equation.       Lab Results   Component Value Date    CR 0.59 06/26/2023    CR 0.81 01/07/2021     No results found for: MICROALBUMIN    Healthy Eating:  Healthy Eating Assessed Today: Yes  Meals include: Lunch, Breakfast, Dinner, Afternoon Snack  Breakfast: 2 eggs with veggies  Lunch: Soup- broth based  Dinner: salad  Snacks: vegetables- small handful  Beverages: Water, Diet soda  Has patient met with a dietitian in the past?: No    Being Active:  Being Active Assessed Today: Yes  Exercise:: Yes  How intense was  your typical exercise? : Light (like stretching or slow walking)    Monitoring:  Monitoring Assessed Today: Yes  Did patient bring glucose meter to appointment? : Yes  Blood Glucose Meter: Unknown  Times checking blood sugar at home (number): 5+  Times checking blood sugar at home (per): Day  Blood glucose trend: Decreasing    Reported range:  mg/dL.  Is trending down more consistently.    Taking Medications:  Diabetes Medication(s)     Diabetic Other       glucose (BD GLUCOSE) 4 g chewable tablet    Take 4 tablets by mouth every 15 minutes as needed for low blood sugar    Insulin       insulin glargine (LANTUS PEN) 100 UNIT/ML pen    Inject 25 Units Subcutaneous 2 times daily          Taking Medication Assessed Today: Yes  Current Treatments: Insulin Injections  Dose schedule: Pre-breakfast, At bedtime  Given by: Patient  Problems taking diabetes medications regularly?: No  Diabetes medication side effects?: No    Problem Solving:  Problem Solving Assessed Today: Yes  Is the patient at risk for hypoglycemia?: Yes  Hypoglycemia Frequency: Never              Reducing Risks:  Diabetes Risks: Family History  Has dilated eye exam at least once a year?: No    Healthy Coping:  Emotional response to diabetes: Ready to learn  Stage of change: ACTION (Actively working towards change)  Patient Activation Measure Survey Score:       No data to display                  Care Plan and Education Provided:  Care Plan: Diabetes   Updates made by Mayte Vickers since 7/13/2023 12:00 AM      Problem: HbA1C Not In Goal       Goal: Establish Regular Follow-Ups with PCP       Task: Discuss with PCP the recommended timing for patient's next follow up visit(s)    Responsible User: Mayte Vickers      Task: Discuss schedule for PCP visits with patient    Responsible User: Mayte Vickers      Goal: Get HbA1C Level in Goal       Task: Educate patient on diabetes education self-management topics     Responsible User: Mayte Vickers      Task: Educate patient on benefits of regular glucose monitoring Completed 7/13/2023   Responsible User: Mayte Vickers      Task: Refer patient to appropriate extended care team member, as needed (Medication Therapy Management, Behavioral Health, Physical Therapy, etc.)    Responsible User: Mayte Vickers      Task: Discuss diabetes treatment plan with patient Completed 7/13/2023   Responsible User: Mayte Vickers      Problem: Diabetes Self-Management Education Needed to Optimize Self-Care Behaviors       Goal: Understand diabetes pathophysiology and disease progression       Task: Provide education on diabetes pathophysiology and disease progression specfic to patient's diabetes type Completed 7/13/2023   Responsible User: Mayte Vickers      Goal: Healthy Eating - follow a healthy eating pattern for diabetes    Start Date: 7/13/2023   This Visit's Progress: 0%   Note:    My Goal: I will balance intake with carbohydrates, protein and fat    What I need to meet my goal: My plate    I plan to meet my goal by this date: 3 months      Task: Provide education on portion control and consistency in amount, composition and timing of food intake Completed 7/13/2023   Responsible User: Mayte Vickers      Task: Provide education on managing carbohydrate intake (carbohydrate counting, plate planning method, etc.) Completed 7/13/2023   Responsible User: Mayte Vickers      Task: Provide education on weight management    Responsible User: Mayte Vickers      Task: Provide education on heart healthy eating    Responsible User: Mayte Vickers      Task: Provide education on eating out    Responsible User: Mayte Vickers      Task: Develop individualized healthy eating plan with patient Completed 7/13/2023   Responsible User: Mayte Vickers      Goal: Being Active - get regular  physical activity, working up to at least 150 minutes per week       Task: Provide education on relationship of activity to glucose and precautions to take if at risk for low glucose Completed 7/13/2023   Responsible User: Mayte Vickers      Task: Discuss barriers to physical activity with patient    Responsible User: Mayte Vickers      Task: Develop physical activity plan with patient    Responsible User: Mayte Vickers      Task: Explore community resources including walking groups, assistance programs, and home videos    Responsible User: Mayte Vickers      Goal: Monitoring - monitor glucose and ketones as directed    Start Date: 7/13/2023   This Visit's Progress: On track   Note:    Discussed realistic goals for BG testing and to expect BG to increase after meals.     Task: Provide education on blood glucose monitoring (purpose, proper technique, frequency, glucose targets, interpreting results, when to use glucose control solution, sharps disposal) Completed 7/13/2023   Responsible User: Mayte Vickers      Task: Provide education on continuous glucose monitoring (sensor placement, use of fernando or /reader, understanding glucose trends, alerts and alarms, differences between sensor glucose and blood glucose)    Responsible User: Mayte Vickers      Task: Provide education on ketone monitoring (when to monitor, frequency, etc.)    Responsible User: Mayte Vickers      Goal: Taking Medication - patient is consistently taking medications as directed    Start Date: 7/13/2023   This Visit's Progress: On track   Note:    Continue taking insulin.  Reach out to provider if BG goes below 70 mg/dL.     Task: Provide education on action of prescribed medication, including when to take and possible side effects Completed 7/13/2023   Responsible User: Mayte Vickers      Task: Provide education on insulin and injectable diabetes  medications, including administration, storage, site selection and rotation for injection sites    Responsible User: Mayte Vickers      Task: Discuss barriers to medication adherence with patient and provide management technique ideas as appropriate    Responsible User: Mayte Vickers      Task: Provide education on frequency and refill details of medications    Responsible User: Mayte Vickers      Goal: Problem Solving - know how to prevent and manage short-term diabetes complications       Task: Provide education on high blood glucose - causes, signs/symptoms, prevention and treatment    Responsible User: Mayte Vickers      Task: Provide education on low blood glucose - causes, signs/symptoms, prevention, treatment, carrying a carbohydrate source at all times, and medical identification Completed 7/13/2023   Responsible User: Mayte Vickers      Task: Provide education on safe travel with diabetes    Responsible User: Mayte Vickers      Task: Provide education on how to care for diabetes on sick days    Responsible User: Mayte Vickers      Task: Provide education on when to call a health care provider Completed 7/13/2023   Responsible User: Mayte Vickers      Goal: Reducing Risks - know how to prevent and treat long-term diabetes complications       Task: Provide education on major complications of diabetes, prevention, early diagnostic measures and treatment of complications Completed 7/13/2023   Responsible User: Mayte Vickers      Task: Provide education on recommended care for dental, eye and foot health    Responsible User: Mayte Vickers      Task: Provide education on Hemoglobin A1c - goals and relationship to blood glucose levels Completed 7/13/2023   Responsible User: Mayte Vickers      Task: Provide education on recommendations for heart health - lipid levels and goals, blood pressure and  goals, and aspirin therapy, if indicated    Responsible User: Mayte Vickers      Task: Provide education on tobacco cessation    Responsible User: Mayte Vickers      Goal: Healthy Coping - use available resources to cope with the challenges of managing diabetes       Task: Discuss recognizing feelings about having diabetes    Responsible User: Mayte Vickers      Task: Provide education on the benefits of making appropriate lifestyle changes    Responsible User: Mayte Vickers      Task: Provide education on benefits of utilizing support systems    Responsible User: Mayte Vickers      Task: Discuss methods for coping with stress    Responsible User: Mayte Vickers      Task: Provide education on when to seek professional counseling    Responsible User: Mayte Vickers RDN, LD, [facility][note 3]  Outpatient Diabetes Education  Adult Diabetes Education Triage [phone]      Time Spent: 45 minutes  Encounter Type: Individual    Any diabetes medication dose changes were made via the CDE Protocol per the patient's referring provider. A copy of this encounter was shared with the provider.

## 2023-08-01 DIAGNOSIS — E13.65 UNCONTROLLED OTHER SPECIFIED DIABETES MELLITUS WITH HYPERGLYCEMIA (H): ICD-10-CM

## 2023-08-01 NOTE — TELEPHONE ENCOUNTER
"Per pharmacy    \"Pt is requesting a refill for blood glucose (NO BRAND SPECIFIED) lancets standard, blood glucose monitoring (NO BRAND SPECIFIED) meter device kit, blood glucose (NO BRAND SPECIFIED) test strip, Pen [place] \"    [facility] pharmacy   "

## 2023-08-16 ENCOUNTER — HOSPITAL ENCOUNTER (INPATIENT)
Facility: CLINIC | Age: 33
LOS: 4 days | Discharge: HOME | End: 2023-08-20
Attending: STUDENT IN AN ORGANIZED HEALTH CARE EDUCATION/TRAINING PROGRAM | Admitting: HOSPITALIST
Payer: MEDICAID

## 2023-08-16 DIAGNOSIS — E11.10 DIABETIC KETOACIDOSIS WITHOUT COMA ASSOCIATED WITH TYPE 2 DIABETES MELLITUS (H): Primary | ICD-10-CM

## 2023-08-16 DIAGNOSIS — N17.9 AKI (ACUTE KIDNEY INJURY) (H): ICD-10-CM

## 2023-08-16 DIAGNOSIS — A41.9 SEVERE SEPSIS (H): ICD-10-CM

## 2023-08-16 DIAGNOSIS — R65.20 SEVERE SEPSIS (H): ICD-10-CM

## 2023-08-16 DIAGNOSIS — E11.9 TYPE 2 DIABETES MELLITUS WITHOUT COMPLICATION, WITH LONG-TERM CURRENT USE OF INSULIN (H): ICD-10-CM

## 2023-08-16 DIAGNOSIS — E87.6 HYPOKALEMIA: ICD-10-CM

## 2023-08-16 DIAGNOSIS — E87.20 LACTIC ACIDOSIS: ICD-10-CM

## 2023-08-16 DIAGNOSIS — Z79.4 TYPE 2 DIABETES MELLITUS WITHOUT COMPLICATION, WITH LONG-TERM CURRENT USE OF INSULIN (H): ICD-10-CM

## 2023-08-16 LAB
ALBUMIN SERPL BCG-MCNC: 4.1 G/DL (ref 3.5–5.2)
ALBUMIN SERPL BCG-MCNC: 4.7 G/DL (ref 3.5–5.2)
ALBUMIN UR-MCNC: 200 MG/DL
ALP SERPL-CCNC: 123 U/L (ref 40–129)
ALP SERPL-CCNC: 95 U/L (ref 40–129)
ALT SERPL W P-5'-P-CCNC: 51 U/L (ref 0–70)
ALT SERPL W P-5'-P-CCNC: 61 U/L (ref 0–70)
AMPHETAMINES UR QL SCN: NORMAL
ANION GAP SERPL CALCULATED.3IONS-SCNC: 19 MMOL/L (ref 7–15)
ANION GAP SERPL CALCULATED.3IONS-SCNC: 27 MMOL/L (ref 7–15)
APPEARANCE UR: ABNORMAL
AST SERPL W P-5'-P-CCNC: 102 U/L (ref 0–45)
AST SERPL W P-5'-P-CCNC: 103 U/L (ref 0–45)
ATRIAL RATE - MUSE: 129 BPM
B-OH-BUTYR SERPL-SCNC: 2 MMOL/L
B-OH-BUTYR SERPL-SCNC: 2.8 MMOL/L
BACTERIA #/AREA URNS HPF: ABNORMAL /HPF
BARBITURATES UR QL SCN: NORMAL
BASE EXCESS BLDV CALC-SCNC: -1.4 MMOL/L (ref -7.7–1.9)
BASOPHILS # BLD AUTO: 0.1 10E3/UL (ref 0–0.2)
BASOPHILS NFR BLD AUTO: 0 %
BENZODIAZ UR QL SCN: NORMAL
BILIRUB DIRECT SERPL-MCNC: 3.29 MG/DL (ref 0–0.3)
BILIRUB DIRECT SERPL-MCNC: 3.49 MG/DL (ref 0–0.3)
BILIRUB SERPL-MCNC: 4.5 MG/DL
BILIRUB SERPL-MCNC: 5.2 MG/DL
BILIRUB UR QL STRIP: ABNORMAL
BUN SERPL-MCNC: 12.9 MG/DL (ref 6–20)
BUN SERPL-MCNC: 13.7 MG/DL (ref 6–20)
BZE UR QL SCN: NORMAL
CALCIUM SERPL-MCNC: 10.3 MG/DL (ref 8.6–10)
CALCIUM SERPL-MCNC: 9.3 MG/DL (ref 8.6–10)
CANNABINOIDS UR QL SCN: NORMAL
CHLORIDE SERPL-SCNC: 87 MMOL/L (ref 98–107)
CHLORIDE SERPL-SCNC: 94 MMOL/L (ref 98–107)
COLOR UR AUTO: ABNORMAL
CREAT SERPL-MCNC: 1.6 MG/DL (ref 0.67–1.17)
CREAT SERPL-MCNC: 2.49 MG/DL (ref 0.67–1.17)
DEPRECATED HCO3 PLAS-SCNC: 20 MMOL/L (ref 22–29)
DEPRECATED HCO3 PLAS-SCNC: 25 MMOL/L (ref 22–29)
DIASTOLIC BLOOD PRESSURE - MUSE: NORMAL MMHG
EOSINOPHIL # BLD AUTO: 0 10E3/UL (ref 0–0.7)
EOSINOPHIL NFR BLD AUTO: 0 %
ERYTHROCYTE [DISTWIDTH] IN BLOOD BY AUTOMATED COUNT: 13.9 % (ref 10–15)
ETHANOL SERPL-MCNC: <0.01 G/DL
GFR SERPL CREATININE-BSD FRML MDRD: 34 ML/MIN/1.73M2
GFR SERPL CREATININE-BSD FRML MDRD: 57 ML/MIN/1.73M2
GLUCOSE BLDC GLUCOMTR-MCNC: 128 MG/DL (ref 70–99)
GLUCOSE BLDC GLUCOMTR-MCNC: 264 MG/DL (ref 70–99)
GLUCOSE SERPL-MCNC: 116 MG/DL (ref 70–99)
GLUCOSE SERPL-MCNC: 306 MG/DL (ref 70–99)
GLUCOSE UR STRIP-MCNC: 100 MG/DL
HBA1C MFR BLD: 8.4 %
HCO3 BLDV-SCNC: 22 MMOL/L (ref 21–28)
HCT VFR BLD AUTO: 50.2 % (ref 40–53)
HGB BLD-MCNC: 16.9 G/DL (ref 13.3–17.7)
HGB UR QL STRIP: ABNORMAL
HOLD SPECIMEN: NORMAL
HYALINE CASTS: 26 /LPF
IMM GRANULOCYTES # BLD: 0.4 10E3/UL
IMM GRANULOCYTES NFR BLD: 2 %
INTERPRETATION ECG - MUSE: NORMAL
KETONES UR STRIP-MCNC: 10 MG/DL
LACTATE SERPL-SCNC: 1.5 MMOL/L (ref 0.7–2)
LACTATE SERPL-SCNC: 5.3 MMOL/L (ref 0.7–2)
LEUKOCYTE ESTERASE UR QL STRIP: ABNORMAL
LIPASE SERPL-CCNC: 259 U/L (ref 13–60)
LYMPHOCYTES # BLD AUTO: 0.9 10E3/UL (ref 0.8–5.3)
LYMPHOCYTES NFR BLD AUTO: 4 %
MAGNESIUM SERPL-MCNC: 2.2 MG/DL (ref 1.7–2.3)
MAGNESIUM SERPL-MCNC: 2.2 MG/DL (ref 1.7–2.3)
MCH RBC QN AUTO: 31.4 PG (ref 26.5–33)
MCHC RBC AUTO-ENTMCNC: 33.7 G/DL (ref 31.5–36.5)
MCV RBC AUTO: 93 FL (ref 78–100)
MONOCYTES # BLD AUTO: 1.2 10E3/UL (ref 0–1.3)
MONOCYTES NFR BLD AUTO: 5 %
MUCOUS THREADS #/AREA URNS LPF: PRESENT /LPF
NEUTROPHILS # BLD AUTO: 21.3 10E3/UL (ref 1.6–8.3)
NEUTROPHILS NFR BLD AUTO: 89 %
NITRATE UR QL: NEGATIVE
NRBC # BLD AUTO: 0 10E3/UL
NRBC BLD AUTO-RTO: 0 /100
O2/TOTAL GAS SETTING VFR VENT: 0 %
OPIATES UR QL SCN: NORMAL
P AXIS - MUSE: 52 DEGREES
PCO2 BLDV: 35 MM HG (ref 40–50)
PCP QUAL URINE (ROCHE): NORMAL
PH BLDV: 7.42 [PH] (ref 7.32–7.43)
PH UR STRIP: 5.5 [PH] (ref 5–7)
PHOSPHATE SERPL-MCNC: 3 MG/DL (ref 2.5–4.5)
PHOSPHATE SERPL-MCNC: 3.5 MG/DL (ref 2.5–4.5)
PLATELET # BLD AUTO: 256 10E3/UL (ref 150–450)
PO2 BLDV: 48 MM HG (ref 25–47)
POTASSIUM SERPL-SCNC: 2.9 MMOL/L (ref 3.4–5.3)
POTASSIUM SERPL-SCNC: 3.1 MMOL/L (ref 3.4–5.3)
PR INTERVAL - MUSE: 130 MS
PROT SERPL-MCNC: 7 G/DL (ref 6.4–8.3)
PROT SERPL-MCNC: 8.5 G/DL (ref 6.4–8.3)
QRS DURATION - MUSE: 94 MS
QT - MUSE: 406 MS
QTC - MUSE: 594 MS
R AXIS - MUSE: 104 DEGREES
RBC # BLD AUTO: 5.38 10E6/UL (ref 4.4–5.9)
RBC URINE: 3 /HPF
SODIUM SERPL-SCNC: 134 MMOL/L (ref 136–145)
SODIUM SERPL-SCNC: 138 MMOL/L (ref 136–145)
SP GR UR STRIP: 1.02 (ref 1–1.03)
SQUAMOUS EPITHELIAL: 13 /HPF
SYSTOLIC BLOOD PRESSURE - MUSE: NORMAL MMHG
T AXIS - MUSE: 63 DEGREES
UROBILINOGEN UR STRIP-MCNC: 4 MG/DL
VENTRICULAR RATE- MUSE: 129 BPM
WBC # BLD AUTO: 23.9 10E3/UL (ref 4–11)
WBC URINE: 64 /HPF

## 2023-08-16 PROCEDURE — 82803 BLOOD GASES ANY COMBINATION: CPT | Performed by: STUDENT IN AN ORGANIZED HEALTH CARE EDUCATION/TRAINING PROGRAM

## 2023-08-16 PROCEDURE — 83605 ASSAY OF LACTIC ACID: CPT | Performed by: EMERGENCY MEDICINE

## 2023-08-16 PROCEDURE — 99223 1ST HOSP IP/OBS HIGH 75: CPT | Mod: AI | Performed by: HOSPITALIST

## 2023-08-16 PROCEDURE — 36415 COLL VENOUS BLD VENIPUNCTURE: CPT | Performed by: STUDENT IN AN ORGANIZED HEALTH CARE EDUCATION/TRAINING PROGRAM

## 2023-08-16 PROCEDURE — 84100 ASSAY OF PHOSPHORUS: CPT | Performed by: HOSPITALIST

## 2023-08-16 PROCEDURE — 99292 CRITICAL CARE ADDL 30 MIN: CPT

## 2023-08-16 PROCEDURE — 82010 KETONE BODYS QUAN: CPT | Performed by: HOSPITALIST

## 2023-08-16 PROCEDURE — 80053 COMPREHEN METABOLIC PANEL: CPT | Performed by: STUDENT IN AN ORGANIZED HEALTH CARE EDUCATION/TRAINING PROGRAM

## 2023-08-16 PROCEDURE — 36415 COLL VENOUS BLD VENIPUNCTURE: CPT | Performed by: HOSPITALIST

## 2023-08-16 PROCEDURE — 96361 HYDRATE IV INFUSION ADD-ON: CPT

## 2023-08-16 PROCEDURE — 120N000001 HC R&B MED SURG/OB

## 2023-08-16 PROCEDURE — 82248 BILIRUBIN DIRECT: CPT | Performed by: HOSPITALIST

## 2023-08-16 PROCEDURE — 82248 BILIRUBIN DIRECT: CPT | Performed by: STUDENT IN AN ORGANIZED HEALTH CARE EDUCATION/TRAINING PROGRAM

## 2023-08-16 PROCEDURE — 81001 URINALYSIS AUTO W/SCOPE: CPT | Performed by: STUDENT IN AN ORGANIZED HEALTH CARE EDUCATION/TRAINING PROGRAM

## 2023-08-16 PROCEDURE — 250N000013 HC RX MED GY IP 250 OP 250 PS 637: Performed by: STUDENT IN AN ORGANIZED HEALTH CARE EDUCATION/TRAINING PROGRAM

## 2023-08-16 PROCEDURE — 99291 CRITICAL CARE FIRST HOUR: CPT | Mod: 25

## 2023-08-16 PROCEDURE — 82077 ASSAY SPEC XCP UR&BREATH IA: CPT | Performed by: STUDENT IN AN ORGANIZED HEALTH CARE EDUCATION/TRAINING PROGRAM

## 2023-08-16 PROCEDURE — 250N000011 HC RX IP 250 OP 636: Performed by: HOSPITALIST

## 2023-08-16 PROCEDURE — 36415 COLL VENOUS BLD VENIPUNCTURE: CPT | Performed by: EMERGENCY MEDICINE

## 2023-08-16 PROCEDURE — 250N000011 HC RX IP 250 OP 636: Mod: JZ | Performed by: STUDENT IN AN ORGANIZED HEALTH CARE EDUCATION/TRAINING PROGRAM

## 2023-08-16 PROCEDURE — 80048 BASIC METABOLIC PNL TOTAL CA: CPT | Performed by: HOSPITALIST

## 2023-08-16 PROCEDURE — 83735 ASSAY OF MAGNESIUM: CPT | Performed by: HOSPITALIST

## 2023-08-16 PROCEDURE — 93005 ELECTROCARDIOGRAM TRACING: CPT

## 2023-08-16 PROCEDURE — 80307 DRUG TEST PRSMV CHEM ANLYZR: CPT | Performed by: STUDENT IN AN ORGANIZED HEALTH CARE EDUCATION/TRAINING PROGRAM

## 2023-08-16 PROCEDURE — 83605 ASSAY OF LACTIC ACID: CPT | Performed by: STUDENT IN AN ORGANIZED HEALTH CARE EDUCATION/TRAINING PROGRAM

## 2023-08-16 PROCEDURE — 85004 AUTOMATED DIFF WBC COUNT: CPT | Performed by: STUDENT IN AN ORGANIZED HEALTH CARE EDUCATION/TRAINING PROGRAM

## 2023-08-16 PROCEDURE — 82962 GLUCOSE BLOOD TEST: CPT

## 2023-08-16 PROCEDURE — 120N000013 HC R&B IMCU

## 2023-08-16 PROCEDURE — 84100 ASSAY OF PHOSPHORUS: CPT | Performed by: STUDENT IN AN ORGANIZED HEALTH CARE EDUCATION/TRAINING PROGRAM

## 2023-08-16 PROCEDURE — 83735 ASSAY OF MAGNESIUM: CPT | Performed by: STUDENT IN AN ORGANIZED HEALTH CARE EDUCATION/TRAINING PROGRAM

## 2023-08-16 PROCEDURE — 87086 URINE CULTURE/COLONY COUNT: CPT | Performed by: STUDENT IN AN ORGANIZED HEALTH CARE EDUCATION/TRAINING PROGRAM

## 2023-08-16 PROCEDURE — 83036 HEMOGLOBIN GLYCOSYLATED A1C: CPT | Performed by: STUDENT IN AN ORGANIZED HEALTH CARE EDUCATION/TRAINING PROGRAM

## 2023-08-16 PROCEDURE — 258N000003 HC RX IP 258 OP 636: Performed by: STUDENT IN AN ORGANIZED HEALTH CARE EDUCATION/TRAINING PROGRAM

## 2023-08-16 PROCEDURE — 87040 BLOOD CULTURE FOR BACTERIA: CPT | Performed by: STUDENT IN AN ORGANIZED HEALTH CARE EDUCATION/TRAINING PROGRAM

## 2023-08-16 PROCEDURE — 96360 HYDRATION IV INFUSION INIT: CPT

## 2023-08-16 PROCEDURE — 83690 ASSAY OF LIPASE: CPT | Performed by: STUDENT IN AN ORGANIZED HEALTH CARE EDUCATION/TRAINING PROGRAM

## 2023-08-16 PROCEDURE — 82010 KETONE BODYS QUAN: CPT | Performed by: STUDENT IN AN ORGANIZED HEALTH CARE EDUCATION/TRAINING PROGRAM

## 2023-08-16 RX ORDER — POTASSIUM CHLORIDE 1.5 G/1.58G
40 POWDER, FOR SOLUTION ORAL ONCE
Status: COMPLETED | OUTPATIENT
Start: 2023-08-16 | End: 2023-08-16

## 2023-08-16 RX ORDER — NICOTINE POLACRILEX 4 MG
15-30 LOZENGE BUCCAL
Status: DISCONTINUED | OUTPATIENT
Start: 2023-08-16 | End: 2023-08-17

## 2023-08-16 RX ORDER — DEXTROSE MONOHYDRATE 25 G/50ML
25-50 INJECTION, SOLUTION INTRAVENOUS
Status: DISCONTINUED | OUTPATIENT
Start: 2023-08-16 | End: 2023-08-17

## 2023-08-16 RX ORDER — SODIUM CHLORIDE 450 MG/100ML
INJECTION, SOLUTION INTRAVENOUS CONTINUOUS
Status: DISCONTINUED | OUTPATIENT
Start: 2023-08-16 | End: 2023-08-17

## 2023-08-16 RX ORDER — LIDOCAINE 40 MG/G
CREAM TOPICAL
Status: DISCONTINUED | OUTPATIENT
Start: 2023-08-16 | End: 2023-08-20 | Stop reason: HOSPADM

## 2023-08-16 RX ORDER — POTASSIUM CHLORIDE 7.45 MG/ML
10 INJECTION INTRAVENOUS
Status: COMPLETED | OUTPATIENT
Start: 2023-08-16 | End: 2023-08-17

## 2023-08-16 RX ORDER — CEFTRIAXONE 2 G/1
2 INJECTION, POWDER, FOR SOLUTION INTRAMUSCULAR; INTRAVENOUS ONCE
Status: COMPLETED | OUTPATIENT
Start: 2023-08-16 | End: 2023-08-16

## 2023-08-16 RX ADMIN — POTASSIUM CHLORIDE 40 MEQ: 1.5 POWDER, FOR SOLUTION ORAL at 20:10

## 2023-08-16 RX ADMIN — DEXTROSE AND SODIUM CHLORIDE: 5; 450 INJECTION, SOLUTION INTRAVENOUS at 23:44

## 2023-08-16 RX ADMIN — SODIUM CHLORIDE 2000 ML: 9 INJECTION, SOLUTION INTRAVENOUS at 18:09

## 2023-08-16 RX ADMIN — CEFTRIAXONE SODIUM 2 G: 2 INJECTION, POWDER, FOR SOLUTION INTRAMUSCULAR; INTRAVENOUS at 20:34

## 2023-08-16 RX ADMIN — SODIUM CHLORIDE 1000 ML: 9 INJECTION, SOLUTION INTRAVENOUS at 19:12

## 2023-08-16 RX ADMIN — POTASSIUM CHLORIDE 10 MEQ: 7.46 INJECTION, SOLUTION INTRAVENOUS at 23:45

## 2023-08-16 ASSESSMENT — ACTIVITIES OF DAILY LIVING (ADL)
ADLS_ACUITY_SCORE: 35

## 2023-08-16 NOTE — ED PROVIDER NOTES
"History     Chief Complaint:  Anxiety (Panic Attack)     The history is provided by the patient.      Ravi Ahmadi is a 33 year old male presenting with with a history of type 2 diabetes, hypertension, and asthma who presents to the emergency department for anxiety (panic attack). The patient states that earlier today after going for a 30-40 minute walk, he felt dehydrated so he returned home. He reports once he returned home, he went into his bedroom to take his Lantus insulin in which he reported seeing a body under his bed. He notes he grabbed a baseball bat and poked the object a few times and later realized it was his cat. He reports experiencing anxiety, abdominal pain, diaphoresis, vomiting, and dysuria (2-3 days ago). He adds that once he was entering EMS, he began feeling lightheaded and adds \"I thought I was going to pass out.\" He notes his blood glucose has been high recently in the 190-200 range. Denies any history of mental symptoms. Denies drug or alcohol use and adds he quit alcohol 3-4 weeks ago. Denies drinking or eating any unusual drinks or foods. Denies chest pain, shortness of breath, fever, hematuria, rash, or wounds. Denies taking metformin.    Triage: Patient was tachycardic and hypotensive upon emergency department arrival (95/62). Lactic acid reading at 5.3.    Independent Historian:   None - Patient Only    Review of External Notes:   I personally reviewed notes from the patient's diabetes educator dated 7/13/2023. This provided me with information regarding patient's recent diabetes regimen.     Medications:    Albuterol  Insulin glargine  Lisinopril    Past Medical History:    Type 2 diabetes  Hypertension  Atopic dermatitis  Asthma    Past Surgical History:    Gravel/foreign body removal in abdomen after accident     Physical Exam   Patient Vitals for the past 24 hrs:   BP Temp Temp src Pulse Resp SpO2 Height Weight   08/16/23 2010 -- -- -- 101 22 95 % -- --   08/16/23 1956 104/53 " "-- -- 91 30 95 % -- --   08/16/23 1926 116/70 -- -- 96 -- 93 % -- --   08/16/23 1923 -- -- -- 102 12 95 % -- --   08/16/23 1908 -- -- -- 108 11 94 % -- --   08/16/23 1900 121/71 -- -- 118 22 95 % -- --   08/16/23 1838 115/69 -- -- 115 26 94 % -- --   08/16/23 1830 93/66 -- -- 114 14 94 % -- --   08/16/23 1805 107/52 -- -- 115 25 95 % -- --   08/16/23 1800 107/52 -- -- 118 25 95 % -- --   08/16/23 1730 107/70 -- -- (!) 130 22 92 % -- --   08/16/23 1700 95/62 97  F (36.1  C) Oral (!) 131 22 -- 1.676 m (5' 6\") 113.4 kg (250 lb)      Physical Exam  Vitals and nursing note reviewed.   Constitutional:       Appearance: He is obese. He is ill-appearing and diaphoretic.   Cardiovascular:      Rate and Rhythm: Regular rhythm. Tachycardia present.   Pulmonary:      Effort: Pulmonary effort is normal.      Breath sounds: Normal breath sounds.   Abdominal:      General: Abdomen is protuberant.      Palpations: Abdomen is soft.      Tenderness: There is no abdominal tenderness. There is no guarding or rebound.   Musculoskeletal:      Cervical back: Neck supple.      Comments: No rigidity,   Skin:     General: Skin is warm.   Neurological:      Mental Status: He is alert and oriented to person, place, and time.      Cranial Nerves: Cranial nerves 2-12 are intact.      Comments: No significant clonus bilaterally and ankles            Emergency Department Course   ECG  ECG taken at 1708, ECG read at 1720  Long QTc  Sinus tachycardia  Rightward axis  Incomplete right bundle branch block  No previous ECG  Rate 129 bpm. NH interval 130 ms. QRS duration 94 ms. QT/QTc 406/594 ms. P-R-T axes 52 104 63.     Laboratory:  Labs Ordered and Resulted from Time of ED Arrival to Time of ED Departure   LACTIC ACID WHOLE BLOOD - Abnormal       Result Value    Lactic Acid 5.3 (*)    BASIC METABOLIC PANEL - Abnormal    Sodium 134 (*)     Potassium 2.9 (*)     Chloride 87 (*)     Carbon Dioxide (CO2) 20 (*)     Anion Gap 27 (*)     Urea Nitrogen 13.7 "      Creatinine 2.49 (*)     Calcium 10.3 (*)     Glucose 306 (*)     GFR Estimate 34 (*)    BLOOD GAS VENOUS - Abnormal    pH Venous 7.42      pCO2 Venous 35 (*)     pO2 Venous 48 (*)     Bicarbonate Venous 22      Base Excess/Deficit (+/-) -1.4      FIO2 0     KETONE BETA-HYDROXYBUTYRATE QUANTITATIVE, RAPID - Abnormal    Ketone (Beta-Hydroxybutyrate) Quantitative 2.80 (*)    CBC WITH PLATELETS AND DIFFERENTIAL - Abnormal    WBC Count 23.9 (*)     RBC Count 5.38      Hemoglobin 16.9      Hematocrit 50.2      MCV 93      MCH 31.4      MCHC 33.7      RDW 13.9      Platelet Count 256      % Neutrophils 89      % Lymphocytes 4      % Monocytes 5      % Eosinophils 0      % Basophils 0      % Immature Granulocytes 2      NRBCs per 100 WBC 0      Absolute Neutrophils 21.3 (*)     Absolute Lymphocytes 0.9      Absolute Monocytes 1.2      Absolute Eosinophils 0.0      Absolute Basophils 0.1      Absolute Immature Granulocytes 0.4      Absolute NRBCs 0.0     GLUCOSE BY METER - Abnormal    GLUCOSE BY METER POCT 264 (*)    HEMOGLOBIN A1C - Abnormal    Hemoglobin A1C 8.4 (*)    ROUTINE UA WITH MICROSCOPIC REFLEX TO CULTURE - Abnormal    Color Urine Orange (*)     Appearance Urine Cloudy (*)     Glucose Urine 100 (*)     Bilirubin Urine Moderate (*)     Ketones Urine 10 (*)     Specific Gravity Urine 1.022      Blood Urine Moderate (*)     pH Urine 5.5      Protein Albumin Urine 200 (*)     Urobilinogen Urine 4.0 (*)     Nitrite Urine Negative      Leukocyte Esterase Urine Trace (*)     Bacteria Urine Few (*)     Mucus Urine Present (*)     RBC Urine 3 (*)     WBC Urine 64 (*)     Squamous Epithelials Urine 13 (*)     Hyaline Casts Urine 26 (*)    LIPASE - Abnormal    Lipase 259 (*)    HEPATIC FUNCTION PANEL - Abnormal    Protein Total 8.5 (*)     Albumin 4.7      Bilirubin Total 5.2 (*)     Alkaline Phosphatase 123       (*)     ALT 61      Bilirubin Direct 3.29 (*)    PHOSPHORUS - Normal    Phosphorus 3.0     MAGNESIUM  - Normal    Magnesium 2.2     ETHYL ALCOHOL LEVEL - Normal    Alcohol ethyl <0.01     GLUCOSE MONITOR NURSING POCT   GLUCOSE MONITOR NURSING POCT   LACTIC ACID WHOLE BLOOD   URINE CULTURE   BLOOD CULTURE   BLOOD CULTURE      Emergency Department Course & Assessments:    Interventions:  Medications   dextrose 50 % injection 25-50 mL (has no administration in time range)   potassium chloride 10 mEq in 100 mL sterile water infusion (has no administration in time range)   cefTRIAXone (ROCEPHIN) 2 g vial to attach to  ml bag for ADULTS or NS 50 ml bag for PEDS (has no administration in time range)   0.9% sodium chloride BOLUS (0 mLs Intravenous Stopped 8/16/23 2010)   0.9% sodium chloride BOLUS (1,000 mLs Intravenous $New Bag 8/16/23 1912)   potassium chloride (KLOR-CON) Packet 40 mEq (40 mEq Oral $Given 8/16/23 2010)      Assessments:  1737 I obtained history and examined the patient as noted above.   1915 I rechecked the patient and explained findings.    Independent Interpretation (X-rays, CTs, rhythm strip):  None    Consultations/Discussion of Management or Tests:  1939 I spoke with Dr. Carrasco, hospitalist, regarding the patient. He accepts admission.    Social Determinants of Health affecting care:   None    Disposition:  The patient was admitted to the hospital under the care of Dr. Carrasco.     Impression & Plan   CMS Diagnoses: The patient has signs of Severe Sepsis        If one the following conditions is present, a 30 mL/kg bolus is recommended as part of the 6 hour bundle (IBW can be used for BMI >30, or document refusal/contraindication):      1.   Initial hypotension  defined as 2 bps < 90 or map < 65 in the 6hrs before or 3hrs after time zero.     2.  Lactate >4.      The patient has signs of Severe Sepsis as evidenced by:    1. 2 SIRS criteria, AND  2. Suspected infection, AND   3. Organ dysfunction: Lactic Acidosis with value >2.0    Time severe sepsis diagnosis confirmed: 1726 08/16/23 as this  was the time when Lactate resulted, and the level was > 2.0    3 Hour Severe Sepsis Bundle Completion:    1. Initial Lactic Acid Result:   Recent Labs   Lab Test 08/16/23  1726   LACT 5.3*     2. Blood Cultures before Antibiotics: Yes  3. Broad Spectrum Antibiotics Administered:  yes       Anti-infectives (From admission through now)      Start     Dose/Rate Route Frequency Ordered Stop    08/16/23 2010  cefTRIAXone (ROCEPHIN) 2 g vial to attach to  ml bag for ADULTS or NS 50 ml bag for PEDS         2 g  over 30 Minutes Intravenous ONCE 08/16/23 2008              4. Is initial hypotension present?     No (IV fluid bolus NOT required). IV Fluid volume administered: 3 L                    Severe Sepsis reassessment:  1. Repeat Lactic Acid Level within 6 hours of time zero: 1.5  2.  Continue fluids given for DKA.  No pressors indicated.    Medical Decision Making:  Patient presenting with anxiety/panic attack.  Notably abnormal vital signs on arrival, tachycardia and borderline hypotensive.  Due to vital sign abnormalities, lactic acid was drawn and this was significantly elevated.  Overall impression is that this is likely secondary to DKA rather than sepsis.  However, did implement sepsis protocol given these findings concerning for severe sepsis.  Labs significant for diabetic ketoacidosis, significant hypokalemia, significant electrolyte abnormalities, leukocytosis.  Did consider ingestion as possible causes of the patient's presentation but alcohol level was negative.  Low suspicion for acute surgical pathology of abdomen based on my exam, which is reassuring.  Also considered toxidrome including serotonin syndrome, anticholinergic toxidrome initially but did not find evidence of these.    Blood cultures were drawn.  Urine culture is sent based on urine which seemsabnormal likely secondary to significant dehydration rather than urinary tract infection.  We will start antibiotics with ceftriaxone.  Plan for  admission to medicine for further evaluation and management.    Critical Care time: was 35 minutes for this patient excluding procedures.  Critical Care  Critical Care is defined as an illness or injury that acutely impairs one or more vital organ systems such that there is a high probability of imminent or life-threatening deterioration in the patient's condition and that the failure to initiate these interventions on an urgent basis would likely result in sudden, clinically significant or life-threatening deterioration in the patient's condition.    The critical condition was: DKA    Critical interventions performed or strongly considered:  Fluid resuscitation, electrolyte correction, including low potassium, insulin drip    Critical care time was 35 minutes exclusive of time spent on separately billable procedures.    For purposes of time:  Procedures included in CC time: interpretation of NICOM, CXR, SpO2, VBG/ABG, interpretation of physiologic data, OG placement, temporary transcutaneous/transvenous pacing, ventilator management  Common separately billable procedures (not included in CC time): CPR, wound repair, endotracheal intubation, central line placement, intraosseous placement, tube thoracostomy, temporary transvenous pacemaker, EKG, electrical cardioversion       Diagnosis:    ICD-10-CM    1. Diabetic ketoacidosis without coma associated with type 2 diabetes mellitus (H)  E11.10       2. JAY (acute kidney injury) (H)  N17.9       3. Hypokalemia  E87.6       4. Lactic acidosis  E87.20       5. Severe sepsis (H)  A41.9     R65.20            Discharge Medications:  New Prescriptions    No medications on file      8/16/2023   Jerad Green MD  08/16/23 6140

## 2023-08-16 NOTE — ED TRIAGE NOTES
Pt [facility] from home, presenting with anxiety post-panic attack. Pt reportedly called the police due to seeing something under his bed (thinking it was a body), it was found to likely be his cat. Pt is calmer, but diaphoretic and tachycardic upon arrival. BP 95/62. Hx of DM II () and HTN.     Pt hx of ETOH use, stopped 3 weeks ago.     Triage Assessment       Row Name 08/16/23 1815       Triage Assessment (Adult)    Airway WDL WDL       Respiratory WDL    Respiratory WDL WDL       Skin Circulation/Temperature WDL    Skin Circulation/Temperature WDL all;X  diaphoretic       Cardiac WDL    Cardiac WDL all;X    Pulse Rate & Regularity radial pulse regular    Cardiac Rhythm ST       Chest Pain Assessment    Associated Signs/Symptoms anxiety       Peripheral/Neurovascular WDL    Peripheral Neurovascular WDL neurovascular assessment lower       LLE Neurovascular Assessment    Sensation LLE numbness present  baseline numbness       RLE Neurovascular Assessment    Sensation RLE numbness present  baseline numbness in toes       Cognitive/Neuro/Behavioral WDL    Cognitive/Neuro/Behavioral WDL WDL

## 2023-08-16 NOTE — ED NOTES
Bed: ED23  Expected date: 8/16/23  Expected time:   Means of arrival:   Comments:  tuyet 524 33 M anxiety panic attack eta 1283

## 2023-08-17 ENCOUNTER — APPOINTMENT (OUTPATIENT)
Dept: GENERAL RADIOLOGY | Facility: CLINIC | Age: 33
End: 2023-08-17
Attending: HOSPITALIST
Payer: MEDICAID

## 2023-08-17 LAB
ANION GAP SERPL CALCULATED.3IONS-SCNC: 15 MMOL/L (ref 7–15)
ANION GAP SERPL CALCULATED.3IONS-SCNC: 18 MMOL/L (ref 7–15)
B-OH-BUTYR SERPL-SCNC: 0.7 MMOL/L
B-OH-BUTYR SERPL-SCNC: 2.1 MMOL/L
B-OH-BUTYR SERPL-SCNC: 2.2 MMOL/L
BUN SERPL-MCNC: 10.5 MG/DL (ref 6–20)
BUN SERPL-MCNC: 11.7 MG/DL (ref 6–20)
BUN SERPL-MCNC: 9.8 MG/DL (ref 6–20)
CALCIUM SERPL-MCNC: 9.4 MG/DL (ref 8.6–10)
CALCIUM SERPL-MCNC: 9.5 MG/DL (ref 8.6–10)
CALCIUM SERPL-MCNC: 9.6 MG/DL (ref 8.6–10)
CHLORIDE SERPL-SCNC: 93 MMOL/L (ref 98–107)
CHLORIDE SERPL-SCNC: 93 MMOL/L (ref 98–107)
CHLORIDE SERPL-SCNC: 94 MMOL/L (ref 98–107)
CHLORIDE SERPL-SCNC: 95 MMOL/L (ref 98–107)
CREAT SERPL-MCNC: 0.91 MG/DL (ref 0.67–1.17)
CREAT SERPL-MCNC: 0.99 MG/DL (ref 0.67–1.17)
CREAT SERPL-MCNC: 1.16 MG/DL (ref 0.67–1.17)
CREAT UR-MCNC: 82.7 MG/DL
DEPRECATED HCO3 PLAS-SCNC: 22 MMOL/L (ref 22–29)
DEPRECATED HCO3 PLAS-SCNC: 22 MMOL/L (ref 22–29)
DEPRECATED HCO3 PLAS-SCNC: 24 MMOL/L (ref 22–29)
DEPRECATED HCO3 PLAS-SCNC: 26 MMOL/L (ref 22–29)
FRACT EXCRET NA UR+SERPL-RTO: 1.1 %
GFR SERPL CREATININE-BSD FRML MDRD: 84 ML/MIN/1.73M2
GFR SERPL CREATININE-BSD FRML MDRD: >90 ML/MIN/1.73M2
GFR SERPL CREATININE-BSD FRML MDRD: >90 ML/MIN/1.73M2
GLUCOSE BLDC GLUCOMTR-MCNC: 106 MG/DL (ref 70–99)
GLUCOSE BLDC GLUCOMTR-MCNC: 111 MG/DL (ref 70–99)
GLUCOSE BLDC GLUCOMTR-MCNC: 128 MG/DL (ref 70–99)
GLUCOSE BLDC GLUCOMTR-MCNC: 130 MG/DL (ref 70–99)
GLUCOSE BLDC GLUCOMTR-MCNC: 140 MG/DL (ref 70–99)
GLUCOSE SERPL-MCNC: 123 MG/DL (ref 70–99)
GLUCOSE SERPL-MCNC: 124 MG/DL (ref 70–99)
GLUCOSE SERPL-MCNC: 129 MG/DL (ref 70–99)
LACTATE SERPL-SCNC: 1.3 MMOL/L (ref 0.7–2)
MAGNESIUM SERPL-MCNC: 2.1 MG/DL (ref 1.7–2.3)
PHOSPHATE SERPL-MCNC: 3.1 MG/DL (ref 2.5–4.5)
PHOSPHATE SERPL-MCNC: 3.3 MG/DL (ref 2.5–4.5)
PHOSPHATE SERPL-MCNC: 3.7 MG/DL (ref 2.5–4.5)
POTASSIUM SERPL-SCNC: 2.8 MMOL/L (ref 3.4–5.3)
POTASSIUM SERPL-SCNC: 2.9 MMOL/L (ref 3.4–5.3)
POTASSIUM SERPL-SCNC: 3 MMOL/L (ref 3.4–5.3)
POTASSIUM SERPL-SCNC: 3 MMOL/L (ref 3.4–5.3)
SODIUM SERPL-SCNC: 133 MMOL/L (ref 136–145)
SODIUM SERPL-SCNC: 134 MMOL/L (ref 136–145)
SODIUM SERPL-SCNC: 135 MMOL/L (ref 136–145)
SODIUM SERPL-SCNC: 136 MMOL/L (ref 136–145)
SODIUM UR-SCNC: 106 MMOL/L

## 2023-08-17 PROCEDURE — 99233 SBSQ HOSP IP/OBS HIGH 50: CPT | Performed by: HOSPITALIST

## 2023-08-17 PROCEDURE — 83735 ASSAY OF MAGNESIUM: CPT | Performed by: INTERNAL MEDICINE

## 2023-08-17 PROCEDURE — 71046 X-RAY EXAM CHEST 2 VIEWS: CPT

## 2023-08-17 PROCEDURE — 82010 KETONE BODYS QUAN: CPT | Performed by: HOSPITALIST

## 2023-08-17 PROCEDURE — 250N000011 HC RX IP 250 OP 636: Performed by: HOSPITALIST

## 2023-08-17 PROCEDURE — 84132 ASSAY OF SERUM POTASSIUM: CPT | Performed by: HOSPITALIST

## 2023-08-17 PROCEDURE — 84100 ASSAY OF PHOSPHORUS: CPT | Performed by: HOSPITALIST

## 2023-08-17 PROCEDURE — 80051 ELECTROLYTE PANEL: CPT | Performed by: HOSPITALIST

## 2023-08-17 PROCEDURE — 84100 ASSAY OF PHOSPHORUS: CPT | Performed by: INTERNAL MEDICINE

## 2023-08-17 PROCEDURE — 82374 ASSAY BLOOD CARBON DIOXIDE: CPT | Performed by: HOSPITALIST

## 2023-08-17 PROCEDURE — 120N000001 HC R&B MED SURG/OB

## 2023-08-17 PROCEDURE — 36415 COLL VENOUS BLD VENIPUNCTURE: CPT | Performed by: HOSPITALIST

## 2023-08-17 PROCEDURE — 250N000013 HC RX MED GY IP 250 OP 250 PS 637: Performed by: HOSPITALIST

## 2023-08-17 PROCEDURE — 83605 ASSAY OF LACTIC ACID: CPT | Performed by: HOSPITALIST

## 2023-08-17 PROCEDURE — 84300 ASSAY OF URINE SODIUM: CPT | Performed by: HOSPITALIST

## 2023-08-17 PROCEDURE — 250N000011 HC RX IP 250 OP 636: Mod: JZ | Performed by: STUDENT IN AN ORGANIZED HEALTH CARE EDUCATION/TRAINING PROGRAM

## 2023-08-17 PROCEDURE — 80048 BASIC METABOLIC PNL TOTAL CA: CPT | Performed by: HOSPITALIST

## 2023-08-17 PROCEDURE — 93010 ELECTROCARDIOGRAM REPORT: CPT | Performed by: INTERNAL MEDICINE

## 2023-08-17 PROCEDURE — 93005 ELECTROCARDIOGRAM TRACING: CPT

## 2023-08-17 PROCEDURE — 250N000013 HC RX MED GY IP 250 OP 250 PS 637: Performed by: INTERNAL MEDICINE

## 2023-08-17 PROCEDURE — 258N000003 HC RX IP 258 OP 636: Performed by: HOSPITALIST

## 2023-08-17 RX ORDER — LORAZEPAM 2 MG/ML
1-2 INJECTION INTRAMUSCULAR EVERY 30 MIN PRN
Status: DISCONTINUED | OUTPATIENT
Start: 2023-08-17 | End: 2023-08-20 | Stop reason: HOSPADM

## 2023-08-17 RX ORDER — DIAZEPAM 5 MG
10 TABLET ORAL EVERY 30 MIN PRN
Status: DISCONTINUED | OUTPATIENT
Start: 2023-08-17 | End: 2023-08-17

## 2023-08-17 RX ORDER — FLUMAZENIL 0.1 MG/ML
0.2 INJECTION, SOLUTION INTRAVENOUS
Status: DISCONTINUED | OUTPATIENT
Start: 2023-08-17 | End: 2023-08-20 | Stop reason: HOSPADM

## 2023-08-17 RX ORDER — FOLIC ACID 1 MG/1
1 TABLET ORAL DAILY
Status: DISCONTINUED | OUTPATIENT
Start: 2023-08-17 | End: 2023-08-17

## 2023-08-17 RX ORDER — NICOTINE POLACRILEX 4 MG
15-30 LOZENGE BUCCAL
Status: DISCONTINUED | OUTPATIENT
Start: 2023-08-17 | End: 2023-08-20 | Stop reason: HOSPADM

## 2023-08-17 RX ORDER — DIAZEPAM 10 MG/2ML
5-10 INJECTION, SOLUTION INTRAMUSCULAR; INTRAVENOUS EVERY 30 MIN PRN
Status: DISCONTINUED | OUTPATIENT
Start: 2023-08-17 | End: 2023-08-17

## 2023-08-17 RX ORDER — POTASSIUM CHLORIDE 1500 MG/1
40 TABLET, EXTENDED RELEASE ORAL ONCE
Status: COMPLETED | OUTPATIENT
Start: 2023-08-17 | End: 2023-08-17

## 2023-08-17 RX ORDER — POTASSIUM CHLORIDE 1500 MG/1
20 TABLET, EXTENDED RELEASE ORAL ONCE
Status: COMPLETED | OUTPATIENT
Start: 2023-08-17 | End: 2023-08-17

## 2023-08-17 RX ORDER — CEFTRIAXONE 2 G/1
2 INJECTION, POWDER, FOR SOLUTION INTRAMUSCULAR; INTRAVENOUS EVERY 24 HOURS
Status: DISCONTINUED | OUTPATIENT
Start: 2023-08-17 | End: 2023-08-18

## 2023-08-17 RX ORDER — MULTIVITAMIN,THERAPEUTIC
1 TABLET ORAL DAILY
Status: DISCONTINUED | OUTPATIENT
Start: 2023-08-17 | End: 2023-08-17

## 2023-08-17 RX ORDER — DEXTROSE MONOHYDRATE 25 G/50ML
25-50 INJECTION, SOLUTION INTRAVENOUS
Status: DISCONTINUED | OUTPATIENT
Start: 2023-08-17 | End: 2023-08-20 | Stop reason: HOSPADM

## 2023-08-17 RX ORDER — THIAMINE HYDROCHLORIDE 100 MG/ML
100 INJECTION, SOLUTION INTRAMUSCULAR; INTRAVENOUS DAILY
Status: DISCONTINUED | OUTPATIENT
Start: 2023-08-17 | End: 2023-08-19

## 2023-08-17 RX ORDER — DEXTROSE MONOHYDRATE, SODIUM CHLORIDE, AND POTASSIUM CHLORIDE 50; 1.49; 4.5 G/1000ML; G/1000ML; G/1000ML
INJECTION, SOLUTION INTRAVENOUS CONTINUOUS
Status: DISCONTINUED | OUTPATIENT
Start: 2023-08-17 | End: 2023-08-18

## 2023-08-17 RX ORDER — CLONIDINE HYDROCHLORIDE 0.1 MG/1
0.1 TABLET ORAL EVERY 8 HOURS
Status: DISCONTINUED | OUTPATIENT
Start: 2023-08-17 | End: 2023-08-19

## 2023-08-17 RX ORDER — MAGNESIUM OXIDE 400 MG/1
400 TABLET ORAL DAILY
Status: DISCONTINUED | OUTPATIENT
Start: 2023-08-17 | End: 2023-08-20 | Stop reason: HOSPADM

## 2023-08-17 RX ORDER — MULTIPLE VITAMINS W/ MINERALS TAB 9MG-400MCG
1 TAB ORAL DAILY
Status: DISCONTINUED | OUTPATIENT
Start: 2023-08-17 | End: 2023-08-20 | Stop reason: HOSPADM

## 2023-08-17 RX ORDER — OLANZAPINE 5 MG/1
5-10 TABLET, ORALLY DISINTEGRATING ORAL EVERY 6 HOURS PRN
Status: DISCONTINUED | OUTPATIENT
Start: 2023-08-17 | End: 2023-08-20 | Stop reason: HOSPADM

## 2023-08-17 RX ORDER — ALBUTEROL SULFATE 90 UG/1
1-2 AEROSOL, METERED RESPIRATORY (INHALATION) 4 TIMES DAILY PRN
Status: DISCONTINUED | OUTPATIENT
Start: 2023-08-17 | End: 2023-08-20 | Stop reason: HOSPADM

## 2023-08-17 RX ORDER — LORAZEPAM 2 MG/ML
0.5 INJECTION INTRAMUSCULAR EVERY 6 HOURS PRN
Status: DISCONTINUED | OUTPATIENT
Start: 2023-08-17 | End: 2023-08-17

## 2023-08-17 RX ORDER — LORAZEPAM 0.5 MG/1
1-2 TABLET ORAL EVERY 30 MIN PRN
Status: DISCONTINUED | OUTPATIENT
Start: 2023-08-17 | End: 2023-08-20 | Stop reason: HOSPADM

## 2023-08-17 RX ORDER — HALOPERIDOL 5 MG/ML
2.5-5 INJECTION INTRAMUSCULAR EVERY 6 HOURS PRN
Status: DISCONTINUED | OUTPATIENT
Start: 2023-08-17 | End: 2023-08-20 | Stop reason: HOSPADM

## 2023-08-17 RX ORDER — FOLIC ACID 1 MG/1
1 TABLET ORAL DAILY
Status: DISCONTINUED | OUTPATIENT
Start: 2023-08-17 | End: 2023-08-20 | Stop reason: HOSPADM

## 2023-08-17 RX ORDER — HEPARIN SODIUM 5000 [USP'U]/.5ML
5000 INJECTION, SOLUTION INTRAVENOUS; SUBCUTANEOUS EVERY 12 HOURS
Status: DISCONTINUED | OUTPATIENT
Start: 2023-08-17 | End: 2023-08-18

## 2023-08-17 RX ADMIN — LORAZEPAM 0.5 MG: 2 INJECTION INTRAMUSCULAR; INTRAVENOUS at 03:36

## 2023-08-17 RX ADMIN — HEPARIN SODIUM 5000 UNITS: 5000 INJECTION, SOLUTION INTRAVENOUS; SUBCUTANEOUS at 03:36

## 2023-08-17 RX ADMIN — CLONIDINE HYDROCHLORIDE 0.1 MG: 0.1 TABLET ORAL at 08:12

## 2023-08-17 RX ADMIN — POTASSIUM CHLORIDE 20 MEQ: 1500 TABLET, EXTENDED RELEASE ORAL at 16:14

## 2023-08-17 RX ADMIN — DEXTROSE AND SODIUM CHLORIDE: 5; 450 INJECTION, SOLUTION INTRAVENOUS at 14:14

## 2023-08-17 RX ADMIN — POTASSIUM CHLORIDE 20 MEQ: 1500 TABLET, EXTENDED RELEASE ORAL at 08:12

## 2023-08-17 RX ADMIN — MULTIPLE VITAMINS W/ MINERALS TAB 1 TABLET: TAB at 08:12

## 2023-08-17 RX ADMIN — THIAMINE HCL TAB 100 MG 100 MG: 100 TAB at 08:12

## 2023-08-17 RX ADMIN — POTASSIUM CHLORIDE, DEXTROSE MONOHYDRATE AND SODIUM CHLORIDE: 150; 5; 450 INJECTION, SOLUTION INTRAVENOUS at 18:39

## 2023-08-17 RX ADMIN — POTASSIUM CHLORIDE 40 MEQ: 1500 TABLET, EXTENDED RELEASE ORAL at 06:29

## 2023-08-17 RX ADMIN — POTASSIUM CHLORIDE 10 MEQ: 7.46 INJECTION, SOLUTION INTRAVENOUS at 00:39

## 2023-08-17 RX ADMIN — CLONIDINE HYDROCHLORIDE 0.1 MG: 0.1 TABLET ORAL at 16:14

## 2023-08-17 RX ADMIN — Medication 400 MG: at 08:12

## 2023-08-17 RX ADMIN — THIAMINE HYDROCHLORIDE 100 MG: 100 INJECTION, SOLUTION INTRAMUSCULAR; INTRAVENOUS at 16:15

## 2023-08-17 RX ADMIN — POTASSIUM CHLORIDE 40 MEQ: 1500 TABLET, EXTENDED RELEASE ORAL at 14:46

## 2023-08-17 RX ADMIN — DEXTROSE AND SODIUM CHLORIDE: 5; 450 INJECTION, SOLUTION INTRAVENOUS at 06:28

## 2023-08-17 RX ADMIN — FOLIC ACID 1 MG: 1 TABLET ORAL at 08:12

## 2023-08-17 RX ADMIN — HEPARIN SODIUM 5000 UNITS: 5000 INJECTION, SOLUTION INTRAVENOUS; SUBCUTANEOUS at 14:47

## 2023-08-17 RX ADMIN — DIAZEPAM 10 MG: 5 TABLET ORAL at 08:32

## 2023-08-17 RX ADMIN — CEFTRIAXONE SODIUM 2 G: 2 INJECTION, POWDER, FOR SOLUTION INTRAMUSCULAR; INTRAVENOUS at 18:12

## 2023-08-17 ASSESSMENT — ACTIVITIES OF DAILY LIVING (ADL)
WEAR_GLASSES_OR_BLIND: YES
ADLS_ACUITY_SCORE: 22
FALL_HISTORY_WITHIN_LAST_SIX_MONTHS: NO
VISION_MANAGEMENT: GLASSES
DIFFICULTY_EATING/SWALLOWING: NO
CONCENTRATING,_REMEMBERING_OR_MAKING_DECISIONS_DIFFICULTY: NO
DRESSING/BATHING_DIFFICULTY: NO
ADLS_ACUITY_SCORE: 22
DOING_ERRANDS_INDEPENDENTLY_DIFFICULTY: NO
ADLS_ACUITY_SCORE: 22
ADLS_ACUITY_SCORE: 22
CHANGE_IN_FUNCTIONAL_STATUS_SINCE_ONSET_OF_CURRENT_ILLNESS/INJURY: NO
TOILETING_ISSUES: NO
ADLS_ACUITY_SCORE: 35
WALKING_OR_CLIMBING_STAIRS_DIFFICULTY: NO
ADLS_ACUITY_SCORE: 22

## 2023-08-17 NOTE — H&P
[facility]    History and Physical - Hospitalist Service       Date of Admission:  8/16/2023    Assessment & Plan      Ravi Ahmadi is a 33 year old male with PMH significant for DM1, alcohol dependence, HTN, asthma was brought to the ER by EMS for evaluation of symptoms including nausea ,vomiting, weakness.  He was found to be in DKA with a number of electrolyte abnormalities and is being admitted on 8/16/2023 for further management.     Anion gap metabolic acidosis   Lactic acidosis  DKA vs starvation Ketoacidosis   Nausea/vomiting  Suspect underlying infection, possible UTI/sepsis and nausea/vomiting precipitating DKA. Endorses using Insulin as prescribed, and injected Lantus this AM.  Noted anion gap, high LA/ketone, VBG shows PH of 7.42 though.   - admit to [facility]  - 3 L IVF bolus in ER, tachycardia improved, feels better, he will have more IVF as electrolyte as well, and continue maintenance IVF per DKA protocol   - BMP q4 hours  - Repeat lactic acid pending. Drug screen pending  - Insulin drip: To start when K is 3.3 or more.  - Telemetry.  - will place on full liquid diet for now, advance in AM if n/v resolves. Abd exam is benign, denies pain, hold off on imaging.     Hyponatremia  Hypokalemia  Hypercalcemia  Likely pseudohyponatremia, corrected sodium normal.  Hypercalcemia due to dehydration and hemoconcentration.  -IVF as per DKA protocol  -Replace potassium as needed with one time order with this lab result.  Not on protocol due to acute kidney injury and decreased urine output  -Follow BMP/lites    Acute kidney injury  Dehydration and presyncope  Creatinine 2.49.  Suspect prerenal in the setting of dehydration and ACEI use.  Could be ATN due to UTI and sepsis.  Denies abdominal pain.  Unlikely obstruction, recent CT without any renal stone.  Does have dysuria.  -Aggressive IV hydration  -Monitor intake and output  -Follow BMP  -Check FENa  -Bladder scan as needed to make sure  "no retention  -If no improvement, will get US  -hold lisinopril  -Avoid nephrotoxic meds    Prolonged QTc  Noted QTc of 594 on EKG.  Suspect related to severe electrolyte derangement  -Telemetry  -Repeat EKG in a.m. after electrolyte replacement    UTI and sepsis  Leukocytosis  Reports dysuria about 2 days ago. No flank pain. Had CT a month ago and showed no renal stone.   -UA is abnormal, blood cultures x2 sent from ER, urine culture sent  -Continue Rocephin 2 g IV daily    History of alcohol abuse  Abnormal LFTs/bilirubin  Possible hallucination/anxiety spell  Patient reports he stopped drinking couple weeks ago.  He appears tremulous, anxious, as if he is in withdrawal though.  Pupils are dilated but denies any other recreational drug use.  -Blood alcohol level negative.  -He is tremulous, suspect could be due to anxiety  -Follow LFTs, hold off on imaging.  If worsening LFTs or any RUQ pain or fever, will get ultrasound abdomen.  -Patient though he saw a man under the bed, later realized it was cat. Unclear if hallucination vs anxiety. Monitor. Hold off on CIWA protocol, as needed Ativan ordered.   - given history, will start MVI, thiamine and folic acid.     HTN  Hold PTA ACEI given JAY  - PRN hydralazine  - plan to initiate ACEI as outpatient, if needed will use norvasc     Tobacco use  Declined nicotine replacement  -Patient states he is willing to quit and will go cold turkey.     Diet:  Clear liquid diet, advance as tolerated  DVT Prophylaxis: Heparin SQ  Abernathy Catheter: Not present  Lines: None     Cardiac Monitoring: None  Code Status:  Full code    Clinically Significant Risk Factors Present on Admission                # Severe Obesity: Estimated body mass index is 40.35 kg/m  as calculated from the following:    Height as of this encounter: 1.676 m (5' 6\").    Weight as of this encounter: 113.4 kg (250 lb).             Disposition Plan            Jonathan Carrasco MD  Hospitalist Service  [facility]" [facility]  Securely message with Arianna (more info)  Text page via [facility] Paging/Directory     ______________________________________________________________________    Chief Complaint   Nausea, vomiting, weakness, presyncope  Possible hallucination    History is obtained from the patient, chart review, discussion with ER MD    History of Present Illness   Ravi Ahmadi is a 33 year old male with PMH significant for DM1, alcohol dependence, HTN, asthma was brought to the ER by EMS for evaluation of symptoms including nausea ,vomiting, weakness.    Patient reports dysuria 2 days ago but it improved although he states making much less urine today.  No fever.  Subsequently he reports diarrhea: endorses 3 pasty BMs yesterday 1 overnight and 1 today.  No hematochezia or melena. Patient was working in his apartment today, started to experience nausea this morning and subsequently had multiple episodes of vomiting, not coffee-ground, no hematemesis.  Denies abdominal pain today.      Reports nasal congestion and thick clear white discharge and dry throat but denies dysphagia.  Denies sinus pain. No dyspnea, cough, chest pain. Had mild headache earlier but not present now.    He went outside for a walk later, felt dehydrated and weak and came back to the apartment to drink some water.  Then, he thought he saw a body under his bedroom.  He grabbed a baseball bat and poked the object few times.  After that he got scared, felt like having a panic attack and left his apartment, was thinking if it was a person and could be chasing him and called 911.  Patient was very weak to walk, felt lightheaded and almost passed out.  He later realized it was most likely a cat.  Because of the incident, he could not even drink water.    Denies chest pain, shortness of breath, fever, flank pain, rash.  He is using his insulin regularly, says he injects 25 units twice a day and last dose was this morning.  Patient states  "that he quit drinking alcohol 3-4 weeks ago.  Denies recreational drug use.  Lost his job and is looking for one currently.  Lives in an apartment.    In ER, patient was evaluated by Dr. Leigh.  Patient was markedly tachycardic to 130s at presentation.  Blood pressure is well low normal.  After 3L IVF, heart rate now in 100.  Patient already feels \"a lot better\" Labs showed sodium 134, potassium 2.9, anion gap, calcium 10.3, creatinine 2.49, blood sugar 264.  WBC count was 23.9.  Lactic acid 5.3.  Serum ketone 2.8.  EKG showed sinus tachycardia with prolonged QTc of 594.  Received 3L IVF.  Potassium IV replacement was initiated.  I did request 40 mEq of potassium p.o.  Meantime UA came back and appeared infected, culture was sent and requested Rocephin 2 g IV.  Patient is being admitted under [facility].    Past Medical History    Past Medical History:   Diagnosis Date    Atopic dermatitis     Intermittent asthma     exercise       Past Surgical History   Past Surgical History:   Procedure Laterality Date    CL AFF SURGICAL PATHOLOGY      gravel/foreign body removal in abdomen after accident       Prior to Admission Medications   Prior to Admission Medications   Prescriptions Last Dose Informant Patient Reported? Taking?   Alcohol Swabs PADS   No No   Sig: Use to swab the area of the injection or steven as directed  Per insurance coverage   Sharps Container MISC   No No   Sig: Use as directed to dispose of needles, lancets and other sharps   albuterol (PROAIR HFA) 108 (90 Base) MCG/ACT inhaler   No No   Sig: Inhale 1-2 puffs into the lungs 4 times daily as needed for shortness of breath / dyspnea or wheezing   blood glucose (NO BRAND SPECIFIED) lancets standard   No No   Sig: To use to test glucose level in the blood.  Use to test blood sugar  4  times daily as directed. To accompany glucose monitor brands per insurance coverage.   blood glucose (NO BRAND SPECIFIED) test strip   No No   Sig: To use to test glucose level " in the blood. Use to test blood sugar  4 times daily as directed. To accompany glucose monitor brands per insurance coverage.   blood glucose calibration (NO BRAND SPECIFIED) solution   No No   Sig: Used to calibrate the blood glucose monitor as needed and as directed.  To accompany  blood glucose brands per insurance coverage   blood glucose monitoring (NO BRAND SPECIFIED) meter device kit   No No   Sig: Use as directed Per insurance coverage   glucose (BD GLUCOSE) 4 g chewable tablet   No No   Sig: Take 4 tablets by mouth every 15 minutes as needed for low blood sugar   insulin glargine (LANTUS PEN) 100 UNIT/ML pen   No No   Sig: Inject 25 Units Subcutaneous 2 times daily   insulin pen needle (32G X 4 MM) 32G X 4 MM miscellaneous   No No   Sig: Use as directed by provider Per insurance coverage   lisinopril (ZESTRIL) 10 MG tablet   No No   Sig: Take 1 tablet (10 mg) by mouth daily      Facility-Administered Medications: None        Review of Systems    The 10 point Review of Systems is negative other than noted in the HPI or here.      Social History   I have reviewed this patient's social history and updated it with pertinent information if needed.  Social History     Tobacco Use    Smoking status: Some Days     Packs/day: 0.50     Years: 7.00     Pack years: 3.50     Types: Cigarettes    Smokeless tobacco: Never    Tobacco comments:     7 cigarettes per day   Substance Use Topics    Alcohol use: Yes     Alcohol/week: 12.5 standard drinks of alcohol     Types: 15 Standard drinks or equivalent per week    Drug use: No         Family History   I have reviewed this patient's family history and updated it with pertinent information if needed.  Family History   Problem Relation Age of Onset    Asthma Mother     Diabetes Paternal Grandmother     Respiratory Maternal Grandfather     Family History Negative Sister          Allergies   No Known Allergies     Physical Exam   Vital Signs: Temp: 97  F (36.1  C) Temp src:  Oral BP: 121/71 Pulse: 102   Resp: 12 SpO2: 95 % O2 Device: None (Room air)    Weight: 250 lbs 0 oz    General: AAOx3, appears somewhat restless and tremulous but comfortable.  No agitation or hallucination.  HEENT: PERRLA EOMI. conjunctival congestion and dilated pupils to about 6 mm but normally reacting to light.  Mucosa moist.   Lungs: Bilateral equal air entry. Clear to auscultation, normal work of breathing.   CVS: S1S2 regular, no tachycardia or murmur.   Abdomen: Soft, obese/distended, nontender . BS heard.  MSK: No edema or deformities.  Neuro: Alert awake, oriented to self and place, thought it was August 14, 2023, CN 2-12 normal. Strength symmetrical.  Skin: No rash.       Medical Decision Making       80 MINUTES SPENT BY ME on the date of service doing chart review, history, exam, documentation & further activities per the note.      Data     I have personally reviewed the following data over the past 24 hrs:    23.9 (H)  \   16.9   / 256     134 (L) 87 (L) 13.7 /  264 (H)   2.9 (L) 20 (L) 2.49 (H) \     ALT: 61 AST: 103 (H) AP: 123 TBILI: 5.2 (H)   ALB: 4.7 TOT PROTEIN: 8.5 (H) LIPASE: 259 (H)     TSH: N/A T4: N/A A1C: 8.4 (H)     Procal: N/A CRP: N/A Lactic Acid: 5.3 (HH)         Imaging results reviewed over the past 24 hrs:   No results found for this or any previous visit (from the past 24 hour(s)).

## 2023-08-17 NOTE — PROGRESS NOTES
Pt A&O X 4. Neuros intact. VSS on RA. Tele NSR. Pt started on full liquid diet, upgraded to moderate carb diet, thin liquids. Takes pills whole. Up with SBA. Denies pain. At beginning of shift, pt having hallucinations, paranoid, having tremors and was diaphoretic, per CIWA protocol, PRN valium was administered with effectiveness. In afternoon, pt is clear and appropriate. Updated MD.  Pt continent of bowel and bladder. Pt scoring green on the Aggression Stop Light Tool. Discharge pending.

## 2023-08-17 NOTE — PROGRESS NOTES
[facility]    Hospitalist Progress Note    Date of Admission:  8/16/2023    Assessment & Plan     Ravi Ahmadi is a 33 year old male with PMH significant for DM1, alcohol dependence, HTN, asthma was brought to the ER by EMS for evaluation of symptoms including nausea ,vomiting, weakness.  He was found to be in DKA with a number of electrolyte abnormalities and is  admitted on 8/16/2023 for further management.      Anion gap metabolic acidosis secondary to lactic acidosis, starvation ketoacidosis,?  DKA  Nausea/vomiting  Suspect underlying infection, possible UTI/sepsis and nausea/vomiting precipitating DKA, starvation ketoacidosis, lactic acidosis.  Patient insists that his last drink was on August 7, unsure if this is reliable.  Endorses using Insulin as prescribed, and injected Lantus morning of admission.  Noted anion gap, high LA/ketone, VBG shows PH of 7.42 though.   - admitted to [facility]MC  - 3 L IVF bolus in ER, tachycardia improved, feels better, continued on aggressive IVF per DKA protocol.  Lactic acidosis resolved, ketosis significantly improved on 8/17.  Patient never was initiated on insulin drip as his potassium level persistently remained below 3 despite being on high-dose potassium replacement protocol.  His numbers improved just with D5 half NS hydration.  Discontinued insulin drip orders.  Initiated on moderate consistent carbohydrate diet, placed orders for subcutaneous Lantus and NovoLog [sliding scale and prandial 1: 15 NovoLog].  Blood sugars have been in the low 100s.  -  Drug screen pending  - Telemetry.  -Discontinued IMC status on 8/17     Hyponatremia  Hypokalemia  Hypercalcemia  Likely pseudohyponatremia, corrected sodium normal.  Hypercalcemia due to dehydration and hemoconcentration.  -IVF as per DKA protocol  -Placed on high-dose potassium replacement protocol.  Also started on D5 half NS with 20 K IVF at 100 cc/h on 8/17 given persistent severe hypokalemia.   Magnesium normal.     Acute kidney injury, resolved  Dehydration and presyncope  Creatinine 2.49.  Suspect prerenal in the setting of dehydration and ACEI use.  Could be ATN due to UTI and sepsis.  Denies abdominal pain.  Unlikely obstruction, recent CT without any renal stone.  Does have dysuria.  -Aggressive IV hydration  -Monitor intake and output  -Follow BMP-creatinine normalized  -hold lisinopril  -Avoid nephrotoxic meds     Prolonged QTc  Noted QTc of 594 on EKG.  Suspect related to severe electrolyte derangement  -Telemetry  -Repeat EKG after electrolyte replacement     Possible UTI and sepsis  Leukocytosis  Reports dysuria about 2 days ago. No flank pain. Had CT a month ago and showed no renal stone.   -UA is abnormal, blood cultures x2 sent from ER, urine culture sent  -Continue Rocephin 2 g IV daily     Severe alcohol use disorder  Alcoholic hepatitis/fatty liver, abnormal LFTs  Possible hallucination/anxiety spell-?  Alcohol withdrawal/delirium tremens  Patient reports he stopped drinking couple weeks ago.  He appears tremulous, anxious, as if he is in withdrawal though.  Pupils are dilated but denies any other recreational drug use.  -Blood alcohol level negative.  -He is tremulous, suspect could be due to anxiety   -Follow LFTs, hold off on imaging.  If worsening LFTs or any RUQ pain or fever, will get ultrasound abdomen.  Last imaging was in June this year which showed fatty liver and hepatomegaly.  -Patient reports hallucinations, has continued to have visual and auditory hallucinations during this hospitalization.  Picture appears consistent with alcohol withdrawal/?  Delirium tremens though patient clearly states his last drink was on August 7.  Consulted psychiatry.  He was seen by mental health liaison.  They recommended continuing to treat as alcohol withdrawal and will follow-up with him.  He refused mental health resources/initiating any medications.  Patient also reported to mental health  "provider that he has had hallucinations in the past as well related to alcohol withdrawal.  -Receiving MVI, folic acid, IV thiamine     HTN  Hold PTA ACEI given JAY  - PRN hydralazine  - plan to initiate ACEI as outpatient, if needed will use norvasc      Tobacco use  Declined nicotine replacement  -Patient states he is willing to quit and will go cold turkey.        Diet: Moderate consistent carbohydrate diet  DVT Prophylaxis: Heparin SQ  Abernathy Catheter: Not present  Lines: None     Cardiac Monitoring: None  Code Status:  Full code         Medical Decision Making       Please see A&P for additional details of medical decision making.        Clinically Significant Risk Factors Present on Admission        # Hypokalemia: Lowest K = 2.8 mmol/L in last 2 days, will replace as needed    # Hypercalcemia: Highest Ca = 10.3 mg/dL in last 2 days, will monitor as appropriate   # Anion Gap Metabolic Acidosis: Highest Anion Gap = 27 mmol/L in last 2 days, will monitor and treat as appropriate      # Hypertension: Noted on problem list      # Severe Obesity: Estimated body mass index is 40.35 kg/m  as calculated from the following:    Height as of this encounter: 1.676 m (5' 6\").    Weight as of this encounter: 113.4 kg (250 lb).       # Asthma: noted on problem list          Marita Light MD  Text Page (7am - 6pm, M-F)  [facility]  Securely message with the Vocera Web Console (learn more here)  Text page via iNeed Paging/Directory      Interval History   This morning patient was sitting up in bed.  He reports being harassed overnight by the nurse from the next room who kept talking to him through the wall.  He reported to me he wanted a different nurse today.  He spoke about being observed via cameras in the room, expressed paranoid ideas.  Went on to say that these were not hallucinations.  He does not believe he has had hallucinations here.  He is oriented to where he is and situation but kept " talking about cameras, being observed etc.  No nausea or vomiting this morning.  Denied pain.    -Data reviewed today: I reviewed all new labs and imaging results over the last 24 hours. I personally reviewed labs, CT scan from last admission    Physical Exam   Temp: 98.4  F (36.9  C) Temp src: Oral BP: 132/85 Pulse: 91   Resp: 20 SpO2: (!) 88 % O2 Device: None (Room air)    Vitals:    08/16/23 1700   Weight: 113.4 kg (250 lb)     Vital Signs with Ranges  Temp:  [98.4  F (36.9  C)-99.1  F (37.3  C)] 98.4  F (36.9  C)  Pulse:  [] 91  Resp:  [10-41] 20  BP: ()/() 132/85  SpO2:  [88 %-97 %] 88 %  I/O last 3 completed shifts:  In: 940 [I.V.:940]  Out: -     Constitutional: Alert, appears anxious, no distress, mildly tremulous  Respiratory: Non labored breathing, clear to auscultation bilaterally, no crackles or wheezes  Cardiovascular: Heart sounds regular rate and rhythm, no murmurs, no leg edema  GI: Abdomen is soft, non distended, non tender. Normal BS  Skin/Integumen: no rashes, no pressure sores  Neuro: alert, converses appropriately, moving all extremities, fluent speech, no facial asymmetry  Psych: Appears to have auditory/visual hallucinations, anxious    Medications    NaCl      dextrose 5% and 0.45% NaCl 150 mL/hr at 08/17/23 1414    insulin        cefTRIAXone  2 g Intravenous Q24H    cloNIDine  0.1 mg Oral Q8H    folic acid  1 mg Oral Daily    heparin ANTICOAGULANT  5,000 Units Subcutaneous Q12H    magnesium oxide  400 mg Oral Daily    multivitamin w/minerals  1 tablet Oral Daily    sodium chloride (PF)  3 mL Intracatheter Q8H    thiamine  100 mg Intravenous Daily    [Held by provider] thiamine  100 mg Oral Daily       Data   Recent Labs   Lab 08/17/23  1613 08/17/23  1301 08/17/23  1200 08/17/23  0823 08/17/23  0808 08/17/23  0540 08/17/23  0404 08/17/23  0234 08/16/23  2259 08/16/23  1843 08/16/23  1726   WBC  --   --   --   --   --   --   --   --   --   --  23.9*   HGB  --   --   --   --    --   --   --   --   --   --  16.9   MCV  --   --   --   --   --   --   --   --   --   --  93   PLT  --   --   --   --   --   --   --   --   --   --  256   NA  --  134*  --  133*  --  135* 136  --  138  --  134*   POTASSIUM  --  2.8*  --  3.0*  --  2.9* 3.0*  --  3.1*  --  2.9*   CHLORIDE  --  93*  --  93*  --  95* 94*  --  94*  --  87*   CO2  --  26  --  22  --  22 24  --  25  --  20*   BUN  --   --   --  9.8  --  10.5 11.7  --  12.9  --  13.7   CR  --   --   --  0.91  --  0.99 1.16  --  1.60*  --  2.49*   ANIONGAP  --  15  --  18*  --  18* 18*  --  19*  --  27*   RAEANN  --   --   --  9.6  --  9.4 9.5  --  9.3  --  10.3*   *  --  128* 129*   < > 123* 124*   < > 116*   < > 306*   ALBUMIN  --   --   --   --   --   --   --   --  4.1  --  4.7   PROTTOTAL  --   --   --   --   --   --   --   --  7.0  --  8.5*   BILITOTAL  --   --   --   --   --   --   --   --  4.5*  --  5.2*   ALKPHOS  --   --   --   --   --   --   --   --  95  --  123   ALT  --   --   --   --   --   --   --   --  51  --  61   AST  --   --   --   --   --   --   --   --  102*  --  103*   LIPASE  --   --   --   --   --   --   --   --   --   --  259*    < > = values in this interval not displayed.       Imaging  Recent Results (from the past 24 hour(s))   XR Chest 2 Views    Narrative    EXAM: XR CHEST 2 VIEWS  LOCATION: [facility]  DATE: 8/17/2023    INDICATION: Sepsis. Vomiting. Leukocytosis. Evaluate for aspiration pneumonia.  COMPARISON: Chest x-ray 2 views 01/04/2014 at 1647 hours.      Impression    IMPRESSION: Both lungs remain clear. Elevation of the right hemidiaphragm identified on current study. No adenopathy or effusion. Normal cardiac size and pulmonary vascularity. Anatomic alignment of the bones and joints. Monitoring leads have been placed   overlying the chest.

## 2023-08-17 NOTE — PHARMACY-ADMISSION MEDICATION HISTORY
Pharmacist Admission Medication History    Admission medication history is complete. The information provided in this note is only as accurate as the sources available at the time of the update.    Medication reconciliation/reorder completed by provider prior to medication history? No    Information Source(s): Patient, Clinic records, and Hospital records via in-person    Pertinent Information: None    Changes made to PTA medication list:  Added: None  Deleted: None  Changed: None    Allergies reviewed with patient and updates made in EHR: yes    Medication History Completed By: Mayte Dallas RPH 8/16/2023 8:17 PM    Prior to Admission medications    Medication Sig Last Dose Taking? Auth Provider Long Term End Date   albuterol (PROAIR HFA) 108 (90 Base) MCG/ACT inhaler Inhale 1-2 puffs into the lungs 4 times daily as needed for shortness of breath / dyspnea or wheezing  at prn Yes Juancarlos Sood MD Yes    insulin glargine (LANTUS PEN) 100 UNIT/ML pen Inject 25 Units Subcutaneous 2 times daily 8/16/2023 at am Yes Mayte Hill MD Yes    lisinopril (ZESTRIL) 10 MG tablet Take 1 tablet (10 mg) by mouth daily 8/15/2023 at am Yes Katerin Romero MD Yes

## 2023-08-17 NOTE — PROGRESS NOTES
"MD Notification    Notified Person: MD    Notified Person Name: River    Notification Date/Time: 08/17/23 0340    Notification Interaction: Pt has been very emotional and teary since arrival to the unit. Made a few statements about wishing he had a heart attack, and that this episode would have just \"ended things\". Denies feeling suicidal or thoughts of harm when asked. Could you order a psych consult?    Orders Received: Psych consult ordered      "

## 2023-08-17 NOTE — PROGRESS NOTES
Cross cover    Noted signs and symptoms of possible etoh withdrawal.  Start on valium CIWA protocol, also ordered psychiatry consult.      Everton Holman MD

## 2023-08-17 NOTE — CONSULTS
Triage and Transition - Consult and Liaison     Ravi Ahmadi  August 17, 2023    Session start: 11:00 am  Session end: 11:27 am  Session duration in minutes: 27 min  CPT utilized: 74401 - Brief diagnostic assessment (modifier 52)  Patient was seen virtually ([employer] cart or other teleconferencing device).    Diagnosis:   311 (F32.9) Unspecified Depressive Disorder ,    ;  300.00 (F41.9) Unspecified Anxiety Disorder,    ;   Substance-Related & Addictive Disorders Alcohol Use Disorder   303.90 (F10.20) Severe   , by history;     Plan/Recommendations:   Patient denies suicidal ideation, plan, or intent. He does not require suicide precautions.   Although patient reports he has not recently used alcohol and only had minor relapse, symptoms do seem consistent with alcohol withdrawal. Would continue with CIWA.   At this time, patient declines referral to services for mental health or initiation of medications.   Please enter another psychiatry if further visits are needed. Patients are not followed by Psychiatry C&L Service unless otherwise indicated.     Reason for consult: Psychiatry consult was requested due to suicidal statements, hallucinations, paranoia. Patient was seen by Triage and Transition Consult & Liaison team.     Identifying information: Ravi is 33 year old White  male  found to be in DKA with a number of electrolyte abnormalities. See H&P for further details.     Brief Psychosocial History  Patient lives with his family, mom and dad. Patient reports he was working 2 weeks ago as a  for grocery [employer]- terminated due to  performance issues  and attendance. He missed a job fair today where he was going to find new employment.      Summary of Patient Situation  Patient seen sitting in bed. Patient reports he has been bugging the staff because he   made somebody up that works here . He reports he made a nurse up that has been harassing him. He states he thought they were real. The  nurse would talk to him and mock what he was saying. He states he then had somebody he trust tell him it wasn't real. He indicates there are cameras all around the room and they are reading all his text messages. Patient states he didn't get any sleep.       He states this may have happened before but it was not as intrusive. He reports in the past he has seen stuff in the dark that was not really there. He reports he sometimes has spotted vision. He reports he hears a squeak in his door often. He installed a wreath of jingle bells on his front door so that he could hear, he will sometimes hear it, he will then grab his bat to go check. He reports he thinks a lot of the at home hallucinations were due to drinking, in past. Right now he thinks a lot of it is stemming from dehydration.     He states he stopped drinking on June 17th when he was admitted to [facility]. He states he had been drinking for 14 years prior to that admission. Unable to quantify how much he was drinking at that time. He states he relapsed only one time since then, on August 7th. He states he drank  just a little bit . He reports he drank a  quarter of a pint of leann . He states he did not experience any withdrawal symptoms after this.     He reports his mental health has been  slipping . He feels empty, gets anxious, ends up pacing. He reports then will have cravings. He states he feels  down in the dumps . He reports he often isolates, feels like he doesn't want to do anything, will spend hours in bed. He reports sleep has been on and off- stating some days he doesn't sleep at all. He reports there has been times when hes been up for 30 hours straight. He states he feels lethargic during this time- we review symptoms of do- in which he denies all.       I inquired about suicidal thoughts, he states he doesn't typically have them. I inquired about the statement he made here, He states it was due to being back in hospital and feeling  horrible. He states it was due to situation he was in. He does not have those thoughts out of the hospital. He reports no history of attempting to end his life. He states at some point he might have tried to drink himself to death, but unsure how intentional it was. He denies active suicidal ideation, plan or intent at this time.     I inquired with patient about receiving services/medications for his mental health. Patient reports he would like to explore diagnosing and treating after hospitalization if it does not improve. He reports he feels it is manageable right now. I indicated the symptoms I felt were related to anxiety/depression and warning signs he may need more help (impacting functioning, suicidal thoughts, etc.) Patient agreed he would reach out to providers if concerned.     Significant Clinical History  Patient has never attended chemical dependency treatment. He has had one medical admission related to withdrawal in June 2023.   Patient has never been prescribed medications for mental health. He has never received mental health services. He has no history of inpatient admissions, commitment, or ECT.     Current Providers  Primary Care Provider: Yes Juancarlos Sood MD, [facility]     Psychiatrist: No   Therapist: No   : No   [facility]: No   ACT Team: No   Other: No    Collateral information:   Reviewed chart. Attempted to reach parents who patient lives with, phone number not available in chart.     Mental Status Exam   Affect: Appropriate  Appearance: Appropriate   Attention Span/Concentration: Attentive    Eye Contact: Engaged  Fund of Knowledge: Appropriate   Language /Speech Content: Fluent  Language /Speech Volume: Normal   Language /Speech Rate/Productions: Normal   Recent Memory: Variable  Remote Memory: Variable  Mood: Anxious   Orientation:   Person: Yes   Place: Yes  Time of Day: Yes   Date: Yes   Situation (Do they understand why they are here?): Yes   Psychomotor  Behavior: Normal   Thought Content: Delusions and Paranoia  Thought Form: Intact    Current medications:   Current Facility-Administered Medications   Medication    0.45 % sodium chloride infusion  (Fluid Maintenance - when initial potassium less than 3.3 mmol/L    albuterol (PROVENTIL HFA/VENTOLIN HFA) inhaler    cefTRIAXone (ROCEPHIN) 2 g vial to attach to  ml bag for ADULTS or NS 50 ml bag for PEDS    cloNIDine (CATAPRES) tablet 0.1 mg    dextrose 5% and 0.45% NaCl infusion    glucose gel 15-30 g    Or    dextrose 50 % injection 25-50 mL    Or    glucagon injection 1 mg    flumazenil (ROMAZICON) injection 0.2 mg    folic acid (FOLVITE) tablet 1 mg    OLANZapine zydis (zyPREXA) ODT tab 5-10 mg    Or    haloperidol lactate (HALDOL) injection 2.5-5 mg    heparin ANTICOAGULANT injection 5,000 Units    insulin regular (MYXREDLIN) infusion    lidocaine (LMX4) cream    lidocaine 1 % 0.1-1 mL    LORazepam (ATIVAN) tablet 1-2 mg    Or    LORazepam (ATIVAN) injection 1-2 mg    magnesium oxide (MAG-OX) tablet 400 mg    melatonin tablet 5 mg    multivitamin w/minerals (THERA-VIT-M) tablet 1 tablet    sodium chloride (PF) 0.9% PF flush 3 mL    sodium chloride (PF) 0.9% PF flush 3 mL    thiamine (B-1) injection 100 mg    [Held by provider] thiamine (B-1) tablet 100 mg        Therapeutic intervention and progress:  Therapeutic intervention consisted of building therapeutic rapport, active listening, validation, engaging in learning/practicing coping skills, active problem solving, and stress relief practices. Patient is making progress towards treatment goals as evidenced by ability to engage in reality testing and other coping skills.      Yany Villa, [facility]   Triage and Transition - Consult and Liaison   [phone]

## 2023-08-17 NOTE — PROVIDER NOTIFICATION
MD Notification    Notified Person: MD    Notified Person Name: River    Notification Date/Time:  8/17/2023, 0643    Notification Interaction:  Pt is having auditory hallucinations and paranoia, tremors and is diaphoretic. Might we want CIWAs ordered?    Orders Received:  CIWA protocol ordered

## 2023-08-17 NOTE — PLAN OF CARE
"A&O x 4, forgetful.  Pt having auditory hallucinations stating the pt in the room next door, \"Sukhi\" is yelling at him \"move your foot bitch...Fuck you bitch\"  Pt states that he has it all on video.  Pt is paranoid about being listened to and watched on cameras in the room.  Pt is diaphoretic and has tremors.  PRN ativan given 1x.  Eyes red and mattery.     VSS on RA. Tele: SR. Up SBA. Full liquid diet, tolerating well; denies nausea.  PIV in RUE infusing at 150mL/hr. Lungs clear. Pt denies pain. Voiding in urinal. +BS, pt endorses loose stools PTA. Continue plan of care.  "

## 2023-08-17 NOTE — PROVIDER NOTIFICATION
"MD Notification    Notified Person: MD    Notified Person Name:  Dr. Holman    Notification Date/Time:  0500    Notification Interaction: Arianna page:  \"Critical ketone of 2.20, up from 2.0. K+ 3.0, down from 3.1, not on protocol. We are waiting for K+ to be above 3.3 to start insulin, though BG is 124. Any new orders?\"    Orders Received: K+ protocols ordered and ran      "

## 2023-08-18 LAB
ALBUMIN SERPL BCG-MCNC: 3.6 G/DL (ref 3.5–5.2)
ALP SERPL-CCNC: 86 U/L (ref 40–129)
ALT SERPL W P-5'-P-CCNC: 54 U/L (ref 0–70)
ANION GAP SERPL CALCULATED.3IONS-SCNC: 15 MMOL/L (ref 7–15)
AST SERPL W P-5'-P-CCNC: 107 U/L (ref 0–45)
BACTERIA UR CULT: NORMAL
BILIRUB SERPL-MCNC: 3.2 MG/DL
BUN SERPL-MCNC: 8.2 MG/DL (ref 6–20)
CALCIUM SERPL-MCNC: 8.9 MG/DL (ref 8.6–10)
CHLORIDE SERPL-SCNC: 100 MMOL/L (ref 98–107)
CREAT SERPL-MCNC: 0.75 MG/DL (ref 0.67–1.17)
DEPRECATED HCO3 PLAS-SCNC: 21 MMOL/L (ref 22–29)
ERYTHROCYTE [DISTWIDTH] IN BLOOD BY AUTOMATED COUNT: 13.7 % (ref 10–15)
GFR SERPL CREATININE-BSD FRML MDRD: >90 ML/MIN/1.73M2
GLUCOSE BLDC GLUCOMTR-MCNC: 132 MG/DL (ref 70–99)
GLUCOSE BLDC GLUCOMTR-MCNC: 137 MG/DL (ref 70–99)
GLUCOSE BLDC GLUCOMTR-MCNC: 138 MG/DL (ref 70–99)
GLUCOSE BLDC GLUCOMTR-MCNC: 139 MG/DL (ref 70–99)
GLUCOSE BLDC GLUCOMTR-MCNC: 139 MG/DL (ref 70–99)
GLUCOSE SERPL-MCNC: 127 MG/DL (ref 70–99)
HCT VFR BLD AUTO: 39.9 % (ref 40–53)
HGB BLD-MCNC: 13.2 G/DL (ref 13.3–17.7)
MCH RBC QN AUTO: 31.9 PG (ref 26.5–33)
MCHC RBC AUTO-ENTMCNC: 33.1 G/DL (ref 31.5–36.5)
MCV RBC AUTO: 96 FL (ref 78–100)
PLATELET # BLD AUTO: 106 10E3/UL (ref 150–450)
POTASSIUM SERPL-SCNC: 3.2 MMOL/L (ref 3.4–5.3)
POTASSIUM SERPL-SCNC: 3.6 MMOL/L (ref 3.4–5.3)
POTASSIUM SERPL-SCNC: 3.6 MMOL/L (ref 3.4–5.3)
PROT SERPL-MCNC: 6.5 G/DL (ref 6.4–8.3)
RBC # BLD AUTO: 4.14 10E6/UL (ref 4.4–5.9)
SODIUM SERPL-SCNC: 136 MMOL/L (ref 136–145)
WBC # BLD AUTO: 9.7 10E3/UL (ref 4–11)

## 2023-08-18 PROCEDURE — 99232 SBSQ HOSP IP/OBS MODERATE 35: CPT | Performed by: NURSE PRACTITIONER

## 2023-08-18 PROCEDURE — 250N000013 HC RX MED GY IP 250 OP 250 PS 637: Performed by: HOSPITALIST

## 2023-08-18 PROCEDURE — 250N000013 HC RX MED GY IP 250 OP 250 PS 637: Performed by: INTERNAL MEDICINE

## 2023-08-18 PROCEDURE — 258N000003 HC RX IP 258 OP 636: Performed by: HOSPITALIST

## 2023-08-18 PROCEDURE — 250N000011 HC RX IP 250 OP 636: Performed by: HOSPITALIST

## 2023-08-18 PROCEDURE — 99233 SBSQ HOSP IP/OBS HIGH 50: CPT | Performed by: HOSPITALIST

## 2023-08-18 PROCEDURE — 120N000001 HC R&B MED SURG/OB

## 2023-08-18 PROCEDURE — 36415 COLL VENOUS BLD VENIPUNCTURE: CPT | Performed by: HOSPITALIST

## 2023-08-18 PROCEDURE — 80053 COMPREHEN METABOLIC PANEL: CPT | Performed by: HOSPITALIST

## 2023-08-18 PROCEDURE — 85027 COMPLETE CBC AUTOMATED: CPT | Performed by: HOSPITALIST

## 2023-08-18 RX ORDER — ACETAMINOPHEN 650 MG/1
650 SUPPOSITORY RECTAL EVERY 4 HOURS PRN
Status: DISCONTINUED | OUTPATIENT
Start: 2023-08-18 | End: 2023-08-20 | Stop reason: HOSPADM

## 2023-08-18 RX ORDER — ACETAMINOPHEN 325 MG/1
650 TABLET ORAL EVERY 4 HOURS PRN
Status: DISCONTINUED | OUTPATIENT
Start: 2023-08-18 | End: 2023-08-20 | Stop reason: HOSPADM

## 2023-08-18 RX ORDER — POTASSIUM CHLORIDE 1500 MG/1
40 TABLET, EXTENDED RELEASE ORAL ONCE
Status: COMPLETED | OUTPATIENT
Start: 2023-08-18 | End: 2023-08-18

## 2023-08-18 RX ORDER — POTASSIUM CHLORIDE 1500 MG/1
20 TABLET, EXTENDED RELEASE ORAL ONCE
Status: COMPLETED | OUTPATIENT
Start: 2023-08-18 | End: 2023-08-18

## 2023-08-18 RX ADMIN — CLONIDINE HYDROCHLORIDE 0.1 MG: 0.1 TABLET ORAL at 16:01

## 2023-08-18 RX ADMIN — ACETAMINOPHEN 650 MG: 325 TABLET, FILM COATED ORAL at 05:08

## 2023-08-18 RX ADMIN — CLONIDINE HYDROCHLORIDE 0.1 MG: 0.1 TABLET ORAL at 09:06

## 2023-08-18 RX ADMIN — POTASSIUM CHLORIDE, DEXTROSE MONOHYDRATE AND SODIUM CHLORIDE: 150; 5; 450 INJECTION, SOLUTION INTRAVENOUS at 04:55

## 2023-08-18 RX ADMIN — Medication 1 ML: at 05:08

## 2023-08-18 RX ADMIN — Medication 1 ML: at 09:09

## 2023-08-18 RX ADMIN — POTASSIUM CHLORIDE, DEXTROSE MONOHYDRATE AND SODIUM CHLORIDE: 150; 5; 450 INJECTION, SOLUTION INTRAVENOUS at 14:23

## 2023-08-18 RX ADMIN — FOLIC ACID 1 MG: 1 TABLET ORAL at 09:06

## 2023-08-18 RX ADMIN — Medication 400 MG: at 09:06

## 2023-08-18 RX ADMIN — HEPARIN SODIUM 5000 UNITS: 5000 INJECTION, SOLUTION INTRAVENOUS; SUBCUTANEOUS at 16:01

## 2023-08-18 RX ADMIN — HEPARIN SODIUM 5000 UNITS: 5000 INJECTION, SOLUTION INTRAVENOUS; SUBCUTANEOUS at 04:18

## 2023-08-18 RX ADMIN — ACETAMINOPHEN 650 MG: 325 TABLET, FILM COATED ORAL at 21:14

## 2023-08-18 RX ADMIN — THIAMINE HYDROCHLORIDE 100 MG: 100 INJECTION, SOLUTION INTRAMUSCULAR; INTRAVENOUS at 09:06

## 2023-08-18 RX ADMIN — ALBUTEROL SULFATE 2 PUFF: 90 AEROSOL, METERED RESPIRATORY (INHALATION) at 22:08

## 2023-08-18 RX ADMIN — CLONIDINE HYDROCHLORIDE 0.1 MG: 0.1 TABLET ORAL at 01:13

## 2023-08-18 RX ADMIN — POTASSIUM CHLORIDE 20 MEQ: 1500 TABLET, EXTENDED RELEASE ORAL at 09:06

## 2023-08-18 RX ADMIN — Medication 1 ML: at 21:06

## 2023-08-18 RX ADMIN — MULTIPLE VITAMINS W/ MINERALS TAB 1 TABLET: TAB at 09:06

## 2023-08-18 RX ADMIN — POTASSIUM CHLORIDE 40 MEQ: 1500 TABLET, EXTENDED RELEASE ORAL at 01:13

## 2023-08-18 RX ADMIN — MELATONIN 5 MG TABLET 5 MG: at 22:48

## 2023-08-18 ASSESSMENT — ACTIVITIES OF DAILY LIVING (ADL)
ADLS_ACUITY_SCORE: 22
DEPENDENT_IADLS:: INDEPENDENT
ADLS_ACUITY_SCORE: 22

## 2023-08-18 NOTE — PROGRESS NOTES
Shift was unremarkable.  CIWA is zero. Patient does not seem to be in withdrawal. He denies anxiety, hallucinations; ambulatory; VSS; on RA.  BG is well controlled. He is no acute distress.  Psychiatry signed off.

## 2023-08-18 NOTE — PLAN OF CARE
[id number]  Orientations: A&O x4, pleasant.   Vitals/Pain: VSS on room air. Denies pain except body aches and throat pain controlled with scheduled tylenol and prn chloraseptic spray  Tele: NSR- discontinued  Lines/Drains: R PIV- infusing fluids @ 100 ml/hr  GI/: Continent of bowel and bladder. Patient reported diarrhea.   Labs: Abnormal/Trends, Electrolyte Replacement- K 3.6- replaced per high replacement protocol.  Ambulation/Assist: Ind in room.   Plan: CIWA score of 3. Continue plan of care.

## 2023-08-18 NOTE — PROGRESS NOTES
[facility]    Hospitalist Progress Note    Date of Admission:  8/16/2023    Assessment & Plan     Ravi Ahmadi is a 33 year old male with PMH significant for DM1, alcohol dependence, HTN, asthma was brought to the ER by EMS for evaluation of symptoms including nausea ,vomiting, weakness.  He was found to be in DKA with a number of electrolyte abnormalities and is  admitted on 8/16/2023 for further management.      Anion gap metabolic acidosis secondary to lactic acidosis, starvation ketoacidosis,?  DKA  Nausea/vomiting  Initially suspect underlying infection, possible UTI/sepsis and nausea/vomiting precipitating DKA, starvation ketoacidosis, lactic acidosis.  However infectious work-up returned negative.  Discontinued antibiotics.  Patient insists that his last drink was on August 7.  Endorses using Insulin as prescribed, and injected Lantus morning of admission.  Noted anion gap, high LA/ketone, VBG shows PH of 7.42 though.  Unclear what exactly triggered this current episode.  Appears that he has underlying anxiety that escalated and contributed to this episode.  See below.  [place] [facility] [place] 3 L IVF bolus in ER, tachycardia improved, feels better, continued on aggressive IVF per DKA protocol.  Lactic acidosis resolved, ketosis significantly improved on 8/17.  Patient never was initiated on insulin drip as his potassium level persistently remained below 3 despite being on high-dose potassium replacement protocol.  His numbers improved just with D5 half NS hydration.  Discontinued insulin drip orders.  Initiated on moderate consistent carbohydrate diet.  Transition to subcutaneous sliding scale insulin.  Blood sugars have been in the low 100s.  Patient has not required any insulin here in the hospital and blood sugars have consistently been in the low 100s.  -  Drug screen negative  - Telemetry.  -Discontinued IMC status on 8/17  -Discussed diabetes management with patient and his  mother on 8/18.  They asked about him being on short acting insulin in addition to Lantus at home.  Apparently his blood sugars were running high 180/ 200 range at home despite taking Lantus 25 units twice daily.  Discussed with them that currently sugars are in low 100s without need for any insulin over the last 48 hours.  Will monitor and titrate insulin as needed.  Discussed that insulin regimen for discharge will be determined on day of discharge depending on patient's insulin use here in the hospital and will need to be closely adjusted even after he goes home, will need close follow-up with PCP.     Hyponatremia  Hypokalemia, resolved  Hypercalcemia  Likely pseudohyponatremia, corrected sodium normal.  Hypercalcemia due to dehydration and hemoconcentration.  -IVF as per DKA protocol  -Placed on high-dose potassium replacement protocol.  Also started on D5 half NS with 20 K IVF at 100 cc/h on 8/17 given persistent severe hypokalemia.  Magnesium normal.  -Discontinue IVF on 8/18.  Potassium normalized.     Acute kidney injury, resolved  Dehydration and presyncope  Creatinine 2.49.  Suspect prerenal in the setting of dehydration and ACEI use.  Could be ATN due to UTI and sepsis.  Denies abdominal pain.  Unlikely obstruction, recent CT without any renal stone.  Does have dysuria.  -Aggressive IV hydration  -Monitor intake and output  -Follow BMP-creatinine normalized  -hold lisinopril  -Avoid nephrotoxic meds     Prolonged QTc  Noted QTc of 594 on EKG.  Suspect related to severe electrolyte derangement  -Telemetry  -Repeat EKG after electrolyte replacement     Leukocytosis, likely reactive, resolved  Reports dysuria about 2 days ago. No flank pain. Had CT a month ago and showed no renal stone.   -UA is abnormal, blood cultures x2 sent from ER, urine culture sent  -Continue Rocephin 2 g IV daily.  Antibiotics discontinued after blood and urine cultures returned negative.     Severe alcohol use disorder  Alcoholic  hepatitis/fatty liver, abnormal LFTs  Possible hallucination/anxiety spell-?  Alcohol withdrawal/delirium tremens  Patient reports he stopped drinking couple weeks ago.  He appeared tremulous, anxious, as if he is in withdrawal though.  Pupils are dilated but denies any other recreational drug use.  -Blood alcohol level negative.  -He is tremulous, suspect could be due to anxiety   -Follow LFTs, hold off on imaging.  If worsening LFTs or any RUQ pain or fever, will get ultrasound abdomen.  Last imaging was in June this year which showed fatty liver and hepatomegaly.  -Patient reports hallucinations, has continued to have visual and auditory hallucinations on 8/17.  Picture appeared consistent with alcohol withdrawal though patient clearly states his last drink was on August, making this less likely.  Consulted psychiatry.  He was seen by mental health liaison.  They recommended continuing to treat as alcohol withdrawal and will follow-up with him.  He refused mental health resources/initiating any medications.  Patient also reported to mental health provider that he has had hallucinations in the past as well related to alcohol withdrawal.  -Patient was seen again by psychiatry provider on 8/18.  At this time, he has stopped having hallucinations.  He acknowledges that the day before he was indeed having hallucinations, feels like the episode has passed now.  He is feeling much better.  Was not started on any new medications per psychiatry.  He will follow-up with outpatient psychiatry.  Psychiatry has signed off.  -Receiving MVI, folic acid, IV thiamine     HTN  Hold PTA ACEI given JAY  - PRN hydralazine  - plan to initiate ACEI as outpatient, if needed will use norvasc      Tobacco use  Declined nicotine replacement  -Patient states he is willing to quit and will go cold turkey.     New thrombocytopenia  PC dropped to 106 from 256 at admission.  Recheck tomorrow.  Discontinued subcutaneous heparin.  If continues to  "be low or dropping further, will need to assess for HIT.    Diet: Moderate consistent carbohydrate diet  DVT Prophylaxis: Heparin SQ, discontinued 8/18 given thrombocytopenia  Abernathy Catheter: Not present  Lines: None     Cardiac Monitoring: None  Code Status:  Full code         Medical Decision Making       Please see A&P for additional details of medical decision making.        Clinically Significant Risk Factors        # Hypokalemia: Lowest K = 2.8 mmol/L in last 2 days, will replace as needed    # Hypercalcemia: Highest Ca = 10.3 mg/dL in last 2 days, will monitor as appropriate   # Anion Gap Metabolic Acidosis: Highest Anion Gap = 27 mmol/L in last 2 days, will monitor and treat as appropriate    # Thrombocytopenia: Lowest platelets = 106 in last 2 days, will monitor for bleeding     # Hypertension: Noted on problem list        # Severe Obesity: Estimated body mass index is 40.85 kg/m  as calculated from the following:    Height as of this encounter: 1.676 m (5' 6\").    Weight as of this encounter: 114.8 kg (253 lb 1.6 oz).  , PRESENT ON ADMISSION     # Financial/Environmental Concerns: unemployed  # Asthma: noted on problem list            Marita Light MD  Text Page (7am - 6pm, M-F)  [facility]  Securely message with the Vocera Web Console (learn more here)  Text page via [employer] Paging/Directory      Interval History   This morning patient was sitting up in bed.  Mother at bedside.  Patient is feeling much better.  He denies having any hallucinations today and acknowledges that the voices he was hearing yesterday were hallucinations.  Mother wanted to talk with the psychiatrist and was asking about having patient started on anxiety/depression medications.  They wanted to visit with a psychiatrist again.  He also had questions about diabetes management.  Questions were answered to their satisfaction.    -Data reviewed today: I reviewed all new labs and imaging results over the last 24 " hours. I personally reviewed labs, CT scan from last admission    Physical Exam   Temp: 98.5  F (36.9  C) Temp src: Oral BP: 124/75 Pulse: 81   Resp: 18 SpO2: 93 % O2 Device: None (Room air)    Vitals:    08/16/23 1700 08/18/23 0656   Weight: 113.4 kg (250 lb) 114.8 kg (253 lb 1.6 oz)     Vital Signs with Ranges  Temp:  [98.1  F (36.7  C)-98.5  F (36.9  C)] 98.5  F (36.9  C)  Pulse:  [74-87] 81  Resp:  [18-20] 18  BP: (104-126)/(68-82) 124/75  SpO2:  [92 %-98 %] 93 %  I/O last 3 completed shifts:  In: 1440 [P.O.:1440]  Out: 600 [Urine:600]    Constitutional: Alert, appears anxious, no distress  Respiratory: Non labored breathing, clear to auscultation bilaterally, no crackles or wheezes  Cardiovascular: Heart sounds regular rate and rhythm, no murmurs, no leg edema  GI: Abdomen is soft, non distended, non tender. Normal BS  Skin/Integumen: no rashes, no pressure sores  Neuro: alert, converses appropriately, moving all extremities, fluent speech, no facial asymmetry  Psych: Calm and cooperative, oriented    Medications    dextrose 5% and 0.45% NaCl + KCl 20 mEq/L 100 mL/hr at 08/18/23 1423      cloNIDine  0.1 mg Oral Q8H    folic acid  1 mg Oral Daily    heparin ANTICOAGULANT  5,000 Units Subcutaneous Q12H    insulin aspart  1-7 Units Subcutaneous TID AC    insulin aspart  1-5 Units Subcutaneous At Bedtime    magnesium oxide  400 mg Oral Daily    multivitamin w/minerals  1 tablet Oral Daily    sodium chloride (PF)  3 mL Intracatheter Q8H    thiamine  100 mg Intravenous Daily    [Held by provider] thiamine  100 mg Oral Daily       Data   Recent Labs   Lab 08/18/23  1145 08/18/23  0738 08/18/23  0721 08/18/23  0608 08/18/23  0155 08/17/23  2335 08/17/23  1613 08/17/23  1301 08/17/23  1200 08/17/23  0823 08/17/23  0808 08/17/23  0540 08/17/23  0234 08/16/23  2039 08/16/23  7783 08/16/23  4586   WBC  --   --  9.7  --   --   --   --   --   --   --   --   --   --   --   --  23.9*   HGB  --   --  13.2*  --   --   --   --    --   --   --   --   --   --   --   --  16.9   MCV  --   --  96  --   --   --   --   --   --   --   --   --   --   --   --  93   PLT  --   --  106*  --   --   --   --   --   --   --   --   --   --   --   --  256   NA  --   --   --  136  --   --   --  134*  --  133*  --  135*   < > 138  --  134*   POTASSIUM  --   --   --  3.6  3.6  --  3.2*  --  2.8*  --  3.0*  --  2.9*   < > 3.1*  --  2.9*   CHLORIDE  --   --   --  100  --   --   --  93*  --  93*  --  95*   < > 94*  --  87*   CO2  --   --   --  21*  --   --   --  26  --  22  --  22   < > 25  --  20*   BUN  --   --   --  8.2  --   --   --   --   --  9.8  --  10.5   < > 12.9  --  13.7   CR  --   --   --  0.75  --   --   --   --   --  0.91  --  0.99   < > 1.60*  --  2.49*   ANIONGAP  --   --   --  15  --   --   --  15  --  18*  --  18*   < > 19*  --  27*   RAEANN  --   --   --  8.9  --   --   --   --   --  9.6  --  9.4   < > 9.3  --  10.3*   * 138*  --  127*   < >  --    < >  --    < > 129*   < > 123*   < > 116*   < > 306*   ALBUMIN  --   --   --  3.6  --   --   --   --   --   --   --   --   --  4.1  --  4.7   PROTTOTAL  --   --   --  6.5  --   --   --   --   --   --   --   --   --  7.0  --  8.5*   BILITOTAL  --   --   --  3.2*  --   --   --   --   --   --   --   --   --  4.5*  --  5.2*   ALKPHOS  --   --   --  86  --   --   --   --   --   --   --   --   --  95  --  123   ALT  --   --   --  54  --   --   --   --   --   --   --   --   --  51  --  61   AST  --   --   --  107*  --   --   --   --   --   --   --   --   --  102*  --  103*   LIPASE  --   --   --   --   --   --   --   --   --   --   --   --   --   --   --  259*    < > = values in this interval not displayed.         Imaging  No results found for this or any previous visit (from the past 24 hour(s)).

## 2023-08-18 NOTE — PLAN OF CARE
Goal Outcome Evaluation:         Alert and oriented x4. Vital signs stable on RA. IND in room. Tolerating mod CHO diet. Lung sounds clear, equal bilaterally. + flatus, loose BM x3. Adequate urine output. Pain managed with PRN tylenol and chloraseptic spray. Denies nausea. Tele SR. CIWA scores of 3, 0, 4. K replaced x1 this shift.

## 2023-08-18 NOTE — DISCHARGE INSTRUCTIONS
Future Appts:    Date: Monday, 8/21/2023  Time: 9:20 am - 10:20 am  Provider: Ricardo Brennan  MSN  CNP,PMHNP,RN  Location: [facility], [address] [place] [address] [address], [place] [address]  Phone: [phone]  Type: Telepsychiatry    Patient Instructions  All Intake appointments will be conducted via telehealth and must have access to video through smart phone or laptop/pc/tablet. You will be contacted by our office to set up the virtual meeting. If you have questions, please contact our office at [phone].

## 2023-08-18 NOTE — PROVIDER NOTIFICATION
MD Notification    Notified Person: MD    Notified Person Name: Geronimo Porter    Notification Date/Time: 8/18/23 0467    Notification Interaction: DoYouRemember messaging    Purpose of Notification: Pt complaining of generalized body aches and right sided throat pain. R tonsil slightly red, no enlarged tonsils noted upon palpation at this time. Can chloraseptic spray and Tylenol be ordered?     Orders Received: Tylenol 650 mg PO every 4 hours PRN for mild pain. Chloraseptic 1.4% spray, 1 spray to mouth/throat every hour PRN for sore throat    Comments:

## 2023-08-18 NOTE — CONSULTS
Psychiatry Consultation; Follow up          Reason for Consult, requesting source:    F/u hallucinations/possible alcohol withdrawal  Requesting source: Jonathan Carrasco            Interim history:    Ravi Ahmadi is a 33 year old male who was admitted 8/16 for DKA with electrolyte abnormalities. UTI and sepsis discovered upon admission. Pt also experiencing auditory and visual hallucinations. History of alcohol abuse, last drink noted to be August 7th. PMH significant for HTN, tobacco use, alcohol abuse, obesity, type II DM, asthma. Psychiatry is consulted for evaluation.     Pt initially seen by KLEVER Holguin yesterday. He is seen for follow-up today. On reevaluation today, patient reports feeling physically better than he had yesterday. He slept well overnight last night. Feeling slightly dizzy at times. Appetite is intact. Endorses some mild anxiety today, feels a bit anxious about family coming to visit, but notes that his anxiety is manageable. Pt reports that Wednesday-Thursday is when he had been experiencing hallucinations. He denies ever experiencing this before. Today, he denies any ongoing hallucinations, denies paranoia. He is future-oriented. He discusses that he has been isolated at home, is not currently working. Often spends much of his day in bed. He is interested in following up outpatient for psychiatric care.          Medications:     Current Facility-Administered Medications   Medication    acetaminophen (TYLENOL) tablet 650 mg    Or    acetaminophen (TYLENOL) Suppository 650 mg    albuterol (PROVENTIL HFA/VENTOLIN HFA) inhaler    cloNIDine (CATAPRES) tablet 0.1 mg    dextrose 5% and 0.45% NaCl + KCl 20 mEq/L infusion    glucose gel 15-30 g    Or    dextrose 50 % injection 25-50 mL    Or    glucagon injection 1 mg    flumazenil (ROMAZICON) injection 0.2 mg    folic acid (FOLVITE) tablet 1 mg    OLANZapine zydis (zyPREXA) ODT tab 5-10 mg    Or    haloperidol lactate (HALDOL)  "injection 2.5-5 mg    heparin ANTICOAGULANT injection 5,000 Units    insulin aspart (NovoLOG) injection (RAPID ACTING)    insulin aspart (NovoLOG) injection (RAPID ACTING)    lidocaine (LMX4) cream    lidocaine 1 % 0.1-1 mL    LORazepam (ATIVAN) tablet 1-2 mg    Or    LORazepam (ATIVAN) injection 1-2 mg    magnesium oxide (MAG-OX) tablet 400 mg    melatonin tablet 5 mg    multivitamin w/minerals (THERA-VIT-M) tablet 1 tablet    phenol (CHLORASEPTIC) 1.4 % spray 1 mL    sodium chloride (PF) 0.9% PF flush 3 mL    sodium chloride (PF) 0.9% PF flush 3 mL    thiamine (B-1) injection 100 mg    [Held by provider] thiamine (B-1) tablet 100 mg              MSE:     Appearance: awake, alert, dressed in hospital scrubs, appeared as age stated, and slightly unkempt  Attitude:  cooperative  Eye Contact:  good  Mood:   okay  Affect:  mood congruent and intensity is normal  Speech:  clear, coherent  Psychomotor Behavior:  no evidence of tardive dyskinesia, dystonia, or tics  Thought Process:  linear  Associations:  no loose associations  Thought Content:  no evidence of suicidal ideation or homicidal ideation and no evidence of psychotic thought  Insight:  fair  Judgement:  fair  Oriented to:  time, person, and place  Attention Span and Concentration:  fair  Recent and Remote Memory:  intact  Language: able to name/identify objects without impairment  Fund of Knowledge: intact with awareness of current and past events    Vital signs:  Temp: 98.1  F (36.7  C) Temp src: Oral BP: 125/77 Pulse: 74   Resp: 20 SpO2: 92 % O2 Device: None (Room air)   Height: 167.6 cm (5' 6\") Weight: 114.8 kg (253 lb 1.6 oz)  Estimated body mass index is 40.85 kg/m  as calculated from the following:    Height as of this encounter: 1.676 m (5' 6\").    Weight as of this encounter: 114.8 kg (253 lb 1.6 oz).              DSM-5 Diagnosis:   #1 Alcohol use disorder, severe, dependence  #2 Anxiety disorder, unspecified  #3 Depressive disorder, unspecified  #4 " Unspecified psychosis, r/o 2/2 depression, alcohol withdrawal, or metabolic encephalopathy          Assessment:   Ravi Ahmadi is a 33 year old male who was admitted 8/16 for DKA with electrolyte abnormalities. UTI and sepsis discovered upon admission. Pt also experiencing auditory and visual hallucinations. History of alcohol abuse, last drink noted to be August 7th. PMH significant for HTN, tobacco use, alcohol abuse, obesity, type II DM, asthma. Psychiatry is consulted for evaluation.     Pt seen for follow-up today. He denies any ongoing hallucinations or paranoia. Difficult to know exactly what caused this. He denies experiencing this in the past, but appears to be a bit hypervigilant at home, putting bells on his door to ensure he hears if anyone enters. Reports he has heard the bells jingle before and no one has been there. He doesn't appear to have a thought disorder and his psychosis symptoms have resolved. Hallucinations can occur during alcohol withdrawal, but patient reports he stopped drinking about 11 days ago, so this would be less likely. Could be related to metabolic encephalopathy secondary to his UTI and sepsis, but this would be less common at his age. He is agreeable to outpatient psychiatry followup and we will get him scheduled.           Summary of Recommendations:   1) Pt denies symptoms of psychosis today  2) CIWA scores have been minimal, recommend to discontinue  3) Encouraged to continue to abstain from alcohol or any other mind or mood-altering substances  4) Consider CD treatment if unable to abstain from alcohol  5) Pt will be scheduled for outpatient psychiatric follow-up. Details will be in AVS.   6) Reconsult psychiatry as needed, we will sign off.       Juan Johnston, THOMAS-BC, APRN, CNP  Consult/Liaison Psychiatry  [facility]  Provider can be paged via [facility] Paging/Directory  If I am unavailable, please contact [facility] at [phone] to reach the on-call provider.

## 2023-08-18 NOTE — CONSULTS
Care Management Initial Consult    General Information  Assessment completed with: Patient,    Type of CM/SW Visit: Offer D/C Planning    Primary Care Provider verified and updated as needed: Yes   Readmission within the last 30 days: no previous admission in last 30 days      Reason for Consult: discharge planning  Advance Care Planning:            Communication Assessment  Patient's communication style: spoken language (English or Bilingual)    Hearing Difficulty or Deaf: yes   Wear Glasses or Blind: yes    Cognitive  Cognitive/Neuro/Behavioral: WDL  Level of Consciousness: alert  Arousal Level: opens eyes spontaneously  Orientation: oriented x 4  Mood/Behavior: calm, cooperative  Best Language: 0 - No aphasia  Speech: clear    Living Environment:   People in home: parent(s)     Current living Arrangements: apartment      Able to return to prior arrangements: yes       Family/Social Support:  Care provided by: self  Provides care for: no one  Marital Status: Single  Parent(s)          Description of Support System: Supportive    Support Assessment: Adequate family and caregiver support    Current Resources:   Patient receiving home care services: No     Community Resources:    Equipment currently used at home: none  Supplies currently used at home:      Employment/Financial:  Employment Status: unemployed        Financial Concerns: unemployed           Does the patient's insurance plan have a 3 day qualifying hospital stay waiver?  No    Lifestyle & Psychosocial Needs:  Social Determinants of Health     Tobacco Use: High Risk (6/26/2023)    Patient History     Smoking Tobacco Use: Some Days     Smokeless Tobacco Use: Never     Passive Exposure: Not on file   Alcohol Use: Heavy Drinker (1/7/2021)    AUDIT-C     Frequency of Alcohol Consumption: 4 or more times a week     Average Number of Drinks: 1 or 2     Frequency of Binge Drinking: Monthly   Financial Resource Strain: Not on file   Food Insecurity: Not on file    Transportation Needs: Not on file   Physical Activity: Not on file   Stress: Not on file   Social Connections: Not on file   Intimate Partner Violence: Not on file   Depression: At risk (6/26/2023)    PHQ-2     PHQ-2 Score: 4   Housing Stability: Not on file       Functional Status:  Prior to admission patient needed assistance:   Dependent ADLs:: Independent  Dependent IADLs:: Independent  Assesssment of Functional Status: At functional baseline    Mental Health Status:  Mental Health Status: Past Concern       Chemical Dependency Status:  Chemical Dependency Status: Current Concern  Chemical Dependency Management: Previous treatment          Values/Beliefs:  Spiritual, Cultural Beliefs, Gnosticism Practices, Values that affect care: no               Additional Information:   Discussed plan of care and discharge plans with patient. Patient known to writer from previous admission. Patient stating he lives with his parents. Patient is unemployed at present.  Patient states he has the desire to quit alcohol and hopes he will be able to remain sober. Patient states he does not have any idea as to why he started drinking but sometimes attributes it to boredom or depression. Discussed with patient that psychiatry recommendation is to seek CD rx program. Patient states he is in the process of seeking job and assures that he will quit drinking.   Patient states he is able to manage his bloodsugars and was not sure the reason for increase in BGMs recently   Patient has an appt with diabetic education on   August 24, 2023 Telephone Visit with Mayte MACEDO  Thursday Aug 24, 2023 8:00 AM   And pcp appt Adult Preventative Visit with Juancarlos Sood MD  Thursday Aug 24, 2023 2:10 PM    Ashu Oleary RN  [phone]

## 2023-08-19 LAB
ANION GAP SERPL CALCULATED.3IONS-SCNC: 13 MMOL/L (ref 7–15)
BUN SERPL-MCNC: 6.6 MG/DL (ref 6–20)
CALCIUM SERPL-MCNC: 9.7 MG/DL (ref 8.6–10)
CHLORIDE SERPL-SCNC: 99 MMOL/L (ref 98–107)
CREAT SERPL-MCNC: 0.61 MG/DL (ref 0.67–1.17)
DEPRECATED HCO3 PLAS-SCNC: 26 MMOL/L (ref 22–29)
ERYTHROCYTE [DISTWIDTH] IN BLOOD BY AUTOMATED COUNT: 13.9 % (ref 10–15)
GFR SERPL CREATININE-BSD FRML MDRD: >90 ML/MIN/1.73M2
GLUCOSE BLDC GLUCOMTR-MCNC: 118 MG/DL (ref 70–99)
GLUCOSE BLDC GLUCOMTR-MCNC: 128 MG/DL (ref 70–99)
GLUCOSE BLDC GLUCOMTR-MCNC: 136 MG/DL (ref 70–99)
GLUCOSE BLDC GLUCOMTR-MCNC: 140 MG/DL (ref 70–99)
GLUCOSE BLDC GLUCOMTR-MCNC: 144 MG/DL (ref 70–99)
GLUCOSE SERPL-MCNC: 137 MG/DL (ref 70–99)
HCT VFR BLD AUTO: 43.8 % (ref 40–53)
HGB BLD-MCNC: 14.1 G/DL (ref 13.3–17.7)
MCH RBC QN AUTO: 31.9 PG (ref 26.5–33)
MCHC RBC AUTO-ENTMCNC: 32.2 G/DL (ref 31.5–36.5)
MCV RBC AUTO: 99 FL (ref 78–100)
PF4 HEPARIN CMPLX AB SER QL: NEGATIVE
PLATELET # BLD AUTO: 99 10E3/UL (ref 150–450)
POTASSIUM SERPL-SCNC: 3.7 MMOL/L (ref 3.4–5.3)
RBC # BLD AUTO: 4.42 10E6/UL (ref 4.4–5.9)
SODIUM SERPL-SCNC: 138 MMOL/L (ref 136–145)
WBC # BLD AUTO: 7.4 10E3/UL (ref 4–11)

## 2023-08-19 PROCEDURE — 250N000013 HC RX MED GY IP 250 OP 250 PS 637: Performed by: HOSPITALIST

## 2023-08-19 PROCEDURE — 99232 SBSQ HOSP IP/OBS MODERATE 35: CPT | Performed by: HOSPITALIST

## 2023-08-19 PROCEDURE — 36415 COLL VENOUS BLD VENIPUNCTURE: CPT | Performed by: HOSPITALIST

## 2023-08-19 PROCEDURE — 120N000001 HC R&B MED SURG/OB

## 2023-08-19 PROCEDURE — 82310 ASSAY OF CALCIUM: CPT | Performed by: HOSPITALIST

## 2023-08-19 PROCEDURE — 250N000011 HC RX IP 250 OP 636: Performed by: HOSPITALIST

## 2023-08-19 PROCEDURE — 250N000013 HC RX MED GY IP 250 OP 250 PS 637: Performed by: INTERNAL MEDICINE

## 2023-08-19 PROCEDURE — 86022 PLATELET ANTIBODIES: CPT | Performed by: HOSPITALIST

## 2023-08-19 PROCEDURE — 85014 HEMATOCRIT: CPT | Performed by: HOSPITALIST

## 2023-08-19 RX ORDER — POTASSIUM CHLORIDE 1.5 G/1.58G
20 POWDER, FOR SOLUTION ORAL ONCE
Status: COMPLETED | OUTPATIENT
Start: 2023-08-19 | End: 2023-08-19

## 2023-08-19 RX ADMIN — FOLIC ACID 1 MG: 1 TABLET ORAL at 10:26

## 2023-08-19 RX ADMIN — ALBUTEROL SULFATE 2 PUFF: 90 AEROSOL, METERED RESPIRATORY (INHALATION) at 21:50

## 2023-08-19 RX ADMIN — Medication 400 MG: at 10:27

## 2023-08-19 RX ADMIN — CLONIDINE HYDROCHLORIDE 0.1 MG: 0.1 TABLET ORAL at 00:38

## 2023-08-19 RX ADMIN — MULTIPLE VITAMINS W/ MINERALS TAB 1 TABLET: TAB at 10:27

## 2023-08-19 RX ADMIN — CLONIDINE HYDROCHLORIDE 0.1 MG: 0.1 TABLET ORAL at 10:27

## 2023-08-19 RX ADMIN — POTASSIUM CHLORIDE 20 MEQ: 1.5 POWDER, FOR SOLUTION ORAL at 10:27

## 2023-08-19 RX ADMIN — ALBUTEROL SULFATE 2 PUFF: 90 AEROSOL, METERED RESPIRATORY (INHALATION) at 10:41

## 2023-08-19 RX ADMIN — MELATONIN 5 MG TABLET 5 MG: at 21:35

## 2023-08-19 RX ADMIN — THIAMINE HCL TAB 100 MG 100 MG: 100 TAB at 13:37

## 2023-08-19 ASSESSMENT — ACTIVITIES OF DAILY LIVING (ADL)
ADLS_ACUITY_SCORE: 22

## 2023-08-19 NOTE — PROGRESS NOTES
[facility]    Medicine Progress Note - Hospitalist Service    Date of Admission:  8/16/2023    Assessment & Plan   Ravi Ahmadi is a 33 year old male with PMH significant for DM1, alcohol dependence, HTN, asthma was brought to the ER by EMS for evaluation of symptoms including nausea ,vomiting, weakness.  He was found to be in DKA with a number of electrolyte abnormalities and is  admitted on 8/16/2023 for further management.      Anion gap metabolic acidosis secondary to lactic acidosis, starvation ketoacidosis,? -Resolved  Type 2 diabetes mellitus  Nausea/vomiting-resolved  -On admission presented with nausea, vomiting and weakness and he had lab work done on admission which showed sodium of 134, potassium of 2.9, chloride of 87, bicarb of 20 with a creatinine of 2.49 and his anion gap was elevated at 27.  Lactic acid was elevated at 5.3, HbA1c was 8.4 and ketones were elevated at 2.80 blood sugar was 306  -He had a venous pH done which showed that the pH was 7.42  -Patient was given 3 L IV fluid bolus in the ED and he was started on insulin per DKA protocol of note insulin drip was never initiated during hospital stay as his potassium was less than 3 and sliding scale insulin was ordered.  Patient showed significant improvement in his clinical condition and his labs improved and ketosis improved and of note he did receive D5 during this hospital stay  -During the course of hospital stay his blood sugars have been persistently low 100s and this morning it was around 130s to 140s and he will need only 1 unit of sliding scale coverage  -We will continue to monitor his blood sugar during the course of hospital stay and if they are high then we we will need to reinitiate him on long-acting insulin.  Of note if his blood sugars continues to be in 130s to 140s range then he can be discharged with metformin and sliding scale insulin with very close follow-up with his primary care physician  and this was discussed with the patient and he understands and agrees and we will see how his levels up by tomorrow morning         Hyponatremia-POA-resolved  Hypokalemia, resolved  Hypercalcemia-resolved  -Did had electrolyte derangement when he presented to the hospital and was started on IV fluid replacement and his potassium is back to near normal at 3.7 today and phosphorus has been normal during this hospital stay       Acute kidney injury, resolved  Dehydration and presyncope  -His baseline creatinine is around 0.59  -Creatinine on admission was 2.49  -His acute renal failure was most likely due to underlying dehydration and recent CT scan of the abdomen and pelvis done in June 2023 did not show any evidence of obstruction  -Patient received IV fluids and his renal function is back to normal at 0.61 and his sodium is stable at 138 with potassium of 3.7       Prolonged Qtc-improved  -EKG done on admission showed QTc of 594 and was most likely due to severe electrolyte derangement and repeat EKG done on 8/17/2023 showed that QTc has improved to 470s       Leukocytosis, likely reactive, resolved  -On admission on 8/16/2023 his white count was elevated at 23.9  -He had a CT scan of abdomen and pelvis done on 6/12/2023 and there was no evidence of any stone  -Ultrasound of the abdomen done on 6/14/2023 showed hepatic steatosis with no evidence of any bile duct involvement  -UA done on admission on 8/16 showed ketones, bilirubin, blood, leukocyte esterase, WBC and it was not a good sample as there was 13 squamous cells and urine culture showed mixture of urogenital apple and blood cultures were negative  -Patient was initially treated with empiric ceftriaxone 2 g daily but it was discontinued after the cultures came back negative and WBC count this morning is stable and he is afebrile         Severe alcohol use disorder  Alcoholic hepatitis/fatty liver, abnormal LFTs  Possible hallucination/anxiety spell-?  Alcohol  "withdrawal/delirium tremens  -Admitting physician notes patient stopped drinking alcohol about 2 weeks before presenting to the hospital and on admission he appeared tremulous, and she is and his blood alcohol level was negative  -Of note he did had elevated LFTs and had imaging done in June 2022 of CT scan of the abdomen and pelvis along with ultrasound abdomen and he does have hepatic steatosis  -Patient during this hospital stay did had visual hallucination on 8/17 and psychiatry was consulted and they have signed off and continue the patient at this time with multivitamin, thiamine and folic acid and he does not seem to have any hallucinations today     HTN  -His PTA home medications include lisinopril 10 mg  -Given his acute kidney injury his lisinopril was held and his blood pressure continues to remain stable in 120s without lisinopril and that given that his renal function is improved he can restart back on lisinopril in the next few days       Tobacco use  -Patient has declined nicotine replacement during this hospital stay     New thrombocytopenia  -His platelet count on admission was 256 and then dropped 206 on 8/18/2023 and today are 99  -Heparin-induced thrombocytopenia is low on the differential but we will send for screening today and will check labs in the morning           Diet: Moderate Consistent Carb (60 g CHO per Meal) Diet    DVT Prophylaxis: Pneumatic Compression Devices and Ambulate every shift  Abernathy Catheter: Not present  Lines: None     Cardiac Monitoring: None  Code Status: Full Code      Clinically Significant Risk Factors        # Hypokalemia: Lowest K = 3.2 mmol/L in last 2 days, will replace as needed         # Thrombocytopenia: Lowest platelets = 99 in last 2 days, will monitor for bleeding   # Hypertension: Noted on problem list        # Severe Obesity: Estimated body mass index is 40.81 kg/m  as calculated from the following:    Height as of this encounter: 1.676 m (5' 6\").    " Weight as of this encounter: 114.7 kg (252 lb 13.9 oz)., PRESENT ON ADMISSION     # Financial/Environmental Concerns: unemployed  # Asthma: noted on problem list        Disposition Plan     Expected Discharge Date: 08/20/2023,  3:00 PM    Destination: home with family  Discharge Comments: 8/17 ED admit overnight. IMCAnisha Gomez MD  Hospitalist Service  [facility]  Securely message with [employer] (more info)  Text page via [employer] Paging/Directory   ______________________________________________________________________    Interval History     Saw the patient today and nurse was present and patient mentioned that he was taking 25 units twice daily of long-acting insulin which was recently increased as her sugars were persistently in 200.  Patient mentioned that his oral intake was very less at home.  I did discuss with the patient about insulin regimen and his blood sugars are around 130s to 140s and it is not safe to start him on long-acting insulin and we can pursue using metformin and sliding scale insulin and he agrees.  He denies any nausea or vomiting.  He denies any hallucinations.  Of note overnight there was a lot of disturbance in his neighbors room and he could not sleep well.      Physical Exam   Vital Signs: Temp: 98.2  F (36.8  C) Temp src: Oral BP: 122/85 Pulse: 75   Resp: 18 SpO2: 94 % O2 Device: None (Room air)    Weight: 252 lbs 13.88 oz      General alert and oriented x3  Respiratory: He is on room air and able to speak in full sentences  CVS: S1-S2 normal without any murmur  Abdomen: Soft, nondistended nontender  Skin: He does have mild area of ecchymosis on the lateral aspect of the abdominal wall on the left side  Musculoskeletal: Normal into motion of upper and lower extremities  Psychiatric: Cooperative    Medical Decision Making       Time spent in care of patient is 45 minutes and I discussed plan of care with the patient and the nurse and reviewed his labs  including CBC and basic metabolic panel and I also reviewed his prior hospitalization in June 2023    Data     I have personally reviewed the following data over the past 24 hrs:    7.4  \   14.1   / 99 (L)     138 99 6.6 /  140 (H)   3.7 26 0.61 (L) \       Imaging results reviewed over the past 24 hrs:   No results found for this or any previous visit (from the past 24 hour(s)).

## 2023-08-19 NOTE — PLAN OF CARE
1900h-0700h: 1900h: Pt Aox4. Neuro intact. CIWA score: 2. Denies hallucinations. O2Sat 93% on RA. Expiratory wheezes, w/ SOB; relieved by prn albuterol puff. Mod CHO diet. Blood glucose controlled, no insulin coverage required. Ambulates in the room independently. Voiding adequate amount. Headache 5/10; managed w/ prn tylenol, effective.      On K protocol. For CBC & BMP in AM.

## 2023-08-20 VITALS
TEMPERATURE: 98 F | HEIGHT: 66 IN | DIASTOLIC BLOOD PRESSURE: 72 MMHG | WEIGHT: 251.77 LBS | HEART RATE: 76 BPM | SYSTOLIC BLOOD PRESSURE: 130 MMHG | BODY MASS INDEX: 40.46 KG/M2 | OXYGEN SATURATION: 93 % | RESPIRATION RATE: 16 BRPM

## 2023-08-20 LAB
ATRIAL RATE - MUSE: 77 BPM
DIASTOLIC BLOOD PRESSURE - MUSE: NORMAL MMHG
ERYTHROCYTE [DISTWIDTH] IN BLOOD BY AUTOMATED COUNT: 14.1 % (ref 10–15)
GLUCOSE BLDC GLUCOMTR-MCNC: 121 MG/DL (ref 70–99)
GLUCOSE BLDC GLUCOMTR-MCNC: 132 MG/DL (ref 70–99)
HCT VFR BLD AUTO: 45.4 % (ref 40–53)
HGB BLD-MCNC: 14.9 G/DL (ref 13.3–17.7)
INTERPRETATION ECG - MUSE: NORMAL
MCH RBC QN AUTO: 31.8 PG (ref 26.5–33)
MCHC RBC AUTO-ENTMCNC: 32.8 G/DL (ref 31.5–36.5)
MCV RBC AUTO: 97 FL (ref 78–100)
P AXIS - MUSE: 80 DEGREES
PLATELET # BLD AUTO: 121 10E3/UL (ref 150–450)
POTASSIUM SERPL-SCNC: 3.8 MMOL/L (ref 3.4–5.3)
PR INTERVAL - MUSE: 128 MS
QRS DURATION - MUSE: 112 MS
QT - MUSE: 416 MS
QTC - MUSE: 470 MS
R AXIS - MUSE: 78 DEGREES
RBC # BLD AUTO: 4.69 10E6/UL (ref 4.4–5.9)
SYSTOLIC BLOOD PRESSURE - MUSE: NORMAL MMHG
T AXIS - MUSE: 78 DEGREES
VENTRICULAR RATE- MUSE: 77 BPM
WBC # BLD AUTO: 9.4 10E3/UL (ref 4–11)

## 2023-08-20 PROCEDURE — 85027 COMPLETE CBC AUTOMATED: CPT | Performed by: STUDENT IN AN ORGANIZED HEALTH CARE EDUCATION/TRAINING PROGRAM

## 2023-08-20 PROCEDURE — 99239 HOSP IP/OBS DSCHRG MGMT >30: CPT | Performed by: STUDENT IN AN ORGANIZED HEALTH CARE EDUCATION/TRAINING PROGRAM

## 2023-08-20 PROCEDURE — 250N000013 HC RX MED GY IP 250 OP 250 PS 637: Performed by: HOSPITALIST

## 2023-08-20 PROCEDURE — 36415 COLL VENOUS BLD VENIPUNCTURE: CPT | Performed by: HOSPITALIST

## 2023-08-20 PROCEDURE — 250N000013 HC RX MED GY IP 250 OP 250 PS 637: Performed by: INTERNAL MEDICINE

## 2023-08-20 PROCEDURE — 250N000013 HC RX MED GY IP 250 OP 250 PS 637: Performed by: STUDENT IN AN ORGANIZED HEALTH CARE EDUCATION/TRAINING PROGRAM

## 2023-08-20 PROCEDURE — 84132 ASSAY OF SERUM POTASSIUM: CPT | Performed by: HOSPITALIST

## 2023-08-20 PROCEDURE — 36415 COLL VENOUS BLD VENIPUNCTURE: CPT | Performed by: STUDENT IN AN ORGANIZED HEALTH CARE EDUCATION/TRAINING PROGRAM

## 2023-08-20 RX ORDER — POTASSIUM CHLORIDE 1.5 G/1.58G
20 POWDER, FOR SOLUTION ORAL ONCE
Status: COMPLETED | OUTPATIENT
Start: 2023-08-20 | End: 2023-08-20

## 2023-08-20 RX ADMIN — FOLIC ACID 1 MG: 1 TABLET ORAL at 09:50

## 2023-08-20 RX ADMIN — Medication 400 MG: at 09:50

## 2023-08-20 RX ADMIN — THIAMINE HCL TAB 100 MG 100 MG: 100 TAB at 09:50

## 2023-08-20 RX ADMIN — POTASSIUM CHLORIDE 20 MEQ: 1.5 POWDER, FOR SOLUTION ORAL at 09:50

## 2023-08-20 RX ADMIN — MULTIPLE VITAMINS W/ MINERALS TAB 1 TABLET: TAB at 09:50

## 2023-08-20 ASSESSMENT — ACTIVITIES OF DAILY LIVING (ADL)
ADLS_ACUITY_SCORE: 22

## 2023-08-20 NOTE — DISCHARGE SUMMARY
"[facility]  Hospitalist Discharge Summary      Date of Admission:  8/16/2023  Date of Discharge:  8/20/2023  Discharging Provider: Goran Platt MD  Discharge Service: Hospitalist Service    Discharge Diagnoses     DKA    Clinically Significant Risk Factors     # Severe Obesity: Estimated body mass index is 40.64 kg/m  as calculated from the following:    Height as of this encounter: 1.676 m (5' 6\").    Weight as of this encounter: 114.2 kg (251 lb 12.3 oz).       Follow-ups Needed After Discharge   Follow-up Appointments     Follow-up and recommended labs and tests       Follow up with primary care provider, Juancarlos Sood, within 7 days   for hospital follow- up.  No follow up labs or test are needed.            Unresulted Labs Ordered in the Past 30 Days of this Admission       Date and Time Order Name Status Description    8/16/2023  7:42 PM Blood Culture Peripheral Blood Preliminary     8/16/2023  7:42 PM Blood Culture Peripheral Blood Preliminary           Discharge Disposition   Discharged to home  Condition at discharge: Stable    Hospital Course   Ravi Ahmadi is a 33 year old male with PMH significant for DM1, alcohol dependence, HTN, asthma was brought to the ER by EMS for evaluation of symptoms including nausea ,vomiting, weakness.  He was found to be in DKA with a number of electrolyte abnormalities and is  admitted on 8/16/2023 for further management.      Anion gap metabolic acidosis secondary to lactic acidosis, starvation ketoacidosis,? -Resolved  Type 2 diabetes mellitus  Nausea/vomiting-resolved  -On admission presented with nausea, vomiting and weakness and he had lab work done on admission which showed sodium of 134, potassium of 2.9, chloride of 87, bicarb of 20 with a creatinine of 2.49 and his anion gap was elevated at 27.  Lactic acid was elevated at 5.3, HbA1c was 8.4 and ketones were elevated at 2.80 blood sugar was 306  -He had a venous pH done which showed " that the pH was 7.42  -Patient was given 3 L IV fluid bolus in the ED and he was started on insulin per DKA protocol of note insulin drip was never initiated during hospital stay as his potassium was less than 3 and sliding scale insulin was ordered.  Patient showed significant improvement in his clinical condition and his labs improved and ketosis improved and of note he did receive D5 during this hospital stay  -During the course of hospital stay his blood sugars have been persistently low 100s and this morning it was around 130s to 140s and he will need only 1 unit of sliding scale coverage  -We will continue to monitor his blood sugar during the course of hospital stay and if they are high then we we will need to reinitiate him on long-acting insulin.   Plan:  - Of note if his blood sugars continues to be in 130s to 140s range -> he can be discharged with metformin and low dose Lantus and titrate as needed.     Hyponatremia-POA-resolved  Hypokalemia, resolved  Hypercalcemia-resolved  -Did had electrolyte derangement when he presented to the hospital and was started on IV fluid replacement and his potassium is back to near normal at 3.7 today and phosphorus has been normal during this hospital stay       Acute kidney injury, resolved  Dehydration and presyncope  -His baseline creatinine is around 0.59  -Creatinine on admission was 2.49  -His acute renal failure was most likely due to underlying dehydration and recent CT scan of the abdomen and pelvis done in June 2023 did not show any evidence of obstruction  Plan:  -Patient received IV fluids and his renal function is back to normal at 0.61 and his sodium and K normalized.     Prolonged Qtc-improved  -EKG done on admission showed QTc of 594 and was most likely due to severe electrolyte derangement and repeat EKG done on 8/17/2023 showed that QTc has improved to 470s     Leukocytosis, likely reactive, resolved  -On admission on 8/16/2023 his white count was  elevated at 23.9  -He had a CT scan of abdomen and pelvis done on 6/12/2023 and there was no evidence of any stone  -Ultrasound of the abdomen done on 6/14/2023 showed hepatic steatosis with no evidence of any bile duct involvement  -UA done on admission on 8/16 showed ketones, bilirubin, blood, leukocyte esterase, WBC and it was not a good sample as there was 13 squamous cells and urine culture showed mixture of urogenital apple and blood cultures were negative  -Patient was initially treated with empiric ceftriaxone 2 g daily but it was discontinued after the cultures came back negative and WBC count is stable and he is afebrile     Severe alcohol use disorder  Alcoholic hepatitis/fatty liver, abnormal LFTs  Possible hallucination/anxiety spell-?  Alcohol withdrawal/delirium tremens  -Admitting physician notes patient stopped drinking alcohol about 2 weeks before presenting to the hospital and on admission he appeared tremulous, and she is and his blood alcohol level was negative  -Of note he did had elevated LFTs and had imaging done in June 2022 of CT scan of the abdomen and pelvis along with ultrasound abdomen and he does have hepatic steatosis  -Patient during this hospital stay did had visual hallucination on 8/17 and psychiatry was consulted and they have signed off   -Encouraged to continue to abstain from alcohol or any other mind or mood-altering substances and patient will be scheduled for outpatient psychiatric follow-up.     HTN  -His PTA home medications include lisinopril 10 mg  -Given his acute kidney injury his lisinopril was held and his blood pressure continues to remain stable in 120s without lisinopril and that given that his renal function is improved this can be resumed     Tobacco use  -Patient has declined nicotine replacement during this hospital stay     New thrombocytopenia  - His platelet count on admission was 256 and then dropped 206 on 8/18/2023 and today are 99  - HIT screen is  negative  - Follow as an outpatient    Consultations This Hospital Stay   PSYCHIATRY IP CONSULT  PSYCHIATRY IP CONSULT    Code Status   Full Code    Time Spent on this Encounter   I, Goran Platt MD, personally saw the patient today and spent greater than 30 minutes discharging this patient.       Goran Platt MD  [place] [address] [place] [address]  Phone: [phone]  ______________________________________________________________________    Physical Exam   Vital Signs: Temp: 98  F (36.7  C) Temp src: Oral BP: 130/72 Pulse: 76   Resp: 16 SpO2: 93 % O2 Device: None (Room air)    Weight: 251 lbs 12.25 oz  ----------------------------------------------------------------------------------------       Primary Care Physician   Juancarlos Sood    Discharge Orders      Reason for your hospital stay    You had uncontrolled blood sugars from diabetes.     Follow-up and recommended labs and tests     Follow up with primary care provider, Juancarlos Sood, within 7 days for hospital follow- up.  No follow up labs or test are needed.     Activity    Your activity upon discharge: activity as tolerated     Diet    Follow this diet upon discharge: Orders Placed This Encounter      Moderate Consistent Carb (60 g CHO per Meal) Diet       Significant Results and Procedures   Most Recent 3 CBC's:  Recent Labs   Lab Test 08/19/23  0552 08/18/23  0721 08/16/23  1726   WBC 7.4 9.7 23.9*   HGB 14.1 13.2* 16.9   MCV 99 96 93   PLT 99* 106* 256     Most Recent 3 BMP's:  Recent Labs   Lab Test 08/20/23  0845 08/20/23  0604 08/20/23  0148 08/19/23  2133 08/19/23  0834 08/19/23  0552 08/18/23  0738 08/18/23  0608 08/17/23  1613 08/17/23  1301 08/17/23  1200 08/17/23 0823   NA  --   --   --   --   --  138  --  136  --  134*  --  133*   POTASSIUM  --  3.8  --   --   --  3.7  --  3.6  3.6   < > 2.8*  --  3.0*   CHLORIDE  --   --   --   --   --  99  --  100  --  93*  --  93*   CO2  --   --   --    --   --  26  --  21*  --  26  --  22   BUN  --   --   --   --   --  6.6  --  8.2  --   --   --  9.8   CR  --   --   --   --   --  0.61*  --  0.75  --   --   --  0.91   ANIONGAP  --   --   --   --   --  13  --  15  --  15  --  18*   RAEANN  --   --   --   --   --  9.7  --  8.9  --   --   --  9.6   *  --  121* 144*   < > 137*   < > 127*   < >  --    < > 129*    < > = values in this interval not displayed.     Most Recent 2 LFT's:  Recent Labs   Lab Test 08/18/23  0608 08/16/23 2259   * 102*   ALT 54 51   ALKPHOS 86 95   BILITOTAL 3.2* 4.5*     Most Recent 3 INR's:No lab results found.  Most Recent 3 Troponin's:No lab results found.  Most Recent 3 BNP's:No lab results found.  7-Day Micro Results       Collected Updated Procedure Result Status      08/16/2023 2025 08/19/2023 2246 Blood Culture Peripheral Blood [[id number]]   Peripheral Blood    Preliminary result Component Value   Culture No growth after 3 days  [P]                08/16/2023 2025 08/19/2023 2246 Blood Culture Peripheral Blood [[id number]]   Peripheral Blood    Preliminary result Component Value   Culture No growth after 3 days  [P]                08/16/2023 1915 08/18/2023 0844 Urine Culture [[id number]]   Urine, Clean Catch    Final result Component Value   Culture <10,000 CFU/mL Mixture of urogenital apple                     Most Recent Urinalysis:  Recent Labs   Lab Test 08/16/23 1915   COLOR Orange*   APPEARANCE Cloudy*   URINEGLC 100*   URINEBILI Moderate*   URINEKETONE 10*   SG 1.022   UBLD Moderate*   URINEPH 5.5   PROTEIN 200*   NITRITE Negative   LEUKEST Trace*   RBCU 3*   WBCU 64*   ,   Results for orders placed or performed during the hospital encounter of 08/16/23   XR Chest 2 Views    Narrative    EXAM: XR CHEST 2 VIEWS  LOCATION: [facility]  DATE: 8/17/2023    INDICATION: Sepsis. Vomiting. Leukocytosis. Evaluate for aspiration pneumonia.  COMPARISON: Chest x-ray 2 views 01/04/2014 at 1647  hours.      Impression    IMPRESSION: Both lungs remain clear. Elevation of the right hemidiaphragm identified on current study. No adenopathy or effusion. Normal cardiac size and pulmonary vascularity. Anatomic alignment of the bones and joints. Monitoring leads have been placed   overlying the chest.       Discharge Medications   Current Discharge Medication List        START taking these medications    Details   metFORMIN (GLUCOPHAGE) 1000 MG tablet Take 1 tablet (1,000 mg) by mouth 2 times daily (with meals) for 30 days  Qty: 60 tablet, Refills: 0    Associated Diagnoses: Diabetic ketoacidosis without coma associated with type 2 diabetes mellitus (H)           CONTINUE these medications which have CHANGED    Details   insulin glargine (LANTUS PEN) 100 UNIT/ML pen Inject 10 Units Subcutaneous 2 times daily Hold if BG < 100  Qty: 30 mL, Refills: 1    Comments: If Lantus is not covered by insurance, may substitute Basaglar or Semglee or other insulin glargine product per insurance preference at same dose and frequency.    Associated Diagnoses: Type 2 diabetes mellitus without complication, with long-term current use of insulin (H)           CONTINUE these medications which have NOT CHANGED    Details   albuterol (PROAIR HFA) 108 (90 Base) MCG/ACT inhaler Inhale 1-2 puffs into the lungs 4 times daily as needed for shortness of breath / dyspnea or wheezing  Qty: 1 Inhaler, Refills: 11    Comments: Pharmacy may dispense brand covered by insurance (Proair, or proventil or ventolin or generic albuterol inhaler)  Associated Diagnoses: Mild intermittent asthma without complication      lisinopril (ZESTRIL) 10 MG tablet Take 1 tablet (10 mg) by mouth daily  Qty: 30 tablet, Refills: 0    Associated Diagnoses: Benign essential hypertension      Alcohol Swabs PADS Use to swab the area of the injection or steven as directed  Per insurance coverage  Qty: 100 each, Refills: 0    Associated Diagnoses: Uncontrolled other specified  diabetes mellitus with hyperglycemia (H)      blood glucose (NO BRAND SPECIFIED) lancets standard To use to test glucose level in the blood.  Use to test blood sugar  4  times daily as directed. To accompany glucose monitor brands per insurance coverage.  Qty: 200 each, Refills: 0    Associated Diagnoses: Uncontrolled other specified diabetes mellitus with hyperglycemia (H)      blood glucose (NO BRAND SPECIFIED) test strip To use to test glucose level in the blood. Use to test blood sugar  4 times daily as directed. To accompany glucose monitor brands per insurance coverage.  Qty: 200 strip, Refills: 0    Associated Diagnoses: Uncontrolled other specified diabetes mellitus with hyperglycemia (H)      blood glucose calibration (NO BRAND SPECIFIED) solution Used to calibrate the blood glucose monitor as needed and as directed.  To accompany  blood glucose brands per insurance coverage  Qty: 1 each, Refills: 0    Associated Diagnoses: Uncontrolled other specified diabetes mellitus with hyperglycemia (H)      blood glucose monitoring (NO BRAND SPECIFIED) meter device kit Use as directed Per insurance coverage  Qty: 1 kit, Refills: 0    Associated Diagnoses: Uncontrolled other specified diabetes mellitus with hyperglycemia (H)      glucose (BD GLUCOSE) 4 g chewable tablet Take 4 tablets by mouth every 15 minutes as needed for low blood sugar  Qty: 50 tablet, Refills: 0    Associated Diagnoses: Uncontrolled other specified diabetes mellitus with hyperglycemia (H)      insulin pen needle (32G X 4 MM) 32G X 4 MM miscellaneous Use as directed by provider Per insurance coverage  Qty: 200 each, Refills: 0    Associated Diagnoses: Uncontrolled other specified diabetes mellitus with hyperglycemia (H)      Sharps Container MISC Use as directed to dispose of needles, lancets and other sharps  Qty: 1 each, Refills: 0    Associated Diagnoses: Uncontrolled other specified diabetes mellitus with hyperglycemia (H)           Allergies    No Known Allergies

## 2023-08-20 NOTE — PLAN OF CARE
1900h-0700h: 1900h: Pt Aox4. Neuro intact. CIWA score: 2. Denies hallucinations. O2Sat 93% on RA. Expiratory wheezes, w/ SOB; relieved by prn albuterol puff. Mod CHO diet. Blood glucose controlled, no insulin coverage required. Ambulates in the room independently. Voiding adequate amount.      Plan to discharge to home.    On K protocol. For CBC & BMP in AM.

## 2023-08-21 LAB
BACTERIA BLD CULT: NO GROWTH
BACTERIA BLD CULT: NO GROWTH

## 2023-08-22 ENCOUNTER — PATIENT OUTREACH (OUTPATIENT)
Dept: CARE COORDINATION | Facility: CLINIC | Age: 33
End: 2023-08-22
Payer: MEDICAID

## 2023-08-22 NOTE — PROGRESS NOTES
[facility] Contact  [facility]/Voicemail     Clinical Data: Post-Discharge Outreach     Outreach attempted x 2.  Left message on patient's voicemail, providing [facility]'s 24/7 scheduling and nurse triage phone number [phone] ([phone]) for questions/concerns and/or to schedule an appt with an [facility] provider, if they do not have a PCP.      Plan:  [facility] will do no further outreaches at this time.       Trudy Castano  Community Health Worker  [facility], [facility]  [phone]      *Connected Care Resource Team does NOT follow patient ongoing. Referrals are identified based on internal discharge reports and the outreach is to ensure patient has an understanding of their discharge instructions.

## 2023-09-04 ENCOUNTER — HOSPITAL ENCOUNTER (EMERGENCY)
Facility: CLINIC | Age: 33
Discharge: HOME | End: 2023-09-04
Attending: EMERGENCY MEDICINE | Admitting: EMERGENCY MEDICINE
Payer: MEDICAID

## 2023-09-04 VITALS
TEMPERATURE: 96.7 F | RESPIRATION RATE: 16 BRPM | DIASTOLIC BLOOD PRESSURE: 79 MMHG | HEART RATE: 102 BPM | OXYGEN SATURATION: 95 % | SYSTOLIC BLOOD PRESSURE: 121 MMHG

## 2023-09-04 DIAGNOSIS — E86.0 DEHYDRATION: ICD-10-CM

## 2023-09-04 DIAGNOSIS — F10.951: ICD-10-CM

## 2023-09-04 DIAGNOSIS — F10.10 ALCOHOL ABUSE: ICD-10-CM

## 2023-09-04 PROBLEM — F33.40 RECURRENT MAJOR DEPRESSIVE DISORDER, IN REMISSION (H): Status: ACTIVE | Noted: 2023-09-04

## 2023-09-04 LAB
ALBUMIN SERPL BCG-MCNC: 4 G/DL (ref 3.5–5.2)
ALP SERPL-CCNC: 85 U/L (ref 40–129)
ALT SERPL W P-5'-P-CCNC: 71 U/L (ref 0–70)
ANION GAP SERPL CALCULATED.3IONS-SCNC: 20 MMOL/L (ref 7–15)
AST SERPL W P-5'-P-CCNC: 72 U/L (ref 0–45)
B-OH-BUTYR SERPL-SCNC: 2.1 MMOL/L
BASE EXCESS BLDV CALC-SCNC: 1 MMOL/L (ref -7.7–1.9)
BASOPHILS # BLD AUTO: 0.1 10E3/UL (ref 0–0.2)
BASOPHILS NFR BLD AUTO: 0 %
BILIRUB SERPL-MCNC: 4.1 MG/DL
BUN SERPL-MCNC: 11.3 MG/DL (ref 6–20)
CALCIUM SERPL-MCNC: 9.2 MG/DL (ref 8.6–10)
CHLORIDE SERPL-SCNC: 92 MMOL/L (ref 98–107)
CREAT SERPL-MCNC: 1.13 MG/DL (ref 0.67–1.17)
DEPRECATED HCO3 PLAS-SCNC: 22 MMOL/L (ref 22–29)
EOSINOPHIL # BLD AUTO: 0.1 10E3/UL (ref 0–0.7)
EOSINOPHIL NFR BLD AUTO: 0 %
ERYTHROCYTE [DISTWIDTH] IN BLOOD BY AUTOMATED COUNT: 13.9 % (ref 10–15)
ETHANOL SERPL-MCNC: <0.01 G/DL
GFR SERPL CREATININE-BSD FRML MDRD: 87 ML/MIN/1.73M2
GLUCOSE SERPL-MCNC: 168 MG/DL (ref 70–99)
HCO3 BLDV-SCNC: 25 MMOL/L (ref 21–28)
HCT VFR BLD AUTO: 44.5 % (ref 40–53)
HGB BLD-MCNC: 14.9 G/DL (ref 13.3–17.7)
IMM GRANULOCYTES # BLD: 0.1 10E3/UL
IMM GRANULOCYTES NFR BLD: 1 %
LACTATE SERPL-SCNC: 1 MMOL/L (ref 0.7–2)
LACTATE SERPL-SCNC: 2.6 MMOL/L (ref 0.7–2)
LYMPHOCYTES # BLD AUTO: 1.5 10E3/UL (ref 0.8–5.3)
LYMPHOCYTES NFR BLD AUTO: 10 %
MCH RBC QN AUTO: 32 PG (ref 26.5–33)
MCHC RBC AUTO-ENTMCNC: 33.5 G/DL (ref 31.5–36.5)
MCV RBC AUTO: 96 FL (ref 78–100)
MONOCYTES # BLD AUTO: 1.1 10E3/UL (ref 0–1.3)
MONOCYTES NFR BLD AUTO: 7 %
NEUTROPHILS # BLD AUTO: 12.8 10E3/UL (ref 1.6–8.3)
NEUTROPHILS NFR BLD AUTO: 82 %
NRBC # BLD AUTO: 0 10E3/UL
NRBC BLD AUTO-RTO: 0 /100
O2/TOTAL GAS SETTING VFR VENT: 0 %
PCO2 BLDV: 38 MM HG (ref 40–50)
PH BLDV: 7.43 [PH] (ref 7.32–7.43)
PLATELET # BLD AUTO: 181 10E3/UL (ref 150–450)
PO2 BLDV: 39 MM HG (ref 25–47)
POTASSIUM SERPL-SCNC: 3.2 MMOL/L (ref 3.4–5.3)
PROT SERPL-MCNC: 7.3 G/DL (ref 6.4–8.3)
RBC # BLD AUTO: 4.65 10E6/UL (ref 4.4–5.9)
SODIUM SERPL-SCNC: 134 MMOL/L (ref 136–145)
WBC # BLD AUTO: 15.7 10E3/UL (ref 4–11)

## 2023-09-04 PROCEDURE — 99285 EMERGENCY DEPT VISIT HI MDM: CPT | Mod: 25

## 2023-09-04 PROCEDURE — 96374 THER/PROPH/DIAG INJ IV PUSH: CPT

## 2023-09-04 PROCEDURE — 82077 ASSAY SPEC XCP UR&BREATH IA: CPT | Performed by: EMERGENCY MEDICINE

## 2023-09-04 PROCEDURE — 90791 PSYCH DIAGNOSTIC EVALUATION: CPT

## 2023-09-04 PROCEDURE — 250N000011 HC RX IP 250 OP 636: Performed by: EMERGENCY MEDICINE

## 2023-09-04 PROCEDURE — 82803 BLOOD GASES ANY COMBINATION: CPT | Performed by: EMERGENCY MEDICINE

## 2023-09-04 PROCEDURE — 258N000003 HC RX IP 258 OP 636: Performed by: EMERGENCY MEDICINE

## 2023-09-04 PROCEDURE — 82010 KETONE BODYS QUAN: CPT | Performed by: EMERGENCY MEDICINE

## 2023-09-04 PROCEDURE — 80053 COMPREHEN METABOLIC PANEL: CPT | Performed by: EMERGENCY MEDICINE

## 2023-09-04 PROCEDURE — 83605 ASSAY OF LACTIC ACID: CPT | Mod: 91 | Performed by: EMERGENCY MEDICINE

## 2023-09-04 PROCEDURE — 96375 TX/PRO/DX INJ NEW DRUG ADDON: CPT

## 2023-09-04 PROCEDURE — 96361 HYDRATE IV INFUSION ADD-ON: CPT

## 2023-09-04 PROCEDURE — 250N000013 HC RX MED GY IP 250 OP 250 PS 637: Performed by: EMERGENCY MEDICINE

## 2023-09-04 PROCEDURE — 36415 COLL VENOUS BLD VENIPUNCTURE: CPT | Performed by: EMERGENCY MEDICINE

## 2023-09-04 PROCEDURE — 85025 COMPLETE CBC W/AUTO DIFF WBC: CPT | Performed by: EMERGENCY MEDICINE

## 2023-09-04 RX ORDER — FOLIC ACID 1 MG/1
1 TABLET ORAL ONCE
Status: COMPLETED | OUTPATIENT
Start: 2023-09-04 | End: 2023-09-04

## 2023-09-04 RX ORDER — LORAZEPAM 2 MG/ML
1 INJECTION INTRAMUSCULAR ONCE
Status: COMPLETED | OUTPATIENT
Start: 2023-09-04 | End: 2023-09-04

## 2023-09-04 RX ORDER — ONDANSETRON 4 MG/1
4 TABLET, ORALLY DISINTEGRATING ORAL EVERY 8 HOURS PRN
Qty: 10 TABLET | Refills: 0 | Status: SHIPPED | OUTPATIENT
Start: 2023-09-04 | End: 2023-09-07

## 2023-09-04 RX ORDER — CHLORDIAZEPOXIDE HYDROCHLORIDE 5 MG/1
10 CAPSULE, GELATIN COATED ORAL 3 TIMES DAILY PRN
Qty: 10 CAPSULE | Refills: 0 | Status: SHIPPED | OUTPATIENT
Start: 2023-09-04 | End: 2023-09-07

## 2023-09-04 RX ORDER — ONDANSETRON 2 MG/ML
4 INJECTION INTRAMUSCULAR; INTRAVENOUS EVERY 30 MIN PRN
Status: DISCONTINUED | OUTPATIENT
Start: 2023-09-04 | End: 2023-09-04 | Stop reason: HOSPADM

## 2023-09-04 RX ORDER — MAGNESIUM OXIDE 400 MG/1
800 TABLET ORAL ONCE
Status: COMPLETED | OUTPATIENT
Start: 2023-09-04 | End: 2023-09-04

## 2023-09-04 RX ORDER — MULTIVITAMIN,THERAPEUTIC
1 TABLET ORAL ONCE
Status: COMPLETED | OUTPATIENT
Start: 2023-09-04 | End: 2023-09-04

## 2023-09-04 RX ORDER — DIAZEPAM 5 MG
5 TABLET ORAL ONCE
Status: COMPLETED | OUTPATIENT
Start: 2023-09-04 | End: 2023-09-04

## 2023-09-04 RX ADMIN — Medication 800 MG: at 10:18

## 2023-09-04 RX ADMIN — ONDANSETRON 4 MG: 2 INJECTION INTRAMUSCULAR; INTRAVENOUS at 11:03

## 2023-09-04 RX ADMIN — THIAMINE HCL TAB 100 MG 100 MG: 100 TAB at 10:18

## 2023-09-04 RX ADMIN — LORAZEPAM 1 MG: 2 INJECTION INTRAMUSCULAR; INTRAVENOUS at 11:44

## 2023-09-04 RX ADMIN — FOLIC ACID 1 MG: 1 TABLET ORAL at 10:18

## 2023-09-04 RX ADMIN — SODIUM CHLORIDE 1000 ML: 9 INJECTION, SOLUTION INTRAVENOUS at 09:28

## 2023-09-04 RX ADMIN — DIAZEPAM 5 MG: 5 TABLET ORAL at 11:44

## 2023-09-04 RX ADMIN — SODIUM CHLORIDE 1000 ML: 9 INJECTION, SOLUTION INTRAVENOUS at 10:19

## 2023-09-04 RX ADMIN — THERA TABS 1 TABLET: TAB at 10:19

## 2023-09-04 ASSESSMENT — ACTIVITIES OF DAILY LIVING (ADL)
ADLS_ACUITY_SCORE: 35

## 2023-09-04 NOTE — ED TRIAGE NOTES
Pt brought in by EMS, pt called 911 because he thought he heard his relatives talking about wanting to kill him. Pt is binge drinker and last drink was 4 days ago. Pt has been hallucinating, cold sweats, tachycardia, not sleeping     Triage Assessment       Row Name 09/04/23 0859       Triage Assessment (Adult)    Airway WDL WDL       Respiratory WDL    Respiratory WDL WDL       Skin Circulation/Temperature WDL    Skin Circulation/Temperature WDL WDL       Cardiac WDL    Cardiac WDL WDL       Peripheral/Neurovascular WDL    Peripheral Neurovascular WDL WDL       Cognitive/Neuro/Behavioral WDL    Cognitive/Neuro/Behavioral WDL WDL

## 2023-09-04 NOTE — DISCHARGE INSTRUCTIONS
Please do not take any alcohol medication that was given, and only take medication if you have signs of early alcohol withdrawal tremors or the shakes.  Come back with any other concerns or have any new symptoms and to follow with the appointments are made for you today.      Mental health recommendations    Please follow-up with your outpatient team about your visit today.    2.  One of our care coordinators will reach out to you after your discharge.  If you have any questions about additional resources please call our coordinators at # [phone]    3.  Please use aftercare plan and already established coping skills and safety planning as needed.     4.  Please call 911 and/or return to the emergency department if her symptoms worsen or your safety becomes compromised.     Aftercare Plan    Appointment information and/or additional resources available to me:     Date: Wednesday, 9/6/2023  Time: 8:00 am - 10:00 am  Provider: Madeline Paredes MD, MD  Location: [facility], [address] [place] [address], [address], [place] [address]  Phone: [phone]  Type: Medication Mgmt - Initial (In-Person)    Date: Thursday, 9/7/2023  Time: 11:00 am - 12:00 pm  Provider: Nupur Jones  Location: [facility], [address] [place] [address]  Phone: [phone]  Type: Therapy - Initial (In-Person)      If I am feeling unsafe or I am in a crisis, I will:   Contact my established care providers   Call the National Suicide Prevention Lifeline: 337.297.8114   Go to the nearest emergency room   Call 911   Your [facility] has a mental health crisis team you can call 24/7: [facility], [phone]    Warning signs that I or other people might notice when a crisis is developing for me: crying, increased substance use, feeling bad about self    Things I am able to do on my own to cope or help me feel better:   -Practice square breathing when I begin to feel anxious - in breath through the nose for the  count   of 4 and the first line on the square. Out breath through the mouth for the count of 4 for the second line   of the square. Repeat to complete the square. Repeat the square as many times as needed.    Things that I am able to do with others to cope or help me feel better: -Use community resources, including hotline numbers, Critical access hospital crisis and support meetings    Things I can use or do for distraction: -Distraction skills of: going for walks, watching TV, spending time outside, calling a friend or family member  -Download a meditation fernando and spend 15-20 minutes per day mediating/relaxing. Some apps to   download include: Calm, Headspace and Insight Timer. All 3 of these apps have free version    Changes I can make to support my mental health and wellness:   -Attend scheduled mental health therapy and psychiatric appointments and follow all recommendations  -Maintain a daily schedule/routine  -Practice deep breathing skills  -Abstain from all mood altering chemicals not currently prescribed to me     People in my life that I can ask for help: [employer] Mental [employer]ness (JEREMIAH)  829.640.6922 or 1.888.JEREMIAH.HELPS    Other things that are important when I m in crisis: -Commit to 30 minutes of self care daily - this can be as simple as taking a shower, going for a walk, cooking a meal, read, writing, etc    The following DBT skills can assist me when: I want to act on your emotions and acting on them will only make things worse, I am overwhelmed by my emotions, I want to try to be skillful and not act on self destructive behavior.     Reduce Extreme Emotion  QUICKLY:  Changing Your Body Chemistry       T:  Change your body Temperature to change your autonomic nervous system    Use Ice pack to calm yourself down FAST. Place ice pack underneath your eyes for a count of 30 seconds to initiate the divers reflex which will naturally calm down your heart rate and breathing.      I:  Intensely exercise to calm  down a body revved up by emotion    Examples: running, walking fast, jumping, playing basketball, weight lifting, swimming, calisthenics, etc.      Engage in exercises that DO NOT include violent behaviors. Exercises that utilize violent behaviors tend to function as  behavioral rehearsal,  and rather than calming the person down, may actually  rev  the person up more, increasing the likelihood of violence, and lessening the likelihood that they will  burn off  energy     P:  Progressively relax your muscles    Starting with your hands, moving to your forearms, upper arms, shoulders, neck, forehead, eyes, cheeks and lips, tongue and teeth, chest, upper back, stomach, buttocks, thighs, calves, ankles, feet      Tense (10 seconds,   of the way), then relax each muscle (all the way)    Notice the tension    Notice the difference when relaxed (by tensing first, and then relaxing, you are able to get a more thorough relaxation than by simply relaxing)      P: Paced breathing to relax    The standard technique is to begin with counting the number of steps one takes for a typical inhale, then counting the steps one takes for a typical exhale, and then lengthening the amount of steps for the exhalation by one or two steps.  OR repeat this pattern for 1-2 minutes:   Inhale for four (4) seconds    Exhale for six (6) to eight (8) seconds        After using Distress Tolerance TIPP, TRY TO STOP!     S- Stop    Do not just react on your emotion urge. Stop! Freeze! Do not move a muscle! Your emotions may try to make you act without thinking. Stay in control! Take a step back Take a step back from the situation.    T- Take a break    Let go. Take a deep breath. Do not let your feelings make you act impulsively.    O- Observe    Notice what is going on inside and outside you. What is the situation? What are your thoughts and feelings? What are others saying or doing? Does my emotion make sense, is it justified? What is it that my  emotions want me to do? Would that be effective?    P- Proceed mindfully    Act with awareness. In deciding what to do, consider your thoughts and feelings, the situation, and other people s thoughts and feelings. Think about your goals. Ask Wise Mind: Which actions will make it better or worse?        If my emotion action urge would not be effective or helpful, practice acting OPPOSITE to the EMOTION ACTION URGE can help reduce the intensity or even change the emotion.   Consider these examples: with FEAR we have the urge to run away/avoid. OPPOSITE would be to approach it with caution. ANGER we have the urge to attack. OPPOSITE would be to gently avoid or to demonstrate kindness towards it. SADNESS we have the urge to withdraw/isolate. OPPOSITE would be to get self to move and be active physically or socially.      These additional skills may help with self-soothing and distracting you:      Activities   Focus attention on a task you need to get done. Rent movies; watch TV. Clean a room in your house. Find an event to go to. Play computer games. Go walking. Exercise. Surf the Internet. Write e-mails. Play sports. Go out for a meal or eat a favorite food. Call or go out with a friend. Listen to your iPod; download music. Build something. Spend time with your children. Play cards. Read magazines, books, comics. Do crossword puzzles or Sudoku.     Emotions   Read emotional books or stories, old letters. Watch emotional TV shows; go to emotional movies. Listen to emotional music. (Be sure the event creates different emotions.) Ideas: Scary movies, joke books, comedies, funny records, Sikhism music, soothing music or music that fires you up, going to a store and reading funny greeting cards.     Thoughts   Count to 10; count colors in a painting or poster or out the window; count anything. Repeat words to a song in your mind. Work puzzles. Watch TV or read.     Sensations   Squeeze a rubber ball very hard. Listen to  "very loud music. Hold ice in your hand or mouth. Go out in the rain or snow. Take a hot or cold shower.   Remember that you can use your 5 senses as helpful self-soothing tools!       I can help my own emotions by practicing the following to keep my emotional mind healthy and bring positive emotions:     The ABC PLEASE skill is about taking good care of ourselves so that we can take care of others. Also, an important component of DBT is to reduce our vulnerability. When we take good care of ourselves, we are less likely to be vulnerable to disease and emotional crisis.     ABC   A- Accumulate positive emotions by doing things that are pleasant.   B- Build mastery by doing things we enjoy. Whether it is reading, cooking, cleaning, fixing a car, working a cross word puzzle, or playing a musical instrument. Practice these things to  and in time we feel competent.   C- [place] Ahead by rehearsing a plan ahead of time so that we can be prepared to cope skillfully. (Think of what makes situations difficult, and what helps in those situations)      PLEASE   Treat Physical Illness and take medications as prescribed.   Balance eating in order to avoid mood swings.   Avoid mood-Altering substances and have mood control.   Maintain good sleep so you can enjoy your life.   Get exercise to maintain high spirits.         Crisis Lines  Crisis Text Line  Text 848498  You will be connected with a trained live crisis counselor to provide support.    Por espanol, texto  CALI a 867232 o texto a 442-AYUDAME en WhatsApp    National Hope Line  1.800.SUICIDE [8799197]      Community Resources  Fast Tracker  Linking people to mental health and substance use disorder resources  fasttrackermn.org     [place] Mental Health Warm Line  Peer to peer support  Monday thru Saturday, 12 pm to 10 pm  876.326.5919 or 3.244.967.7384  Text \"Support\" to 98294    National [place] on Mental Illness ([place])  173.187.0732 or " 1.888.JEREMIAH.HELPS        Mental Health Apps  My3  https://Oxsensispp.org/    VirtualHopeBox  https://ContestMachine/apps/virtual-hope-box/      Additional Information  Today you were seen by a licensed mental health professional through Triage and Transition services, Behavioral Healthcare Providers ([facility])  for a crisis assessment in the Emergency Department at [facility].  It is recommended that you follow up with your established providers (psychiatrist, mental health therapist, and/or primary care doctor - as relevant) as soon as possible. Coordinators from [facility] will be calling you in the next 24-48 hours to ensure that you have the resources you need.  You can also contact [facility] coordinators directly at [phone]. You may have been scheduled for or offered an appointment with a mental health provider. [facility] maintains an extensive network of licensed behavioral health providers to connect patients with the services they need.  We do not charge providers a fee to participate in our referral network.  We match patients with providers based on a patient's specific needs, insurance coverage, and location.  Our first effort will be to refer you to a provider within your care system, and will utilize providers outside your care system as needed.       Substance Use Disorder Direct Access Resources    It is recommended that you abstain from all mood altering chemicals. Please contact the sober support hotline (195-342-5070) as needed; phones are answered 24 hours a day, 7 days a week.    To access substance use treatment you must have a comprehensive assessment completed to begin any treatment program.     If uninsured, please contact your county of residence for eligibility screen to substance use disorder evaluation and treatment:    [place] - [phone]   [place] - [phone]   [place] - [phone]   [place] - [phone]   [place] - [phone]   [place] - [phone]   [place] - [phone]   [place] -  [phone]     If you have private insurance, call the customer service number on the back of your insurance card to find an in-network substance abuse use disorder assessment. The ideal provider will be a treatment facility, licensed in the state [facility].     Community JHON Evaluations: Clients may call their county for a full list of providers - Availability and services listed belo are subject to change, please call the provider to confirm    [facility]  [phone]  [facility] [place], [place], [address]  *Please call the above number to schedule a comprehensive assessment for determination of level of care needs. In person and virtual appointments available Mon-Fri.    [facility], [address] [place] [address], First Floor, [address], [place] [address] (next to the outpatient lab)    Phone: [phone]   Provides bridging services to people with Opiate Use Disorders (OUD) seeking care. This is a front door to Medication Assisted Treatments (MAT), ages 16+  Walk In hours: Monday-Friday 9:00am-3:00pm    [facility]  [phone]  Walk in Assessments: Mon-Friday 7a-1:45p  [address], [place], [address]    [facility]  Central Access [phone]  [address] [place] [address], [address]  *by appointment only    Carlos Alberto  [phone] (phone consultation available )  Locations in: [place], [facility], and [place], [place]  [place] virtual IOP [phone] or visit [email or web address]   Also offers LGBTQ programming     [facility]  [phone]  [address]  [place], [place], [address]  *Currently only offered via telehealth - call to set up an appointment    [facility]  [address] [place] [address], [address]  [facility]  For more information to to make a referral call:  [phone]  Walk-in on -  [facility]  [phone]  [address]  [place], [place], [address]  *available by appointments only    [facility]  [phone]  [address] [place], [place], [address]  *available by appointment only    [facility]  [phone]  [address] [place], [place], [address]  *walk in assessments available M-F starting at 7 am.    [facility]  [phone]  Locations: [facility], [facility], and [place]  *Walk in assessments availble M-F starting at 8 am -virtual only    [facility]  [phone]  [address] [place], [place] [address]    [facility]  Virtual + Locations: [place], [place], [place], [place], [facility], [facility], [place], [place]   [phone]  *available by appointment only    [facility]  [phone]  [address]  [place], [place], [address]    [facility]  [phone]  [address], [place], [address]  *available by appointment    [facility]  [phone]  [address] [place], [place], [address]  *walk ins available M-TH from 9-3    [facility]  MAT program: [phone]  [address] [place], [place], [address]    [place]  [phone]  Same day substance use disorder assessments are available Monday - Friday, via walk-in or by appointment at the [place] location.  [address], [address], [place] [address]     [facility] - adolescent and adult SUDs services  [phone]  Offer services Monday through Friday, as well as evening hours Monday through Thursday. Normally, a first appointment will be scheduled within one week  [email or web address]  Locations all over [place]    If you are intoxicated, you may be required to detox at a detox facility before starting treatment. The following are detox facilities that you can self present to. All  detox facilities are able to help you complete an assessment prior to discharge if you choose:    [facility]: Arrive at a [facility] for immediate medical evaluation    [facility]: [address] [place], [place], [address].         [phone]    [facility]: [address] [place], [place], [address]  [phone]    [facility] ([facility]): [address] [place], [place], [address]  [phone]     [facility]: [address] [place], [place], [address], [phone]         Ways to help cope with sobriety:    -- Take prescribed medicines as scheduled  -- Keep follow-up appointments  -- Talk to others about your concerns  -- Get regular exercise  -- Practice deep breathing skills  -- Eat a healthy diet  -- Use community resources, including hotline numbers, [facility] crisis and support meetings  -- Stay sober and avoid places/people/things associated with substance use  --Maintain a daily schedule/routine  --Get at least 7-8 hours of sleep per night  --Create a list 10--20 healthy activities that you can do that are enjoyable and do not involve substance use  --Create daily goals (approx. 1-4 goals) per day and work to achieve them throughout the day.       Free Resources:    [place] [facility] ([facility])  [facility] connects people seeking recovery to resources that help foster and sustain long-term recovery. Whether you are seeking resources for treatment, transportation, housing, job training, education, health care or other pathways to recovery, [facility] is a great place to start.  Phone: 659.368.4097. www.[facility].org (Great listing of all types of recovery and non-recovery related resources)    Alcoholics Anonymous  Phone: 8-449-ALCOHOL  Website: HTTP://WWW.AA.ORG/  [facility] [place] (134-608-9193 or http://aaminneaStorehouse.org)  [facility] [place] (951-460-4003 or www.aastpaul.org)     Narcotics Anonymous  Phone: 867.421.9694  Website:  www.Angie's List.Snagsta.    [facility] [address], [place], [place],  Phone: [phone]  Drop-in Hours: Monday-Friday 9-11:30 am. By appointment at other times.  Provides: [facility] is a drop-in center on the east side [facility] that provides a safe space for individuals who are homeless and have a history of chemical use. Sobriety is not a requirement but drugs and alcohol are not allowed on the property.  Services: Non-clients can access drop-in services such as Recovery and Harm Reduction Groups, referrals to case management, community activities, shower facilities, and a pool table. Individuals who are homeless and have chemical health needs may be eligible for enrollment into [facility]'s case management program. Clients and  work together to access benefits, treatment, health care, shelter, and external housing resources.

## 2023-09-04 NOTE — CONSULTS
Diagnostic Evaluation Consultation  Crisis Assessment    Patient Name: Ravi Ahmadi  Age:  33 year old  Legal Sex: male  Gender Identity: male  Pronouns: he/him   Race: White  Ethnicity: Not  or   Language: English      Patient was assessed: In person      Patient location: [facility] EMERGENCY DEPT                                 Referral Data and Chief Complaint  Ravi Ahmadi presents to the ED via EMS. Patient is presenting to the ED for the following concerns: Anxiety, Substance use, Suicidal ideation.   Factors that make the mental health crisis life threatening or complex are:  Pt presented to the ED due to paranoid ideation. Pt recently has been struggling with his alcohol use and today was going through withdrawal. Pt was staying at his aunts house and Pt left the house and this was concerning to Pts family. Pt endorsed that he was having increased hallucinations and thought that his aunt was trying to kill him and he went to the police, Pt was then brought to the ED for further evaluation..      Informed Consent and Assessment Methods  Explained the crisis assessment process, including applicable information disclosures and limits to confidentiality, assessed understanding of the process, and obtained consent to proceed with the assessment.  Assessment methods included conducting a formal interview with patient, review of medical records, collaboration with medical staff, and obtaining relevant collateral information from family and community providers when available.  : done     Patient response to interventions: acceptance expressed, verbalizes understanding  Coping skills were attempted to reduce the crisis:  Pt has been receptive to ED interventions     History of the Crisis   Pt presented with a depressed and anxious affect. Pts mood was congruent with this presentation. Pt was oriented x4. Pt was engaged and cooperative during assessment. Pt endorsed that he has  been trying to manage his alcohol use and his anxiety. Pt stated that today was the second time he has experiencing AH/VH. Pt endorsed that he has been seeing shadow figures and also hearing voices that tell him negative things about himself. Pt stated that he also hears voices of peoples conversations that are far away. Pt also endorsed feeling that his aunt was going to harm him today and then calling 911 for help, Pt endorsed that he now feels this may have been a hallucination. Pt denied any command AH. Pt did not endorse any SI, Pt endorsed that he has struggled with SI and also attempted to over dose on muscle relaxers and alcohol this past year, Pt stated that he intervened, made himself vomit and also sought medical attention. Pt did not endorse any other attempts. Pt denied any HI or SIB. Pt endorsed that he currently does not have any outpatient providers but is willing to follow through with outpatient recommendations.    Brief Psychosocial History  Family:  Single, Children no  Support System:  Parent(s)  Employment Status:  unemployed  Source of Income:  salary/wages  Financial Environmental Concerns:  unemployed  Current Hobbies:  reading, group/social activities, outdoor activities, music, interaction with pets, television/movies/videos  Barriers in Personal Life:  mental health concerns    Significant Clinical History  Current Anxiety Symptoms:  panic attack, anxious, racing thoughts, excessive worry  Current Depression/Trauma:  withdrawl/isolation, negativistic, hoplessness, helplessness, low self esteem  Current Somatic Symptoms:  racing thoughts, excessive worry, shortness of breath or racing heart, anxious  Current Psychosis/Thought Disturbance:     Current Eating Symptoms:  loss of appetite  Chemical Use History:  Alcohol: Daily, Blackouts  Last Use:: 09/03/23  Benzodiazepines: None  Opiates: None  Cocaine: None  Marijuana: Occasional  Other Use: None  Withdrawal Symptoms: Tremors,  Hallucinations   Past diagnosis:  Depression, Anxiety Disorder, Substance Use Disorder  Family history:  Substance Use Disorder  Past treatment:  Individual therapy, Psychiatric Medication Management  Details of most recent treatment:  Pt currently does not have any outpatient MH providers.  Other relevant history:          Collateral Information  Is there collateral information: Yes     Collateral information name, relationship, phone number:  Althea Alan (Aunt)  # [phone]    What happened today: Althea stated that Pt has been struggling with his mental health and alcohol use. Pt today was sick with withdrawals and Althea was helping Pt. Althea then fell asleep and noticed that Pt was gone, Althea and her  were looking for Pt and then police showed up at her door, questioning if Althea had any weapons due to Pt reporting that Althea was going to harm Pt. Althea stated that Pt started to binge drink again. Pt has endorsed SI telling her that he doesn t care if he lives or dies, and that he wants to drink himself to death. Pt has not endorsed any HI or SIB. Pt has endorsed having hallucinations but doesn t tell Althea the nature of them.     What is different about patient's functioning: Pt has been struggling, going through withdrawal and binge drinking.     Concern about alcohol/drug use:  yes alcohol use     What do you think the patient needs:  inpatient substance use tx.     Has patient made comments about wanting to kill themselves/others: yes    If d/c is recommended, can they take part in safety/aftercare planning:  yes    Additional collateral information:  n/a     Risk Assessment  Phelps Suicide Severity Rating Scale Full Clinical Version:  Suicidal Ideation  Q1 Wish to be Dead (Lifetime): Yes  Q2 Non-Specific Active Suicidal Thoughts (Lifetime): No  3. Active Suicidal Ideation with any Methods (Not Plan) Without Intent to Act (Lifetime): Yes  Q4 Active Suicidal Ideation with Some Intent to Act, Without  Specific Plan (Lifetime): Yes  Q5 Active Suicidal Ideation with Specific Plan and Intent (Lifetime): Yes  Q6 Suicide Behavior (Lifetime): yes     Suicidal Behavior (Lifetime)  Actual Attempt (Lifetime): Yes  Total Number of Actual Attempts (Lifetime): 1  Actual Attempt Description (Lifetime): overdose and alcohol intoxication  Interrupted Attempts (Lifetime): No  Aborted or Self-Interrupted Attempt (Lifetime): Yes  Total Number of Aborted or Self-Interrupted Attempts (Lifetime): 1  Aborted or Self-Interrupted Attempt Description (Lifetime): overdose and alcohol intoxication  Preparatory Acts or Behavior (Lifetime): No    Citrus Suicide Severity Rating Scale Recent:              Environmental or Psychosocial Events: challenging interpersonal relationships, helplessness/hopelessness, unemployment/underemployment, ongoing abuse of substances, unstable housing, homelessness  Protective Factors: Protective Factors: strong bond to family unit, community support, or employment, help seeking, sense of importance of health and wellness    Does the patient have thoughts of harming others? Feels Like Hurting Others: no  Previous Attempt to Hurt Others: no  Is the patient engaging in sexually inappropriate behavior?: no    Is the patient engaging in sexually inappropriate behavior?  no        Mental Status Exam   Affect: Appropriate  Appearance: Disheveled  Attention Span/Concentration: Attentive  Eye Contact: Variable    Fund of Knowledge: Appropriate   Language /Speech Content: Fluent  Language /Speech Volume: Normal  Language /Speech Rate/Productions: Normal  Recent Memory: Intact  Remote Memory: Intact  Mood: Anxious, Depressed  Orientation to Person: Yes   Orientation to Place: Yes  Orientation to Time of Day: Yes  Orientation to Date: Yes     Situation (Do they understand why they are here?): Yes  Psychomotor Behavior: Normal  Thought Content: Clear  Thought Form: Intact     Mini-Cog Assessment  Number of Words  Recalled:    Clock-Drawing Test:     Three Item Recall:    Mini-Cog Total Score:       Medication  Psychotropic medications:   Medication Orders - Psychiatric (From admission, onward)      Start     Dose/Rate Route Frequency Ordered Stop    09/04/23 0000  chlordiazePOXIDE (LIBRIUM) 5 MG capsule         10 mg Oral 3 TIMES DAILY PRN 09/04/23 1431 09/07/23 1854             Current Care Team  Patient Care Team:  Juancarlos Sood MD as PCP - General (Internal Medicine)  Mayte Hill MD as Assigned PCP  Mayte Vickers as Diabetes Educator (Dietitian, Registered)    Diagnosis  Patient Active Problem List   Diagnosis Code    Atopic dermatitis L20.9    Intermittent asthma J45.20    Tobacco abuse Z72.0    Morbid obesity (H) E66.01    Benign essential hypertension I10    Hypokalemia E87.6    Hyperglycemia R73.9    Alcohol withdrawal syndrome without complication (H) F10.930    Alcohol use disorder F10.90    Nausea and vomiting, unspecified vomiting type R11.2    Alcoholic intoxication with complication (H) F10.929    Alcoholic hepatitis without ascites K70.10    Alcohol-induced acute pancreatitis K85.20    Lactic acidosis E87.20    JAY (acute kidney injury) (H) N17.9    Diabetic ketoacidosis without coma associated with type 2 diabetes mellitus (H) E11.10    Recurrent major depressive disorder, in remission (H) F33.40    Alcohol abuse F10.10       Primary Problem This Admission  Active Hospital Problems    Recurrent major depressive disorder, in remission (H)      Alcohol abuse        Clinical Summary and Substantiation of Recommendations   Pt is a 34 y/o male with a hx of alcohol use, depression and anxiety. Pt presented to the ED due to increased AH/VH and paranoia. Pt did not endorse any SI/SIB/HI. Pt was able to safety plan with writer and is agreeable to outpatient resources and community supports.  At this time IP  admission does not appear to be the least restrictive alternative nor currently  therapeutically beneficial.       Patient coping skills attempted to reduce the crisis:  Pt has been receptive to ED interventions    Disposition  Recommended disposition: Individual Therapy, Medication Management, Substance Abuse Disorder Treatment        Reviewed case and recommendations with attending provider. Attending Name: MD Diop       Attending concurs with disposition: yes       Patient and/or validated legal guardian concurs with disposition:   yes       Final disposition:  discharge    Legal status on admission: Voluntary/Patient has signed consent for treatment    Assessment Details   Total duration spent on the patient case in minutes: 30 min     CPT code(s) utilized: 90898 - Psychotherapy for Crisis - 60 (30-74*) min    Marbella Moore [facility], Psychotherapist  [facility] - Triage & Transition Services  Callback: [phone]

## 2023-09-04 NOTE — ED PROVIDER NOTES
History     Chief Complaint:  Withdrawal    The history is provided by the patient.      Ravi Ahmadi is a 33 year old male with history of type II diabetes mellitus and alcohol use disorder with withdrawal who presents to the ED for evaluation of alcohol withdrawal and hallucinations. Ravi reports he has been living with his aunt and uncle for the last few weeks and drinking intermittently. His last drink was 4 days ago, and he was drinking for 2 days prior. He began experiencing some withdrawal symptoms, including cold sweats, tachycardia, and loss of sleep, and auditory hallucinations. This morning, he hallucinated hearing his relatives discussing how they would execute him. He pretended to sleep during the hallucination and immediately left the house after the auditory hallucination was finished. He called 911 at that time. He also mentions seeing shadowy figures outside while smoking the other day. He denies chest pain, abdominal pain, nausea, vomiting, or confusion. No flu-like symptoms.    Independent Historian:   None - Patient Only    Review of External Notes:   Yes, I reviewed patient's admission here at this hospital on 16 August where he was seen for DKA and sepsis.  Patient relates that she is having stuttering chest pain for the last  Medications:    Insulin  Lisinopril    Past Medical History:    Atopic dermatitis  Intermittent asthma  Tobacco abuse  Alcohol use disorder  Alcohol withdrawal syndrome  Alcohol-induced acute pancreatitis  Lactic acidosis  JAY  Diabetic ketoacidosis without coma  Hypertension  Diabetes mellitus, type II    Past Surgical History:    Foreign body removal    Physical Exam   Patient Vitals for the past 24 hrs:   BP Temp Temp src Pulse Resp SpO2   09/04/23 1437 -- -- -- -- 16 --   09/04/23 1330 121/79 -- -- 102 (!) 6 95 %   09/04/23 1315 (!) 125/90 -- -- 94 24 95 %   09/04/23 1300 124/76 -- -- 85 13 93 %   09/04/23 1245 116/70 -- -- 86 13 92 %   09/04/23 1230 111/68  -- -- 90 18 93 %   09/04/23 1200 107/59 -- -- 90 21 92 %   09/04/23 1130 115/72 -- -- 89 17 93 %   09/04/23 1100 109/74 -- -- 93 15 94 %   09/04/23 1030 106/61 -- -- 98 (!) 9 94 %   09/04/23 1000 115/67 -- -- 116 26 96 %   09/04/23 0945 110/74 -- -- 95 15 96 %   09/04/23 0930 (!) 79/33 -- -- 105 26 95 %   09/04/23 0915 115/67 -- -- 112 -- 97 %   09/04/23 0900 (!) 71/56 -- -- 116 -- --   09/04/23 0858 (!) 71/56 (!) 96.7  F (35.9  C) Temporal 116 -- --     Physical Exam  Vitals: reviewed by me  General: Pt seen on [facility], cooperative, and alert to conversation  Eyes: Tracking well, clear conjunctiva BL  ENT: MMM, midline trachea.  Full range of motion to neck, no meningismus  Lungs: No tachypnea, no accessory muscle use. No respiratory distress.   CV: Rate as above  Abd: Soft, non tender, no guarding, no rebound. Non distended  MSK: no joint effusion.  No evidence of trauma  Skin: No rash  Neuro: Clear speech and no facial droop.  Moving spontaneously, following commands, answering all questions appropriately.  Does have mild tremors on extension.   Psych: Not RIS, no e/o AH/VH          Emergency Department Course     Laboratory:  Labs Ordered and Resulted from Time of ED Arrival to Time of ED Departure   COMPREHENSIVE METABOLIC PANEL - Abnormal       Result Value    Sodium 134 (*)     Potassium 3.2 (*)     Chloride 92 (*)     Carbon Dioxide (CO2) 22      Anion Gap 20 (*)     Urea Nitrogen 11.3      Creatinine 1.13      Calcium 9.2      Glucose 168 (*)     Alkaline Phosphatase 85      AST 72 (*)     ALT 71 (*)     Protein Total 7.3      Albumin 4.0      Bilirubin Total 4.1 (*)     GFR Estimate 87     LACTIC ACID WHOLE BLOOD - Abnormal    Lactic Acid 2.6 (*)    KETONE BETA-HYDROXYBUTYRATE QUANTITATIVE, RAPID - Abnormal    Ketone (Beta-Hydroxybutyrate) Quantitative 2.10 (*)    CBC WITH PLATELETS AND DIFFERENTIAL - Abnormal    WBC Count 15.7 (*)     RBC Count 4.65      Hemoglobin 14.9      Hematocrit  44.5      MCV 96      MCH 32.0      MCHC 33.5      RDW 13.9      Platelet Count 181      % Neutrophils 82      % Lymphocytes 10      % Monocytes 7      % Eosinophils 0      % Basophils 0      % Immature Granulocytes 1      NRBCs per 100 WBC 0      Absolute Neutrophils 12.8 (*)     Absolute Lymphocytes 1.5      Absolute Monocytes 1.1      Absolute Eosinophils 0.1      Absolute Basophils 0.1      Absolute Immature Granulocytes 0.1      Absolute NRBCs 0.0     BLOOD GAS VENOUS - Abnormal    pH Venous 7.43      pCO2 Venous 38 (*)     pO2 Venous 39      Bicarbonate Venous 25      Base Excess/Deficit 1.0      FIO2 0     ETHYL ALCOHOL LEVEL - Normal    Alcohol ethyl <0.01     LACTIC ACID WHOLE BLOOD - Normal    Lactic Acid 1.0       Procedures   None    Emergency Department Course & Assessments:    Interventions:  Medications   ondansetron (ZOFRAN) injection 4 mg (4 mg Intravenous $Given 9/4/23 1103)   0.9% sodium chloride BOLUS (0 mLs Intravenous Stopped 9/4/23 1007)   0.9% sodium chloride BOLUS (0 mLs Intravenous Stopped 9/4/23 1102)   multivitamin, therapeutic (THERA-VIT) tablet 1 tablet (1 tablet Oral $Given 9/4/23 1019)   folic acid (FOLVITE) tablet 1 mg (1 mg Oral $Given 9/4/23 1018)   thiamine (B-1) tablet 100 mg (100 mg Oral $Given 9/4/23 1018)   magnesium oxide (MAG-OX) tablet 800 mg (800 mg Oral $Given 9/4/23 1018)   diazepam (VALIUM) tablet 5 mg (5 mg Oral $Given 9/4/23 1144)   LORazepam (ATIVAN) injection 1 mg (1 mg Intravenous $Given 9/4/23 1144)     Assessments:  0911 I obtained history and examined the patient as noted above.   1101 I rechecked the patient and explained findings.  1430 I rechecked the patient and explained findings. We discussed plans for discharge and the patient is comfortable with this plan.    Independent Interpretation (X-rays, CTs, rhythm strip):  None    Consultations/Discussion of Management or Tests:  1340 I spoke with [facility] regarding their assessment of the patient and conversation  with the patient's aunt.    Social Determinants of Health affecting care:   Stress/Adjustment Disorders    Disposition:  The patient was discharged to home.     Impression & Plan        Medical Decision Making:  This is a very pleasant 33-year-old male presents emergency room with what appears to been either a vivid dream, or some form of alcoholic hallucinosis earlier today.  He is quite clear that he knows this was a hallucination, and there is no evidence of ongoing hallucinations here in the ER.  His medical work-up is negative, apart from significant dehydration and minimal alcohol withdrawal, and he is doing well here after treatment.  He did speak with our mental health assessment team here, who also agreed that patient is unlikely to be having a new psychotic break, and that the hallucinations are likely related to alcohol use.  He does not seem to be in delirium tremens as he is completely alert and oriented, and his tremors and symptoms have been managed here especially with 1 small dose of IV Ativan and then an oral tablet.  He has been watched here for over 5 hours and I do think that he is stable to go home, he is quite clear that he will follow with his recommended mental health appointments, and studies were made for him.  He does not want to stay in the hospital or go to treatment at this time, though he does tell me that he will stop drinking and would like to try the Librium at home.  Red flags when to come back to the ER were discussed in detail, patient is okay with this plan.    Critical Care time: was 0 minutes for this patient excluding procedures.    Diagnosis:    ICD-10-CM    1. 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 Dehydration  E86.0            Discharge Medications:  New Prescriptions    CHLORDIAZEPOXIDE (LIBRIUM) 5 MG CAPSULE    Take 2 capsules (10 mg) by mouth 3 times daily as needed for anxiety    ONDANSETRON (ZOFRAN ODT) 4 MG ODT TAB    Take 1 tablet  (4 mg) by mouth every 8 hours as needed for nausea       Scribe Disclosure:  I, Ana Luisa Bach, am serving as a scribe at 10:10 AM on 9/4/2023 to document services personally performed by Ricardo Diop MD based on my observations and the provider's statements to me.   9/4/2023   Ricardo Diop MD Fitzgerald, Michael Maxwell Kreofsky, MD  09/04/23 5844

## 2023-09-05 ENCOUNTER — HOSPITAL ENCOUNTER (EMERGENCY)
Facility: CLINIC | Age: 33
Discharge: HOME | End: 2023-09-05
Attending: EMERGENCY MEDICINE | Admitting: EMERGENCY MEDICINE
Payer: MEDICAID

## 2023-09-05 ENCOUNTER — E-VISIT (OUTPATIENT)
Dept: URGENT CARE | Facility: CLINIC | Age: 33
End: 2023-09-05

## 2023-09-05 VITALS
OXYGEN SATURATION: 95 % | WEIGHT: 250 LBS | HEIGHT: 72 IN | SYSTOLIC BLOOD PRESSURE: 138 MMHG | DIASTOLIC BLOOD PRESSURE: 82 MMHG | BODY MASS INDEX: 33.86 KG/M2 | RESPIRATION RATE: 18 BRPM | TEMPERATURE: 98.8 F | HEART RATE: 89 BPM

## 2023-09-05 DIAGNOSIS — F10.951: ICD-10-CM

## 2023-09-05 DIAGNOSIS — E87.6 HYPOKALEMIA: ICD-10-CM

## 2023-09-05 DIAGNOSIS — B30.9 VIRAL CONJUNCTIVITIS: Primary | ICD-10-CM

## 2023-09-05 DIAGNOSIS — E83.42 HYPOMAGNESEMIA: ICD-10-CM

## 2023-09-05 LAB
ALBUMIN SERPL BCG-MCNC: 4.3 G/DL (ref 3.5–5.2)
ALBUMIN UR-MCNC: 10 MG/DL
ALP SERPL-CCNC: 84 U/L (ref 40–129)
ALT SERPL W P-5'-P-CCNC: 72 U/L (ref 0–70)
ANION GAP SERPL CALCULATED.3IONS-SCNC: 19 MMOL/L (ref 7–15)
APPEARANCE UR: CLEAR
AST SERPL W P-5'-P-CCNC: 80 U/L (ref 0–45)
BASOPHILS # BLD AUTO: 0.1 10E3/UL (ref 0–0.2)
BASOPHILS NFR BLD AUTO: 1 %
BILIRUB SERPL-MCNC: 3.1 MG/DL
BILIRUB UR QL STRIP: ABNORMAL
BUN SERPL-MCNC: 15.1 MG/DL (ref 6–20)
CALCIUM SERPL-MCNC: 9.4 MG/DL (ref 8.6–10)
CHLORIDE SERPL-SCNC: 94 MMOL/L (ref 98–107)
COLOR UR AUTO: YELLOW
CREAT SERPL-MCNC: 0.84 MG/DL (ref 0.67–1.17)
DEPRECATED HCO3 PLAS-SCNC: 21 MMOL/L (ref 22–29)
EOSINOPHIL # BLD AUTO: 0.1 10E3/UL (ref 0–0.7)
EOSINOPHIL NFR BLD AUTO: 1 %
ERYTHROCYTE [DISTWIDTH] IN BLOOD BY AUTOMATED COUNT: 13.9 % (ref 10–15)
GFR SERPL CREATININE-BSD FRML MDRD: >90 ML/MIN/1.73M2
GLUCOSE SERPL-MCNC: 164 MG/DL (ref 70–99)
GLUCOSE UR STRIP-MCNC: NEGATIVE MG/DL
HCT VFR BLD AUTO: 42.2 % (ref 40–53)
HGB BLD-MCNC: 14.1 G/DL (ref 13.3–17.7)
HGB UR QL STRIP: NEGATIVE
HOLD SPECIMEN: NORMAL
HYALINE CASTS: 1 /LPF
IMM GRANULOCYTES # BLD: 0.1 10E3/UL
IMM GRANULOCYTES NFR BLD: 1 %
KETONES UR STRIP-MCNC: 40 MG/DL
LEUKOCYTE ESTERASE UR QL STRIP: NEGATIVE
LYMPHOCYTES # BLD AUTO: 1.5 10E3/UL (ref 0.8–5.3)
LYMPHOCYTES NFR BLD AUTO: 11 %
MAGNESIUM SERPL-MCNC: 1.5 MG/DL (ref 1.7–2.3)
MCH RBC QN AUTO: 32 PG (ref 26.5–33)
MCHC RBC AUTO-ENTMCNC: 33.4 G/DL (ref 31.5–36.5)
MCV RBC AUTO: 96 FL (ref 78–100)
MONOCYTES # BLD AUTO: 1 10E3/UL (ref 0–1.3)
MONOCYTES NFR BLD AUTO: 7 %
MUCOUS THREADS #/AREA URNS LPF: PRESENT /LPF
NEUTROPHILS # BLD AUTO: 10.4 10E3/UL (ref 1.6–8.3)
NEUTROPHILS NFR BLD AUTO: 79 %
NITRATE UR QL: NEGATIVE
NRBC # BLD AUTO: 0 10E3/UL
NRBC BLD AUTO-RTO: 0 /100
PH UR STRIP: 5 [PH] (ref 5–7)
PLATELET # BLD AUTO: 161 10E3/UL (ref 150–450)
POTASSIUM SERPL-SCNC: 3.2 MMOL/L (ref 3.4–5.3)
PROT SERPL-MCNC: 7.5 G/DL (ref 6.4–8.3)
RBC # BLD AUTO: 4.41 10E6/UL (ref 4.4–5.9)
RBC URINE: 0 /HPF
SODIUM SERPL-SCNC: 134 MMOL/L (ref 136–145)
SP GR UR STRIP: 1.02 (ref 1–1.03)
SQUAMOUS EPITHELIAL: 1 /HPF
UROBILINOGEN UR STRIP-MCNC: 2 MG/DL
WBC # BLD AUTO: 13.1 10E3/UL (ref 4–11)
WBC URINE: 1 /HPF

## 2023-09-05 PROCEDURE — 258N000003 HC RX IP 258 OP 636: Performed by: EMERGENCY MEDICINE

## 2023-09-05 PROCEDURE — 83735 ASSAY OF MAGNESIUM: CPT | Performed by: EMERGENCY MEDICINE

## 2023-09-05 PROCEDURE — 85025 COMPLETE CBC W/AUTO DIFF WBC: CPT | Performed by: EMERGENCY MEDICINE

## 2023-09-05 PROCEDURE — 99285 EMERGENCY DEPT VISIT HI MDM: CPT | Mod: 25

## 2023-09-05 PROCEDURE — 81001 URINALYSIS AUTO W/SCOPE: CPT | Performed by: EMERGENCY MEDICINE

## 2023-09-05 PROCEDURE — 96365 THER/PROPH/DIAG IV INF INIT: CPT

## 2023-09-05 PROCEDURE — 80053 COMPREHEN METABOLIC PANEL: CPT | Performed by: EMERGENCY MEDICINE

## 2023-09-05 PROCEDURE — 99207 PR NO CHARGE LOS: CPT | Performed by: PREVENTIVE MEDICINE

## 2023-09-05 PROCEDURE — 96361 HYDRATE IV INFUSION ADD-ON: CPT

## 2023-09-05 PROCEDURE — 250N000013 HC RX MED GY IP 250 OP 250 PS 637: Performed by: EMERGENCY MEDICINE

## 2023-09-05 PROCEDURE — 250N000011 HC RX IP 250 OP 636: Mod: JZ | Performed by: EMERGENCY MEDICINE

## 2023-09-05 PROCEDURE — 36415 COLL VENOUS BLD VENIPUNCTURE: CPT | Performed by: EMERGENCY MEDICINE

## 2023-09-05 RX ORDER — EPINASTINE HCL 0.05 %
DROPS OPHTHALMIC (EYE)
Qty: 5 ML | Refills: 0 | Status: ON HOLD | OUTPATIENT
Start: 2023-09-05 | End: 2023-10-25

## 2023-09-05 RX ORDER — POTASSIUM CHLORIDE 1500 MG/1
40 TABLET, EXTENDED RELEASE ORAL ONCE
Status: COMPLETED | OUTPATIENT
Start: 2023-09-05 | End: 2023-09-05

## 2023-09-05 RX ORDER — MAGNESIUM SULFATE HEPTAHYDRATE 40 MG/ML
2 INJECTION, SOLUTION INTRAVENOUS ONCE
Status: COMPLETED | OUTPATIENT
Start: 2023-09-05 | End: 2023-09-05

## 2023-09-05 RX ORDER — DIAZEPAM 5 MG
5 TABLET ORAL ONCE
Status: COMPLETED | OUTPATIENT
Start: 2023-09-05 | End: 2023-09-05

## 2023-09-05 RX ADMIN — DIAZEPAM 5 MG: 5 TABLET ORAL at 00:59

## 2023-09-05 RX ADMIN — SODIUM CHLORIDE 1000 ML: 9 INJECTION, SOLUTION INTRAVENOUS at 00:59

## 2023-09-05 RX ADMIN — MAGNESIUM SULFATE HEPTAHYDRATE 2 G: 40 INJECTION, SOLUTION INTRAVENOUS at 02:10

## 2023-09-05 RX ADMIN — POTASSIUM CHLORIDE 40 MEQ: 1500 TABLET, EXTENDED RELEASE ORAL at 02:10

## 2023-09-05 ASSESSMENT — ACTIVITIES OF DAILY LIVING (ADL)
ADLS_ACUITY_SCORE: 35
ADLS_ACUITY_SCORE: 35

## 2023-09-05 NOTE — ED TRIAGE NOTES
Patient arrives by EMS from his home. Patient reports he was hearing car doors opening, people yelling and flashing lights into his home. Pt called PD. Pt reports- 1.75 whiskey daily with last drink on Thursday. Pt planning on going to detox tomorrow.

## 2023-09-05 NOTE — PATIENT INSTRUCTIONS
Thank you for choosing us for your care. I have placed an order for a prescription so that you can start treatment. View your full visit summary for details by clicking on the link below. Your pharmacist will able to address any questions you may have about the medication.     If you re not feeling better within 2-3 days, please schedule an appointment.  You can schedule an appointment right here in [facility], or call [phone]  If the visit is for the same symptoms as your eVisit, we ll refund the cost of your eVisit if seen within seven days.    Pinkeye: Care Instructions  Overview     Pinkeye is redness and swelling of the eye surface and the conjunctiva (the lining of the eyelid and the covering of the white part of the eye). Pinkeye is also called conjunctivitis. Pinkeye is often caused by infection with bacteria or a virus. Dry air, allergies, smoke, and chemicals are other common causes.  Pinkeye often gets better on its own in 7 to 10 days. Antibiotics only help if the pinkeye is caused by bacteria. Pinkeye caused by infection spreads easily. If an allergy or chemical is causing pinkeye, it will not go away unless you can avoid whatever is causing it.  Follow-up care is a key part of your treatment and safety. Be sure to make and go to all appointments, and call your doctor if you are having problems. It's also a good idea to know your test results and keep a list of the medicines you take.  How can you care for yourself at home?  Wash your hands often. Always wash them before and after you treat pinkeye or touch your eyes or face.  Use moist cotton or a clean, wet cloth to remove crust. Wipe from the inside corner of the eye to the outside. Use a clean part of the cloth for each wipe.  Put cold or warm wet cloths on your eye a few times a day if the eye hurts.  Do not wear contact lenses or eye makeup until the pinkeye is gone. Throw away any eye makeup you were using when you got pinkeye. Clean your  "contacts and storage case. If you wear disposable contacts, use a new pair when your eye has cleared and it is safe to wear contacts again.  If the doctor gave you antibiotic ointment or eyedrops, use them as directed. Use the medicine for as long as instructed, even if your eye starts looking better soon. Keep the bottle tip clean, and do not let it touch the eye area.  To put in eyedrops or ointment:  Tilt your head back, and pull your lower eyelid down with one finger.  Drop or squirt the medicine inside the lower lid.  Close your eye for 30 to 60 seconds to let the drops or ointment move around.  Do not touch the ointment or dropper tip to your eyelashes or any other surface.  Do not share towels, pillows, or washcloths while you have pinkeye.  When should you call for help?   Call your doctor now or seek immediate medical care if:    You have pain in your eye, not just irritation on the surface.     You have a change in vision or loss of vision.     You have an increase in discharge from the eye.     Your eye has not started to improve or begins to get worse within 48 hours after you start using antibiotics.     Pinkeye lasts longer than 7 days.   Watch closely for changes in your health, and be sure to contact your doctor if you have any problems.  Where can you learn more?  Go to https://www.[employer].net/patiented  Enter Y392 in the search box to learn more about \"Pinkeye: Care Instructions.\"  Current as of: October 12, 2022               Content Version: 13.7    9679-8680 [employer].   Care instructions adapted under license by your healthcare professional. If you have questions about a medical condition or this instruction, always ask your healthcare professional. [employer] disclaims any warranty or liability for your use of this information.      "

## 2023-09-05 NOTE — DISCHARGE INSTRUCTIONS
Call this morning to try to get into treatment  If have hallucinations, talk to someone at home to see if there really is someone there before calling [phone]

## 2023-09-05 NOTE — ED NOTES
Bed: ED11  Expected date:   Expected time:   Means of arrival:   Comments:  [age]M ETOH withdrawal

## 2023-09-05 NOTE — ED PROVIDER NOTES
History     Chief Complaint:  Alcohol Problem (/)       The history is provided by the patient.      Ravi Ahmadi is a 33 year old male with history of type 2 diabetes, DKA, and alcohol use disorder with withdrawal and hallucinosis who presents with audio and visual hallucinations of people in his home tonight.  He called the police who came and did not see any people in his house.  About 15 minutes after they left, he saw and heard people again.  He attempted to record them and called the police again.  When the police arrived, they did not see any people in his house or in the recording.  Patient was seen in the ED yesterday for similar symptoms.  He was prescribed librium but has not been able to fill this prescription.  He states his last drink was 4 days ago. He is planning to go to detox tomorrow in [place].  He also reports having hallucinations previously when he was in DKA.  He has been maintaining his blood sugar level between 115-130.  He denies vomiting, abdominal pain, and shaking or tremors.  He notes that he recently moved back home after losing his job after his recent diabetes diagnosis.    Independent Historian:   None - Patient Only    Review of External Notes:   Reviewed recent emergency department visit    Medications:    Albuterol  Lantus  Librium  Lisinopril  Metformin  Zofran    Past Medical History:    Asthma  Tobacco abuse  Alcohol use disorder  Alcohol withdrawal syndrome  Alcohol-induced acute pancreatitis  Alcohol hallucinosis  Lactic acidosis  JAY  Diabetic ketoacidosis without coma  Hypertension  Type 2 diabetes     Past Surgical History:    Foreign body removal from stomach     Physical Exam   Patient Vitals for the past 24 hrs:   BP Temp Temp src Pulse Resp SpO2 Height Weight   09/05/23 0210 111/69 -- -- 79 -- 94 % -- --   09/05/23 0031 117/76 98.8  F (37.1  C) Oral 112 20 95 % 1.829 m (6') 113.4 kg (250 lb)        Physical Exam  General: Sitting up in bed  Eyes:  The  pupils are equal and round    Conjunctivae and sclerae are normal  ENT:    Atraumatic face  Neck:  Normal range of motion  CV:  Regular rate, regular rhythm     Skin warm and well perfused   Resp:  Non labored breathing on room air    No tachypnea    No cough heard  GI:  Abdomen is soft, there is no rigidity    No distension    No rebound tenderness     No abdominal tenderness  MS:  No hand tremors  Skin:  No rash or acute skin lesions noted  Neuro:   Awake, alert.      Speech is normal and fluent.    Face is symmetric.     Moves all extremities equally  Psych: Normal affect.  Appropriate interactions.     Emergency Department Course     Laboratory:  Labs Ordered and Resulted from Time of ED Arrival to Time of ED Departure   COMPREHENSIVE METABOLIC PANEL - Abnormal       Result Value    Sodium 134 (*)     Potassium 3.2 (*)     Chloride 94 (*)     Carbon Dioxide (CO2) 21 (*)     Anion Gap 19 (*)     Urea Nitrogen 15.1      Creatinine 0.84      Calcium 9.4      Glucose 164 (*)     Alkaline Phosphatase 84      AST 80 (*)     ALT 72 (*)     Protein Total 7.5      Albumin 4.3      Bilirubin Total 3.1 (*)     GFR Estimate >90     MAGNESIUM - Abnormal    Magnesium 1.5 (*)    CBC WITH PLATELETS AND DIFFERENTIAL - Abnormal    WBC Count 13.1 (*)     RBC Count 4.41      Hemoglobin 14.1      Hematocrit 42.2      MCV 96      MCH 32.0      MCHC 33.4      RDW 13.9      Platelet Count 161      % Neutrophils 79      % Lymphocytes 11      % Monocytes 7      % Eosinophils 1      % Basophils 1      % Immature Granulocytes 1      NRBCs per 100 WBC 0      Absolute Neutrophils 10.4 (*)     Absolute Lymphocytes 1.5      Absolute Monocytes 1.0      Absolute Eosinophils 0.1      Absolute Basophils 0.1      Absolute Immature Granulocytes 0.1      Absolute NRBCs 0.0     ROUTINE UA WITH MICROSCOPIC REFLEX TO CULTURE - Abnormal    Color Urine Yellow      Appearance Urine Clear      Glucose Urine Negative      Bilirubin Urine Small (*)      Ketones Urine 40 (*)     Specific Gravity Urine 1.019      Blood Urine Negative      pH Urine 5.0      Protein Albumin Urine 10 (*)     Urobilinogen Urine 2.0      Nitrite Urine Negative      Leukocyte Esterase Urine Negative      Mucus Urine Present (*)     RBC Urine 0      WBC Urine 1      Squamous Epithelials Urine 1      Hyaline Casts Urine 1          Emergency Department Course & Assessments:    Interventions:  Medications   0.9% sodium chloride BOLUS (1,000 mLs Intravenous $New Bag 9/5/23 0059)   diazepam (VALIUM) tablet 5 mg (5 mg Oral $Given 9/5/23 0059)   magnesium sulfate 2 g in 50 mL sterile water intermittent infusion (2 g Intravenous $New Bag 9/5/23 0210)   potassium chloride ER (KLOR-CON M) CR tablet 40 mEq (40 mEq Oral $Given 9/5/23 0210)      Assessments:  0050 I gathered history and examined the patient as noted above.   0310 I reevaluated the patient.  He feels improved and agrees with discharge.    Social Determinants of Health affecting care:   Alcohol use disorder.  Recently lost his job.    Disposition:  The patient was discharged to home.     Impression & Plan      Medical Decision Making:  Ravi Ahmadi is a 33-year-old male who presented to the emergency department with alcohol problem.  Seen in the emergency department recently for similar symptoms.  Hallucinations I believe are from alcohol hallucinosis.  He had a DEC assessment on last emergency department visit and doubt mental health causing hallucinations.  He plans on hopefully going to detox or treatment at a center in [place] and plans to call later today.  He does not want me to look to see if there is another detox bed available right now.  Feeling improved in the emergency department with no hallucinations.  He reports that if he has another hallucination, he will ask someone that he lives with if there is actually someone in the home before calling 911.  There is no apparent hallucinations on my  examination    Diagnosis:    ICD-10-CM    1. Hypokalemia  E87.6       2. Hypomagnesemia  E83.42       3. Alcohol hallucinosis (H)  F10.951          Scribe Disclosure:  I, Suri Zuñiga, am serving as a scribe at 12:36 AM on 9/5/2023 to document services personally performed by Marily Rader MD based on my observations and the provider's statements to me.     9/5/2023   Marily Rader MD Goertz, Maria Kristine, MD  09/05/23 0557

## 2023-09-06 ENCOUNTER — TELEPHONE (OUTPATIENT)
Dept: EMERGENCY MEDICINE | Facility: CLINIC | Age: 33
End: 2023-09-06
Payer: MEDICAID

## 2023-09-06 ENCOUNTER — TELEPHONE (OUTPATIENT)
Dept: INTERNAL MEDICINE | Facility: CLINIC | Age: 33
End: 2023-09-06
Payer: MEDICAID

## 2023-09-06 ENCOUNTER — TELEPHONE (OUTPATIENT)
Dept: URGENT CARE | Facility: URGENT CARE | Age: 33
End: 2023-09-06
Payer: MEDICAID

## 2023-09-06 DIAGNOSIS — H10.9 CONJUNCTIVITIS, UNSPECIFIED CONJUNCTIVITIS TYPE, UNSPECIFIED LATERALITY: Primary | ICD-10-CM

## 2023-09-06 RX ORDER — OLOPATADINE HYDROCHLORIDE 1 MG/ML
1 SOLUTION/ DROPS OPHTHALMIC 2 TIMES DAILY
Qty: 5 ML | Refills: 0 | Status: ON HOLD | OUTPATIENT
Start: 2023-09-06 | End: 2023-10-25

## 2023-09-06 NOTE — TELEPHONE ENCOUNTER
Pt called requesting rx transfer. He has E-visit was was prescribed eye drops but 's pharmacy does not have medication in stock. Pt wants Rx sent to [facility] pharmacy. Triage advised he can have pharmacy request tranfers. If [facility] pharmacy does not have medication, call clinic back to send message to ordering provider. Pt verbalized agreement with plan.

## 2023-09-06 NOTE — TELEPHONE ENCOUNTER
[facility] Emergency Department Lab result notification     Caller/Patient name  Ravi    Reason for call  Needs assessment sent to Detox. Below is the information is he request sent to a detox center      Recommendations/Instructions  Confirmed information he needs sent over  Transferred Ravi to medication records.      Lj Sesay RN  [facility]  Emergency Dept Lab Result RN  Ph# [phone]

## 2023-09-06 NOTE — TELEPHONE ENCOUNTER
Epinastine non formulary, please advise and send to [facility].

## 2023-09-08 ENCOUNTER — TELEPHONE (OUTPATIENT)
Dept: BEHAVIORAL HEALTH | Facility: CLINIC | Age: 33
End: 2023-09-08

## 2023-09-08 ENCOUNTER — TELEPHONE (OUTPATIENT)
Dept: INTERNAL MEDICINE | Facility: CLINIC | Age: 33
End: 2023-09-08
Payer: MEDICAID

## 2023-09-08 NOTE — TELEPHONE ENCOUNTER
Patient called [facility] this morning stating he planned on coming into the program on Monday 9/11/23. Patient is not currently on the LP Waitlist and writer was not able to find a chemical dependency assessment in patients chart. Writer transferred patient to intake to schedule a chemical dependency assessment.

## 2023-09-08 NOTE — TELEPHONE ENCOUNTER
Patient called and stated that he is trying to get checked into an in patient program at [facility]. Patient is needing an appointment for substance use assessment to be able to go into an in patient program. Patient stated that he would prefer to see his PCP or Dr. Hill for this. Can this appointment be done virtually?    Kelly CORDERO RN  [facility] Triage Team

## 2023-09-11 NOTE — TELEPHONE ENCOUNTER
I think this is incorrect- anyone going in to Rx typically has a chem dep assessment done thru mental health. All typically done thru intake. But I'm not sure on specifics -its just not something that we do.

## 2023-09-11 NOTE — TELEPHONE ENCOUNTER
Patient Contact    Attempt # 1    Was call answered?  No.  Unable to leave message.    Upon call back, please relay provider message below.     Thank you,  Marisa Lovelace RN

## 2023-09-12 NOTE — TELEPHONE ENCOUNTER
Received a call back from the patient. Relayed message from the provider. Patient expressed understanding and states he already has an appointment scheduled with a different clinic. Patient had no additional questions at this time.     Olga Rowland RN

## 2023-10-04 ENCOUNTER — HOSPITAL ENCOUNTER (EMERGENCY)
Facility: CLINIC | Age: 33
Discharge: HOME | End: 2023-10-04
Attending: EMERGENCY MEDICINE | Admitting: EMERGENCY MEDICINE
Payer: COMMERCIAL

## 2023-10-04 VITALS
RESPIRATION RATE: 16 BRPM | DIASTOLIC BLOOD PRESSURE: 86 MMHG | SYSTOLIC BLOOD PRESSURE: 118 MMHG | OXYGEN SATURATION: 99 % | HEART RATE: 80 BPM | TEMPERATURE: 97.8 F

## 2023-10-04 DIAGNOSIS — R45.851 DEPRESSION WITH SUICIDAL IDEATION: ICD-10-CM

## 2023-10-04 DIAGNOSIS — F10.929 ALCOHOLIC INTOXICATION WITH COMPLICATION (H): ICD-10-CM

## 2023-10-04 DIAGNOSIS — F32.A DEPRESSION WITH SUICIDAL IDEATION: ICD-10-CM

## 2023-10-04 LAB
ALBUMIN SERPL BCG-MCNC: 4.4 G/DL (ref 3.5–5.2)
ALP SERPL-CCNC: 105 U/L (ref 40–129)
ALT SERPL W P-5'-P-CCNC: 63 U/L (ref 0–70)
ANION GAP SERPL CALCULATED.3IONS-SCNC: 13 MMOL/L (ref 7–15)
AST SERPL W P-5'-P-CCNC: 77 U/L (ref 0–45)
B-OH-BUTYR SERPL-SCNC: <0.18 MMOL/L
BASO+EOS+MONOS # BLD AUTO: NORMAL 10*3/UL
BASO+EOS+MONOS NFR BLD AUTO: NORMAL %
BASOPHILS # BLD AUTO: 0.1 10E3/UL (ref 0–0.2)
BASOPHILS NFR BLD AUTO: 1 %
BILIRUB SERPL-MCNC: 0.7 MG/DL
BUN SERPL-MCNC: 2.5 MG/DL (ref 6–20)
CALCIUM SERPL-MCNC: 9.5 MG/DL (ref 8.6–10)
CHLORIDE SERPL-SCNC: 106 MMOL/L (ref 98–107)
CREAT SERPL-MCNC: 0.68 MG/DL (ref 0.67–1.17)
DEPRECATED HCO3 PLAS-SCNC: 27 MMOL/L (ref 22–29)
EGFRCR SERPLBLD CKD-EPI 2021: >90 ML/MIN/1.73M2
EOSINOPHIL # BLD AUTO: 0.4 10E3/UL (ref 0–0.7)
EOSINOPHIL NFR BLD AUTO: 4 %
ERYTHROCYTE [DISTWIDTH] IN BLOOD BY AUTOMATED COUNT: 12.6 % (ref 10–15)
ETHANOL SERPL-MCNC: 0.36 G/DL
GLUCOSE SERPL-MCNC: 130 MG/DL (ref 70–99)
HCT VFR BLD AUTO: 49 % (ref 40–53)
HGB BLD-MCNC: 16.3 G/DL (ref 13.3–17.7)
IMM GRANULOCYTES # BLD: 0 10E3/UL
IMM GRANULOCYTES NFR BLD: 0 %
LIPASE SERPL-CCNC: 44 U/L (ref 13–60)
LYMPHOCYTES # BLD AUTO: 3.8 10E3/UL (ref 0.8–5.3)
LYMPHOCYTES NFR BLD AUTO: 40 %
MAGNESIUM SERPL-MCNC: 2.4 MG/DL (ref 1.7–2.3)
MCH RBC QN AUTO: 32 PG (ref 26.5–33)
MCHC RBC AUTO-ENTMCNC: 33.3 G/DL (ref 31.5–36.5)
MCV RBC AUTO: 96 FL (ref 78–100)
MONOCYTES # BLD AUTO: 0.5 10E3/UL (ref 0–1.3)
MONOCYTES NFR BLD AUTO: 6 %
NEUTROPHILS # BLD AUTO: 4.5 10E3/UL (ref 1.6–8.3)
NEUTROPHILS NFR BLD AUTO: 49 %
NRBC # BLD AUTO: 0 10E3/UL
NRBC BLD AUTO-RTO: 0 /100
PHOSPHATE SERPL-MCNC: 4.1 MG/DL (ref 2.5–4.5)
PLATELET # BLD AUTO: 260 10E3/UL (ref 150–450)
POTASSIUM SERPL-SCNC: 3.9 MMOL/L (ref 3.4–5.3)
PROT SERPL-MCNC: 8.3 G/DL (ref 6.4–8.3)
RBC # BLD AUTO: 5.1 10E6/UL (ref 4.4–5.9)
SODIUM SERPL-SCNC: 146 MMOL/L (ref 135–145)
WBC # BLD AUTO: 9.4 10E3/UL (ref 4–11)

## 2023-10-04 PROCEDURE — 99285 EMERGENCY DEPT VISIT HI MDM: CPT

## 2023-10-04 PROCEDURE — 84100 ASSAY OF PHOSPHORUS: CPT | Performed by: EMERGENCY MEDICINE

## 2023-10-04 PROCEDURE — 82077 ASSAY SPEC XCP UR&BREATH IA: CPT | Performed by: EMERGENCY MEDICINE

## 2023-10-04 PROCEDURE — 80053 COMPREHEN METABOLIC PANEL: CPT | Performed by: EMERGENCY MEDICINE

## 2023-10-04 PROCEDURE — 36415 COLL VENOUS BLD VENIPUNCTURE: CPT | Performed by: EMERGENCY MEDICINE

## 2023-10-04 PROCEDURE — 85025 COMPLETE CBC W/AUTO DIFF WBC: CPT | Performed by: EMERGENCY MEDICINE

## 2023-10-04 PROCEDURE — 83735 ASSAY OF MAGNESIUM: CPT | Performed by: EMERGENCY MEDICINE

## 2023-10-04 PROCEDURE — 250N000013 HC RX MED GY IP 250 OP 250 PS 637: Performed by: EMERGENCY MEDICINE

## 2023-10-04 PROCEDURE — 82010 KETONE BODYS QUAN: CPT | Performed by: EMERGENCY MEDICINE

## 2023-10-04 PROCEDURE — 83690 ASSAY OF LIPASE: CPT | Performed by: EMERGENCY MEDICINE

## 2023-10-04 RX ORDER — ACETAMINOPHEN 500 MG
1000 TABLET ORAL ONCE
Status: COMPLETED | OUTPATIENT
Start: 2023-10-04 | End: 2023-10-04

## 2023-10-04 RX ADMIN — ACETAMINOPHEN 1000 MG: 500 TABLET ORAL at 15:33

## 2023-10-04 ASSESSMENT — ACTIVITIES OF DAILY LIVING (ADL)
ADLS_ACUITY_SCORE: 35
ADLS_ACUITY_SCORE: 35

## 2023-10-04 NOTE — ED NOTES
Bed: [facility]  Expected date:   Expected time:   Means of arrival:   Comments:  Isak - 525 - 33 M suicidal ETOH eta 1130

## 2023-10-04 NOTE — ED NOTES
Pt sat up in bed as this RN came into room, alert and not endorsing anymore SI thoughts. Asked for water and tylenol for a headache

## 2023-10-04 NOTE — ED PROVIDER NOTES
"  History     Chief Complaint:  Psychiatric Evaluation and Alcohol Intoxication    The history is provided by the patient.     Ravi Ahmadi is a 33 year old male with history of depression and alcohol use disorder presenting via EMS for a psychiatric evaluation and alcohol intoxication. Ravi endorses recent stressors and explains that his recent diabetes diagnosis pushed him \"over the edge.\" He denies nausea, abdominal pain, and chest pain. He notes diarrhea. He reports smoking cigarettes.    Independent Historian:   None - Patient Only    Review of External Notes:   None      Medications:    Albuterol  Glucose  Insulin  Zestril  Metformin    Past Medical History:    Atopic dermatitis  Intermittent asthma  Tobacco abuse  Benign essential hypertension  Hypokalemia  Hyperglycemia  Alcohol use disorder  Alcoholic hepatitis without ascites  Alcohol-induced acute pancreatitis  Lactic acidosis  Acute kidney injury  Diabetic ketoacidosis without coma associated with type 2 diabetes mellitus  Recurrent major depressive disorder, in remission    Past Surgical History:    CL AFF surgical pathology, foreign body removal in abdomen after accident    Physical Exam   Patient Vitals for the past 24 hrs:   BP Temp Temp src Pulse Resp SpO2   10/04/23 1330 -- 97.8  F (36.6  C) Temporal -- -- --   10/04/23 1245 -- -- -- -- 16 --   10/04/23 1152 -- -- -- -- -- 99 %   10/04/23 1151 118/86 -- -- 80 -- --     Physical Exam  Constitutional: Vital signs reviewed.  Pleasant.  HEENT: Moist mucous membranes  Cardiovascular: Regular rate and rhythm  Pulmonary/Chest: Breathing comfortably on room air.  No audible wheezing  Musculoskeletal/Extremities: No bony deformities.  Moves all 4 extremities without difficulty.  Neurological: Alert.  No focal deficits.  Endo: No pitting edema  Skin: No visible rash.  Psychiatric: Pleasant. Intoxicated, slurring his words. Admits to depression with suicidal thoughts and plan to overdose on muscle " relaxant.    Emergency Department Course       Laboratory:  Labs Ordered and Resulted from Time of ED Arrival to Time of ED Departure   COMPREHENSIVE METABOLIC PANEL - Abnormal       Result Value    Sodium 146 (*)     Potassium 3.9      Carbon Dioxide (CO2) 27      Anion Gap 13      Urea Nitrogen 2.5 (*)     Creatinine 0.68      GFR Estimate >90      Calcium 9.5      Chloride 106      Glucose 130 (*)     Alkaline Phosphatase 105      AST 77 (*)     ALT 63      Protein Total 8.3      Albumin 4.4      Bilirubin Total 0.7     ETHYL ALCOHOL LEVEL - Abnormal    Alcohol ethyl 0.36 (*)    MAGNESIUM - Abnormal    Magnesium 2.4 (*)    LIPASE - Normal    Lipase 44     KETONE BETA-HYDROXYBUTYRATE QUANTITATIVE, RAPID - Normal    Ketone (Beta-Hydroxybutyrate) Quantitative <0.18     PHOSPHORUS - Normal    Phosphorus 4.1     CBC WITH PLATELETS AND DIFFERENTIAL    WBC Count 9.4      RBC Count 5.10      Hemoglobin 16.3      Hematocrit 49.0      MCV 96      MCH 32.0      MCHC 33.3      RDW 12.6      Platelet Count 260      % Neutrophils 49      % Lymphocytes 40      % Monocytes 6      Mids % (Monos, Eos, Basos)        % Eosinophils 4      % Basophils 1      % Immature Granulocytes 0      NRBCs per 100 WBC 0      Absolute Neutrophils 4.5      Absolute Lymphocytes 3.8      Absolute Monocytes 0.5      Mids Abs (Monos, Eos, Basos)        Absolute Eosinophils 0.4      Absolute Basophils 0.1      Absolute Immature Granulocytes 0.0      Absolute NRBCs 0.0            Emergency Department Course & Assessments:  PSS-3      Date and Time Over the past 2 weeks have you felt down, depressed, or hopeless? Over the past 2 weeks have you had thoughts of killing yourself? Have you ever attempted to kill yourself? When did this last happen? User   10/04/23 1152 yes yes no -- AGUSTO OGSSRS ([place])      Date and Time Q1 Wished to be Dead (Past Month) Q2 Suicidal Thoughts (Past Month) Q3 Suicidal Thought Method Q4 Suicidal Intent without Specific  Plan Q5 Suicide Intent with Specific Plan Q6 Suicide Behavior (Lifetime) Within the Past 3 Months? RETIRED: Level of Risk per Screen Screening Not Complete User   10/04/23 1334 yes yes yes -- no -- -- -- -- [facility]   10/04/23 1157 yes yes yes no yes no -- -- -- [place]                Interventions:  Medications - No data to display     Assessments:  1144 I obtained history and examined the patient as noted above.       Consultations/Discussion of Management or Tests:  ED Course as of 10/04/23 1342   Wed Oct 04, 2023   1245 I spoke with ROBIN Garcia, regarding the patient's history and presentation in the emergency department today.      Social Determinants of Health affecting care:   Alcohol misuse    Disposition:  The patient will be signed out to my colleague, Dr. Alvarado, as he will need to be reevaluated by the DEC  when he is clinically sober.  He is currently on an GERARDO.    Impression & Plan        Medical Decision Making:  Patient presents to the emergency department via EMS after he texted a friend he had talked to him while that he was planning on taking his own life.  Patient admits to drinking alcohol.  He is obviously intoxicated here.  He states that he was recently diagnosed with diabetes and he is really taken that hard.  He told the nurse that he has a plan to overdose on Flexeril.  He is anxious here but has no physical complaints.  He does have a history of alcoholic ketoacidosis.  Lab work here as above.  He was 0.36 blood alcohol.  His comprehensive metabolic panel was nonconcerning.  Glucose was 130 on that.  Ketones were negative.  He does have a slight elevated magnesium but a normal phosphorus and lipase.  CBC was also unremarkable.  He was seen briefly by the DEC  but is obviously very intoxicated.  She did get some collateral information and the family is aware and they feel that this is more of a stress reaction and that when he ruddy up he likely will no longer feel suicidal and would  be able to go home.  As such the plan will be to allow him to sober up and be reevaluated this evening when clinically sober.    Diagnosis:    ICD-10-CM    1. Alcoholic intoxication with complication (H24)  F10.929       2. Depression with suicidal ideation  F32.A     R45.851            Discharge Medications:  New Prescriptions    No medications on file      Scribe Disclosure:  I, Alma Rosa Gomez, am serving as a scribe at 11:44 AM on 10/4/2023 to document services personally performed by Cristopher Almazan MD based on my observations and the provider's statements to me.   10/4/2023   Cristopher Almazan MD Walters, Brent Aaron, MD  10/04/23 1346

## 2023-10-04 NOTE — DISCHARGE INSTRUCTIONS
Mental health recommendations    Please follow-up with your outpatient team about your visit today.  Call [facility] to schedule with psychiatry [phone]    2.  One of our care coordinators will reach out to you after your discharge.  If you have any questions about additional resources please call our coordinators at # [phone]    3.  Please use aftercare plan and already established coping skills and safety planning as needed.     4.  Please call 911 and/or return to the emergency department if her symptoms worsen or your safety becomes compromised.       Date: Friday, 10/6/2023  Time: 10:00 am - 11:00 am  Provider: Mandy MTZ  Location: [facility], [address], [address], [place] [address]  Phone: [phone]  Type: Teletherapy      Walk in psychiatry services     [facility]. services   [phone]  Same-Day Care Option  At our [facility], [facility], and [facility], we now provide same-day therapy/counseling support (for new and returning clients) and/or prescriber/medication services (for returning clients only) without an appointment. Same-day services vary per location. For clinic hours and services per location, visit each clinic's webpage.     To participate, simply walk in during open clinic hours and wait for an opening. These services are available based on client cancellations and no-shows. You can also call [facility] at [phone] to be placed on a waitlist. Based upon the number of cancellations or no-shows for a particular day and the number of clients on the waitlist, we cannot, unfortunately, guarantee that you will be seen through this same-day care option.  We will, however, communicate any clinic scheduling changes as they occur and do our best to accommodate your needs.     [facility]   [phone]  Acute Psychiatry Services (APS)  Acute Psychiatric Services  (APS) provides emergency services, 24 hours a day 7 days a week, to persons experiencing mental health crises including psychosis, depression, violence or suicide, and other crisis situations Clients may present on a walk-in basis or are referred from outside agencies or individuals.       Case management:     [facility]    Call [facility] at [phone].    You can call that number Monday through Friday, 8 a.m. to 4 p.m. except holidays.    You will speak with someone to help you identify your needs and offer options for service.    We may refer you to services or agencies for treatment and support. These could include:    Adult rehabilitative mental health services (ARMHS)  Community support program access  Intensive residential treatment  Outpatient mental health services  Case management  Assertive Community Treatment (ACT)  Supportive housing  Supportive employment  Chemical health assessment and treatment  Connections to resources in the community      [facility]     Case management and First episode psychosis     [address] [place] [address], [address]  [place] [address]    Phone: [phone] Fax: [phone]    [facility]    Case Management referral      If you live close to one of the clinics above, call us at [phone]  Go to [email or web address] and click on  Make A Referral  in the top Menu  Please indicate that you are making a referral for  Case Management/[facility]   Desired date of transfer if receiving targeted case management, [facility] or ACT elsewhere  Our case management  looks at all referrals and if it isn't a good fit, will recommend   other programs

## 2023-10-04 NOTE — ED NOTES
When this RN asked pt if he felt as thought he was going to follow through with this plan, pt stated he was not.

## 2023-10-04 NOTE — PROGRESS NOTES
[facility] Discharge Note    Writer met with patient to assess readiness for discharge. Pt met previously today with Radha Moore [facility], who was able to complete the AVS and scheduled Pt for follow-up appointments. At that time, Pt was unable to fully complete safety planning as he was intoxicated. Upon meeting with this writer, Pt denied any active SI or imminent safety concerns. Pt was able to identify coping skills, was future-oriented, and advocate for self in establishing psyciatry resources within the [facility] vs referral for outside provider. Writer did recommend IOP level of care and provider psycho-ed about possible benefits of more intensive services, but Pt denied this service. Writer consulted with attending MD, who agreed with Pt's ability to safely discharge home.

## 2023-10-04 NOTE — ED NOTES
DATE/TIME OF CALL RECEIVED FROM LAB:  10/04/23 at 1:32 PM   LAB TEST:  ETOH  LAB VALUE:  0.36  PROVIDER NOTIFIED?: Yes  PROVIDER NAME: Tha  DATE/TIME LAB VALUE REPORTED TO PROVIDER: 10/4/23 5502  MECHANISM OF PROVIDER NOTIFICATION: Phone Call  PROVIDER RESPONSE:

## 2023-10-04 NOTE — ED NOTES
"Pt stated to this RN, \"I am leaving right now.\"  RN stated that at this time, he could not leave, d/t the health officer hold.  Pt states, \" no, you don't understand, I am leaving.\"   RN educated pt again on the hold he is currently on and that he will need to be reassessed when he is more sober.  Pt states, \"what do I need, a treadmill so I can sober up faster.  Do pushups\"?  "

## 2023-10-04 NOTE — ED PROVIDER NOTES
4:26 PM 10/4/2023    Re-evaluated by DEC at this time. No SI at this time. Stable for discharge. Provided resources for OP follow up.     I evaluated the patient myself.  He is alert, oriented.  Denies any suicidal ideations.  He states he has support at home. States he will follow-up with therapist and psychiatrist.  I discussed strict return precautions with the patient.  He verbalized understanding agreement.    DO Christiano Hartman Tiffani, DO  10/04/23 9933

## 2023-10-04 NOTE — CONSULTS
Diagnostic Evaluation Consultation  Crisis Assessment    Patient Name: Ravi Ahmadi  Age:  33 year old  Legal Sex: male  Gender Identity: male  Pronouns: he/him   Race: White  Ethnicity: Not  or   Language: English      Patient was assessed: In person      Patient location: [facility] EMERGENCY DEPT                             [facility]    Referral Data and Chief Complaint  Ravi Ahmadi presents to the ED via EMS. Patient is presenting to the ED for the following concerns: Anxiety, Substance use, Suicidal ideation.   Factors that make the mental health crisis life threatening or complex are:  Pt presents to the ED due to alcohol intoxication and SI. Pt endorsed SI to a friend over the phone and this individual was concerned and called 911. Per EMS Pt endorsed SI w/plan to overdose on medications. Pt was transported to the ED via EMS for further evaluation..      Informed Consent and Assessment Methods  Explained the crisis assessment process, including applicable information disclosures and limits to confidentiality, assessed understanding of the process, and obtained consent to proceed with the assessment.  Assessment methods included conducting a formal interview with patient, review of medical records, collaboration with medical staff, and obtaining relevant collateral information from family and community providers when available.  : done     Patient response to interventions: acceptance expressed, verbalizes understanding  Coping skills were attempted to reduce the crisis:  Pt has been receptive to ED interventions     History of the Crisis   Pt presents with a depressed affect. Pt was intoxicated during assessment and Pts TC was a .36 at time of assessment. Pts mood is congruent with this presentation. Pt is oriented x4. Pt was minimally engaged in assessment. Pt was slow to respond and vague. Pt has a psychiatric hx of depression, anxiety and alcohol use. Pt has no hx of past  IP  admissions. Pts currently has no outpatient providers. Pt currently presents to the ED due to alcohol intoxication and SI. Pt is currently endorsed SI w/plan. Pt endorsed that he thinks about overdosing on medications and has had these ideations for several years. Pt endorsed to writer that in 2019 he acted on this thoughts and  overdosed on medications but did not tell anyone about this attempt nor seek medical attention at that time. Pt endorsed to writer currently he does not have any intent to act on his SI. Pt denied taking medications/ overdosing prior arrival to ED. Pt was not able to fully safety plan with writer. Pt did not endorse any HI/SIB or AH/VH. Pt endorsed he has struggled with his alcohol use for several years and worse in the last few months. Pt also endorsed occasional marijuana use. Pt did not endorse any other substance use.    Brief Psychosocial History  Family:  Single, Children no  Support System:  Sibling(s), Parent(s)  Employment Status:  unemployed  Source of Income:  salary/wages  Financial Environmental Concerns:  unemployed  Current Hobbies:  reading, group/social activities, outdoor activities, music, interaction with pets, television/movies/videos  Barriers in Personal Life:  mental health concerns    Significant Clinical History  Current Anxiety Symptoms:  panic attack, anxious, racing thoughts, excessive worry  Current Depression/Trauma:     Current Somatic Symptoms:  racing thoughts, excessive worry, shortness of breath or racing heart, anxious  Current Psychosis/Thought Disturbance:     Current Eating Symptoms:  loss of appetite  Chemical Use History:  Alcohol: Daily  Last Use:: 10/04/23  Benzodiazepines: None  Opiates: None  Cocaine: None  Marijuana: Occasional  Last Use:: 10/01/23  Other Use: None  Withdrawal Symptoms: Tremors, Seizures, Hallucinations, Nausea   Past diagnosis:  Depression, Anxiety Disorder, Substance Use Disorder  Family history:  Substance Use  Disorder  Past treatment:  Individual therapy, Psychiatric Medication Management  Details of most recent treatment:  Pt currently does not have any outpatient  providers.  Other relevant history:          Collateral Information  Is there collateral information: Yes     Collateral information name, relationship, phone number:  Radha (sister) [phone]    What happened today: Radha endorsed that today she received a call from his mother that the  were at the house. Pt was endorsing SI to someone he was talking to on the phone and they called 911.     What is different about patient's functioning: Pt has been struggling with his alcohol use for years but worse in the last few months. Pt does endorsed SI. Has not endorsed plan. Pt does not have any hx of aggression. Pt was recently diagnosed with diabetes and this is stressful for Pt. Pt is not working and living with his mother. Pt does not have any outpatient providers and Pt usually struggles to meet with providers.     Concern about alcohol/drug use:      What do you think the patient needs:      Has patient made comments about wanting to kill themselves/others: yes    If d/c is recommended, can they take part in safety/aftercare planning:  yes    Additional collateral information:  n/a     Risk Assessment  [place] Suicide Severity Rating Scale Full Clinical Version:  Suicidal Ideation  Q6 Suicide Behavior (Lifetime): no          [place] Suicide Severity Rating Scale Recent:   Suicidal Ideation (Recent)  Q1 Wished to be Dead (Past Month): yes  Q2 Suicidal Thoughts (Past Month): yes  Q3 Suicidal Thought Method: yes  Q4 Suicidal Intent without Specific Plan: no  Q5 Suicide Intent with Specific Plan: no  Level of Risk per Screen: moderate risk  Intensity of Ideation (Recent)  Most Severe Ideation Rating (Past 1 Month): 4  Description of Most Severe Ideation (Past 1 Month): SI w/plan  Frequency (Past 1 Month): 2-5 times in week  Duration (Past 1 Month): 1-4  hours/a lot of time  Controllability (Past 1 Month): Can control thoughts with some difficulty  Deterrents (Past 1 Month): Deterrents probably stopped you  Reasons for Ideation (Past 1 Month): Mostly to end or stop the pain (You couldn't go on living with the pain or how you were feeling)  Suicidal Behavior (Recent)  Actual Attempt (Past 3 Months): No  Has subject engaged in non-suicidal self-injurious behavior? (Past 3 Months): No  Interrupted Attempts (Past 3 Months): No  Aborted or Self-Interrupted Attempt (Past 3 Months): No  Preparatory Acts or Behavior (Past 3 Months): No    Environmental or Psychosocial Events: challenging interpersonal relationships, helplessness/hopelessness, unemployment/underemployment, ongoing abuse of substances, unstable housing, homelessness  Protective Factors: Protective Factors: strong bond to family unit, community support, or employment, help seeking, sense of importance of health and wellness    Does the patient have thoughts of harming others? Feels Like Hurting Others: no  Previous Attempt to Hurt Others: no  Is the patient engaging in sexually inappropriate behavior?: no    Is the patient engaging in sexually inappropriate behavior?  no        Mental Status Exam   Affect: Blunted  Appearance: Disheveled  Attention Span/Concentration: Inattentive  Eye Contact: Variable    Fund of Knowledge: Appropriate   Language /Speech Content: Fluent  Language /Speech Volume: Soft  Language /Speech Rate/Productions: Slow  Recent Memory: Variable  Remote Memory: Variable  Mood: Anxious, Depressed  Orientation to Person: Yes   Orientation to Place: Yes  Orientation to Time of Day: Yes  Orientation to Date: Yes     Situation (Do they understand why they are here?): Yes  Psychomotor Behavior: Normal  Thought Content: Suicidal  Thought Form: Intact     Mini-Cog Assessment  Number of Words Recalled:    Clock-Drawing Test:     Three Item Recall:    Mini-Cog Total Score:        Medication  Psychotropic medications:   Medication Orders - Psychiatric (From admission, onward)      None             Current Care Team  Patient Care Team:  Juancarlos Sood MD as PCP - General (Internal Medicine)  Mayte Hill MD as Assigned PCP  Mayte Vickers as Diabetes Educator (Dietitian, Registered)    Diagnosis  Patient Active Problem List   Diagnosis Code    Atopic dermatitis L20.9    Intermittent asthma J45.20    Tobacco abuse Z72.0    Morbid obesity (H) E66.01    Benign essential hypertension I10    Hypokalemia E87.6    Hyperglycemia R73.9    Alcohol withdrawal syndrome without complication (H) F10.930    Alcohol use disorder F10.90    Nausea and vomiting, unspecified vomiting type R11.2    Alcoholic intoxication with complication (H24) F10.929    Alcoholic hepatitis without ascites (H28) K70.10    Alcohol-induced acute pancreatitis K85.20    Lactic acidosis E87.20    JAY (acute kidney injury) (H24) N17.9    Diabetic ketoacidosis without coma associated with type 2 diabetes mellitus (H) E11.10    Severe episode of recurrent major depressive disorder, without psychotic features (H) F33.2    Alcohol abuse F10.10       Primary Problem This Admission  Active Hospital Problems    Severe episode of recurrent major depressive disorder, without psychotic features (H)      Alcohol use disorder        Clinical Summary and Substantiation of Recommendations   Pt is a 34 y/o male with a psychiatric hx of depression, anxiety and alcohol use. At this time it does appear that Pt would benefit from further observation and psychiatric stabilization via medication management and ED level therapeutic interventions, due to Pts current SI and level of intoxication. Pt would benefit from reassessment when he clears from his alcohol intoxication. Pt was not able to fully safety plan with writer. Pt does appear to be at higher risk of death by suicide accidental or intentional due to mental health hx and  substance use sx. If Pt is able to effectively safety plan and/or Pts sx improve it would be beneficial to pursue a less restrictive alternative.  If Pt requests to leave it would be beneficial for Pt to be reassessed. Pt is currently on a GERARDO and writer recommended 72 hour hold to attending MD, but as of note Pt is currently on a GERARDO.         Patient coping skills attempted to reduce the crisis:  Pt has been receptive to ED interventions    Disposition  Recommended disposition: Substance Abuse Disorder Treatment, Observation        Reviewed case and recommendations with attending provider. Attending Name: MD Almazan       Attending concurs with disposition: yes       Patient and/or validated legal guardian concurs with disposition:   yes       Final disposition:  observation    Legal status on admission:      Assessment Details   Total duration spent with the patient: 30 min     CPT code(s) utilized: 67842 - Psychotherapy for Crisis - 60 (30-74*) min    Marbella Moore [facility], Psychotherapist  DEC - Triage & Transition Services  Callback: [phone]

## 2023-10-04 NOTE — ED TRIAGE NOTES
Pt comes from home where he lives in apartment with parents.  Per EMS, pt was talking to a friend he had not talked to in a while and expressed feelings of suicide and would overdose on his muscle relaxers.  Friend called 911 and ems brought pt to ED.  Pt notes to nurse that he has been suicidal for years and that plan has always been his plan.

## 2023-10-23 ENCOUNTER — HOSPITAL ENCOUNTER (INPATIENT)
Facility: CLINIC | Age: 33
LOS: 3 days | Discharge: PSYCHIATRIC OR SUBSTANCE USE FACILITY | End: 2023-10-26
Attending: EMERGENCY MEDICINE | Admitting: INTERNAL MEDICINE
Payer: COMMERCIAL

## 2023-10-23 DIAGNOSIS — K29.70 GASTRITIS WITHOUT BLEEDING, UNSPECIFIED CHRONICITY, UNSPECIFIED GASTRITIS TYPE: ICD-10-CM

## 2023-10-23 DIAGNOSIS — J45.20 MILD INTERMITTENT ASTHMA WITHOUT COMPLICATION: ICD-10-CM

## 2023-10-23 DIAGNOSIS — F10.932 ALCOHOL WITHDRAWAL SYNDROME WITH PERCEPTUAL DISTURBANCE (H): ICD-10-CM

## 2023-10-23 DIAGNOSIS — Z72.0 TOBACCO ABUSE: ICD-10-CM

## 2023-10-23 DIAGNOSIS — Z79.4 TYPE 2 DIABETES MELLITUS WITHOUT COMPLICATION, WITH LONG-TERM CURRENT USE OF INSULIN (H): ICD-10-CM

## 2023-10-23 DIAGNOSIS — E87.29 ALCOHOLIC KETOACIDOSIS: ICD-10-CM

## 2023-10-23 DIAGNOSIS — F10.10 ALCOHOL ABUSE: Primary | ICD-10-CM

## 2023-10-23 DIAGNOSIS — I10 BENIGN ESSENTIAL HYPERTENSION: ICD-10-CM

## 2023-10-23 DIAGNOSIS — E11.9 TYPE 2 DIABETES MELLITUS WITHOUT COMPLICATION, WITH LONG-TERM CURRENT USE OF INSULIN (H): ICD-10-CM

## 2023-10-23 DIAGNOSIS — E13.65 UNCONTROLLED OTHER SPECIFIED DIABETES MELLITUS WITH HYPERGLYCEMIA (H): ICD-10-CM

## 2023-10-23 LAB
ALBUMIN SERPL BCG-MCNC: 5 G/DL (ref 3.5–5.2)
ALP SERPL-CCNC: 72 U/L (ref 40–129)
ALT SERPL W P-5'-P-CCNC: 77 U/L (ref 0–70)
ANION GAP SERPL CALCULATED.3IONS-SCNC: 17 MMOL/L (ref 7–15)
AST SERPL W P-5'-P-CCNC: 72 U/L (ref 0–45)
ATRIAL RATE - MUSE: 103 BPM
B-OH-BUTYR SERPL-SCNC: 1.7 MMOL/L
BASOPHILS # BLD AUTO: 0.1 10E3/UL (ref 0–0.2)
BASOPHILS NFR BLD AUTO: 0 %
BILIRUB SERPL-MCNC: 3 MG/DL
BUN SERPL-MCNC: 9.1 MG/DL (ref 6–20)
CALCIUM SERPL-MCNC: 11 MG/DL (ref 8.6–10)
CHLORIDE SERPL-SCNC: 95 MMOL/L (ref 98–107)
CREAT SERPL-MCNC: 0.68 MG/DL (ref 0.67–1.17)
DEPRECATED HCO3 PLAS-SCNC: 25 MMOL/L (ref 22–29)
DIASTOLIC BLOOD PRESSURE - MUSE: NORMAL MMHG
EGFRCR SERPLBLD CKD-EPI 2021: >90 ML/MIN/1.73M2
EOSINOPHIL # BLD AUTO: 0 10E3/UL (ref 0–0.7)
EOSINOPHIL NFR BLD AUTO: 0 %
ERYTHROCYTE [DISTWIDTH] IN BLOOD BY AUTOMATED COUNT: 14.3 % (ref 10–15)
ETHANOL SERPL-MCNC: <0.01 G/DL
GLUCOSE BLDC GLUCOMTR-MCNC: 118 MG/DL (ref 70–99)
GLUCOSE BLDC GLUCOMTR-MCNC: 133 MG/DL (ref 70–99)
GLUCOSE SERPL-MCNC: 150 MG/DL (ref 70–99)
HCT VFR BLD AUTO: 45.7 % (ref 40–53)
HGB BLD-MCNC: 15.6 G/DL (ref 13.3–17.7)
HOLD SPECIMEN: NORMAL
HOLD SPECIMEN: NORMAL
IMM GRANULOCYTES # BLD: 0.1 10E3/UL
IMM GRANULOCYTES NFR BLD: 1 %
INTERPRETATION ECG - MUSE: NORMAL
LIPASE SERPL-CCNC: 53 U/L (ref 13–60)
LYMPHOCYTES # BLD AUTO: 1.6 10E3/UL (ref 0.8–5.3)
LYMPHOCYTES NFR BLD AUTO: 12 %
MAGNESIUM SERPL-MCNC: 1.6 MG/DL (ref 1.7–2.3)
MCH RBC QN AUTO: 33.1 PG (ref 26.5–33)
MCHC RBC AUTO-ENTMCNC: 34.1 G/DL (ref 31.5–36.5)
MCV RBC AUTO: 97 FL (ref 78–100)
MONOCYTES # BLD AUTO: 1.5 10E3/UL (ref 0–1.3)
MONOCYTES NFR BLD AUTO: 11 %
NEUTROPHILS # BLD AUTO: 9.8 10E3/UL (ref 1.6–8.3)
NEUTROPHILS NFR BLD AUTO: 76 %
NRBC # BLD AUTO: 0 10E3/UL
NRBC BLD AUTO-RTO: 0 /100
P AXIS - MUSE: 76 DEGREES
PHOSPHATE SERPL-MCNC: 3.1 MG/DL (ref 2.5–4.5)
PLATELET # BLD AUTO: 145 10E3/UL (ref 150–450)
POTASSIUM SERPL-SCNC: 3.5 MMOL/L (ref 3.4–5.3)
PR INTERVAL - MUSE: 154 MS
PROT SERPL-MCNC: 8.6 G/DL (ref 6.4–8.3)
QRS DURATION - MUSE: 108 MS
QT - MUSE: 378 MS
QTC - MUSE: 495 MS
R AXIS - MUSE: 85 DEGREES
RBC # BLD AUTO: 4.72 10E6/UL (ref 4.4–5.9)
SODIUM SERPL-SCNC: 137 MMOL/L (ref 135–145)
SYSTOLIC BLOOD PRESSURE - MUSE: NORMAL MMHG
T AXIS - MUSE: 68 DEGREES
VENTRICULAR RATE- MUSE: 103 BPM
WBC # BLD AUTO: 13.1 10E3/UL (ref 4–11)

## 2023-10-23 PROCEDURE — 84100 ASSAY OF PHOSPHORUS: CPT | Performed by: INTERNAL MEDICINE

## 2023-10-23 PROCEDURE — HZ2ZZZZ DETOXIFICATION SERVICES FOR SUBSTANCE ABUSE TREATMENT: ICD-10-PCS | Performed by: INTERNAL MEDICINE

## 2023-10-23 PROCEDURE — 250N000009 HC RX 250: Performed by: INTERNAL MEDICINE

## 2023-10-23 PROCEDURE — 93005 ELECTROCARDIOGRAM TRACING: CPT

## 2023-10-23 PROCEDURE — 82077 ASSAY SPEC XCP UR&BREATH IA: CPT | Performed by: EMERGENCY MEDICINE

## 2023-10-23 PROCEDURE — 120N000001 HC R&B MED SURG/OB

## 2023-10-23 PROCEDURE — 83690 ASSAY OF LIPASE: CPT | Performed by: EMERGENCY MEDICINE

## 2023-10-23 PROCEDURE — 250N000011 HC RX IP 250 OP 636: Performed by: INTERNAL MEDICINE

## 2023-10-23 PROCEDURE — 250N000011 HC RX IP 250 OP 636: Performed by: EMERGENCY MEDICINE

## 2023-10-23 PROCEDURE — 99285 EMERGENCY DEPT VISIT HI MDM: CPT | Mod: 25

## 2023-10-23 PROCEDURE — 36415 COLL VENOUS BLD VENIPUNCTURE: CPT | Performed by: EMERGENCY MEDICINE

## 2023-10-23 PROCEDURE — 258N000003 HC RX IP 258 OP 636: Performed by: INTERNAL MEDICINE

## 2023-10-23 PROCEDURE — 96361 HYDRATE IV INFUSION ADD-ON: CPT

## 2023-10-23 PROCEDURE — 258N000003 HC RX IP 258 OP 636: Performed by: EMERGENCY MEDICINE

## 2023-10-23 PROCEDURE — 250N000013 HC RX MED GY IP 250 OP 250 PS 637: Performed by: INTERNAL MEDICINE

## 2023-10-23 PROCEDURE — 82010 KETONE BODYS QUAN: CPT | Performed by: EMERGENCY MEDICINE

## 2023-10-23 PROCEDURE — 85025 COMPLETE CBC W/AUTO DIFF WBC: CPT | Performed by: EMERGENCY MEDICINE

## 2023-10-23 PROCEDURE — 80053 COMPREHEN METABOLIC PANEL: CPT | Performed by: EMERGENCY MEDICINE

## 2023-10-23 PROCEDURE — 96374 THER/PROPH/DIAG INJ IV PUSH: CPT

## 2023-10-23 PROCEDURE — 83735 ASSAY OF MAGNESIUM: CPT | Performed by: EMERGENCY MEDICINE

## 2023-10-23 PROCEDURE — 250N000011 HC RX IP 250 OP 636: Mod: JZ | Performed by: EMERGENCY MEDICINE

## 2023-10-23 PROCEDURE — 99223 1ST HOSP IP/OBS HIGH 75: CPT | Mod: AI | Performed by: INTERNAL MEDICINE

## 2023-10-23 RX ORDER — PROCHLORPERAZINE MALEATE 10 MG
10 TABLET ORAL EVERY 6 HOURS PRN
Status: DISCONTINUED | OUTPATIENT
Start: 2023-10-23 | End: 2023-10-26 | Stop reason: HOSPADM

## 2023-10-23 RX ORDER — GABAPENTIN 300 MG/1
900 CAPSULE ORAL EVERY 8 HOURS
Qty: 27 CAPSULE | Refills: 0 | Status: DISCONTINUED | OUTPATIENT
Start: 2023-10-24 | End: 2023-10-26 | Stop reason: HOSPADM

## 2023-10-23 RX ORDER — GABAPENTIN 600 MG/1
1200 TABLET ORAL ONCE
Qty: 2 TABLET | Refills: 0 | Status: COMPLETED | OUTPATIENT
Start: 2023-10-23 | End: 2023-10-23

## 2023-10-23 RX ORDER — PROCHLORPERAZINE 25 MG
25 SUPPOSITORY, RECTAL RECTAL EVERY 12 HOURS PRN
Status: DISCONTINUED | OUTPATIENT
Start: 2023-10-23 | End: 2023-10-26 | Stop reason: HOSPADM

## 2023-10-23 RX ORDER — DIAZEPAM 5 MG
10 TABLET ORAL EVERY 30 MIN PRN
Status: DISCONTINUED | OUTPATIENT
Start: 2023-10-23 | End: 2023-10-24

## 2023-10-23 RX ORDER — OLANZAPINE 5 MG/1
5-10 TABLET, ORALLY DISINTEGRATING ORAL EVERY 6 HOURS PRN
Status: DISCONTINUED | OUTPATIENT
Start: 2023-10-23 | End: 2023-10-26 | Stop reason: HOSPADM

## 2023-10-23 RX ORDER — GABAPENTIN 300 MG/1
300 CAPSULE ORAL EVERY 8 HOURS
Qty: 6 CAPSULE | Refills: 0 | Status: DISCONTINUED | OUTPATIENT
Start: 2023-10-29 | End: 2023-10-26 | Stop reason: HOSPADM

## 2023-10-23 RX ORDER — AMOXICILLIN 250 MG
2 CAPSULE ORAL 2 TIMES DAILY PRN
Status: DISCONTINUED | OUTPATIENT
Start: 2023-10-23 | End: 2023-10-26 | Stop reason: HOSPADM

## 2023-10-23 RX ORDER — AMOXICILLIN 250 MG
1 CAPSULE ORAL 2 TIMES DAILY PRN
Status: DISCONTINUED | OUTPATIENT
Start: 2023-10-23 | End: 2023-10-26 | Stop reason: HOSPADM

## 2023-10-23 RX ORDER — HALOPERIDOL 5 MG/ML
2.5-5 INJECTION INTRAMUSCULAR EVERY 6 HOURS PRN
Status: DISCONTINUED | OUTPATIENT
Start: 2023-10-23 | End: 2023-10-26 | Stop reason: HOSPADM

## 2023-10-23 RX ORDER — NALOXONE HYDROCHLORIDE 0.4 MG/ML
0.2 INJECTION, SOLUTION INTRAMUSCULAR; INTRAVENOUS; SUBCUTANEOUS
Status: DISCONTINUED | OUTPATIENT
Start: 2023-10-23 | End: 2023-10-26 | Stop reason: HOSPADM

## 2023-10-23 RX ORDER — FLUMAZENIL 0.1 MG/ML
0.2 INJECTION, SOLUTION INTRAVENOUS
Status: DISCONTINUED | OUTPATIENT
Start: 2023-10-23 | End: 2023-10-26 | Stop reason: HOSPADM

## 2023-10-23 RX ORDER — FOLIC ACID 1 MG/1
1 TABLET ORAL DAILY
Status: DISCONTINUED | OUTPATIENT
Start: 2023-10-24 | End: 2023-10-26 | Stop reason: HOSPADM

## 2023-10-23 RX ORDER — LORAZEPAM 2 MG/ML
2 INJECTION INTRAMUSCULAR ONCE
Status: DISCONTINUED | OUTPATIENT
Start: 2023-10-23 | End: 2023-10-23

## 2023-10-23 RX ORDER — HALOPERIDOL 5 MG/ML
2.5 INJECTION INTRAMUSCULAR ONCE
Status: COMPLETED | OUTPATIENT
Start: 2023-10-23 | End: 2023-10-23

## 2023-10-23 RX ORDER — DEXTROSE, SODIUM CHLORIDE, SODIUM LACTATE, POTASSIUM CHLORIDE, AND CALCIUM CHLORIDE 5; .6; .31; .03; .02 G/100ML; G/100ML; G/100ML; G/100ML; G/100ML
1000 INJECTION, SOLUTION INTRAVENOUS ONCE
Status: COMPLETED | OUTPATIENT
Start: 2023-10-23 | End: 2023-10-23

## 2023-10-23 RX ORDER — MULTIPLE VITAMINS W/ MINERALS TAB 9MG-400MCG
1 TAB ORAL DAILY
Status: DISCONTINUED | OUTPATIENT
Start: 2023-10-24 | End: 2023-10-26 | Stop reason: HOSPADM

## 2023-10-23 RX ORDER — HYDRALAZINE HYDROCHLORIDE 20 MG/ML
10 INJECTION INTRAMUSCULAR; INTRAVENOUS EVERY 4 HOURS PRN
Status: DISCONTINUED | OUTPATIENT
Start: 2023-10-23 | End: 2023-10-26 | Stop reason: HOSPADM

## 2023-10-23 RX ORDER — CLONIDINE HYDROCHLORIDE 0.1 MG/1
0.1 TABLET ORAL EVERY 8 HOURS
Status: DISCONTINUED | OUTPATIENT
Start: 2023-10-23 | End: 2023-10-24

## 2023-10-23 RX ORDER — IBUPROFEN 200 MG
400 TABLET ORAL EVERY 8 HOURS PRN
Status: ON HOLD | COMMUNITY
End: 2023-10-25

## 2023-10-23 RX ORDER — ONDANSETRON 4 MG/1
4 TABLET, ORALLY DISINTEGRATING ORAL EVERY 6 HOURS PRN
Status: DISCONTINUED | OUTPATIENT
Start: 2023-10-23 | End: 2023-10-26 | Stop reason: HOSPADM

## 2023-10-23 RX ORDER — NALOXONE HYDROCHLORIDE 0.4 MG/ML
0.4 INJECTION, SOLUTION INTRAMUSCULAR; INTRAVENOUS; SUBCUTANEOUS
Status: DISCONTINUED | OUTPATIENT
Start: 2023-10-23 | End: 2023-10-26 | Stop reason: HOSPADM

## 2023-10-23 RX ORDER — LORAZEPAM 2 MG/ML
1 INJECTION INTRAMUSCULAR ONCE
Status: DISCONTINUED | OUTPATIENT
Start: 2023-10-23 | End: 2023-10-23

## 2023-10-23 RX ORDER — DIAZEPAM 10 MG/2ML
5-10 INJECTION, SOLUTION INTRAMUSCULAR; INTRAVENOUS EVERY 30 MIN PRN
Status: DISCONTINUED | OUTPATIENT
Start: 2023-10-23 | End: 2023-10-24

## 2023-10-23 RX ORDER — GABAPENTIN 100 MG/1
100 CAPSULE ORAL EVERY 8 HOURS
Qty: 9 CAPSULE | Refills: 0 | Status: DISCONTINUED | OUTPATIENT
Start: 2023-10-31 | End: 2023-10-26 | Stop reason: HOSPADM

## 2023-10-23 RX ORDER — GABAPENTIN 300 MG/1
600 CAPSULE ORAL EVERY 8 HOURS
Qty: 12 CAPSULE | Refills: 0 | Status: DISCONTINUED | OUTPATIENT
Start: 2023-10-27 | End: 2023-10-26 | Stop reason: HOSPADM

## 2023-10-23 RX ORDER — DEXTROSE MONOHYDRATE 25 G/50ML
25-50 INJECTION, SOLUTION INTRAVENOUS
Status: DISCONTINUED | OUTPATIENT
Start: 2023-10-23 | End: 2023-10-26 | Stop reason: HOSPADM

## 2023-10-23 RX ORDER — DIAZEPAM 10 MG/2ML
5 INJECTION, SOLUTION INTRAMUSCULAR; INTRAVENOUS ONCE
Status: COMPLETED | OUTPATIENT
Start: 2023-10-23 | End: 2023-10-23

## 2023-10-23 RX ORDER — ONDANSETRON 2 MG/ML
4 INJECTION INTRAMUSCULAR; INTRAVENOUS EVERY 6 HOURS PRN
Status: DISCONTINUED | OUTPATIENT
Start: 2023-10-23 | End: 2023-10-26 | Stop reason: HOSPADM

## 2023-10-23 RX ORDER — LISINOPRIL 10 MG/1
10 TABLET ORAL DAILY
Status: DISCONTINUED | OUTPATIENT
Start: 2023-10-24 | End: 2023-10-26 | Stop reason: HOSPADM

## 2023-10-23 RX ORDER — NICOTINE POLACRILEX 4 MG
15-30 LOZENGE BUCCAL
Status: DISCONTINUED | OUTPATIENT
Start: 2023-10-23 | End: 2023-10-26 | Stop reason: HOSPADM

## 2023-10-23 RX ADMIN — SODIUM CHLORIDE, SODIUM LACTATE, POTASSIUM CHLORIDE, CALCIUM CHLORIDE AND DEXTROSE MONOHYDRATE 1000 ML: 5; 600; 310; 30; 20 INJECTION, SOLUTION INTRAVENOUS at 14:31

## 2023-10-23 RX ADMIN — DIAZEPAM 10 MG: 5 TABLET ORAL at 18:29

## 2023-10-23 RX ADMIN — SODIUM CHLORIDE 1000 ML: 9 INJECTION, SOLUTION INTRAVENOUS at 13:44

## 2023-10-23 RX ADMIN — DIAZEPAM 5 MG: 10 INJECTION, SOLUTION INTRAMUSCULAR; INTRAVENOUS at 13:50

## 2023-10-23 RX ADMIN — FOLIC ACID: 5 INJECTION, SOLUTION INTRAMUSCULAR; INTRAVENOUS; SUBCUTANEOUS at 19:58

## 2023-10-23 RX ADMIN — GABAPENTIN 1200 MG: 600 TABLET, FILM COATED ORAL at 19:25

## 2023-10-23 RX ADMIN — MELATONIN 5 MG TABLET 5 MG: at 23:44

## 2023-10-23 RX ADMIN — HALOPERIDOL LACTATE 2.5 MG: 5 INJECTION, SOLUTION INTRAMUSCULAR at 16:25

## 2023-10-23 RX ADMIN — CLONIDINE HYDROCHLORIDE 0.1 MG: 0.1 TABLET ORAL at 19:25

## 2023-10-23 ASSESSMENT — ACTIVITIES OF DAILY LIVING (ADL)
ADLS_ACUITY_SCORE: 35
ADLS_ACUITY_SCORE: 20
ADLS_ACUITY_SCORE: 35
ADLS_ACUITY_SCORE: 20
ADLS_ACUITY_SCORE: 35

## 2023-10-23 NOTE — ED TRIAGE NOTES
Triage Assessment (Adult)       Row Name 10/23/23 1862          Triage Assessment    Airway WDL WDL        Respiratory WDL    Respiratory WDL WDL        Skin Circulation/Temperature WDL    Skin Circulation/Temperature WDL WDL        Cardiac WDL    Cardiac WDL WDL

## 2023-10-23 NOTE — ED NOTES
Tele-PIT/Intake Evaluation      Video-Visit Details    Type of service:  Video Visit    Video Start Time (time video started): 1:30 PM  Video End Time (time video stopped): 1:33 PM   Originating Location (pt. Location): [facility]  Distant Location (provider location):  [facility]  Mode of Communication:  Video Conference via [employer]  Patient verbally consented to [facility] televisit.    History:  Patient is a diabetic with a history of alcohol abuse.  He drinks up to half of the 1.75/day.  Last drink was on Saturday.  He is now having withdrawal symptoms including hallucinations both auditory and visual.  This is happened to him before.  He does not feel like he had a seizure this time around but has had seizures in the past per his report.  He states that he was not good about taking his meds while he was drinking but he is back on them.  No cough cold runny nose or other complaints.  No recent falls or head injuries.    Exam:  General: Uncomfortable appearing  Psych: Pleasant  No data found.    Appropriate interventions for symptom management were initiated if applicable.  Appropriate diagnostic tests were initiated if indicated.    Important information for subsequent clinician:  Patient appears to be withdrawing from alcohol.  This is his usual presentation.  He has been off alcohol for 2 days now.  He has had seizures in the past but none yet.  I have ordered labs and fluids as well as Valium.    I briefly evaluated the patient and developed an initial plan of care. I discussed this plan and explained that this brief interaction does not constitute a full evaluation. Patient/family understands that they should wait to be fully evaluated and discuss any test results with another clinician prior to leaving the hospital.       Cristopher Almazan MD  10/23/23 0360

## 2023-10-23 NOTE — ED TRIAGE NOTES
ETOH withdrawal, last drink was Saturday night, he is diabetic, now he is hallucinating, he has been drinking half a 1.75 this past 17 years, he has maybe had a seizure woken up with bites to his tongue in the past.

## 2023-10-23 NOTE — H&P
[facility]    History and Physical - Hospitalist Service       Date of Admission:  10/23/2023    Assessment & Plan      Ravi Ahmadi is a 33 year old male with past medical history of call use disorder with prior hospitalizations for alcohol withdrawal, alcoholic hepatitis, hypertension and type 2 diabetes who was admitted on 10/23/2023 for management of acute alcohol withdrawal.     Alcohol use disorder, now with acute alcohol withdrawal with hallucinations  *Has hx of alcohol use disorder and has been hospitalization hospitalized for alcohol withdrawal in the past has been complicated by seizures.  Since his most recent hospitalization in 8/2023, he has had intermittent periods of sobriety lasting a few weeks at a time. Approximately 1 week prior to admission he began drinking alcohol again. Reported drinking up to 1.75 L of whiskey a day.  Last drink was on 10/21/23. He has felt poorly since that time. Earlier today he began to develop hallucinations which ultimately prompted his presentation to the emergency room for management of withdrawal.  He is interested in formulating a plan to maintain sobriety going forward.  *In ED, patient was afebrile, was tachycardic and hypertensive with BPs 170s/100s.  He was tremulous and hallucinating.  Labs notable for WBC 13.1, lites and renal function stable, bicarb 25, anion gap 17, ketones 1.7, glucose 150, BAL negative. EKG showed sinus tachycardia. He was given IV fluids, Valium and Haldol.   -- cont treatment of alcohol withdrawal per protocol with sched gabapentin, clonidine  -- prn Valium per CIWA protocol  -- IV MVI, thiamine, folate  -- cont supportive cares with prns for pain/nausea  -- chem dep consult    Suspected alcoholic hepatitis  *Hx of abnl LFTs noted during prior admissions.   *LFTs abnl again today but seems to be close to his baseline and better than LFTs back in 6/2023.   -- trend labs    Hypertension  *Elevated BPs noted  during prior admissions. Maintained on lisinopril 10mg daily.   *Suspect elevated blood pressures today are due in part to alcohol withdrawal.  -- PTA lisinopril resumed  -- monitor BPs  -- prns available    DM II  *A1c 8.4 in 8/2023. Following hospitalization in 8/2023, which was due in part to AGMA secondary to lactic acidosis and starvation ketosis, he was started on metformin 1000mg BID and Lantus 10U BID.  Per pharmacy admission med rec, he has not been taking Lantus in over a month but continues to use metformin  -- monitor blood sugars, will utilize sliding scale insulin for now.  -- as withdrawal improves, will revisit plan for ongoing blood sugar management and whether regimen needs to be titrated further    Leukocytosis  *Suspect reactive secondary to above. Was noted to have leukocytosis during prior hospitalization in 8/2023 without evidence of infection.  No reports of recent fevers illness or other infection.  -- monitor clinically, no indication for antibiotics at this time  -- further evaluation if leukocytosis persists or if he becomes febrile    Hypercalcemia  Hypomagnesemia  *Initial BMP with Ca 11.0 (corrects to 10.2 when accounting for albumin), Mag 1.6.   -- will add lyte replacement protocols        Diet:  Regular diet  DVT Prophylaxis: Pneumatic Compression Devices  Abernathy Catheter: Not present  Lines: None     Cardiac Monitoring: None  Code Status:  Full code    Clinically Significant Risk Factors Present on Admission           # Hypercalcemia: Highest Ca = 11 mg/dL in last 2 days, will monitor as appropriate  # Hypomagnesemia: Lowest Mg = 1.6 mg/dL in last 2 days, will replace as needed       # Hypertension: Noted on problem list     # DMII: A1C = 8.4 % (Ref range: <5.7 %) within past 6 months    # Obesity: Estimated body mass index is 33.91 kg/m  as calculated from the following:    Height as of this encounter: 1.829 m (6').    Weight as of this encounter: 113.4 kg (250 lb).       #  Financial/Environmental Concerns:    # Asthma: noted on problem list        Disposition Plan      Expected Discharge Date: 10/25/2023                Pam Welch DO  Hospitalist Service  [facility]  Securely message with basno (more info)  Text page via [employer] Paging/Directory     ______________________________________________________________________    Chief Complaint   Hallucinations, alcohol withdrawal    History is obtained from the patient, ED MD and chart review    History of Present Illness   Ravi Ahmadi is a 33 year old male with past medical history of call use disorder with prior hospitalizations for alcohol withdrawal, alcoholic hepatitis, hypertension and type 2 diabetes who presented to the ED for management of alcohol withdrawal    Has hx of alcohol use disorder and has been hospitalization hospitalized for alcohol withdrawal in the past has been complicated by seizures.  Since his most recent hospitalization in 8/2023, he has had intermittent periods of sobriety lasting a few weeks at a time. Approximately 1 week prior to admission he began drinking alcohol again. Reported drinking up to 1.75 L of whiskey a day.  Last drink was on 10/21/23. He has felt poorly since that time. Earlier today he began to develop hallucinations which ultimately prompted his presentation to the emergency room for management of withdrawal.  He is interested in formulating a plan to maintain sobriety going forward.    In ED, patient was afebrile, was tachycardic and hypertensive with BPs 170s/100s.  He was tremulous and hallucinating.  Labs notable for WBC 13.1, lites and renal function stable, bicarb 25, anion gap 17, ketones 1.7, glucose 150, BAL negative. EKG showed sinus tachycardia. He was given IV fluids, Valium and Haldol.      Saw patient this afternoon, he reported that he was feeling a little bit better.  Tremor had improved.  Was able to relay above history.  No specific  complaints of pain at present, no n/v.  Denies recent illness.      Past Medical History    Past Medical History:   Diagnosis Date    Abnormal LFTs     suspected secondary to alcohol use    Alcohol use disorder     with hx of hospitalizations for withdrawal    Atopic dermatitis     Intermittent asthma     exercise    Tobacco use     Type 2 diabetes mellitus (H)     Diagnosed during hosptial stay in 8/2023 with A1c 8.4       Past Surgical History   Past Surgical History:   Procedure Laterality Date    CL AFF SURGICAL PATHOLOGY      gravel/foreign body removal in abdomen after accident       Prior to Admission Medications      PTA Med List   Medication Sig Last Dose    glucose (BD GLUCOSE) 4 g chewable tablet Take 4 tablets by mouth every 15 minutes as needed for low blood sugar Unknown    ibuprofen (ADVIL/MOTRIN) 200 MG tablet Take 400 mg by mouth every 8 hours as needed for pain Unknown    insulin glargine (LANTUS PEN) 100 UNIT/ML pen Inject 10 Units Subcutaneous 2 times daily Hold if BG < 100 More than a month    lisinopril (ZESTRIL) 10 MG tablet Take 1 tablet (10 mg) by mouth daily 10/23/2023 at 0600    metFORMIN (GLUCOPHAGE) 1000 MG tablet Take 1 tablet (1,000 mg) by mouth 2 times daily (with meals) for 30 days 10/23/2023 at am        Review of Systems    The 10 point Review of Systems is negative other than noted in the HPI or here.      Physical Exam   Vital Signs:     BP: (!) 177/116 Pulse: 109     SpO2: 97 % O2 Device: None (Room air)    Weight: 250 lbs 0 oz    General Appearance: Resting comfortably, alert and conversing appropriately, NAD  Respiratory: CTAB, no wheeze/rales/rhonchi, no increased work of breathing  Cardiovascular: HRRR, no MGR, no LE edema  GI: S, NT, ND, +BS  Skin: warm/dry  Other: Faint bilateral UE tremor    Medical Decision Making       75 MINUTES SPENT BY ME on the date of service doing chart review, history, exam, documentation & further activities per the note.      Data     I have  personally reviewed the following data over the past 24 hrs:    13.1 (H)  \   15.6   / 145 (L)     137 95 (L) 9.1 /  150 (H)   3.5 25 0.68 \     ALT: 77 (H) AST: 72 (H) AP: 72 TBILI: 3.0 (H)   ALB: 5.0 TOT PROTEIN: 8.6 (H) LIPASE: 53       Imaging results reviewed over the past 24 hrs:   No results found for this or any previous visit (from the past 24 hour(s)).

## 2023-10-23 NOTE — PROGRESS NOTES
RECEIVING UNIT ED HANDOFF REVIEW    ED Nurse Handoff Report was reviewed by: Shannan Bateman RN on October 23, 2023 at 5:15 PM

## 2023-10-23 NOTE — ED NOTES
[facility]  ED Nurse Handoff Report    ED Chief complaint: Withdrawal      ED Diagnosis:   Final diagnoses:   Alcohol withdrawal syndrome with perceptual disturbance (H)   Alcoholic ketoacidosis       Code Status: Full Code    Allergies: No Known Allergies    Patient Story:  Pt drinks up to half of the 1.75/day.  Last drink was on Saturday.  He is now having withdrawal symptoms including hallucinations both auditory and visual.  This is happened to him before.  He does not feel like he had a seizure this time around but has had seizures in the past per his report.   Results for orders placed or performed during the hospital encounter of 10/23/23   Comprehensive metabolic panel     Status: Abnormal   Result Value Ref Range    Sodium 137 135 - 145 mmol/L    Potassium 3.5 3.4 - 5.3 mmol/L    Carbon Dioxide (CO2) 25 22 - 29 mmol/L    Anion Gap 17 (H) 7 - 15 mmol/L    Urea Nitrogen 9.1 6.0 - 20.0 mg/dL    Creatinine 0.68 0.67 - 1.17 mg/dL    GFR Estimate >90 >60 mL/min/1.73m2    Calcium 11.0 (H) 8.6 - 10.0 mg/dL    Chloride 95 (L) 98 - 107 mmol/L    Glucose 150 (H) 70 - 99 mg/dL    Alkaline Phosphatase 72 40 - 129 U/L    AST 72 (H) 0 - 45 U/L    ALT 77 (H) 0 - 70 U/L    Protein Total 8.6 (H) 6.4 - 8.3 g/dL    Albumin 5.0 3.5 - 5.2 g/dL    Bilirubin Total 3.0 (H) <=1.2 mg/dL   Alcohol level blood     Status: Normal   Result Value Ref Range    Alcohol ethyl <0.01 <=0.01 g/dL   CBC with platelets and differential     Status: Abnormal   Result Value Ref Range    WBC Count 13.1 (H) 4.0 - 11.0 10e3/uL    RBC Count 4.72 4.40 - 5.90 10e6/uL    Hemoglobin 15.6 13.3 - 17.7 g/dL    Hematocrit 45.7 40.0 - 53.0 %    MCV 97 78 - 100 fL    MCH 33.1 (H) 26.5 - 33.0 pg    MCHC 34.1 31.5 - 36.5 g/dL    RDW 14.3 10.0 - 15.0 %    Platelet Count 145 (L) 150 - 450 10e3/uL    % Neutrophils 76 %    % Lymphocytes 12 %    % Monocytes 11 %    % Eosinophils 0 %    % Basophils 0 %    % Immature Granulocytes 1 %    NRBCs per 100 WBC  0 <1 /100    Absolute Neutrophils 9.8 (H) 1.6 - 8.3 10e3/uL    Absolute Lymphocytes 1.6 0.8 - 5.3 10e3/uL    Absolute Monocytes 1.5 (H) 0.0 - 1.3 10e3/uL    Absolute Eosinophils 0.0 0.0 - 0.7 10e3/uL    Absolute Basophils 0.1 0.0 - 0.2 10e3/uL    Absolute Immature Granulocytes 0.1 <=0.4 10e3/uL    Absolute NRBCs 0.0 10e3/uL   Duff Draw     Status: None    Narrative    The following orders were created for panel order Duff Draw.  Procedure                               Abnormality         Status                     ---------                               -----------         ------                     Extra Blue Top Tube[[id number]]                              Final result               Extra Red Top Tube[[id number]]                               Final result                 Please view results for these tests on the individual orders.   Extra Blue Top Tube     Status: None   Result Value Ref Range    Hold Specimen JIC    Extra Red Top Tube     Status: None   Result Value Ref Range    Hold Specimen JIC    Lipase     Status: Normal   Result Value Ref Range    Lipase 53 13 - 60 U/L   Magnesium     Status: Abnormal   Result Value Ref Range    Magnesium 1.6 (L) 1.7 - 2.3 mg/dL   Ketone Beta-Hydroxybutyrate Quantitative     Status: Abnormal   Result Value Ref Range    Ketone (Beta-Hydroxybutyrate) Quantitative 1.70 (HH) <=0.30 mmol/L   EKG 12-lead, tracing only     Status: None   Result Value Ref Range    Systolic Blood Pressure  mmHg    Diastolic Blood Pressure  mmHg    Ventricular Rate 103 BPM    Atrial Rate 103 BPM    HI Interval 154 ms    QRS Duration 108 ms     ms    QTc 495 ms    P Axis 76 degrees    R AXIS 85 degrees    T Axis 68 degrees    Interpretation ECG       Sinus tachycardia  Otherwise normal ECG  When compared with ECG of 17-AUG-2023 18:22,  No significant change was found  Confirmed by GENERATED REPORT, COMPUTER ([id number]),  Adin Florez ([id number]) on 10/23/2023 2:04:02 PM     CBC with  platelets + differential     Status: Abnormal    Narrative    The following orders were created for panel order CBC with platelets + differential.  Procedure                               Abnormality         Status                     ---------                               -----------         ------                     CBC with platelets and d...[[id number]]  Abnormal            Final result                 Please view results for these tests on the individual orders.     Focused Assessment:  pt AAOX4    Treatments and/or interventions provided: VALIUM  Patient's response to treatments and/or interventions: Decreased CIWA    To be done/followed up on inpatient unit:  MONITOR CIWA    Does this patient have any cognitive concerns?: no    Activity level - Baseline/Home:  Independent  Activity Level - Current:   Independent    Patient's Preferred language: English   Needed?: No    Isolation: None  Infection: Not Applicable  Patient tested for COVID 19 prior to admission: NO  Bariatric?: No    Vital Signs:   Vitals:    10/23/23 1345 10/23/23 1400   BP: (!) 172/105 (!) 177/116   Pulse: 88 109   SpO2:  97%   Weight:  113.4 kg (250 lb)   Height:  1.829 m (6')       Cardiac Rhythm:     Was the PSS-3 completed:   Yes  What interventions are required if any?               Family Comments:   OBS brochure/video discussed/provided to patient/family: No              Name of person given brochure if not patient:               Relationship to patient:     For the majority of the shift this patient's behavior was Green.   Behavioral interventions performed were .    ED NURSE PHONE NUMBER: [phone]

## 2023-10-23 NOTE — PHARMACY-ADMISSION MEDICATION HISTORY
Pharmacist Admission Medication History    Admission medication history is complete. The information provided in this note is only as accurate as the sources available at the time of the update.    Information Source(s): Patient and CareEverywhere/SureScripts via in-person    Pertinent Information: Has not taken Lantus in a month. No prescription fill info in sure scripts    Changes made to PTA medication list:  Added: ibuprofen  Deleted: None  Changed: marked as not taking: patanol, epinastine, albuterol    Medication Affordability:  Not including over the counter (OTC) medications, was there a time in the past 3 months when you did not take your medications as prescribed because of cost?: No    Allergies reviewed with patient and updates made in EHR: yes    Medication History Completed By: Delphine Do RPH 10/23/2023 4:22 PM    PTA Med List   Medication Sig Last Dose    glucose (BD GLUCOSE) 4 g chewable tablet Take 4 tablets by mouth every 15 minutes as needed for low blood sugar Unknown    ibuprofen (ADVIL/MOTRIN) 200 MG tablet Take 400 mg by mouth every 8 hours as needed for pain Unknown    insulin glargine (LANTUS PEN) 100 UNIT/ML pen Inject 10 Units Subcutaneous 2 times daily Hold if BG < 100 More than a month    lisinopril (ZESTRIL) 10 MG tablet Take 1 tablet (10 mg) by mouth daily 10/23/2023 at 0600    metFORMIN (GLUCOPHAGE) 1000 MG tablet Take 1 tablet (1,000 mg) by mouth 2 times daily (with meals) for 30 days 10/23/2023 at am

## 2023-10-24 LAB
ALBUMIN SERPL BCG-MCNC: 4.2 G/DL (ref 3.5–5.2)
ALBUMIN SERPL BCG-MCNC: 4.2 G/DL (ref 3.5–5.2)
ALP SERPL-CCNC: 62 U/L (ref 40–129)
ALP SERPL-CCNC: 63 U/L (ref 40–129)
ALT SERPL W P-5'-P-CCNC: 83 U/L (ref 0–70)
ALT SERPL W P-5'-P-CCNC: 84 U/L (ref 0–70)
ANION GAP SERPL CALCULATED.3IONS-SCNC: 13 MMOL/L (ref 7–15)
ANION GAP SERPL CALCULATED.3IONS-SCNC: 13 MMOL/L (ref 7–15)
AST SERPL W P-5'-P-CCNC: 98 U/L (ref 0–45)
AST SERPL W P-5'-P-CCNC: 99 U/L (ref 0–45)
B-OH-BUTYR SERPL-SCNC: 0.8 MMOL/L
BILIRUB SERPL-MCNC: 2.3 MG/DL
BILIRUB SERPL-MCNC: 2.5 MG/DL
BUN SERPL-MCNC: 7 MG/DL (ref 6–20)
BUN SERPL-MCNC: 8.2 MG/DL (ref 6–20)
CALCIUM SERPL-MCNC: 10.1 MG/DL (ref 8.6–10)
CALCIUM SERPL-MCNC: 10.3 MG/DL (ref 8.6–10)
CHLORIDE SERPL-SCNC: 99 MMOL/L (ref 98–107)
CHLORIDE SERPL-SCNC: 99 MMOL/L (ref 98–107)
CREAT SERPL-MCNC: 0.64 MG/DL (ref 0.67–1.17)
CREAT SERPL-MCNC: 0.66 MG/DL (ref 0.67–1.17)
DEPRECATED HCO3 PLAS-SCNC: 27 MMOL/L (ref 22–29)
DEPRECATED HCO3 PLAS-SCNC: 27 MMOL/L (ref 22–29)
EGFRCR SERPLBLD CKD-EPI 2021: >90 ML/MIN/1.73M2
EGFRCR SERPLBLD CKD-EPI 2021: >90 ML/MIN/1.73M2
ERYTHROCYTE [DISTWIDTH] IN BLOOD BY AUTOMATED COUNT: 14.3 % (ref 10–15)
GLUCOSE BLDC GLUCOMTR-MCNC: 124 MG/DL (ref 70–99)
GLUCOSE BLDC GLUCOMTR-MCNC: 128 MG/DL (ref 70–99)
GLUCOSE BLDC GLUCOMTR-MCNC: 129 MG/DL (ref 70–99)
GLUCOSE BLDC GLUCOMTR-MCNC: 130 MG/DL (ref 70–99)
GLUCOSE BLDC GLUCOMTR-MCNC: 141 MG/DL (ref 70–99)
GLUCOSE SERPL-MCNC: 112 MG/DL (ref 70–99)
GLUCOSE SERPL-MCNC: 181 MG/DL (ref 70–99)
HBA1C MFR BLD: 5.7 %
HCT VFR BLD AUTO: 44.1 % (ref 40–53)
HGB BLD-MCNC: 14.8 G/DL (ref 13.3–17.7)
MAGNESIUM SERPL-MCNC: 1.7 MG/DL (ref 1.7–2.3)
MCH RBC QN AUTO: 32.9 PG (ref 26.5–33)
MCHC RBC AUTO-ENTMCNC: 33.6 G/DL (ref 31.5–36.5)
MCV RBC AUTO: 98 FL (ref 78–100)
PHOSPHATE SERPL-MCNC: 5.1 MG/DL (ref 2.5–4.5)
PLATELET # BLD AUTO: 128 10E3/UL (ref 150–450)
POTASSIUM SERPL-SCNC: 3 MMOL/L (ref 3.4–5.3)
POTASSIUM SERPL-SCNC: 3.1 MMOL/L (ref 3.4–5.3)
POTASSIUM SERPL-SCNC: 3.4 MMOL/L (ref 3.4–5.3)
PROT SERPL-MCNC: 7.1 G/DL (ref 6.4–8.3)
PROT SERPL-MCNC: 7.2 G/DL (ref 6.4–8.3)
RBC # BLD AUTO: 4.5 10E6/UL (ref 4.4–5.9)
SODIUM SERPL-SCNC: 139 MMOL/L (ref 135–145)
SODIUM SERPL-SCNC: 139 MMOL/L (ref 135–145)
TSH SERPL DL<=0.005 MIU/L-ACNC: 2.88 UIU/ML (ref 0.3–4.2)
WBC # BLD AUTO: 8 10E3/UL (ref 4–11)

## 2023-10-24 PROCEDURE — 80053 COMPREHEN METABOLIC PANEL: CPT | Performed by: INTERNAL MEDICINE

## 2023-10-24 PROCEDURE — 83735 ASSAY OF MAGNESIUM: CPT | Performed by: HOSPITALIST

## 2023-10-24 PROCEDURE — 84155 ASSAY OF PROTEIN SERUM: CPT | Performed by: HOSPITALIST

## 2023-10-24 PROCEDURE — 36415 COLL VENOUS BLD VENIPUNCTURE: CPT | Performed by: INTERNAL MEDICINE

## 2023-10-24 PROCEDURE — 120N000001 HC R&B MED SURG/OB

## 2023-10-24 PROCEDURE — H0001 ALCOHOL AND/OR DRUG ASSESS: HCPCS

## 2023-10-24 PROCEDURE — 84100 ASSAY OF PHOSPHORUS: CPT | Performed by: HOSPITALIST

## 2023-10-24 PROCEDURE — 84443 ASSAY THYROID STIM HORMONE: CPT | Performed by: HOSPITALIST

## 2023-10-24 PROCEDURE — 82010 KETONE BODYS QUAN: CPT | Performed by: HOSPITALIST

## 2023-10-24 PROCEDURE — 99233 SBSQ HOSP IP/OBS HIGH 50: CPT | Performed by: HOSPITALIST

## 2023-10-24 PROCEDURE — 84132 ASSAY OF SERUM POTASSIUM: CPT | Performed by: HOSPITALIST

## 2023-10-24 PROCEDURE — 250N000013 HC RX MED GY IP 250 OP 250 PS 637: Performed by: INTERNAL MEDICINE

## 2023-10-24 PROCEDURE — 83036 HEMOGLOBIN GLYCOSYLATED A1C: CPT | Performed by: HOSPITALIST

## 2023-10-24 PROCEDURE — 36415 COLL VENOUS BLD VENIPUNCTURE: CPT | Performed by: HOSPITALIST

## 2023-10-24 PROCEDURE — 250N000013 HC RX MED GY IP 250 OP 250 PS 637: Performed by: HOSPITALIST

## 2023-10-24 PROCEDURE — 85014 HEMATOCRIT: CPT | Performed by: INTERNAL MEDICINE

## 2023-10-24 RX ORDER — LORAZEPAM 2 MG/ML
1-2 INJECTION INTRAMUSCULAR EVERY 30 MIN PRN
Status: DISCONTINUED | OUTPATIENT
Start: 2023-10-24 | End: 2023-10-26 | Stop reason: HOSPADM

## 2023-10-24 RX ORDER — POTASSIUM CHLORIDE 1500 MG/1
40 TABLET, EXTENDED RELEASE ORAL ONCE
Status: COMPLETED | OUTPATIENT
Start: 2023-10-24 | End: 2023-10-24

## 2023-10-24 RX ORDER — LORAZEPAM 1 MG/1
1-2 TABLET ORAL EVERY 30 MIN PRN
Status: DISCONTINUED | OUTPATIENT
Start: 2023-10-24 | End: 2023-10-26 | Stop reason: HOSPADM

## 2023-10-24 RX ORDER — PANTOPRAZOLE SODIUM 40 MG/1
40 TABLET, DELAYED RELEASE ORAL
Status: DISCONTINUED | OUTPATIENT
Start: 2023-10-24 | End: 2023-10-26 | Stop reason: HOSPADM

## 2023-10-24 RX ADMIN — POTASSIUM CHLORIDE 40 MEQ: 1500 TABLET, EXTENDED RELEASE ORAL at 14:17

## 2023-10-24 RX ADMIN — GABAPENTIN 900 MG: 300 CAPSULE ORAL at 18:45

## 2023-10-24 RX ADMIN — MULTIPLE VITAMINS W/ MINERALS TAB 1 TABLET: TAB at 10:08

## 2023-10-24 RX ADMIN — LISINOPRIL 10 MG: 10 TABLET ORAL at 10:08

## 2023-10-24 RX ADMIN — CLONIDINE HYDROCHLORIDE 0.1 MG: 0.1 TABLET ORAL at 11:54

## 2023-10-24 RX ADMIN — FOLIC ACID 1 MG: 1 TABLET ORAL at 10:09

## 2023-10-24 RX ADMIN — THIAMINE HCL TAB 100 MG 100 MG: 100 TAB at 10:08

## 2023-10-24 RX ADMIN — CLONIDINE HYDROCHLORIDE 0.1 MG: 0.1 TABLET ORAL at 03:34

## 2023-10-24 RX ADMIN — GABAPENTIN 900 MG: 300 CAPSULE ORAL at 03:34

## 2023-10-24 RX ADMIN — OXYCODONE HYDROCHLORIDE 2.5 MG: 5 TABLET ORAL at 20:08

## 2023-10-24 RX ADMIN — GABAPENTIN 900 MG: 300 CAPSULE ORAL at 11:55

## 2023-10-24 RX ADMIN — PANTOPRAZOLE SODIUM 40 MG: 40 TABLET, DELAYED RELEASE ORAL at 17:11

## 2023-10-24 ASSESSMENT — ACTIVITIES OF DAILY LIVING (ADL)
ADLS_ACUITY_SCORE: 20

## 2023-10-24 NOTE — PLAN OF CARE
Goal Outcome Evaluation:       Summary:  Alcohol Withdrawal  DATE & TIME: 10/23/2023 8966-8231   Cognitive Concerns/ Orientation : A/O x4   BEHAVIOR & AGGRESSION TOOL COLOR: green  CIWA SCORE: 0, 0   ABNL VS/O2: VSS x /85. , 133  MOBILITY: SBA/Independent  PAIN MANAGMENT: Denies  DIET: Regular  BOWEL/BLADDER: continent B&B  ABNL LAB/BG: ketone 1.70, LFT abn, Calcium 11, mag 1.6 recheck this morning  DRAIN/DEVICES: R PIV SL  TELEMETRY RHYTHM: NA  SKIN: shin rashes, scab, bruises  TESTS/PROCEDURES: NA  D/C DAY/GOALS/PLACE: Pending  OTHER IMPORTANT INFO: Psych to evaluate, Chem IP consult. Check MD note for suicidal history.

## 2023-10-24 NOTE — ED PROVIDER NOTES
History     Chief Complaint:  Withdrawal     HPI   Ravi Ahmadi is a 33 year old male with pmhx alcohol abuse, T2DM, and more who presents with CC alcohol withdrawal. He reports his last use of alcohol was 2 days ago. He reports symptoms began yesterday with nausea/vomiting and hallucinations. Hallucinations have been worse today and culminated in patient calling police due to them. Pt with history of withdrawal seizures in the past.       Independent Historian:   None - Patient Only    Review of External Notes:   Reviewed discharge summary from last hospitalization.        Medications:    No current outpatient medications on file.      Past Medical History:    Past Medical History:   Diagnosis Date    Abnormal LFTs     Alcohol use disorder     Atopic dermatitis     Intermittent asthma     Tobacco use     Type 2 diabetes mellitus (H)        Past Surgical History:    Past Surgical History:   Procedure Laterality Date    CL AFF SURGICAL PATHOLOGY      gravel/foreign body removal in abdomen after accident        Physical Exam   Patient Vitals for the past 24 hrs:   BP Temp Temp src Pulse Resp SpO2 Height Weight   10/23/23 1812 (!) 145/85 98.3  F (36.8  C) Oral 98 18 95 % -- --   10/23/23 1600 122/69 -- -- 85 -- 96 % -- --   10/23/23 1400 (!) 177/116 -- -- 109 -- 97 % 1.829 m (6') 113.4 kg (250 lb)   10/23/23 1345 (!) 172/105 -- -- 88 -- -- -- --        Physical Exam  Nursing note and vitals reviewed.  HENT:   Mouth/Throat: Moist mucous membranes.   Eyes: EOMI, nonicteric sclera  Cardiovascular: tachycardic, regular rhythm, no murmurs, rubs, or gallops  Pulmonary/Chest: Effort normal and breath sounds normal. No respiratory distress. No wheezes. No rales.   Abdominal: Soft. Nontender, nondistended, no guarding or rigidity.   Musculoskeletal: Normal range of motion.   Neurological: Alert. Tremulous. Moves all extremities spontaneously.   Skin: Skin is warm and dry. No rash noted.         Emergency Department Course    ECG  ECG results from 10/23/23   EKG 12-lead, tracing only     Value    Systolic Blood Pressure     Diastolic Blood Pressure     Ventricular Rate 103    Atrial Rate 103    WA Interval 154    QRS Duration 108        QTc 495    P Axis 76    R AXIS 85    T Axis 68    Interpretation ECG      Sinus tachycardia  Otherwise normal ECG  When compared with ECG of 17-AUG-2023 18:22,  No significant change was found  Confirmed by GENERATED REPORT, COMPUTER ([id number]),  Adin Florez ([id number]) on 10/23/2023 2:04:02 PM             Laboratory:  Labs Ordered and Resulted from Time of ED Arrival to Time of ED Departure   COMPREHENSIVE METABOLIC PANEL - Abnormal       Result Value    Sodium 137      Potassium 3.5      Carbon Dioxide (CO2) 25      Anion Gap 17 (*)     Urea Nitrogen 9.1      Creatinine 0.68      GFR Estimate >90      Calcium 11.0 (*)     Chloride 95 (*)     Glucose 150 (*)     Alkaline Phosphatase 72      AST 72 (*)     ALT 77 (*)     Protein Total 8.6 (*)     Albumin 5.0      Bilirubin Total 3.0 (*)    CBC WITH PLATELETS AND DIFFERENTIAL - Abnormal    WBC Count 13.1 (*)     RBC Count 4.72      Hemoglobin 15.6      Hematocrit 45.7      MCV 97      MCH 33.1 (*)     MCHC 34.1      RDW 14.3      Platelet Count 145 (*)     % Neutrophils 76      % Lymphocytes 12      % Monocytes 11      % Eosinophils 0      % Basophils 0      % Immature Granulocytes 1      NRBCs per 100 WBC 0      Absolute Neutrophils 9.8 (*)     Absolute Lymphocytes 1.6      Absolute Monocytes 1.5 (*)     Absolute Eosinophils 0.0      Absolute Basophils 0.1      Absolute Immature Granulocytes 0.1      Absolute NRBCs 0.0     MAGNESIUM - Abnormal    Magnesium 1.6 (*)    KETONE BETA-HYDROXYBUTYRATE QUANTITATIVE, RAPID - Abnormal    Ketone (Beta-Hydroxybutyrate) Quantitative 1.70 (*)    ETHYL ALCOHOL LEVEL - Normal    Alcohol ethyl <0.01     LIPASE - Normal    Lipase 53            Emergency Department Course & Assessments:              Interventions:  Medications   cloNIDine (CATAPRES) tablet 0.1 mg (0.1 mg Oral $Given 10/23/23 1925)   OLANZapine zydis (zyPREXA) ODT tab 5-10 mg (has no administration in time range)     Or   haloperidol lactate (HALDOL) injection 2.5-5 mg (has no administration in time range)   flumazenil (ROMAZICON) injection 0.2 mg (has no administration in time range)   melatonin tablet 5 mg (has no administration in time range)   oxyCODONE IR (ROXICODONE) half-tab 2.5 mg (has no administration in time range)   ondansetron (ZOFRAN ODT) ODT tab 4 mg (has no administration in time range)     Or   ondansetron (ZOFRAN) injection 4 mg (has no administration in time range)   prochlorperazine (COMPAZINE) injection 10 mg (has no administration in time range)     Or   prochlorperazine (COMPAZINE) tablet 10 mg (has no administration in time range)     Or   prochlorperazine (COMPAZINE) suppository 25 mg (has no administration in time range)   senna-docusate (SENOKOT-S/PERICOLACE) 8.6-50 MG per tablet 1 tablet (has no administration in time range)     Or   senna-docusate (SENOKOT-S/PERICOLACE) 8.6-50 MG per tablet 2 tablet (has no administration in time range)   gabapentin (NEURONTIN) capsule 900 mg (has no administration in time range)   gabapentin (NEURONTIN) capsule 600 mg (has no administration in time range)   gabapentin (NEURONTIN) capsule 300 mg (has no administration in time range)   gabapentin (NEURONTIN) capsule 100 mg (has no administration in time range)   diazepam (VALIUM) tablet 10 mg (10 mg Oral $Given 10/23/23 1829)     Or   diazepam (VALIUM) injection 5-10 mg ( Intravenous See Alternative 10/23/23 1829)   sodium chloride 0.9 % 1,000 mL with Infuvite Adult 10 mL, thiamine 100 mg, folic acid 1 mg infusion (has no administration in time range)   thiamine (B-1) tablet 100 mg (has no administration in time range)   folic acid (FOLVITE) tablet 1 mg (has no administration in time range)   multivitamin w/minerals  (THERA-VIT-M) tablet 1 tablet (has no administration in time range)   hydrALAZINE (APRESOLINE) injection 10 mg (has no administration in time range)   glucose gel 15-30 g (has no administration in time range)     Or   dextrose 50 % injection 25-50 mL (has no administration in time range)     Or   glucagon injection 1 mg (has no administration in time range)   insulin aspart (NovoLOG) injection (RAPID ACTING) ( Subcutaneous Not Given 10/23/23 1853)   insulin aspart (NovoLOG) injection (RAPID ACTING) (has no administration in time range)   lisinopril (ZESTRIL) tablet 10 mg (has no administration in time range)   naloxone (NARCAN) injection 0.2 mg (has no administration in time range)     Or   naloxone (NARCAN) injection 0.4 mg (has no administration in time range)     Or   naloxone (NARCAN) injection 0.2 mg (has no administration in time range)     Or   naloxone (NARCAN) injection 0.4 mg (has no administration in time range)   sodium chloride 0.9% BOLUS 1,000 mL (0 mLs Intravenous Stopped 10/23/23 1432)   diazepam (VALIUM) injection 5 mg (5 mg Intravenous $Given 10/23/23 1350)   dextrose 5% in lactated ringers infusion (0 mLs Intravenous Stopped 10/23/23 1802)   haloperidol lactate (HALDOL) injection 2.5 mg (2.5 mg Intravenous $Given 10/23/23 1625)   gabapentin (NEURONTIN) tablet 1,200 mg (1,200 mg Oral $Given 10/23/23 1925)            Independent Interpretation (X-rays, CTs, rhythm strip):  None    Consultations/Discussion of Management or Tests:     The patient arrived in triage where vitals were measured and recorded.   The patient was then escorted back to the emergency department.   The patient's medical records were reviewed.  Nursing notes and vitals were reviewed.    I performed an exam of the patient as documented above. The patient is in agreement with my plan of care.       Social Determinants of Health affecting care:   None    Disposition:  The patient was admitted to the hospital under the care of   Rebekah.     Impression & Plan        Medical Decision Making:  Pt presents with CC etoh withdrawal. Pt hypertensive, tachycardic, tremulous, and hallucinating on arrival - all consistent with withdrawal. No signs of intoxication or co-ingestions. Labs suggest alcoholic ketoacidosis and alcohol hepatitis. 1L D5LR given for correction. Pt symptomatically improved after IV benzos and haldol. Admission indicated for further evaluation and treatment. Case discussed with hospitalist service, Dr. Welch, who accepts pt for admission. Pt in agreement with the plan. All questions answered.       Diagnosis:    ICD-10-CM    1. Alcohol withdrawal syndrome with perceptual disturbance (H)  F10.932       2. Alcoholic ketoacidosis  E87.29                 Twin Marcus MD  10/24/23 0242

## 2023-10-24 NOTE — PLAN OF CARE
Goal Outcome Evaluation:           Summary:  Alcohol Withdrawal  DATE & TIME: 10/23/2023 Evening    Cognitive Concerns/ Orientation : A/O x4   BEHAVIOR & AGGRESSION TOOL COLOR: green  CIWA SCORE: 11, 0 gave 10mg Ativan ( seeing halos, headache and anxiety), ED gave Haldol MAR. Patient sleeping between and throughout cares.  ABNL VS/O2: VSS x /85. , 133  MOBILITY: Independent  PAIN MANAGMENT: some abdominal pain 4/10, and headache  DIET: regular  BOWEL/BLADDER: continent BB  ABNL LAB/BG: ketone 1.70, LFT abn, Calcium 11  DRAIN/DEVICES: Rt PIV  TELEMETRY RHYTHM: NA  SKIN: shin rashes  TESTS/PROCEDURES: NA  D/C DAY/GOALS/PLACE: Pending  OTHER IMPORTANT INFO: Psych to evaluate, Chem IP consult. Check MD note for suicidal history.

## 2023-10-24 NOTE — PLAN OF CARE
Goal Outcome Evaluation:    Summary: Alcohol Withdrawal    DATE & TIME: 10/24/23 5094-4752   Cognitive Concerns/ Orientation: A&O x4   BEHAVIOR & AGGRESSION TOOL COLOR: green  CIWA SCORE: 0, 1, 0  ABNL VS/O2: VSS on RA  MOBILITY: Independent  PAIN MANAGMENT: Denies  DIET: Regular, good appetite  BOWEL/BLADDER: continent B&B  ABNL LAB/BG: ketone 0.80; Calcium 10.3; K+ 3.0 (replaced per protocol); B, 129, 128  DRAIN/DEVICES: R PIV SL  TELEMETRY RHYTHM: NA  SKIN: shin rashes, scab, bruises  TESTS/PROCEDURES: NA  D/C DAY/GOALS/PLACE: referrals sent to residential substance use treatments per  note  OTHER IMPORTANT INFO: Psych to evaluate, Chem IP consult. Check MD note for suicidal history.

## 2023-10-24 NOTE — UTILIZATION REVIEW
"  Admission Status; Secondary Review Determination         Under the authority of the Utilization Management Committee, the utilization review process indicated a secondary review on the above patient.  The review outcome is based on review of the medical records, discussions with staff, and applying clinical experience noted on the date of the review.        (xxx)      Inpatient Status Appropriate - This patient's medical care is consistent with medical management for inpatient care and reasonable inpatient medical practice.      () Observation Status Appropriate - This patient does not meet hospital inpatient criteria and is placed in observation status. If this patient's primary payer is Medicare and was admitted as an inpatient, Condition Code 44 should be used and patient status changed to \"observation\".   () Admission Status NOT Appropriate - This patient's medical care is not consistent with medical management for Inpatient or Observation Status.          RATIONALE FOR DETERMINATION   Ravi Ahmadi is a 33 year old male with past medical history of call use disorder with prior hospitalizations for alcohol withdrawal complicated by seizure, alcoholic hepatitis, hypertension and type 2 diabetes who was admitted on 10/23/2023 for management of acute alcohol withdrawal.  Last alcoholic beverage was 10/21.  In the ER he was tremulous, hypertensive, tachycardic, and hallucinating.  CIWA scores since admission have been 12 and most recently 22.  IP status is appropriate.      The severity of illness, intensity of service provided, expected LOS and risk for adverse outcome make the care complex, high risk and appropriate for hospital admission.        The information on this document is developed by the utilization review team in order for the business office to ensure compliance.  This only denotes the appropriateness of proper admission status and does not reflect the quality of care rendered.         The " definitions of Inpatient Status and Observation Status used in making the determination above are those provided in the CMS Coverage Manual, Chapter 1 and Chapter 6, section 70.4.      Sincerely,     Dina Lee MD  Physician Advisor   Utilization Review/ Case Management  [facility].
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
## 2023-10-24 NOTE — PROGRESS NOTES
Care Management Follow Up    Length of Stay (days): 1    Expected Discharge Date: 10/27/2023     Concerns to be Addressed:       Patient plan of care discussed at interdisciplinary rounds: Yes    Anticipated Discharge Disposition:  Residential Substance Use Treatment     Anticipated Discharge Services:    Anticipated Discharge DME:      Patient/family educated on Medicare website which has current facility and service quality ratings:    Education Provided on the Discharge Plan:    Patient/Family in Agreement with the Plan:      Referrals Placed by CM/SW:    Private pay costs discussed: Not applicable    Additional Information:  Met with patient to ask that he sign the YOSEF for his JHON assessment and any necessary medical information to the programs discussed earlier today with the [facility]. Patient signed the referrals and confirms his preference is to admit into a residential program.  He is hoping to go home and pack his belongings and then admit to a program.  He does live with family so if he cannot admit to a program the same day he discharges, he feels his family can provide support for patient to remain sobriety while awaiting placement.   Referrals were sent to [facility] and [facility] has called and requesting medical information to continue their assesment. As of today they do not have a vacancy however this can change day to day. H&P, MAR and RN notes faxed to [facility] at [phone]    PALLAVI Olsen

## 2023-10-24 NOTE — CONSULTS
10/24/2023    Pt has been interviewed via telephone and CD Consult has been completed. Email has been sent to unit SW with YOSEF's for pt to sign. Referrals have been sent to treatment facilities for review, per pt request.     Recommendations:      Abstain from all non-prescribed mood-altering substances  Take all medications as prescribed  Enter and complete a MICD residential or inpatient treatment program  Follow all recommendations upon discharge from treatment. Recommendations may include, but are not limited to: extended treatment, outpatient treatment and/or sober housing.  Increase sober support, attend support meetings at least one time weekly  Follow up with scheduling and attending therapy appointments        7.   Follow all recommendations of your medical providers      Clinical Substantiation:       Pt reports he has never been to CD treatment and has not attended sober support meetings. Pt reports tolerance, withdrawal, and an inability to control his use. Pt reports he has lost jobs due to using. Pt reports he lives with his parents and is unemployed. Pt reports he is not seeing a therapist but would like to. Pt reports he would like to attend inpatient CD treatment. Pt lacks coping skills and relapse prevention strategies. Pt lacks a sober peer support, recovery network.      Referrals/ Alternatives:  [facility] [phone]  Fax - [phone]     [facility] -   Phone: [phone]   Fax: [phone]     [facility] Team for Referrals  Phone: [phone]  Intake: [phone]   Fax: [phone]        JHON consult completed by: EILEEN Romo.  Phone Number: [phone]  E-mail Address: [email or web address] Mental Health and Addiction Services Evaluation Department  [address] [place] [address]

## 2023-10-24 NOTE — PROGRESS NOTES
[facility]  Hospitalist Progress Note   10/24/2023          Assessment and Plan:       Ravi Ahmadi is a 33 year old male with alcohol use disorder with prior hospitalizations for alcohol withdrawal, alcoholic hepatitis, hypertension and type 2 diabetes admitted on 10/23/2023 for management of acute alcohol withdrawal.     Acute alcohol withdrawal with hallucinations.  Anion gap metabolic acidosis from alcohol use.  Elevated ketones from alcohol use, starvation.  Alcohol abuse.  *Has hx of alcohol use disorder and has been hospitalization hospitalized for alcohol withdrawal in the past has been complicated by seizures.  Since his most recent hospitalization in 8/2023, he has had intermittent periods of sobriety lasting a few weeks at a time. Approximately 1 week prior to admission he began drinking alcohol again. Reported drinking up to 1.75 L of whiskey a day.  Last drink was on 10/21/23. He has felt poorly since that time. Earlier today he began to develop hallucinations which ultimately prompted his presentation to the emergency room for management of withdrawal.  He is interested in formulating a plan to maintain sobriety going forward.  *In ED, patient was afebrile, was tachycardic and hypertensive with BPs 170s/100s.  He was tremulous and hallucinating.   -WBC 13.1, lites and renal function stable, bicarb 25, anion gap 17, ketones 1.7, glucose 150, BAL negative. EKG showed sinus tachycardia.  -- Has been treated for alcohol withdrawal with supportive care, clonidine, gabapentin and Valium symptoms improving.  Discontinue IV fluids, monitor intake.  Discontinue clonidine, monitor blood pressures.  Continue scheduled gabapentin for withdrawal.  Continue CIWA.  IV/p.o. as needed Ativan ordered.  Continue thiamine multivitamin and folic acid supplements.  Consult requested.  Social work assistance with transition plans.  Emphasized need to consider abstinence from alcohol use, somewhat  motivated.    Abdominal discomfort likely from gastritis due to alcohol/nicotine use  Complains of abdominal discomfort, bloating sensation.  Lipase 53.  Creatinine within normal limits.  Hemoglobin stable.  No blood in the stools or blood in the urine.  Emphasized need to consider abstinence from alcohol and nicotine use.  Trial of oral Protonix.  GI evaluation as outpatient.    Suspected alcoholic hepatitis  *Hx of abnl LFTs noted during prior admissions.   *LFTs abnl but seems to be close to his baseline and better than LFTs back in 6/2023.   -- trend labs, avoid hepatotoxic drugs.  Need to consider abstinence from alcohol use.     Hypertension  *Elevated BPs noted during prior admissions. Maintained on lisinopril 10mg daily.   *Suspect elevated blood pressures are due in part to alcohol withdrawal.  -- PTA lisinopril continued.  -- monitor BPs, improving.  -- prns available    Sinus tachycardia improving.  No chest pain or palpitations.  EKG sinus tachycardia heart rate 103, QTc 495.  Follow TSH.  Heart rate improving.  Correct electrolyte abnormalities.     DM II  *A1c 8.4 in 8/2023. Following hospitalization in 8/2023, which was due in part to AGMA secondary to lactic acidosis and starvation ketosis, he was started on metformin 1000mg BID and Lantus 10U BID.  Per pharmacy admission med rec, he has not been taking Lantus in over a month but continues to use metformin  -- monitor blood sugars, will utilize sliding scale insulin for now.  Follow hemoglobin level.,  Optimize regimen prior to discharge.     Leukocytosis likely reactive.   Was noted to have leukocytosis during prior hospitalization in 8/2023 without evidence of infection.  No reports of recent fevers illness or other infection.  -- monitor clinically, no indication for antibiotics at this time  -- further evaluation if leukocytosis persists or if he becomes febrile    Thrombocytopenia from alcohol use.  Platelets dropped from 1 45- 128  Monitor in  AM.  No Active bleeding.    Hypokalemia from alcohol use, poor intake  Hypercalcemia likely from alcohol use, dehydration  Hypomagnesemia likely from poor intake, alcohol use  *Initial BMP with Ca 11.0 (corrects to 10.2 when accounting for albumin), Mag 1.6.   Electrolyte replacement protocol.  Monitor electrolytes this morning.    Nicotine dependence.  Continues to smoke about a pack a day.  Emphasized need to consider abstinence.    Hyperlipidemia.  PTA not on meds.  Age-appropriate health maintenance as outpatient    Obesity with a BMI of 33.  Need to consider lifestyle modification with diet and exercise as able to.     Orders Placed This Encounter      Combination Diet Regular Diet Adult      DVT Prophylaxis: SCDs, ambulate.  Code Status: Full Code  Disposition: Expected discharge in 1 to 2 days pending clinical improvement.    Discussed with patient, bedside RN  >52 minutes spent by me on the date of service doing chart review, history, exam, documentation & further activities per the note.      Kentrell Fernandez MD        Interval History:        Patient lying in bed.  Denies any chest pain or palpitations at this time.  Does admit to having some anxiety.  Denies any nausea.  No vomiting.  Does admit to some on and off head ache.  No new tingling or numbness.  No bowel or bladder incontinence.  Reports some improvement in hallucinations.  Blood pressures improving.  Tolerating oral diet.         Physical Exam:        Physical Exam   Temp:  [97.8  F (36.6  C)-98.3  F (36.8  C)] 97.8  F (36.6  C)  Pulse:  [79-98] 79  Resp:  [18] 18  BP: (122-145)/() 134/104  SpO2:  [94 %-97 %] 97 %    Intake/Output Summary (Last 24 hours) at 10/24/2023 1423  Last data filed at 10/24/2023 1010  Gross per 24 hour   Intake 240 ml   Output --   Net 240 ml       Admission Weight: 113.4 kg (250 lb)  Current Weight: 113.4 kg (250 lb)    PHYSICAL EXAM  GENERAL: Patient is in no distress. Alert and oriented.  HEENT: Oropharynx  pink, moist.   HEART: Regular rate and rhythm. S1S2. No murmurs  LUNGS: Clear to auscultation bilaterally. No expiratory wheeze.  Respirations unlabored  ABDOMEN: Soft, no abdominal tenderness, bowel sounds heard   NEURO: Moving all extremities.  EXTREMITIES: No pedal edema  SKIN: Warm, dry. No rash   PSYCHIATRY Cooperative       Medications:         folic acid  1 mg Oral Daily    [START ON 10/31/2023] gabapentin  100 mg Oral Q8H    [START ON 10/29/2023] gabapentin  300 mg Oral Q8H    [START ON 10/27/2023] gabapentin  600 mg Oral Q8H    gabapentin  900 mg Oral Q8H    insulin aspart  1-7 Units Subcutaneous TID AC    insulin aspart  1-5 Units Subcutaneous At Bedtime    lisinopril  10 mg Oral Daily    multivitamin w/minerals  1 tablet Oral Daily    thiamine  100 mg Oral Daily     glucose **OR** dextrose **OR** glucagon, flumazenil, OLANZapine zydis **OR** haloperidol lactate, hydrALAZINE, LORazepam **OR** LORazepam, melatonin, naloxone **OR** naloxone **OR** naloxone **OR** naloxone, ondansetron **OR** ondansetron, oxyCODONE IR, prochlorperazine **OR** prochlorperazine **OR** prochlorperazine, senna-docusate **OR** senna-docusate         Data:      All new lab and imaging data was reviewed.

## 2023-10-24 NOTE — PROGRESS NOTES
MD Notification    Notified Person: MD    Notified Person Name:Pam Cortez    Notification Date/Time: 10/23/2023 @ 2030    Notification Interaction: Vocera IM    Purpose of Notification: patient states he recently had suicidal thoughts and had a plan of taking muscle relaxers and alcohol on 10/1/2023. Currently denies thoughts of suicide. Patient is also sedated and falling in and out of sleep while answering these questions. Patient does not currently have a plan or ideations.     Orders Received: Noted. If he is currently denying suicidal thoughts, then I don't think we need to make any changes to current treatment plan. Will continue to monitor closely. Can further discuss whether or not he'd like to speak with psychiatry in the morning.     Comments: Will continue to monitor closely. Can further discuss whether or not he'd like to speak with psychiatry in the morning.

## 2023-10-25 LAB
ALBUMIN SERPL BCG-MCNC: 4.6 G/DL (ref 3.5–5.2)
ALP SERPL-CCNC: 69 U/L (ref 40–129)
ALT SERPL W P-5'-P-CCNC: 86 U/L (ref 0–70)
ANION GAP SERPL CALCULATED.3IONS-SCNC: 13 MMOL/L (ref 7–15)
AST SERPL W P-5'-P-CCNC: 74 U/L (ref 0–45)
B-OH-BUTYR SERPL-SCNC: 0.4 MMOL/L
BILIRUB SERPL-MCNC: 2.2 MG/DL
BUN SERPL-MCNC: 9 MG/DL (ref 6–20)
CALCIUM SERPL-MCNC: 10.6 MG/DL (ref 8.6–10)
CHLORIDE SERPL-SCNC: 99 MMOL/L (ref 98–107)
CREAT SERPL-MCNC: 0.67 MG/DL (ref 0.67–1.17)
DEPRECATED HCO3 PLAS-SCNC: 26 MMOL/L (ref 22–29)
EGFRCR SERPLBLD CKD-EPI 2021: >90 ML/MIN/1.73M2
ERYTHROCYTE [DISTWIDTH] IN BLOOD BY AUTOMATED COUNT: 13.8 % (ref 10–15)
GLUCOSE BLDC GLUCOMTR-MCNC: 118 MG/DL (ref 70–99)
GLUCOSE BLDC GLUCOMTR-MCNC: 124 MG/DL (ref 70–99)
GLUCOSE BLDC GLUCOMTR-MCNC: 132 MG/DL (ref 70–99)
GLUCOSE BLDC GLUCOMTR-MCNC: 139 MG/DL (ref 70–99)
GLUCOSE BLDC GLUCOMTR-MCNC: 171 MG/DL (ref 70–99)
GLUCOSE SERPL-MCNC: 127 MG/DL (ref 70–99)
HCT VFR BLD AUTO: 46 % (ref 40–53)
HGB BLD-MCNC: 15.7 G/DL (ref 13.3–17.7)
MAGNESIUM SERPL-MCNC: 1.8 MG/DL (ref 1.7–2.3)
MCH RBC QN AUTO: 33.3 PG (ref 26.5–33)
MCHC RBC AUTO-ENTMCNC: 34.1 G/DL (ref 31.5–36.5)
MCV RBC AUTO: 98 FL (ref 78–100)
PHOSPHATE SERPL-MCNC: 3.9 MG/DL (ref 2.5–4.5)
PLATELET # BLD AUTO: 158 10E3/UL (ref 150–450)
POTASSIUM SERPL-SCNC: 3.1 MMOL/L (ref 3.4–5.3)
POTASSIUM SERPL-SCNC: 3.4 MMOL/L (ref 3.4–5.3)
PROT SERPL-MCNC: 8 G/DL (ref 6.4–8.3)
RBC # BLD AUTO: 4.72 10E6/UL (ref 4.4–5.9)
SODIUM SERPL-SCNC: 138 MMOL/L (ref 135–145)
WBC # BLD AUTO: 9.3 10E3/UL (ref 4–11)

## 2023-10-25 PROCEDURE — 250N000013 HC RX MED GY IP 250 OP 250 PS 637: Performed by: HOSPITALIST

## 2023-10-25 PROCEDURE — 250N000013 HC RX MED GY IP 250 OP 250 PS 637

## 2023-10-25 PROCEDURE — 82010 KETONE BODYS QUAN: CPT | Performed by: HOSPITALIST

## 2023-10-25 PROCEDURE — 85027 COMPLETE CBC AUTOMATED: CPT | Performed by: HOSPITALIST

## 2023-10-25 PROCEDURE — 83735 ASSAY OF MAGNESIUM: CPT | Performed by: HOSPITALIST

## 2023-10-25 PROCEDURE — 80053 COMPREHEN METABOLIC PANEL: CPT | Performed by: HOSPITALIST

## 2023-10-25 PROCEDURE — 84100 ASSAY OF PHOSPHORUS: CPT | Performed by: HOSPITALIST

## 2023-10-25 PROCEDURE — 99232 SBSQ HOSP IP/OBS MODERATE 35: CPT | Performed by: HOSPITALIST

## 2023-10-25 PROCEDURE — 84132 ASSAY OF SERUM POTASSIUM: CPT | Performed by: HOSPITALIST

## 2023-10-25 PROCEDURE — 250N000011 HC RX IP 250 OP 636: Performed by: HOSPITALIST

## 2023-10-25 PROCEDURE — 120N000001 HC R&B MED SURG/OB

## 2023-10-25 PROCEDURE — 250N000013 HC RX MED GY IP 250 OP 250 PS 637: Performed by: INTERNAL MEDICINE

## 2023-10-25 PROCEDURE — 36415 COLL VENOUS BLD VENIPUNCTURE: CPT | Performed by: HOSPITALIST

## 2023-10-25 PROCEDURE — 99254 IP/OBS CNSLTJ NEW/EST MOD 60: CPT

## 2023-10-25 RX ORDER — POTASSIUM CHLORIDE 1500 MG/1
40 TABLET, EXTENDED RELEASE ORAL ONCE
Status: COMPLETED | OUTPATIENT
Start: 2023-10-25 | End: 2023-10-25

## 2023-10-25 RX ORDER — LISINOPRIL 10 MG/1
10 TABLET ORAL DAILY
Qty: 30 TABLET | Refills: 0 | Status: ON HOLD | OUTPATIENT
Start: 2023-10-25 | End: 2023-12-11

## 2023-10-25 RX ORDER — LANOLIN ALCOHOL/MO/W.PET/CERES
100 CREAM (GRAM) TOPICAL DAILY
Qty: 30 TABLET | Refills: 0 | Status: ON HOLD | OUTPATIENT
Start: 2023-10-26 | End: 2023-12-11

## 2023-10-25 RX ORDER — NICOTINE 21 MG/24HR
1 PATCH, TRANSDERMAL 24 HOURS TRANSDERMAL DAILY
Status: DISCONTINUED | OUTPATIENT
Start: 2023-10-25 | End: 2023-10-26 | Stop reason: HOSPADM

## 2023-10-25 RX ORDER — ACAMPROSATE CALCIUM 333 MG/1
666 TABLET, DELAYED RELEASE ORAL 3 TIMES DAILY
Status: DISCONTINUED | OUTPATIENT
Start: 2023-10-25 | End: 2023-10-26 | Stop reason: HOSPADM

## 2023-10-25 RX ORDER — PANTOPRAZOLE SODIUM 40 MG/1
40 TABLET, DELAYED RELEASE ORAL
Qty: 12 TABLET | Refills: 0 | Status: ON HOLD | OUTPATIENT
Start: 2023-10-26 | End: 2023-12-11

## 2023-10-25 RX ORDER — NICOTINE 21 MG/24HR
1 PATCH, TRANSDERMAL 24 HOURS TRANSDERMAL DAILY
Qty: 30 PATCH | Refills: 0 | Status: ON HOLD | OUTPATIENT
Start: 2023-10-26 | End: 2023-12-11

## 2023-10-25 RX ORDER — LORAZEPAM 0.5 MG/1
0.5 TABLET ORAL 2 TIMES DAILY PRN
Status: DISCONTINUED | OUTPATIENT
Start: 2023-10-25 | End: 2023-10-26 | Stop reason: HOSPADM

## 2023-10-25 RX ORDER — SODIUM CHLORIDE AND POTASSIUM CHLORIDE 150; 900 MG/100ML; MG/100ML
INJECTION, SOLUTION INTRAVENOUS CONTINUOUS
Status: DISCONTINUED | OUTPATIENT
Start: 2023-10-25 | End: 2023-10-25

## 2023-10-25 RX ORDER — MULTIPLE VITAMINS W/ MINERALS TAB 9MG-400MCG
1 TAB ORAL DAILY
Qty: 30 TABLET | Refills: 0 | Status: ON HOLD | OUTPATIENT
Start: 2023-10-26 | End: 2023-12-11

## 2023-10-25 RX ORDER — FOLIC ACID 1 MG/1
1 TABLET ORAL DAILY
Qty: 30 TABLET | Refills: 0 | Status: ON HOLD | OUTPATIENT
Start: 2023-10-26 | End: 2023-12-11

## 2023-10-25 RX ORDER — SODIUM CHLORIDE AND POTASSIUM CHLORIDE 150; 900 MG/100ML; MG/100ML
INJECTION, SOLUTION INTRAVENOUS CONTINUOUS
Status: ACTIVE | OUTPATIENT
Start: 2023-10-25 | End: 2023-10-25

## 2023-10-25 RX ADMIN — ACAMPROSATE CALCIUM 666 MG: 333 TABLET, DELAYED RELEASE ORAL at 16:44

## 2023-10-25 RX ADMIN — GABAPENTIN 900 MG: 300 CAPSULE ORAL at 18:50

## 2023-10-25 RX ADMIN — GABAPENTIN 900 MG: 300 CAPSULE ORAL at 03:01

## 2023-10-25 RX ADMIN — POTASSIUM CHLORIDE AND SODIUM CHLORIDE: 900; 150 INJECTION, SOLUTION INTRAVENOUS at 12:39

## 2023-10-25 RX ADMIN — LISINOPRIL 10 MG: 10 TABLET ORAL at 08:41

## 2023-10-25 RX ADMIN — THIAMINE HCL TAB 100 MG 100 MG: 100 TAB at 08:42

## 2023-10-25 RX ADMIN — FOLIC ACID 1 MG: 1 TABLET ORAL at 08:42

## 2023-10-25 RX ADMIN — NICOTINE 1 PATCH: 14 PATCH, EXTENDED RELEASE TRANSDERMAL at 12:36

## 2023-10-25 RX ADMIN — PANTOPRAZOLE SODIUM 40 MG: 40 TABLET, DELAYED RELEASE ORAL at 06:45

## 2023-10-25 RX ADMIN — POTASSIUM CHLORIDE 40 MEQ: 1500 TABLET, EXTENDED RELEASE ORAL at 18:50

## 2023-10-25 RX ADMIN — GABAPENTIN 900 MG: 300 CAPSULE ORAL at 10:55

## 2023-10-25 RX ADMIN — POTASSIUM CHLORIDE 40 MEQ: 1500 TABLET, EXTENDED RELEASE ORAL at 12:36

## 2023-10-25 RX ADMIN — MULTIPLE VITAMINS W/ MINERALS TAB 1 TABLET: TAB at 08:42

## 2023-10-25 RX ADMIN — ACAMPROSATE CALCIUM 666 MG: 333 TABLET, DELAYED RELEASE ORAL at 21:29

## 2023-10-25 RX ADMIN — LORAZEPAM 1 MG: 1 TABLET ORAL at 12:36

## 2023-10-25 RX ADMIN — LORAZEPAM 1 MG: 1 TABLET ORAL at 00:07

## 2023-10-25 ASSESSMENT — ACTIVITIES OF DAILY LIVING (ADL)
ADLS_ACUITY_SCORE: 20

## 2023-10-25 NOTE — PLAN OF CARE
Goal Outcome Evaluation:    Summary: Alcohol Withdrawal    DATE & TIME: 10/25/23, 1185-6894  Cognitive Concerns/ Orientation: A&O x4   BEHAVIOR & AGGRESSION TOOL COLOR: green  CIWA SCORE: 3, 8, 4, PRN Ativan given x1 for auditory hallucinations and anxiety  ABNL VS/O2: VSS on RA  MOBILITY: Independent  PAIN MANAGMENT: denies  DIET: Regular, good appetite  BOWEL/BLADDER: continent B&B  ABNL LAB/BG: ketone 0.40; ALT 86; AST 74; K+ 3.1 (replaced per protocol); B, 139  DRAIN/DEVICES: L PIV infusing KCL 20mEq 100 ml/her  TELEMETRY RHYTHM: NA  SKIN: shin rashes, scab, bruises  TESTS/PROCEDURES: NA  D/C DAY/GOALS/PLACE: discharge 10/26 to [facility]  OTHER IMPORTANT INFO: Psych to evaluate, Chem IP consult. Check MD note for suicidal history.
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
## 2023-10-25 NOTE — PROGRESS NOTES
Care Management Follow Up    Length of Stay (days): 2    Expected Discharge Date: 10/27/2023     Concerns to be Addressed:       Patient plan of care discussed at interdisciplinary rounds: Yes    Anticipated Discharge Disposition: Inpatient Mental Health     Anticipated Discharge Services:    Anticipated Discharge DME:      Patient/family educated on Medicare website which has current facility and service quality ratings:    Education Provided on the Discharge Plan:    Patient/Family in Agreement with the Plan:      Referrals Placed by CM/SW:    Private pay costs discussed: Not applicable    Additional Information:  [facility] called today.  She interviewed patient this morning and is offering admission today however patient will need to arrive by 1200.  Writer sent a Vocera page to Dr Fernandez asking if patient is stable for discharge today.    Dr Fernandez plans to keep patient here today. [facility], located at [address] in [place] can accept patient tomorrow.  They will have a  scheduled to  patient on 10/26 at 1130 at door #2.  Dr Fernandez will order 30 day supply of medication for patient to bring with him to [facility].  The ccordinator at [facility] is Julia at [phone].  Thursday  will contact Marilyn to update her if patient is discharging on Thursday.    If patient is not ready tomorrow and if [facility] cannot offer admission at the time patient is ready to discharge, Isauro Jacobo is able to assess patient. She can be reached at [phone].     Lynn Ellison [facility]

## 2023-10-25 NOTE — PLAN OF CARE
Summary: Alcohol Withdrawal    DATE & TIME: 10/25/23, 2422-8541   Cognitive Concerns/ Orientation: A&O x4   BEHAVIOR & AGGRESSION TOOL COLOR: green  CIWA SCORE: 3,9,2,2  ABNL VS/O2: VSS on RA  MOBILITY: Independent  PAIN MANAGMENT: back pain, relief with oxycodone 2.5mg  DIET: Regular, good appetite  BOWEL/BLADDER: continent B&B  ABNL LAB/BG: ketone 0.80; Calcium 10.3; K+ 3.0 (replaced per protocol); B, 132  DRAIN/DEVICES: R PIV SL  TELEMETRY RHYTHM: NA  SKIN: shin rashes, scab, bruises  TESTS/PROCEDURES: NA  D/C DAY/GOALS/PLACE: referrals sent to residential substance use treatments per  note  OTHER IMPORTANT INFO: Psych to evaluate, Chem IP consult. Check MD note for suicidal history.

## 2023-10-26 VITALS
TEMPERATURE: 98.3 F | OXYGEN SATURATION: 95 % | HEART RATE: 93 BPM | RESPIRATION RATE: 18 BRPM | SYSTOLIC BLOOD PRESSURE: 130 MMHG | HEIGHT: 72 IN | DIASTOLIC BLOOD PRESSURE: 92 MMHG | WEIGHT: 250 LBS | BODY MASS INDEX: 33.86 KG/M2

## 2023-10-26 LAB
B-OH-BUTYR SERPL-SCNC: <0.18 MMOL/L
GLUCOSE BLDC GLUCOMTR-MCNC: 108 MG/DL (ref 70–99)
GLUCOSE BLDC GLUCOMTR-MCNC: 193 MG/DL (ref 70–99)
POTASSIUM SERPL-SCNC: 3.7 MMOL/L (ref 3.4–5.3)

## 2023-10-26 PROCEDURE — 84132 ASSAY OF SERUM POTASSIUM: CPT | Performed by: HOSPITALIST

## 2023-10-26 PROCEDURE — 250N000013 HC RX MED GY IP 250 OP 250 PS 637: Performed by: HOSPITALIST

## 2023-10-26 PROCEDURE — 250N000012 HC RX MED GY IP 250 OP 636 PS 637: Performed by: INTERNAL MEDICINE

## 2023-10-26 PROCEDURE — 250N000013 HC RX MED GY IP 250 OP 250 PS 637: Performed by: INTERNAL MEDICINE

## 2023-10-26 PROCEDURE — 250N000013 HC RX MED GY IP 250 OP 250 PS 637

## 2023-10-26 PROCEDURE — 99239 HOSP IP/OBS DSCHRG MGMT >30: CPT | Performed by: HOSPITALIST

## 2023-10-26 PROCEDURE — 250N000012 HC RX MED GY IP 250 OP 636 PS 637: Performed by: HOSPITALIST

## 2023-10-26 PROCEDURE — 82010 KETONE BODYS QUAN: CPT | Performed by: HOSPITALIST

## 2023-10-26 PROCEDURE — 36415 COLL VENOUS BLD VENIPUNCTURE: CPT | Performed by: HOSPITALIST

## 2023-10-26 RX ORDER — POTASSIUM CHLORIDE 1.5 G/1.58G
40 POWDER, FOR SOLUTION ORAL ONCE
Status: COMPLETED | OUTPATIENT
Start: 2023-10-26 | End: 2023-10-26

## 2023-10-26 RX ORDER — ACAMPROSATE CALCIUM 333 MG/1
666 TABLET, DELAYED RELEASE ORAL 3 TIMES DAILY
Qty: 90 TABLET | Refills: 0 | Status: SHIPPED | OUTPATIENT
Start: 2023-10-26 | End: 2023-10-26

## 2023-10-26 RX ORDER — GABAPENTIN 300 MG/1
300 CAPSULE ORAL 3 TIMES DAILY
Qty: 42 CAPSULE | Refills: 0 | Status: ON HOLD | OUTPATIENT
Start: 2023-10-26 | End: 2023-12-11

## 2023-10-26 RX ADMIN — THIAMINE HCL TAB 100 MG 100 MG: 100 TAB at 08:14

## 2023-10-26 RX ADMIN — PANTOPRAZOLE SODIUM 40 MG: 40 TABLET, DELAYED RELEASE ORAL at 06:47

## 2023-10-26 RX ADMIN — MULTIPLE VITAMINS W/ MINERALS TAB 1 TABLET: TAB at 08:14

## 2023-10-26 RX ADMIN — INSULIN GLARGINE 8 UNITS: 100 INJECTION, SOLUTION SUBCUTANEOUS at 08:15

## 2023-10-26 RX ADMIN — LISINOPRIL 10 MG: 10 TABLET ORAL at 08:14

## 2023-10-26 RX ADMIN — ACAMPROSATE CALCIUM 666 MG: 333 TABLET, DELAYED RELEASE ORAL at 08:14

## 2023-10-26 RX ADMIN — INSULIN ASPART 2 UNITS: 100 INJECTION, SOLUTION INTRAVENOUS; SUBCUTANEOUS at 08:18

## 2023-10-26 RX ADMIN — POTASSIUM CHLORIDE 40 MEQ: 1.5 POWDER, FOR SOLUTION ORAL at 09:46

## 2023-10-26 RX ADMIN — NICOTINE 1 PATCH: 14 PATCH, EXTENDED RELEASE TRANSDERMAL at 08:13

## 2023-10-26 RX ADMIN — FOLIC ACID 1 MG: 1 TABLET ORAL at 08:14

## 2023-10-26 RX ADMIN — GABAPENTIN 900 MG: 300 CAPSULE ORAL at 10:44

## 2023-10-26 RX ADMIN — GABAPENTIN 900 MG: 300 CAPSULE ORAL at 02:40

## 2023-10-26 ASSESSMENT — ACTIVITIES OF DAILY LIVING (ADL)
ADLS_ACUITY_SCORE: 20

## 2023-10-26 NOTE — DISCHARGE SUMMARY
Discharge Summary  Hospitalist    Date of Admission:  10/23/2023  Date of Discharge:  10/26/2023  Discharging Provider: Kentrell Fernandez MD    Primary Care Physician   Juancarlos Sood  Primary Care Provider Phone Number: [phone]  Primary Care Provider Fax Number: [phone]    PRINCIPAL DIAGNOSIS  Acute alcohol withdrawal with hallucinations   Anion gap metabolic acidosis from alcohol use - Resolved   Elevated ketones from alcohol use, starvation - Cleared  Abdominal discomfort likely from gastritis due to alcohol/nicotine use - improved   Suspected alcoholic hepatitis  Sinus tachycardia improved  Leukocytosis likely reactive.  Thrombocytopenia from alcohol use - improved   Hypokalemia from alcohol use, poor intake replaced.  Hypercalcemia likely from alcohol use, dehydration-improving.  Hypomagnesemia likely from poor intake, alcohol use-replaced.  Nicotine dependence.  Obesity with a BMI of 33.    Past Medical History:   Diagnosis Date    Abnormal LFTs     suspected secondary to alcohol use    Alcohol use disorder     with hx of hospitalizations for withdrawal    Atopic dermatitis     Intermittent asthma     exercise    Tobacco use     Type 2 diabetes mellitus (H)     Diagnosed during hosptial stay in 8/2023 with A1c 8.4       History of Present Illness   Ravi Ahmadi is an 33 year old male who presented with alcohol withdrawal.     Hospital Course   Ravi Ahmadi is a 33 year old male with alcohol use disorder with prior hospitalizations for alcohol withdrawal, alcoholic hepatitis, hypertension and type 2 diabetes admitted on 10/23/2023 for management of acute alcohol withdrawal.      Acute alcohol withdrawal with hallucinations   Anion gap metabolic acidosis from alcohol use - Resolved   Elevated ketones from alcohol use, starvation - Cleared  Alcohol abuse.  History of alcohol withdrawal seizures   --Since his most recent hospitalization in 8/2023, he has had intermittent periods of sobriety  lasting a few weeks at a time. Approximately 1 week prior to admission he began drinking alcohol again. Reported drinking up to 1.75 L of whiskey a day.  Last drink was on 10/21/23. He has felt poorly since that time.   --Reported worsening Auditory and visual hallucinations that culminated in patient calling police due to them.   -- At the time of admission, patient was tremulous and hallucinating.  afebrile, tachycardic, hypertensive with BPs 170s/100s.   -WBC 13.1. Renal function stable, bicarb 25, anion gap 17, ketones 1.7, glucose 150, BAL negative.   EKG showed sinus tachycardia.  -- Has been treated for alcohol withdrawal with supportive care, clonidine, gabapentin and Valium symptoms improving.    Continue thiamine multivitamin and folic acid supplements.  Continue gabapentin 3 times daily.  Psychiatry recommending to follow-up in clinic, accordingly can consider medical management.  [ Acamprosate not covered]  Emphasized need to consider abstinence from alcohol use, seems motivated.  Discharging to [facility]     Abdominal discomfort likely from gastritis due to alcohol/nicotine use - improved   Complained of abdominal discomfort, bloating sensation.  Lipase 53.  Creatinine within normal limits.  Hemoglobin stable.  No blood in the stools or blood in the urine.  Emphasized need to consider abstinence from alcohol and nicotine use.  Trial of oral Protonix for 2 weeks.  Consider GI evaluation as outpatient.     Suspected alcoholic hepatitis  Hepatic steatosis.  *Hx of abnl LFTs noted during prior admissions.  Recent ultrasound 8/2023 with hepatic steatosis.  *LFTs abnl but seems to be close to his baseline and better than LFTs back in 6/2023.   Avoid hepatotoxic drugs.  Need to consider abstinence from alcohol use.     Hypertension   Elevated BPs noted during prior admissions.  Elevated blood pressures suspect due to alcohol withdrawal.  Continue PTA lisinopril 10 mg oral daily.  Blood pressure  improved.      Sinus tachycardia improved  No chest pain or palpitations.  EKG sinus tachycardia heart rate 103, QTc 495.  TSH within normal limits.  heart rate improved.  Correct electrolyte abnormalities.     DM II with hemoglobin A1c of 5.7 [10/24/2023)  *A1c 8.4 in 8/2023.  A1c of 10.8 in 6/2023   --PTA on metformin 1000 mg twice daily, Lantus 10 units twice daily.  Discontinue metformin given concern for alcoholic hepatitis.  Continue insulin Lantus 8 units daily a.m.  [Decreased dose given improvement in hemoglobin A1c]  Monitor blood sugars closely, optimize regimen.  Hypoglycemia protocol in place.     Leukocytosis likely reactive.  WBC 13.1 > 9.3.  No signs or symptoms of infection at this time.     Thrombocytopenia from alcohol use - improved   Platelets dropped from 1 45- 128 >158  No Active bleeding.     Hypokalemia from alcohol use, poor intake replaced.  Hypercalcemia likely from alcohol use, dehydration-improving.  Hypomagnesemia likely from poor intake, alcohol use-replaced.  *Initial BMP with Ca 11.0 (corrects to 10.2 when accounting for albumin), Mag 1.6.  Potassium 3.0  Received IV fluids, electrolyte replacement.  Monitor CMP, magnesium in 7 to 10 days.    Nicotine dependence.  Continues to smoke about a pack a day.  Emphasized need to consider abstinence.  Nicotine patch continued on discharge     Hyperlipidemia.  PTA not on meds.  Age-appropriate health maintenance as outpatient     Obesity with a BMI of 33.  Need to consider lifestyle modification with diet and exercise as able to.     Kentrell Fernandez MD.    Pending Results   Unresulted Labs Ordered in the Past 30 Days of this Admission       No orders found from 9/23/2023 to 10/24/2023.               Physical Exam   Vitals:    10/23/23 1400   Weight: 113.4 kg (250 lb)     Vital Signs with Ranges  Temp:  [98  F (36.7  C)-98.3  F (36.8  C)] 98.3  F (36.8  C)  Pulse:  [79-93] 93  Resp:  [17-18] 18  BP: (130-143)/(89-92) 130/92  SpO2:  [95 %-99  %] 95 %  I/O last 3 completed shifts:  In: 600 [P.O.:600]  Out: -   PHYSICAL EXAM  GENERAL: Patient is in no distress. Alert and oriented.  HEENT: Oropharynx pink, moist.   HEART: Regular rate and rhythm. S1S2. No murmurs  LUNGS: Respirations unlabored  ABDOMEN: Soft, no abdominal tenderness, bowel sounds heard   NEURO: Moving all extremities.  EXTREMITIES: No pedal edema  SKIN: Warm, dry. No rash   PSYCHIATRY Cooperative  )Consultations This Hospital Stay   CHEMICAL DEPENDENCY IP CONSULT  CARE MANAGEMENT / SOCIAL WORK IP CONSULT  PSYCHIATRY IP CONSULT    Time Spent on this Encounter   IKentrell MD, personally saw the patient today and spent greater than 30 minutes discharging this patient.. Discussed with patient, bedside RN.    Discharge Disposition   Discharged to home  Condition at discharge: Good.    Discharge Orders      Reason for your hospital stay    You were admitted to the hospital with alcohol withdrawal, followed by hospitalist team, chemical dependency and psychiatry.  With supportive care symptoms improving plan for transition to rehab facility.     Activity    Your activity upon discharge: activity as tolerated     Discharge Instructions    Consider abstinence from alcohol use.  Consider cutting down, abstinence from nicotine use.  Avoid hepatotoxic drugs.  Consider lifestyle modification with diet and exercise as able to.     Monitor and record    Monitor blood sugars at least 1 time per day, review on provider visit and optimize therapy.  Monitor blood pressures daily, review on provider visit and optimize therapy.     Follow-up and recommended labs and tests     Follow up with primary care provider, Juancarlos Sood, within 7 days for hospital follow- up.    The following labs/tests are recommended: Follow-up CBC, CMP, magnesium, phosphorus level in 7 to 10 days.  Follow up with psychiatry after discharge in 1-2 weeks and consider Antabuse or naltrexone. .   Consider gastroenterology  evaluation as outpatient if ongoing abdominal discomfort.  Age-appropriate health maintenance on PCP visit.  Consider sleep studies as outpatient.     Diet    Low-salt, low-fat, carbohydrate controlled diet encouraged.       Discharge Medications   Discharge Medication List as of 10/26/2023 10:55 AM        START taking these medications    Details   folic acid (FOLVITE) 1 MG tablet Take 1 tablet (1 mg) by mouth daily, Disp-30 tablet, R-0, E-Prescribe      gabapentin (NEURONTIN) 300 MG capsule Take 1 capsule (300 mg) by mouth 3 times daily Taper on provider visit, Disp-42 capsule, R-0, E-Prescribe      multivitamin w/minerals (THERA-VIT-M) tablet Take 1 tablet by mouth daily, Disp-30 tablet, R-0, E-Prescribe      nicotine (NICODERM CQ) 14 MG/24HR 24 hr patch Place 1 patch onto the skin daily, Disp-30 patch, R-0, E-Prescribe      pantoprazole (PROTONIX) 40 MG EC tablet Take 1 tablet (40 mg) by mouth every morning (before breakfast), Disp-12 tablet, R-0, E-Prescribe      thiamine (B-1) 100 MG tablet Take 1 tablet (100 mg) by mouth daily, Disp-30 tablet, R-0, E-Prescribe           CONTINUE these medications which have CHANGED    Details   glucose (BD GLUCOSE) 4 g chewable tablet Take 4 tablets by mouth every 15 minutes as needed for low blood sugar, Disp-20 tablet, R-0, E-Prescribe      insulin glargine (LANTUS PEN) 100 UNIT/ML pen Inject 8 Units Subcutaneous every morning Hold if BG < 100, Disp-2 mL, R-0, E-PrescribeIf Lantus is not covered by insurance, may substitute Basaglar or Semglee or other insulin glargine product per insurance preference at same dose and frequency.        lisinopril (ZESTRIL) 10 MG tablet Take 1 tablet (10 mg) by mouth daily, Disp-30 tablet, R-0, E-Prescribe           CONTINUE these medications which have NOT CHANGED    Details   Alcohol Swabs PADS Use to swab the area of the injection or steven as directed  Per insurance coverage, Disp-100 each, R-0, E-Prescribe      blood glucose (NO BRAND  SPECIFIED) lancets standard To use to test glucose level in the blood.  Use to test blood sugar  4  times daily as directed. To accompany glucose monitor brands per insurance coverage.Disp-200 each, [id number]      blood glucose (NO BRAND SPECIFIED) test strip To use to test glucose level in the blood. Use to test blood sugar  4 times daily as directed. To accompany glucose monitor brands per insurance coverage., Disp-200 strip, R-0, E-Prescribe      blood glucose calibration (NO BRAND SPECIFIED) solution Used to calibrate the blood glucose monitor as needed and as directed.  To accompany  blood glucose brands per insurance coverage, Disp-1 each, R-0, E-Prescribe      blood glucose monitoring (NO BRAND SPECIFIED) meter device kit Use as directed Per insurance coverageDisp-1 kit, [id number]      insulin pen needle (32G X 4 MM) 32G X 4 MM miscellaneous Use as directed by provider Per insurance coverageDisp-200 each, [id number]      Sharps Container MISC Use as directed to dispose of needles, lancets and other sharps, Disp-1 each, R-0, E-Prescribe           STOP taking these medications       acamprosate (CAMPRAL) 333 MG EC tablet Comments:   Reason for Stopping:         albuterol (PROAIR HFA) 108 (90 Base) MCG/ACT inhaler Comments:   Reason for Stopping:         epinastine HCl (ELESTAT) 0.05 % SOLN Comments:   Reason for Stopping:         ibuprofen (ADVIL/MOTRIN) 200 MG tablet Comments:   Reason for Stopping:         metFORMIN (GLUCOPHAGE) 1000 MG tablet Comments:   Reason for Stopping:         olopatadine (PATANOL) 0.1 % ophthalmic solution Comments:   Reason for Stopping:             Allergies   No Known Allergies    DATA  Most Recent 3 CBC's:  Recent Labs   Lab Test 10/25/23  0949 10/24/23  1033 10/23/23  1325   WBC 9.3 8.0 13.1*   HGB 15.7 14.8 15.6   MCV 98 98 97    128* 145*      Most Recent 3 BMP's:  Recent Labs   Lab Test 10/26/23  0811 10/26/23  0240 10/26/23  0003 10/25/23  2105  10/25/23  1819 10/25/23  1638 10/25/23  1218 10/25/23  0949 10/24/23  1338 10/24/23  1237 10/24/23  1033   NA  --   --   --   --   --   --   --  138  --  139 139   POTASSIUM  --   --  3.7  --   --  3.4  --  3.1*   < > 3.0* 3.1*   CHLORIDE  --   --   --   --   --   --   --  99  --  99 99   CO2  --   --   --   --   --   --   --  26  --  27 27   BUN  --   --   --   --   --   --   --  9.0  --  8.2 7.0   CR  --   --   --   --   --   --   --  0.67  --  0.66* 0.64*   ANIONGAP  --   --   --   --   --   --   --  13  --  13 13   RAEANN  --   --   --   --   --   --   --  10.6*  --  10.3* 10.1*   * 108*  --  171*   < >  --    < > 127*   < > 112* 181*    < > = values in this interval not displayed.     Most Recent 2 LFT's:  Recent Labs   Lab Test 10/25/23  0949 10/24/23  1237   AST 74* 98*   ALT 86* 84*   ALKPHOS 69 63   BILITOTAL 2.2* 2.3*     Most Recent TSH, T4 and A1c Labs:  Recent Labs   Lab Test 10/24/23  1237 10/24/23  1033   TSH 2.88  --    A1C  --  5.7*     Results for orders placed or performed during the hospital encounter of 08/16/23   XR Chest 2 Views    Narrative    EXAM: XR CHEST 2 VIEWS  LOCATION: [facility]  DATE: 8/17/2023    INDICATION: Sepsis. Vomiting. Leukocytosis. Evaluate for aspiration pneumonia.  COMPARISON: Chest x-ray 2 views 01/04/2014 at 1647 hours.      Impression    IMPRESSION: Both lungs remain clear. Elevation of the right hemidiaphragm identified on current study. No adenopathy or effusion. Normal cardiac size and pulmonary vascularity. Anatomic alignment of the bones and joints. Monitoring leads have been placed   overlying the chest.

## 2023-10-26 NOTE — PROGRESS NOTES
Care Management Follow Up    Length of Stay (days): 3    Expected Discharge Date: 10/26/2023     Concerns to be Addressed:       Patient plan of care discussed at interdisciplinary rounds: Yes    Anticipated Discharge Disposition: Inpatient Mental Health     Anticipated Discharge Services:    Anticipated Discharge DME:      Patient/family educated on Medicare website which has current facility and service quality ratings:    Education Provided on the Discharge Plan:    Patient/Family in Agreement with the Plan:      Referrals Placed by CM/SW:    Private pay costs discussed: Not applicable    Additional Information:  SW confirmed with [facility] that patient is medically ready to discharge today and the doctor has signed the orders. They are still planning to have a  pick patient up at Door [address] at 1130.     ROBERT Delgadillo

## 2023-10-26 NOTE — DISCHARGE SUMMARY
Discharge    Patient discharged to [facility] via facilities own transportation.  Care plan note: alert and oriented x4. VSS on RA. Independent and ambulates in the sun multiple times a shift. Tolerating a regular diet. CIWA 0. Nicotine patch on right chest. Denies pain this shift. No skin concerns. Discharge medications and paperwork given, was able to read back information with understanding.    Listed belongings gathered and given to patient (including from security/pharmacy). Yes  Care Plan and Patient education resolved: Yes  Prescriptions if needed, hard copies sent with patient  Yes  Medication Bin checked and emptied on discharge Yes  SW/care coordinator/charge RN aware of discharge: Yes

## 2023-10-26 NOTE — PLAN OF CARE
Summary: Alcohol Withdrawal    DATE & TIME:   Cognitive Concerns/ Orientation: A&O x4   BEHAVIOR & AGGRESSION TOOL COLOR: green  CIWA SCORE: 2,  ABNL VS/O2: VSS on RA  MOBILITY: Independent  PAIN MANAGMENT: denies  DIET: Regular, good appetite  BOWEL/BLADDER: continent B&B  ABNL LAB/BG: ketone 0.40; ALT 86; AST 74; K+ recheck: 3.7, B  DRAIN/DEVICES: L PIV SL  SKIN: Rash on shins, scattered scabs and bruises, scars on abdomen   TESTS/PROCEDURES: NA  D/C DAY/GOALS/PLACE: discharge 10/26 to [facility] at 11:30 am- Door #2. MD will fill 30 day med supply that needs to be sent with patient  OTHER IMPORTANT INFO: Psych following.

## 2023-10-26 NOTE — PLAN OF CARE
Goal Outcome Evaluation:    Summary: Alcohol Withdrawal    DATE & TIME: 10/25/23, 0111-0541  Cognitive Concerns/ Orientation: A&O x4   BEHAVIOR & AGGRESSION TOOL COLOR: green  CIWA SCORE: 3, 3  ABNL VS/O2: VSS on RA  MOBILITY: Independent  PAIN MANAGMENT: denies  DIET: Regular, good appetite  BOWEL/BLADDER: continent B&B  ABNL LAB/BG: ketone 0.40; ALT 86; AST 74; K+ 3.4 replaced orally. Recheck 2400 B, 171  DRAIN/DEVICES: L PIV SL  SKIN: Rash on shins, scattered scabs and bruises  TESTS/PROCEDURES: NA  D/C DAY/GOALS/PLACE: discharge 10/26 to [facility] at 11:30 am- Door #2. MD will fill 30 day med supply that needs to be sent with patient  OTHER IMPORTANT INFO: Psych following.

## 2023-10-28 ENCOUNTER — PATIENT OUTREACH (OUTPATIENT)
Dept: CARE COORDINATION | Facility: CLINIC | Age: 33
End: 2023-10-28
Payer: COMMERCIAL

## 2023-10-28 NOTE — PROGRESS NOTES
[facility]    Background: Transitional Care Management program identified per system criteria and reviewed by [facility] team for possible outreach.    Assessment: Upon chart review, CCR Team member will not proceed with patient outreach related to this episode of Transitional Care Management program due to reason below:    [facility] TCU: Patient discharged to TCU/ARU/LTACH and is established within [facility]. Referral created for Primary Care-Care Coordination program.  Patient discharged to inpatient substance abuse program.    Plan: Transitional Care Management episode addressed appropriately per reason noted above.      Julia Monique MA  [facility], [facility]    *Connected Care Resource Team does NOT follow patient ongoing. Referrals are identified based on internal discharge reports and the outreach is to ensure patient has an understanding of their discharge instructions.

## 2023-12-07 ENCOUNTER — TELEPHONE (OUTPATIENT)
Dept: BEHAVIORAL HEALTH | Facility: CLINIC | Age: 33
End: 2023-12-07

## 2023-12-07 ENCOUNTER — HOSPITAL ENCOUNTER (EMERGENCY)
Facility: CLINIC | Age: 33
Discharge: PSYCHIATRIC OR SUBSTANCE USE FACILITY | End: 2023-12-07
Attending: EMERGENCY MEDICINE | Admitting: EMERGENCY MEDICINE
Payer: COMMERCIAL

## 2023-12-07 ENCOUNTER — HOSPITAL ENCOUNTER (INPATIENT)
Facility: CLINIC | Age: 33
LOS: 5 days | Discharge: NURSING, REHAB OR LONG-TERM CARE | End: 2023-12-12
Attending: PSYCHIATRY & NEUROLOGY | Admitting: PSYCHIATRY & NEUROLOGY
Payer: COMMERCIAL

## 2023-12-07 VITALS
SYSTOLIC BLOOD PRESSURE: 123 MMHG | DIASTOLIC BLOOD PRESSURE: 77 MMHG | OXYGEN SATURATION: 95 % | HEIGHT: 72 IN | HEART RATE: 96 BPM | BODY MASS INDEX: 31.83 KG/M2 | WEIGHT: 235 LBS | RESPIRATION RATE: 18 BRPM | TEMPERATURE: 97.8 F

## 2023-12-07 DIAGNOSIS — E08.10 DIABETES MELLITUS DUE TO UNDERLYING CONDITION WITH KETOACIDOSIS WITHOUT COMA, WITH LONG-TERM CURRENT USE OF INSULIN (H): ICD-10-CM

## 2023-12-07 DIAGNOSIS — R44.3 HALLUCINATIONS: Primary | ICD-10-CM

## 2023-12-07 DIAGNOSIS — F10.239 ALCOHOL DEPENDENCE WITH WITHDRAWAL WITH COMPLICATION (H): ICD-10-CM

## 2023-12-07 DIAGNOSIS — Z72.0 TOBACCO ABUSE: ICD-10-CM

## 2023-12-07 DIAGNOSIS — F39 MOOD DISORDER (H): ICD-10-CM

## 2023-12-07 DIAGNOSIS — I10 BENIGN ESSENTIAL HYPERTENSION: ICD-10-CM

## 2023-12-07 DIAGNOSIS — K29.70 GASTRITIS WITHOUT BLEEDING, UNSPECIFIED CHRONICITY, UNSPECIFIED GASTRITIS TYPE: ICD-10-CM

## 2023-12-07 DIAGNOSIS — Z79.4 DIABETES MELLITUS DUE TO UNDERLYING CONDITION WITH KETOACIDOSIS WITHOUT COMA, WITH LONG-TERM CURRENT USE OF INSULIN (H): ICD-10-CM

## 2023-12-07 DIAGNOSIS — J45.20 MILD INTERMITTENT ASTHMA, UNSPECIFIED WHETHER COMPLICATED: ICD-10-CM

## 2023-12-07 DIAGNOSIS — F10.90 ALCOHOL USE DISORDER: ICD-10-CM

## 2023-12-07 DIAGNOSIS — E13.65 UNCONTROLLED OTHER SPECIFIED DIABETES MELLITUS WITH HYPERGLYCEMIA (H): ICD-10-CM

## 2023-12-07 DIAGNOSIS — F10.10 ALCOHOL ABUSE: ICD-10-CM

## 2023-12-07 DIAGNOSIS — F10.932 ALCOHOL WITHDRAWAL SEIZURE WITH PERCEPTUAL DISTURBANCE (H): ICD-10-CM

## 2023-12-07 DIAGNOSIS — R56.9 ALCOHOL WITHDRAWAL SEIZURE WITH PERCEPTUAL DISTURBANCE (H): ICD-10-CM

## 2023-12-07 PROBLEM — F10.939 ALCOHOL WITHDRAWAL, WITH UNSPECIFIED COMPLICATION (H): Status: ACTIVE | Noted: 2023-12-07

## 2023-12-07 LAB
ALBUMIN SERPL BCG-MCNC: 4.5 G/DL (ref 3.5–5.2)
ALP SERPL-CCNC: 96 U/L (ref 40–150)
ALT SERPL W P-5'-P-CCNC: 43 U/L (ref 0–70)
AMPHETAMINES UR QL SCN: NORMAL
ANION GAP SERPL CALCULATED.3IONS-SCNC: 17 MMOL/L (ref 7–15)
AST SERPL W P-5'-P-CCNC: 49 U/L (ref 0–45)
B-OH-BUTYR SERPL-SCNC: 1.9 MMOL/L
BARBITURATES UR QL SCN: NORMAL
BASE EXCESS BLDV CALC-SCNC: -0.4 MMOL/L (ref -7.7–1.9)
BASOPHILS # BLD AUTO: 0.1 10E3/UL (ref 0–0.2)
BASOPHILS NFR BLD AUTO: 0 %
BENZODIAZ UR QL SCN: NORMAL
BILIRUB SERPL-MCNC: 1.7 MG/DL
BUN SERPL-MCNC: 10.3 MG/DL (ref 6–20)
BZE UR QL SCN: NORMAL
CALCIUM SERPL-MCNC: 10.4 MG/DL (ref 8.6–10)
CANNABINOIDS UR QL SCN: NORMAL
CHLORIDE SERPL-SCNC: 98 MMOL/L (ref 98–107)
CREAT SERPL-MCNC: 0.73 MG/DL (ref 0.67–1.17)
DEPRECATED HCO3 PLAS-SCNC: 22 MMOL/L (ref 22–29)
EGFRCR SERPLBLD CKD-EPI 2021: >90 ML/MIN/1.73M2
EOSINOPHIL # BLD AUTO: 0.1 10E3/UL (ref 0–0.7)
EOSINOPHIL NFR BLD AUTO: 1 %
ERYTHROCYTE [DISTWIDTH] IN BLOOD BY AUTOMATED COUNT: 13.9 % (ref 10–15)
ETHANOL SERPL-MCNC: <0.01 G/DL
FENTANYL UR QL: NORMAL
GLUCOSE BLDC GLUCOMTR-MCNC: 126 MG/DL (ref 70–99)
GLUCOSE SERPL-MCNC: 138 MG/DL (ref 70–99)
HCO3 BLDV-SCNC: 24 MMOL/L (ref 21–28)
HCT VFR BLD AUTO: 48.1 % (ref 40–53)
HGB BLD-MCNC: 16.6 G/DL (ref 13.3–17.7)
IMM GRANULOCYTES # BLD: 0.1 10E3/UL
IMM GRANULOCYTES NFR BLD: 1 %
LYMPHOCYTES # BLD AUTO: 2.3 10E3/UL (ref 0.8–5.3)
LYMPHOCYTES NFR BLD AUTO: 17 %
MCH RBC QN AUTO: 31.9 PG (ref 26.5–33)
MCHC RBC AUTO-ENTMCNC: 34.5 G/DL (ref 31.5–36.5)
MCV RBC AUTO: 93 FL (ref 78–100)
MONOCYTES # BLD AUTO: 1.2 10E3/UL (ref 0–1.3)
MONOCYTES NFR BLD AUTO: 9 %
NEUTROPHILS # BLD AUTO: 9.8 10E3/UL (ref 1.6–8.3)
NEUTROPHILS NFR BLD AUTO: 72 %
NRBC # BLD AUTO: 0 10E3/UL
NRBC BLD AUTO-RTO: 0 /100
O2/TOTAL GAS SETTING VFR VENT: 0 %
OPIATES UR QL SCN: NORMAL
PCO2 BLDV: 38 MM HG (ref 40–50)
PCP QUAL URINE (ROCHE): NORMAL
PH BLDV: 7.41 [PH] (ref 7.32–7.43)
PLATELET # BLD AUTO: 197 10E3/UL (ref 150–450)
PO2 BLDV: 72 MM HG (ref 25–47)
POTASSIUM SERPL-SCNC: 3.6 MMOL/L (ref 3.4–5.3)
PROT SERPL-MCNC: 8.3 G/DL (ref 6.4–8.3)
RBC # BLD AUTO: 5.2 10E6/UL (ref 4.4–5.9)
SODIUM SERPL-SCNC: 137 MMOL/L (ref 135–145)
WBC # BLD AUTO: 13.6 10E3/UL (ref 4–11)

## 2023-12-07 PROCEDURE — 80307 DRUG TEST PRSMV CHEM ANLYZR: CPT | Performed by: EMERGENCY MEDICINE

## 2023-12-07 PROCEDURE — 258N000003 HC RX IP 258 OP 636: Performed by: EMERGENCY MEDICINE

## 2023-12-07 PROCEDURE — 82803 BLOOD GASES ANY COMBINATION: CPT | Performed by: EMERGENCY MEDICINE

## 2023-12-07 PROCEDURE — 80053 COMPREHEN METABOLIC PANEL: CPT | Performed by: EMERGENCY MEDICINE

## 2023-12-07 PROCEDURE — 250N000013 HC RX MED GY IP 250 OP 250 PS 637: Performed by: STUDENT IN AN ORGANIZED HEALTH CARE EDUCATION/TRAINING PROGRAM

## 2023-12-07 PROCEDURE — 250N000013 HC RX MED GY IP 250 OP 250 PS 637: Performed by: NURSE PRACTITIONER

## 2023-12-07 PROCEDURE — 250N000009 HC RX 250: Performed by: EMERGENCY MEDICINE

## 2023-12-07 PROCEDURE — 85025 COMPLETE CBC W/AUTO DIFF WBC: CPT | Performed by: EMERGENCY MEDICINE

## 2023-12-07 PROCEDURE — 250N000011 HC RX IP 250 OP 636: Performed by: EMERGENCY MEDICINE

## 2023-12-07 PROCEDURE — 96375 TX/PRO/DX INJ NEW DRUG ADDON: CPT

## 2023-12-07 PROCEDURE — 128N000004 HC R&B CD ADULT

## 2023-12-07 PROCEDURE — 96374 THER/PROPH/DIAG INJ IV PUSH: CPT

## 2023-12-07 PROCEDURE — 82010 KETONE BODYS QUAN: CPT | Performed by: EMERGENCY MEDICINE

## 2023-12-07 PROCEDURE — 250N000013 HC RX MED GY IP 250 OP 250 PS 637: Performed by: EMERGENCY MEDICINE

## 2023-12-07 PROCEDURE — 99285 EMERGENCY DEPT VISIT HI MDM: CPT | Mod: 25

## 2023-12-07 PROCEDURE — 36415 COLL VENOUS BLD VENIPUNCTURE: CPT | Performed by: EMERGENCY MEDICINE

## 2023-12-07 PROCEDURE — 82077 ASSAY SPEC XCP UR&BREATH IA: CPT | Performed by: EMERGENCY MEDICINE

## 2023-12-07 RX ORDER — LOPERAMIDE HCL 2 MG
2 CAPSULE ORAL 4 TIMES DAILY PRN
Status: DISCONTINUED | OUTPATIENT
Start: 2023-12-07 | End: 2023-12-07

## 2023-12-07 RX ORDER — DIAZEPAM 5 MG
5-20 TABLET ORAL EVERY 30 MIN PRN
Status: DISCONTINUED | OUTPATIENT
Start: 2023-12-07 | End: 2023-12-11

## 2023-12-07 RX ORDER — TRAZODONE HYDROCHLORIDE 50 MG/1
50 TABLET, FILM COATED ORAL
Status: DISCONTINUED | OUTPATIENT
Start: 2023-12-07 | End: 2023-12-12 | Stop reason: HOSPADM

## 2023-12-07 RX ORDER — OLANZAPINE 5 MG/1
5 TABLET, ORALLY DISINTEGRATING ORAL AT BEDTIME
Status: DISCONTINUED | OUTPATIENT
Start: 2023-12-07 | End: 2023-12-07

## 2023-12-07 RX ORDER — FOLIC ACID 1 MG/1
1 TABLET ORAL DAILY
Status: DISCONTINUED | OUTPATIENT
Start: 2023-12-08 | End: 2023-12-12 | Stop reason: HOSPADM

## 2023-12-07 RX ORDER — LORAZEPAM 2 MG/ML
1 INJECTION INTRAMUSCULAR EVERY 30 MIN PRN
Status: DISCONTINUED | OUTPATIENT
Start: 2023-12-07 | End: 2023-12-07 | Stop reason: HOSPADM

## 2023-12-07 RX ORDER — ONDANSETRON 4 MG/1
4 TABLET, ORALLY DISINTEGRATING ORAL EVERY 6 HOURS PRN
Status: DISCONTINUED | OUTPATIENT
Start: 2023-12-07 | End: 2023-12-12 | Stop reason: HOSPADM

## 2023-12-07 RX ORDER — NICOTINE 21 MG/24HR
1 PATCH, TRANSDERMAL 24 HOURS TRANSDERMAL DAILY
Status: DISCONTINUED | OUTPATIENT
Start: 2023-12-08 | End: 2023-12-12 | Stop reason: HOSPADM

## 2023-12-07 RX ORDER — MAGNESIUM HYDROXIDE/ALUMINUM HYDROXICE/SIMETHICONE 120; 1200; 1200 MG/30ML; MG/30ML; MG/30ML
30 SUSPENSION ORAL EVERY 4 HOURS PRN
Status: DISCONTINUED | OUTPATIENT
Start: 2023-12-07 | End: 2023-12-12 | Stop reason: HOSPADM

## 2023-12-07 RX ORDER — IBUPROFEN 600 MG/1
600 TABLET, FILM COATED ORAL EVERY 6 HOURS PRN
Status: DISCONTINUED | OUTPATIENT
Start: 2023-12-07 | End: 2023-12-12 | Stop reason: HOSPADM

## 2023-12-07 RX ORDER — PANTOPRAZOLE SODIUM 40 MG/1
40 TABLET, DELAYED RELEASE ORAL
Status: DISCONTINUED | OUTPATIENT
Start: 2023-12-08 | End: 2023-12-12 | Stop reason: HOSPADM

## 2023-12-07 RX ORDER — AMOXICILLIN 250 MG
1 CAPSULE ORAL 2 TIMES DAILY PRN
Status: DISCONTINUED | OUTPATIENT
Start: 2023-12-07 | End: 2023-12-12 | Stop reason: HOSPADM

## 2023-12-07 RX ORDER — MULTIPLE VITAMINS W/ MINERALS TAB 9MG-400MCG
1 TAB ORAL DAILY
Status: DISCONTINUED | OUTPATIENT
Start: 2023-12-08 | End: 2023-12-12 | Stop reason: HOSPADM

## 2023-12-07 RX ORDER — LORAZEPAM 0.5 MG/1
0.5 TABLET ORAL ONCE
Status: COMPLETED | OUTPATIENT
Start: 2023-12-07 | End: 2023-12-07

## 2023-12-07 RX ORDER — ONDANSETRON 4 MG/1
4 TABLET, ORALLY DISINTEGRATING ORAL EVERY 6 HOURS PRN
Status: DISCONTINUED | OUTPATIENT
Start: 2023-12-07 | End: 2023-12-07

## 2023-12-07 RX ORDER — LOPERAMIDE HCL 2 MG
2 CAPSULE ORAL 4 TIMES DAILY PRN
Status: DISCONTINUED | OUTPATIENT
Start: 2023-12-07 | End: 2023-12-12 | Stop reason: HOSPADM

## 2023-12-07 RX ORDER — LISINOPRIL 10 MG/1
10 TABLET ORAL DAILY
Status: DISCONTINUED | OUTPATIENT
Start: 2023-12-08 | End: 2023-12-12 | Stop reason: HOSPADM

## 2023-12-07 RX ORDER — OLANZAPINE 10 MG/1
10 TABLET ORAL 3 TIMES DAILY PRN
Status: DISCONTINUED | OUTPATIENT
Start: 2023-12-07 | End: 2023-12-12 | Stop reason: HOSPADM

## 2023-12-07 RX ORDER — OLANZAPINE 10 MG/2ML
10 INJECTION, POWDER, FOR SOLUTION INTRAMUSCULAR 3 TIMES DAILY PRN
Status: DISCONTINUED | OUTPATIENT
Start: 2023-12-07 | End: 2023-12-12 | Stop reason: HOSPADM

## 2023-12-07 RX ORDER — HYDROXYZINE HYDROCHLORIDE 25 MG/1
25 TABLET, FILM COATED ORAL EVERY 4 HOURS PRN
Status: DISCONTINUED | OUTPATIENT
Start: 2023-12-07 | End: 2023-12-12 | Stop reason: HOSPADM

## 2023-12-07 RX ORDER — OLANZAPINE 5 MG/1
5 TABLET, ORALLY DISINTEGRATING ORAL ONCE
Status: COMPLETED | OUTPATIENT
Start: 2023-12-07 | End: 2023-12-07

## 2023-12-07 RX ADMIN — DIAZEPAM 10 MG: 5 TABLET ORAL at 22:27

## 2023-12-07 RX ADMIN — IBUPROFEN 600 MG: 600 TABLET, FILM COATED ORAL at 22:27

## 2023-12-07 RX ADMIN — LORAZEPAM 0.5 MG: 0.5 TABLET ORAL at 20:12

## 2023-12-07 RX ADMIN — OLANZAPINE 10 MG: 10 TABLET, FILM COATED ORAL at 22:27

## 2023-12-07 RX ADMIN — OLANZAPINE 5 MG: 5 TABLET, ORALLY DISINTEGRATING ORAL at 14:41

## 2023-12-07 RX ADMIN — LORAZEPAM 1 MG: 2 INJECTION INTRAMUSCULAR; INTRAVENOUS at 13:54

## 2023-12-07 RX ADMIN — TRAZODONE HYDROCHLORIDE 50 MG: 50 TABLET ORAL at 22:27

## 2023-12-07 RX ADMIN — FOLIC ACID: 5 INJECTION, SOLUTION INTRAMUSCULAR; INTRAVENOUS; SUBCUTANEOUS at 14:20

## 2023-12-07 ASSESSMENT — ACTIVITIES OF DAILY LIVING (ADL)
FALL_HISTORY_WITHIN_LAST_SIX_MONTHS: NO
CHANGE_IN_FUNCTIONAL_STATUS_SINCE_ONSET_OF_CURRENT_ILLNESS/INJURY: NO
DIFFICULTY_EATING/SWALLOWING: NO
ADLS_ACUITY_SCORE: 45
WEAR_GLASSES_OR_BLIND: YES
HEARING_DIFFICULTY_OR_DEAF: NO
ADLS_ACUITY_SCORE: 35
CONCENTRATING,_REMEMBERING_OR_MAKING_DECISIONS_DIFFICULTY: NO
TOILETING_ISSUES: NO
WALKING_OR_CLIMBING_STAIRS_DIFFICULTY: NO
ADLS_ACUITY_SCORE: 35
DRESSING/BATHING_DIFFICULTY: NO
ADLS_ACUITY_SCORE: 35
DOING_ERRANDS_INDEPENDENTLY_DIFFICULTY: NO
ADLS_ACUITY_SCORE: 30
DIFFICULTY_COMMUNICATING: NO

## 2023-12-07 ASSESSMENT — LIFESTYLE VARIABLES
ORIENTATION AND CLOUDING OF SENSORIUM: ORIENTED AND CAN DO SERIAL ADDITIONS
TOTAL SCORE: 6
AGITATION: NORMAL ACTIVITY
AUDITORY DISTURBANCES: MODERATE HARSHNESS OR ABILITY TO FRIGHTEN
ANXIETY: 2
TREMOR: 2
PAROXYSMAL SWEATS: BEADS OF SWEAT OBVIOUS ON FOREHEAD
ANXIETY: NO ANXIETY, AT EASE
ORIENTATION AND CLOUDING OF SENSORIUM: ORIENTED AND CAN DO SERIAL ADDITIONS
NAUSEA AND VOMITING: NO NAUSEA AND NO VOMITING
VISUAL DISTURBANCES: VERY MILD SENSITIVITY
AGITATION: NORMAL ACTIVITY
AUDITORY DISTURBANCES: VERY MILD HARSHNESS OR ABILITY TO FRIGHTEN
TOTAL SCORE: 14
PAROXYSMAL SWEATS: BARELY PERCEPTIBLE SWEATING, PALMS MOIST
HEADACHE, FULLNESS IN HEAD: NOT PRESENT
VISUAL DISTURBANCES: MILD SENSITIVITY
TREMOR: MODERATE, WITH PATIENT'S ARMS EXTENDED
NAUSEA AND VOMITING: NO NAUSEA AND NO VOMITING
HEADACHE, FULLNESS IN HEAD: NOT PRESENT
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
## 2023-12-07 NOTE — ED NOTES
Called central intake; they have a male bed available.  Pt on the list.  MD to call report when labs are back.

## 2023-12-07 NOTE — TELEPHONE ENCOUNTER
S: [facility] ED , Provider Ayse  calling at 5:15 PM   with clinical on a 33 year old/Male presenting for alcohol detox.     B: Pt presents for ETOH detox.   Currently reports drinking Half of 175 ml daily  for 6 years, took a month off, started again in mid oct.    Patient reports last use was 3 days ago .  Pt TC: 0  Pt  endorses hx of DT  Pt  endorses hx of seizures. Last seizure:  August 23  Pt endorsing the following symptoms of withdrawal: Tremulous,  emesis, temp fluctuation   MSSA Score: Unknown     Pt denies acute mental health or medical concerns. Elevated Ketone  Pt endorses other drug use: Cannabis Amount/frequency: N/A    Does Pt have a detox care plan in [facility]? No   Does pt present with specific needs, assistive devices, or exclusionary criteria? None  Is the patient ambulating, eating and drinking in the ED? Yes     A: Pt meets criteria to be presented for IP detox admission. Patient is voluntary    COVID Symptoms: No  If yes, COVID test required   Utox: Negative  CMP: Abnormalities: Anion Gap 17 , Calcium 10.4, Glucose 138, AST 49, Bilirubin 1.7  CBC: Abnormalities: WBC 13.6, Absolute Neutrophils 9.8,   HCG: N/A     R: Patient cleared and ready for behavioral bed placement: Yes    Pt is meeting criteria for presentation to 3A/CD    Does Patient need a Transfer Center request created? Yes, writer completed Transfer Center request at:

## 2023-12-07 NOTE — ED PROVIDER NOTES
History     Chief Complaint:  Psychiatric Evaluation    The history is provided by the patient.     Ravi Ahmadi is a 33 year old male with history of alcohol use disorder, MDD, DKA, alcohol withdrawal, asthma, and type 2 diabetes mellitus presenting via EMS for a psychiatric evaluation. Ravi reports that he relapsed from alcohol sobriety two weeks ago and stopped drinking again three days ago. He reports use of between half and three quarters of a 175 mL bottle of alcohol every two days. He states that he called EMS for himself, noting auditory hallucinations. He reports a history of auditory hallucinations with voices but denies hearing voices today. He notes recent diarrhea and denies blood in stool. He denies current suicidal ideation. He reports cannabis use and smoking cigarettes. He reports use of PRN insulin with irregular blood glucose checks. He notes restarting metformin yesterday. Ravi is currently unemployed and living with his parents.    Independent Historian:   None - Patient Only    Review of External Notes:    None.    Medications:    Neurontin  Insulin  Zestril  Protonix    Past Medical History:    Alcohol use disorder  Atopic dermatitis  Intermittent asthma  Tobacco use  Type 2 diabetes mellitus  JAY  DKA  MDD  Alcoholic ketoacidosis  Alcohol withdrawal syndrome with perpetual disturbance    Past Surgical History:    CL AFF surgical pathology, gravel/foreign body removal in abdomen after accident    Physical Exam   Patient Vitals for the past 24 hrs:   BP Temp Temp src Pulse Resp SpO2 Height Weight   12/07/23 1307 (!) 143/93 97.8  F (36.6  C) Oral 115 18 95 % 1.829 m (6') 106.6 kg (235 lb)     Physical Exam  Constitutional: Middle aged male. Sitting. No respiratory distress.  HENT: No signs of trauma.   Eyes: EOM are normal. Pupils are equal, round, and reactive to light.   Neck: Normal range of motion. No JVD present. No cervical adenopathy.  Cardiovascular: Regular rhythm.  Exam  reveals no gallop and no friction rub.    No murmur heard.  Pulmonary/Chest: Bilateral breath sounds normal. No wheezes, rhonchi or rales.  Abdominal: Soft. No tenderness. No rebound or guarding.   Musculoskeletal: No edema. No tenderness.   Lymphadenopathy: No lymphadenopathy.   Neurological: Alert and oriented to person, place, and time. Normal strength. Coordination normal.   Skin: Skin is warm and dry. No rash noted. No erythema.   Psych: Admits to auditory hallucinations and suicidal thought without plan.    Emergency Department Course   Laboratory:  Labs Ordered and Resulted from Time of ED Arrival to Time of ED Departure   COMPREHENSIVE METABOLIC PANEL - Abnormal       Result Value    Sodium 137      Potassium 3.6      Carbon Dioxide (CO2) 22      Anion Gap 17 (*)     Urea Nitrogen 10.3      Creatinine 0.73      GFR Estimate >90      Calcium 10.4 (*)     Chloride 98      Glucose 138 (*)     Alkaline Phosphatase 96      AST 49 (*)     ALT 43      Protein Total 8.3      Albumin 4.5      Bilirubin Total 1.7 (*)    CBC WITH PLATELETS AND DIFFERENTIAL - Abnormal    WBC Count 13.6 (*)     RBC Count 5.20      Hemoglobin 16.6      Hematocrit 48.1      MCV 93      MCH 31.9      MCHC 34.5      RDW 13.9      Platelet Count 197      % Neutrophils 72      % Lymphocytes 17      % Monocytes 9      % Eosinophils 1      % Basophils 0      % Immature Granulocytes 1      NRBCs per 100 WBC 0      Absolute Neutrophils 9.8 (*)     Absolute Lymphocytes 2.3      Absolute Monocytes 1.2      Absolute Eosinophils 0.1      Absolute Basophils 0.1      Absolute Immature Granulocytes 0.1      Absolute NRBCs 0.0     KETONE BETA-HYDROXYBUTYRATE QUANTITATIVE, RAPID - Abnormal    Ketone (Beta-Hydroxybutyrate) Quantitative 1.90 (*)    BLOOD GAS VENOUS - Abnormal    pH Venous 7.41      pCO2 Venous 38 (*)     pO2 Venous 72 (*)     Bicarbonate Venous 24      Base Excess/Deficit -0.4      FIO2 0     ETHYL ALCOHOL LEVEL - Normal    Alcohol ethyl  <0.01     URINE DRUG SCREEN PANEL - Normal    Amphetamines Urine Screen Negative      Barbituates Urine Screen Negative      Benzodiazepine Urine Screen Negative      Cannabinoids Urine Screen Negative      Cocaine Urine Screen Negative      Fentanyl Qual Urine Screen Negative      Opiates Urine Screen Negative      PCP Urine Screen Negative       Emergency Department Course & Assessments:       Interventions:  Medications   LORazepam (ATIVAN) injection 1 mg (1 mg Intravenous $Given 12/7/23 1354)   sodium chloride 0.9 % 1,000 mL with Infuvite Adult 10 mL, thiamine 100 mg, folic acid 1 mg infusion ( Intravenous $New Bag 12/7/23 1420)   OLANZapine zydis (zyPREXA) ODT tab 5 mg (5 mg Oral $Given 12/7/23 1441)      Assessments:  1340 I obtained history and examined the patient as noted above.     Independent Interpretation (X-rays, CTs, rhythm strip):  None    Consultations/Discussion of Management or Tests:  None        Social Determinants of Health affecting care:   Employment/Unemployment    Disposition:  The patient was transferred to [facility].    Impression & Plan    Medical Decision Making:  This is a 33 year old who presents to the emergency department after he began hallucinating when he stopped taking his alcohol. He has had some problems with alcohol hallucination in the past. He had been on a two week enamorado and stopped three days ago. Now he is hearing noises and he called the police for help. He was brought here. He denies other substance abuse. He has diabetes and is supposed to be back on metformin, using insulin only if his sugars are too high. On the exam, he is pleasant. He is able to ambulate and protect his airway. He has no overt psychosis other than what he tells me. Labs were obtained. His white count is minimally elevated. His ketones are slightly elevated. His VBG shows no acidosis. His glucose is 138, and there was borderline anion gap. I think this is more likely starvation ketosis  than DKA. He has received a bag of fluids with vitamins. He received some oral Zyprexa and IV Ativan and we have a bed available at [facility] and will transfer him there to detox.    Diagnosis:    ICD-10-CM    1. Alcohol withdrawal seizure with perceptual disturbance (H)  F10.932     R56.9         Scribe Disclosure:  I, Alma Rosa Gomez, am serving as a scribe at 1:28 PM on 12/7/2023 to document services personally performed by Amrit Guerra MD based on my observations and the provider's statements to me.   12/7/2023   Amrit Guerra MD Steinman, Randall Ira, MD  12/07/23 [id number]

## 2023-12-07 NOTE — ED NOTES
Pt aunt called;  wanted to make sure pt got here.  States that pt has been hallucinating recently and talking vaguely about SI.  Brenda Ahmadi [phone].  Pt did express that he would like her to be updated.

## 2023-12-07 NOTE — ED TRIAGE NOTES
Pt was recently in treatment at [facility];  states that he relapsed with alcohol 2 weeks ago.  Pt states last drink was on Monday.  He was drinking about 1.75 of alcohol every two days.  Pt prompted to call EMS when his auditory hallucinations got too bad.

## 2023-12-08 LAB
ALBUMIN SERPL BCG-MCNC: 4 G/DL (ref 3.5–5.2)
ALP SERPL-CCNC: 82 U/L (ref 40–150)
ALT SERPL W P-5'-P-CCNC: 48 U/L (ref 0–70)
ANION GAP SERPL CALCULATED.3IONS-SCNC: 11 MMOL/L (ref 7–15)
AST SERPL W P-5'-P-CCNC: 50 U/L (ref 0–45)
BASOPHILS # BLD AUTO: 0.1 10E3/UL (ref 0–0.2)
BASOPHILS NFR BLD AUTO: 1 %
BILIRUB SERPL-MCNC: 1.4 MG/DL
BUN SERPL-MCNC: 17.2 MG/DL (ref 6–20)
CALCIUM SERPL-MCNC: 10.1 MG/DL (ref 8.6–10)
CHLORIDE SERPL-SCNC: 101 MMOL/L (ref 98–107)
CHOLEST SERPL-MCNC: 235 MG/DL
CREAT SERPL-MCNC: 0.89 MG/DL (ref 0.67–1.17)
DEPRECATED HCO3 PLAS-SCNC: 27 MMOL/L (ref 22–29)
EGFRCR SERPLBLD CKD-EPI 2021: >90 ML/MIN/1.73M2
EOSINOPHIL # BLD AUTO: 0.3 10E3/UL (ref 0–0.7)
EOSINOPHIL NFR BLD AUTO: 2 %
ERYTHROCYTE [DISTWIDTH] IN BLOOD BY AUTOMATED COUNT: 14.2 % (ref 10–15)
GLUCOSE BLDC GLUCOMTR-MCNC: 103 MG/DL (ref 70–99)
GLUCOSE BLDC GLUCOMTR-MCNC: 140 MG/DL (ref 70–99)
GLUCOSE BLDC GLUCOMTR-MCNC: 144 MG/DL (ref 70–99)
GLUCOSE BLDC GLUCOMTR-MCNC: 146 MG/DL (ref 70–99)
GLUCOSE SERPL-MCNC: 103 MG/DL (ref 70–99)
HBA1C MFR BLD: 6 %
HCT VFR BLD AUTO: 45.9 % (ref 40–53)
HDLC SERPL-MCNC: 55 MG/DL
HGB BLD-MCNC: 15.5 G/DL (ref 13.3–17.7)
IMM GRANULOCYTES # BLD: 0.1 10E3/UL
IMM GRANULOCYTES NFR BLD: 1 %
LDLC SERPL CALC-MCNC: 160 MG/DL
LYMPHOCYTES # BLD AUTO: 3 10E3/UL (ref 0.8–5.3)
LYMPHOCYTES NFR BLD AUTO: 28 %
MCH RBC QN AUTO: 32 PG (ref 26.5–33)
MCHC RBC AUTO-ENTMCNC: 33.8 G/DL (ref 31.5–36.5)
MCV RBC AUTO: 95 FL (ref 78–100)
MONOCYTES # BLD AUTO: 1 10E3/UL (ref 0–1.3)
MONOCYTES NFR BLD AUTO: 9 %
NEUTROPHILS # BLD AUTO: 6.5 10E3/UL (ref 1.6–8.3)
NEUTROPHILS NFR BLD AUTO: 59 %
NONHDLC SERPL-MCNC: 180 MG/DL
NRBC # BLD AUTO: 0 10E3/UL
NRBC BLD AUTO-RTO: 0 /100
PLATELET # BLD AUTO: 148 10E3/UL (ref 150–450)
POTASSIUM SERPL-SCNC: 3.6 MMOL/L (ref 3.4–5.3)
PROT SERPL-MCNC: 7.5 G/DL (ref 6.4–8.3)
RBC # BLD AUTO: 4.85 10E6/UL (ref 4.4–5.9)
SARS-COV-2 RNA RESP QL NAA+PROBE: NEGATIVE
SODIUM SERPL-SCNC: 139 MMOL/L (ref 135–145)
TRIGL SERPL-MCNC: 100 MG/DL
TSH SERPL DL<=0.005 MIU/L-ACNC: 1.82 UIU/ML (ref 0.3–4.2)
WBC # BLD AUTO: 10.8 10E3/UL (ref 4–11)

## 2023-12-08 PROCEDURE — 250N000013 HC RX MED GY IP 250 OP 250 PS 637

## 2023-12-08 PROCEDURE — 250N000012 HC RX MED GY IP 250 OP 636 PS 637

## 2023-12-08 PROCEDURE — 250N000013 HC RX MED GY IP 250 OP 250 PS 637: Performed by: NURSE PRACTITIONER

## 2023-12-08 PROCEDURE — 250N000013 HC RX MED GY IP 250 OP 250 PS 637: Performed by: PSYCHIATRY & NEUROLOGY

## 2023-12-08 PROCEDURE — 84443 ASSAY THYROID STIM HORMONE: CPT | Performed by: NURSE PRACTITIONER

## 2023-12-08 PROCEDURE — 83036 HEMOGLOBIN GLYCOSYLATED A1C: CPT | Performed by: NURSE PRACTITIONER

## 2023-12-08 PROCEDURE — 99254 IP/OBS CNSLTJ NEW/EST MOD 60: CPT

## 2023-12-08 PROCEDURE — 128N000004 HC R&B CD ADULT

## 2023-12-08 PROCEDURE — 80053 COMPREHEN METABOLIC PANEL: CPT | Performed by: NURSE PRACTITIONER

## 2023-12-08 PROCEDURE — 80061 LIPID PANEL: CPT | Performed by: NURSE PRACTITIONER

## 2023-12-08 PROCEDURE — HZ2ZZZZ DETOXIFICATION SERVICES FOR SUBSTANCE ABUSE TREATMENT: ICD-10-PCS | Performed by: PSYCHIATRY & NEUROLOGY

## 2023-12-08 PROCEDURE — 85025 COMPLETE CBC W/AUTO DIFF WBC: CPT | Performed by: NURSE PRACTITIONER

## 2023-12-08 PROCEDURE — 36415 COLL VENOUS BLD VENIPUNCTURE: CPT | Performed by: NURSE PRACTITIONER

## 2023-12-08 PROCEDURE — 87635 SARS-COV-2 COVID-19 AMP PRB: CPT | Performed by: PSYCHIATRY & NEUROLOGY

## 2023-12-08 RX ORDER — ALBUTEROL SULFATE 90 UG/1
2 AEROSOL, METERED RESPIRATORY (INHALATION) EVERY 6 HOURS PRN
Status: DISCONTINUED | OUTPATIENT
Start: 2023-12-08 | End: 2023-12-12 | Stop reason: HOSPADM

## 2023-12-08 RX ORDER — ARIPIPRAZOLE 10 MG/1
10 TABLET ORAL DAILY
Status: ON HOLD | COMMUNITY
End: 2023-12-11

## 2023-12-08 RX ORDER — NICOTINE POLACRILEX 4 MG
15-30 LOZENGE BUCCAL
Status: DISCONTINUED | OUTPATIENT
Start: 2023-12-08 | End: 2023-12-12 | Stop reason: HOSPADM

## 2023-12-08 RX ORDER — VITAMIN B COMPLEX
25 TABLET ORAL DAILY
Status: ON HOLD | COMMUNITY
End: 2023-12-11

## 2023-12-08 RX ORDER — ARIPIPRAZOLE 10 MG/1
10 TABLET ORAL DAILY
Status: DISCONTINUED | OUTPATIENT
Start: 2023-12-08 | End: 2023-12-12 | Stop reason: HOSPADM

## 2023-12-08 RX ORDER — DEXTROSE MONOHYDRATE 25 G/50ML
25-50 INJECTION, SOLUTION INTRAVENOUS
Status: DISCONTINUED | OUTPATIENT
Start: 2023-12-08 | End: 2023-12-12 | Stop reason: HOSPADM

## 2023-12-08 RX ORDER — VITAMIN B COMPLEX
25 TABLET ORAL DAILY
Status: DISCONTINUED | OUTPATIENT
Start: 2023-12-08 | End: 2023-12-12 | Stop reason: HOSPADM

## 2023-12-08 RX ORDER — ACAMPROSATE CALCIUM 333 MG/1
666 TABLET, DELAYED RELEASE ORAL 3 TIMES DAILY
Status: DISCONTINUED | OUTPATIENT
Start: 2023-12-08 | End: 2023-12-12 | Stop reason: HOSPADM

## 2023-12-08 RX ORDER — FLUTICASONE FUROATE AND VILANTEROL 100; 25 UG/1; UG/1
1 POWDER RESPIRATORY (INHALATION) DAILY
Status: DISCONTINUED | OUTPATIENT
Start: 2023-12-08 | End: 2023-12-12 | Stop reason: HOSPADM

## 2023-12-08 RX ADMIN — NICOTINE 1 PATCH: 14 PATCH, EXTENDED RELEASE TRANSDERMAL at 08:04

## 2023-12-08 RX ADMIN — DIAZEPAM 10 MG: 5 TABLET ORAL at 08:04

## 2023-12-08 RX ADMIN — INSULIN GLARGINE 5 UNITS: 100 INJECTION, SOLUTION SUBCUTANEOUS at 21:52

## 2023-12-08 RX ADMIN — FLUTICASONE FUROATE AND VILANTEROL TRIFENATATE 1 PUFF: 100; 25 POWDER RESPIRATORY (INHALATION) at 20:31

## 2023-12-08 RX ADMIN — HYDROXYZINE HYDROCHLORIDE 25 MG: 25 TABLET, FILM COATED ORAL at 08:04

## 2023-12-08 RX ADMIN — PANTOPRAZOLE SODIUM 40 MG: 40 TABLET, DELAYED RELEASE ORAL at 08:04

## 2023-12-08 RX ADMIN — LISINOPRIL 10 MG: 10 TABLET ORAL at 08:04

## 2023-12-08 RX ADMIN — ACAMPROSATE CALCIUM 666 MG: 333 TABLET, DELAYED RELEASE ORAL at 20:29

## 2023-12-08 RX ADMIN — ACAMPROSATE CALCIUM 666 MG: 333 TABLET, DELAYED RELEASE ORAL at 14:37

## 2023-12-08 RX ADMIN — TRAZODONE HYDROCHLORIDE 50 MG: 50 TABLET ORAL at 21:46

## 2023-12-08 RX ADMIN — DIAZEPAM 10 MG: 5 TABLET ORAL at 16:22

## 2023-12-08 RX ADMIN — THIAMINE HCL TAB 100 MG 100 MG: 100 TAB at 08:04

## 2023-12-08 RX ADMIN — Medication 25 MCG: at 12:58

## 2023-12-08 RX ADMIN — HYDROXYZINE HYDROCHLORIDE 25 MG: 25 TABLET, FILM COATED ORAL at 12:08

## 2023-12-08 RX ADMIN — DIAZEPAM 10 MG: 5 TABLET ORAL at 12:08

## 2023-12-08 RX ADMIN — DIAZEPAM 10 MG: 5 TABLET ORAL at 20:29

## 2023-12-08 RX ADMIN — ARIPIPRAZOLE 10 MG: 10 TABLET ORAL at 12:58

## 2023-12-08 RX ADMIN — INSULIN ASPART 1 UNITS: 100 INJECTION, SOLUTION INTRAVENOUS; SUBCUTANEOUS at 09:58

## 2023-12-08 RX ADMIN — FOLIC ACID 1 MG: 1 TABLET ORAL at 08:04

## 2023-12-08 RX ADMIN — Medication 1 TABLET: at 08:04

## 2023-12-08 ASSESSMENT — ACTIVITIES OF DAILY LIVING (ADL)
ADLS_ACUITY_SCORE: 30
LAUNDRY: WITH SUPERVISION
ORAL_HYGIENE: INDEPENDENT
HYGIENE/GROOMING: INDEPENDENT
ADLS_ACUITY_SCORE: 30
HYGIENE/GROOMING: INDEPENDENT
ADLS_ACUITY_SCORE: 30
ADLS_ACUITY_SCORE: 30
ORAL_HYGIENE: INDEPENDENT
DRESS: INDEPENDENT
ADLS_ACUITY_SCORE: 30
DRESS: INDEPENDENT
LAUNDRY: WITH SUPERVISION

## 2023-12-08 NOTE — CARE PLAN
"Patient was admitted from [facility] ED for alcohol detox. Patient was drinking half 175 mls of whisky daily. Patient called EMS in July 2023 for severe withdrawal symptoms with hallucinations, and he was taken to the emergency room. On October 21st, 2023, he was at the ED for withdrawal related hallucinations and suicidal ideations to ove dose on his medications, on 26th, was taken to [facility] for 30days. This was his first CD treatment. Stated that he was experiencing severe hallucinations. During the admission assessment, patient was closing his ears with his fingers to block the voices. Patient started drinking, smoking cigarette/THC at age 14.   Patient explained that the hallucinations \"at the moment\" are related to withdrawal. At age 16, he was cutting himself (SIB), but not sure what his goal was for the behavior.    Patient reports hx DM-2, using metformin and Lantus insulin alternatively. BG on admission 126, he reported anx 8/10, dep. 7/10, generalized joint pain 5/10, smokes one pack daily and will need nicotine replacement.   Patient lives with parents, has no job since June due to alcohol disorder.   MSSA 14, valium 10mg, Ibuprofen, Trazodone and Zyprexa were given.     Patient contracted for safety.  "

## 2023-12-08 NOTE — ED NOTES
Patient was asked if he had to go to the bathroom, patient denies needing to at this moment. Patient was informed of the call light button if he does need to go and to use it for anything else as well.

## 2023-12-08 NOTE — ED NOTES
Report given to nurse on 3A, and they are ready to accept patient after a CIWA score, Ativan as needed. Transport requested.

## 2023-12-08 NOTE — ED NOTES
Patient accepted to unit 3A at [facility] under Dr. Santana. Pending return call from unit RN for report.

## 2023-12-08 NOTE — PROGRESS NOTES
Patient has the following appointment scheduled:     1:1 Mental Health Therapy (virtual):  Date: Wednesday, 12/13/2023  Time: 5:00 pm - 6:00 pm  Provider: Cj Laboy MA  [facility]  Location: [facility], [address], [address], [place] [address]  Phone: [phone]  Type: Teletherapy    Patient Instructions  Please fill New Patient Form by using following link. All forms need to be completed 24 hours prior to the appointment date/time by going to [email or web address] Please call us on  24 hours prior to your scheduled appointment to confirm that you are able to attend. We will provide you information about how to log into video call software when you call.    Psychiatry/Medication Management:  Date: Monday, 12/18/2023  Time: 1:00 pm - 2:00 pm  Provider: Carlos Martinez MA, CNP,RN (male provider)  Location: [facility], [address] [place] [address]  Phone: [phone]  Type: Telepsychiatry    Patient Instructions  Please fill New Patient Form by using following link. All forms need to be completed 24 hours prior to the appointment date/time by going to [email or web address] Please call us on [phone] 96 hours prior to your scheduled appointment to confirm that you are able to attend. We will provide you information about how to log into video call software when you call.

## 2023-12-08 NOTE — TELEPHONE ENCOUNTER
R: 6:20PM received call from provider Siri requesting a doc to doc with pt's current provider.      Siri spoke to Dr Leigh at [facility] who reported they are not concerned with pts ketone level as it appears to be related to pt not eating much recently.  MD reports the pt is not vomiting and is currently just sleeping.    6:30PM Provider Siri accepts pt for unit 3A/[facility]    6:35PM pt added to unit queue, unit  called with disposition    ED updated with pt placement 6:40PM

## 2023-12-08 NOTE — PLAN OF CARE
Behavioral Team Discussion: (12/8/2023)    Continued Stay Criteria/Rationale: Patient admitted for  Alcohol Use Disorder.  Plan: The following services will be provided to the patient; psychiatric assessment, medication management, therapeutic milieu, individual and group support, and skills groups.   Participants: 3A Provider: Dr. Brett Santana MD; 3A RN: Nikki Johnson, RN; 3A CM's: Katya Morris.  Summary/Recommendation: Providers will assess today for treatment recommendations, discharge planning, and aftercare plans. CM will meet with pt for discharge planning.   Medical/Physical: Patient reports a history of  DM-2, using metformin and Lantus insulin alternatively, no other medical or physical concerns at this time.  Precautions:   Behavioral Orders   Procedures    Code 1 - Restrict to Unit    Routine Programming     As clinically indicated    Seizure precautions    Status 15     Every 15 minutes.    Withdrawal precautions     Rationale for change in precautions or plan: N/A  Progress: Initial.    ASAM Dimension Scale Ratings:  Dimension 1: 3 Client tolerates and oscar with withdrawal discomfort poorly. Client has severe intoxication, such that the client endangers self or others, or intoxication has not abated with less intensive levels of services. Client displays severe signs and symptoms; or risk of severe, but manageable withdrawal; or withdrawal worsening despite detox at less intensive level.  Dimension 2: 1 Client tolerates and oscar with physical discomfort and is able to get the services that the client needs.  Dimension 3: 2 Client has difficulty with impulse control and lacks coping skills. Client has thoughts of suicide or harm to others without means; however, the thoughts may interfere with participation in some treatment activities. Client has difficulty functioning in significant life areas. Client has moderate symptoms of emotional, behavioral, or cognitive problems. Client is able to participate in  most treatment activities.  Dimension 4: 3 Client displays inconsistent compliance, minimal awareness of either the client's addiction or mental disorder, and is minimally cooperative.  Dimension 5: 3 Client has poor recognition and understanding of relapse and recidivism issues and displays moderately high vulnerability for further substance use or mental health problems. Client has few coping skills and rarely applies coping skills.  Dimension 6: 3 Client is not engaged in structured, meaningful activity and the client's peers, family, significant other, and living environment are unsupportive, or there is significant criminal justice system involvement.

## 2023-12-08 NOTE — PHARMACY-ADMISSION MEDICATION HISTORY
Admission Medication History Completed by Pharmacy    See [facility] Admission Navigator for allergy information, preferred outpatient pharmacy, prior to admission medications and immunization status.     Medication history sources:  Patient via phone; [facility] dispense report; [facility] ([phone])     Pertinent changes made to PTA medication list:  Added: The following were added per patient   - Metformin 1000 mg tablets   - Aripiprazole 10 mg tablets   Deleted: N/A  Changed: N/A    Additional medication history information:   - Per chart, metformin was discontinued during October admission, however, patient reports he started taking it again recently. This was not instructed by his doctor.   - Per patient he takes aripiprazole, however, called patient's pharmacy and the last time he picked up this prescription was over a year ago.   - Patient denies taking any additional Rx/OTC medications other than the ones listed below.    Prior to Admission medications    Medication Sig Last Dose Taking? Auth Provider Long Term End Date   ARIPiprazole (ABILIFY) 10 MG tablet Take 10 mg by mouth daily Unknown Yes Unknown, Entered By History Yes    folic acid (FOLVITE) 1 MG tablet Take 1 tablet (1 mg) by mouth daily 12/6/2023 Yes Kentrell Fernandez MD     gabapentin (NEURONTIN) 300 MG capsule Take 1 capsule (300 mg) by mouth 3 times daily Taper on provider visit 12/6/2023 Yes Kentrell Fernandez MD Yes    insulin glargine (LANTUS PEN) 100 UNIT/ML pen Inject 8 Units Subcutaneous every morning Hold if BG < 100 Past Week Yes Kentrell Fernandez MD Yes    lisinopril (ZESTRIL) 10 MG tablet Take 1 tablet (10 mg) by mouth daily 12/6/2023 Yes Kentrell Fernandez MD Yes    metFORMIN (GLUCOPHAGE) 1000 MG tablet Take 1,000 mg by mouth 2 times daily (with meals) Past Week Yes Unknown, Entered By History No    multivitamin w/minerals (THERA-VIT-M) tablet Take 1 tablet by mouth daily 12/6/2023 Yes Kentrell Fernandez MD     nicotine  (NICODERM CQ) 14 MG/24HR 24 hr patch Place 1 patch onto the skin daily Past Month Yes Kentrell Fernandez MD     pantoprazole (PROTONIX) 40 MG EC tablet Take 1 tablet (40 mg) by mouth every morning (before breakfast) 12/6/2023 Yes Kentrell Fernandez MD     thiamine (B-1) 100 MG tablet Take 1 tablet (100 mg) by mouth daily 12/6/2023 Yes Kentrell Fernandez MD     Vitamin D3 (CHOLECALCIFEROL) 25 mcg (1000 units) tablet Take 25 mcg by mouth daily Past Week Yes Unknown, Entered By History            Date completed: 12/08/23    Medication history completed by:   Lucinda Cleary, PharmD  *[id number]

## 2023-12-08 NOTE — PROGRESS NOTES
"Triage & Transition Services, [address]     Ravi Ahmadi  December 8, 2023    Insurance: Moz      Legal Status: Vol     SUDs Assessment Status: Last one in Epic - 10/23/23 by Berenice Villar on file: None     Living Situation: With parents.     Current Providers and Supports:  Parents.     Encounter: Writer met with patient to discuss aftercare and discharge planning. Patient states he is hearing voices and that they are mocking him. He states sometimes they are mumbled and most often they tell him \"I am real, this is real life\" etc. Patient says he hears people walking down the sun and \"things, like small animals walking around the room\". Patient asks for medication for this and writer lets nursing know. Patient agrees to be scheduled for 1:1 mental health therapy and psychiatry once for once he leaves the unit, but declines case management assistance with [facility] treatment. Patient states he recently completed 30 days at [facility] and had about a 1.5. week relapse. He now wants to engage in IOP at [facility] so he can get a job as well. He states had an intake recently but declines signing YOSE[facility] or having case management call or assist him in setting up an admit to the program.     Collateral: None     Consulted with EILEEN Hernandez on Patient s plan of care.          Current Plan: Detox only, mental health therapy and psychiatry/medication management.      RN updated.    Yasemin Morgan  Triage & Transition Services - Mental Health and Addiction Service Line  [address] - Adult Inpatient Addiction Psychiatry Unit           "

## 2023-12-08 NOTE — H&P
Mr. Ravi Ahmadi is a 33 year old man admitted to [facility] to detox from Alcohol. In the context of auditory hallucinations.  From the ED note:  Ravi Ahmadi is a 33 year old male with history of alcohol use disorder, MDD, DKA, alcohol withdrawal, asthma, and type 2 diabetes mellitus presenting via EMS for a psychiatric evaluation. Ravi reports that he relapsed from alcohol sobriety two weeks ago and stopped drinking again three days ago. He reports use of between half and three quarters of a 175 mL bottle of alcohol every two days. He states that he called EMS for himself, noting auditory hallucinations. He reports a history of auditory hallucinations with voices but denies hearing voices today. He notes recent diarrhea and denies blood in stool. He denies current suicidal ideation. He reports cannabis use and smoking cigarettes. He reports use of PRN insulin with irregular blood glucose checks. He notes restarting metformin yesterday. Ravi is currently unemployed and living with his parents.     12/8: Blood pressure 123/77, pulse 116, temperature 97.1  F (36.2  C), temperature source Temporal, resp. rate 16, height 1.829 m (6'), weight 106.1 kg (234 lb), SpO2 93%.  12/8: General appearance: fair  Alert.   Affect: fair  Mood: a bit depressed  Speech:  normal.   Eye contact:  good.    Psychomotor behavior: tremors  Gait: steady   Abnormal movements:  none  Delusions: none  Hallucinations:  auditory, see below  Thoughts: linear, concentration decreased  Associations: intact  Judgement: fair  Insight: fair  Cognitions: intact in conversation  Memory:  intact in conversation  Orientation: normal    Not suicidal.    Family history positive for mother with Bipolar illness, father with addictions.    Medical:   Past Medical History:   Diagnosis Date    Abnormal LFTs     suspected secondary to alcohol use    Alcohol use disorder     with hx of hospitalizations for withdrawal    Atopic dermatitis      Intermittent asthma     exercise    Tobacco use     Type 2 diabetes mellitus (H)     Diagnosed during hosptial stay in 8/2023 with A1c 8.4     Recent Results (from the past 168 hour(s))   Urine Drug Screen Panel    Collection Time: 12/07/23  1:41 PM   Result Value Ref Range    Amphetamines Urine Screen Negative Screen Negative    Barbituates Urine Screen Negative Screen Negative    Benzodiazepine Urine Screen Negative Screen Negative    Cannabinoids Urine Screen Negative Screen Negative    Cocaine Urine Screen Negative Screen Negative    Fentanyl Qual Urine Screen Negative Screen Negative    Opiates Urine Screen Negative Screen Negative    PCP Urine Screen Negative Screen Negative   Comprehensive metabolic panel    Collection Time: 12/07/23  1:45 PM   Result Value Ref Range    Sodium 137 135 - 145 mmol/L    Potassium 3.6 3.4 - 5.3 mmol/L    Carbon Dioxide (CO2) 22 22 - 29 mmol/L    Anion Gap 17 (H) 7 - 15 mmol/L    Urea Nitrogen 10.3 6.0 - 20.0 mg/dL    Creatinine 0.73 0.67 - 1.17 mg/dL    GFR Estimate >90 >60 mL/min/1.73m2    Calcium 10.4 (H) 8.6 - 10.0 mg/dL    Chloride 98 98 - 107 mmol/L    Glucose 138 (H) 70 - 99 mg/dL    Alkaline Phosphatase 96 40 - 150 U/L    AST 49 (H) 0 - 45 U/L    ALT 43 0 - 70 U/L    Protein Total 8.3 6.4 - 8.3 g/dL    Albumin 4.5 3.5 - 5.2 g/dL    Bilirubin Total 1.7 (H) <=1.2 mg/dL   Alcohol level blood    Collection Time: 12/07/23  1:45 PM   Result Value Ref Range    Alcohol ethyl <0.01 <=0.01 g/dL   CBC with platelets and differential    Collection Time: 12/07/23  1:45 PM   Result Value Ref Range    WBC Count 13.6 (H) 4.0 - 11.0 10e3/uL    RBC Count 5.20 4.40 - 5.90 10e6/uL    Hemoglobin 16.6 13.3 - 17.7 g/dL    Hematocrit 48.1 40.0 - 53.0 %    MCV 93 78 - 100 fL    MCH 31.9 26.5 - 33.0 pg    MCHC 34.5 31.5 - 36.5 g/dL    RDW 13.9 10.0 - 15.0 %    Platelet Count 197 150 - 450 10e3/uL    % Neutrophils 72 %    % Lymphocytes 17 %    % Monocytes 9 %    % Eosinophils 1 %    % Basophils 0 %     % Immature Granulocytes 1 %    NRBCs per 100 WBC 0 <1 /100    Absolute Neutrophils 9.8 (H) 1.6 - 8.3 10e3/uL    Absolute Lymphocytes 2.3 0.8 - 5.3 10e3/uL    Absolute Monocytes 1.2 0.0 - 1.3 10e3/uL    Absolute Eosinophils 0.1 0.0 - 0.7 10e3/uL    Absolute Basophils 0.1 0.0 - 0.2 10e3/uL    Absolute Immature Granulocytes 0.1 <=0.4 10e3/uL    Absolute NRBCs 0.0 10e3/uL   Ketone Beta-Hydroxybutyrate Quantitative    Collection Time: 12/07/23  1:45 PM   Result Value Ref Range    Ketone (Beta-Hydroxybutyrate) Quantitative 1.90 (HH) <=0.30 mmol/L   Blood gas venous    Collection Time: 12/07/23  2:34 PM   Result Value Ref Range    pH Venous 7.41 7.32 - 7.43    pCO2 Venous 38 (L) 40 - 50 mm Hg    pO2 Venous 72 (H) 25 - 47 mm Hg    Bicarbonate Venous 24 21 - 28 mmol/L    Base Excess/Deficit -0.4 -7.7 - 1.9 mmol/L    FIO2 0    Glucose by meter    Collection Time: 12/07/23 10:10 PM   Result Value Ref Range    GLUCOSE BY METER POCT 126 (H) 70 - 99 mg/dL   Comprehensive metabolic panel    Collection Time: 12/08/23  6:37 AM   Result Value Ref Range    Sodium 139 135 - 145 mmol/L    Potassium 3.6 3.4 - 5.3 mmol/L    Carbon Dioxide (CO2) 27 22 - 29 mmol/L    Anion Gap 11 7 - 15 mmol/L    Urea Nitrogen 17.2 6.0 - 20.0 mg/dL    Creatinine 0.89 0.67 - 1.17 mg/dL    GFR Estimate >90 >60 mL/min/1.73m2    Calcium 10.1 (H) 8.6 - 10.0 mg/dL    Chloride 101 98 - 107 mmol/L    Glucose 103 (H) 70 - 99 mg/dL    Alkaline Phosphatase 82 40 - 150 U/L    AST 50 (H) 0 - 45 U/L    ALT 48 0 - 70 U/L    Protein Total 7.5 6.4 - 8.3 g/dL    Albumin 4.0 3.5 - 5.2 g/dL    Bilirubin Total 1.4 (H) <=1.2 mg/dL   Hemoglobin A1c    Collection Time: 12/08/23  6:37 AM   Result Value Ref Range    Hemoglobin A1C 6.0 (H) <5.7 %   Lipid panel    Collection Time: 12/08/23  6:37 AM   Result Value Ref Range    Cholesterol 235 (H) <200 mg/dL    Triglycerides 100 <150 mg/dL    Direct Measure HDL 55 >=40 mg/dL    LDL Cholesterol Calculated 160 (H) <=100 mg/dL    Non  "HDL Cholesterol 180 (H) <130 mg/dL   TSH with free T4 reflex and/or T3 as indicated    Collection Time: 12/08/23  6:37 AM   Result Value Ref Range    TSH 1.82 0.30 - 4.20 uIU/mL   CBC with platelets and differential    Collection Time: 12/08/23  6:37 AM   Result Value Ref Range    WBC Count 10.8 4.0 - 11.0 10e3/uL    RBC Count 4.85 4.40 - 5.90 10e6/uL    Hemoglobin 15.5 13.3 - 17.7 g/dL    Hematocrit 45.9 40.0 - 53.0 %    MCV 95 78 - 100 fL    MCH 32.0 26.5 - 33.0 pg    MCHC 33.8 31.5 - 36.5 g/dL    RDW 14.2 10.0 - 15.0 %    Platelet Count 148 (L) 150 - 450 10e3/uL    % Neutrophils 59 %    % Lymphocytes 28 %    % Monocytes 9 %    % Eosinophils 2 %    % Basophils 1 %    % Immature Granulocytes 1 %    NRBCs per 100 WBC 0 <1 /100    Absolute Neutrophils 6.5 1.6 - 8.3 10e3/uL    Absolute Lymphocytes 3.0 0.8 - 5.3 10e3/uL    Absolute Monocytes 1.0 0.0 - 1.3 10e3/uL    Absolute Eosinophils 0.3 0.0 - 0.7 10e3/uL    Absolute Basophils 0.1 0.0 - 0.2 10e3/uL    Absolute Immature Granulocytes 0.1 <=0.4 10e3/uL    Absolute NRBCs 0.0 10e3/uL   Glucose by meter    Collection Time: 12/08/23  7:50 AM   Result Value Ref Range    GLUCOSE BY METER POCT 140 (H) 70 - 99 mg/dL   Symptomatic COVID-19 Virus (Coronavirus) by PCR Nose    Collection Time: 12/08/23 10:11 AM    Specimen: Nose; Swab   Result Value Ref Range    SARS CoV2 PCR Negative Negative   Glucose by meter    Collection Time: 12/08/23 12:03 PM   Result Value Ref Range    GLUCOSE BY METER POCT 146 (H) 70 - 99 mg/dL     Hallucinations first appeared within the last year in the context of alcohol withdrawal.  The resolved, but recurred in October whit detox from alcohol.  The had referential thinking accompany them or told him people were talking about him in October.  They subsided a bit, but recurred with this withdrawal. Abilify was prescribed one month ago with little benefit.      Mood is \"down\" with lethargy, sadness.  He scores borderline on the MDQ with spells of racing " thoughts, higher energy, impulsiveness.    Imp:  Alcohol use disorder, severe, recurrent, with withdrawal and complicated by hallucinations  2.  Alcoholic hallucinosis  3.  Probable bipolar 2.    Plan:   Detox with MSSA and Valium  2.  Continue Abilify  3.  Add Campral to dampen glutamate activity to decrease hallucinations.

## 2023-12-08 NOTE — PLAN OF CARE
Problem: Alcohol Withdrawal  Goal: Alcohol Withdrawal Symptom Control  Outcome: Progressing  Intervention: Minimize or Manage Alcohol Withdrawal Symptoms  Recent Flowsheet Documentation  Taken 12/8/2023 1041 by Nikki Johnson RN  Sensory Stimulation Regulation:   auditory stimulation minimized   care clustered   lighting decreased   quiet environment promoted  Seizure Precautions: side rails padded   Goal Outcome Evaluation:    Plan of Care Reviewed With: patient    /71   Pulse 108   Temp 97.3  F (36.3  C) (Temporal)   Resp 16   Ht 1.829 m (6')   Wt 106.1 kg (234 lb)   SpO2 93%   BMI 31.74 kg/m    Patient is A&O x 3, flat blunted affect, sweating and shaky. Endorsing auditory hallucinations denies SI/SIB, contract for safety while on unit.  Pt has poor appetite, ate 25% at breakfast. Encouraged fluids.  Endorsed anxiety 8/10 and depression 7/10. PRN Hydroxyzine 25 mg given.   MSSA 9, Valium 10 mg given at 0800    , 1 unit of  insulin Nov log given.    1200 Assessments:  /77   Pulse 116   Temp 97.1  F (36.2  C) (Temporal)   Resp 16   Ht 1.829 m (6')   Wt 106.1 kg (234 lb)   SpO2 93%   BMI 31.74 kg/m    Patient ate 75% at lunch  Endorsed anxiety 7/10, Depression 6/10.Pt continues to report auditory and visual hallucinations.   MSSA 8, Valium 10 mg given at noon.  Staff will continue to monitor and update changes.

## 2023-12-08 NOTE — PROGRESS NOTES
12/07/23 2120   Patient Belongings   Did you bring any home meds/supplements to the hospital?  Yes   Disposition of meds  Sent to security/pharmacy per site process   Patient Belongings other (see comments)   Belongings Search Yes   Clothing Search Yes   Second Staff Abhay   Comment See Notes     Storage bin:  Jacket, shorts w/strings, under shirt wears, shoes, belt,  Meds bin:  Cell phone,  line, keys, wallet  Security # [id number]  3 Visa cards  3 [place] D/L  S.[place] card   A               Admission:  I am responsible for any personal items that are not sent to the safe or pharmacy.  [facility] is not responsible for loss, theft or damage of any property in my possession.    Signature:  _________________________________ Date: _______  Time: _____                                              Staff Signature:  ____________________________ Date: ________  Time: _____      2nd Staff person, if patient is unable/unwilling to sign:    Signature: ________________________________ Date: ________  Time: _____     Discharge:  [facility] has returned all of my personal belongings:    Signature: _________________________________ Date: ________  Time: _____                                          Staff Signature:  ____________________________ Date: ________  Time: _____

## 2023-12-08 NOTE — ED PROVIDER NOTES
I assumed care from Dr Guerra.  Patient presenting with hallucinations.  Patient does have a ketosis but no acidemia and no hyperglycemia. Likely starvation ketosis. He is sleeping at present. I spoke with Dr Soto at [place] to discuss. They accepted the patient. Patient will be transferred there for inpatient management.      Jerad Leigh MD  12/07/23 7617

## 2023-12-08 NOTE — PLAN OF CARE
Problem: Alcohol Withdrawal  Goal: Alcohol Withdrawal Symptom Control  Outcome: Progressing   Goal Outcome Evaluation:    MSSA scores of 6/6, is patient mildly hypotensive on first detox check but BP recovers by mid -shift. Patient did not require medication for alcohol withdrawal and is observed to sleep throughout the noc.

## 2023-12-08 NOTE — DISCHARGE INSTRUCTIONS
Behavioral Discharge Planning and Instructions  THANK YOU FOR CHOOSING [facility]  3[facility]  [phone]    Summary: You were admitted to [facility] on 12/7/23 for detoxification from alcohol.  A medical exam was performed that included lab work. You have met with a  and opted to detox only.  Please take care and make your recovery a daily priority, Ravi!  It was a pleasure working with you and the entire treatment team here wishes you the very best in your recovery!     Recommendation:  Attend residential CD treatment at [facility] (admit scheduled 12/12/23 at 2pm door to door from ). Complete program and follow aftercare recommendations as recommended and required.     To find a treatment program:     [facility]  Click on find treatment  Enter your zip code and select your city/state.  The Substance Abuse and Mental Health Services Administration (SAMHSA) is the agency within  the U.S. Department of Health and Human Services that leads public health efforts to advance the  behavioral health of the nation. SAMHSA's mission is to reduce the impact of substance abuse and  mental illness on [place] communities.     MATIvision.org select substance use disorder programs then select find services.  Fast Tracker is a virtual community and health care connection resource. We connect  individuals, families, mental health and substance use disorder providers, physicians, care coordinators,  and others with a real-time, searchable directory of mental health and substance use disorder resources  and their availability within [place].    Disposition: [facility] - admit scheduled at 2pm on 12/12      Main Diagnoses:  Per Dr. Brett Santana MD;  Alcohol use disorder, severe, recurrent, with withdrawal and complicated by hallucinations  2.  Alcoholic hallucinosis  3.  Probable bipolar 2.    Major Treatments, Procedures and Findings:  You were treated for alcohol detoxification using   valium protocol. As an outpatient you will be prescribed medication, please take this medication as prescribed until it is gone. You declined a chemical dependency assessment. You had labs drawn and those results were reviewed with you. Please take a copy of your lab work with you to your next primary care provider appointment.    Symptoms to Report:  If you experience more anxiety, confusion, sleeplessness, deep sadness or thoughts of suicide, notify your treatment team or notify your primary care provider. IF ANY OF THE SYMPTOMS YOU ARE EXPERIENCING ARE A MEDICAL EMERGENCY CALL 911 IMMEDIATELY.     Lifestyle Adjustment:  Health Action Plan:  1.Create a daily schedule  2. Eat Healthy  3. Plan Enjoyable Sober Activities  4. Use Problem Solving Skills and Deal with Issues as they Arise.   5. Be Physically Active  6. Take your medications as prescribed  7. Get enough restful sleep  8. Practice Relaxation  9. Spend time with Supportive People  10. No use of alcohol, illegal drugs or addictive medications other than what is currently prescribed.   11.AA, NA Sponsor are excellent resources for support  12. Explore how  you can utilize spirituality in your recovery        Facts about COVID19 at www.cdc.gov/COVID19 and www.[place].gov/covid19    Keeping hands clean is one of the most important steps we can take to avoid getting sick and spreading germs to others.  Please wash your hands frequently and lather with soap for at least 20 seconds!    Follow-up Appointment:   Appointment Date/Time: 12/12/23 Primary Care Giver: Juancarlos Rivera    Address: [facility]         You are aware you should make a follow up appointment with your primary care doctor for medical and medication management     Residential CD Treatment:   Patient as admit scheduled at [facility] at 2pm on 12/12/23   Please remind [facility] of your standing mental health appointments (below)  - if you cannot attend these  appointments while at  please call to reschedule/cancel.         Patient has the following appointment scheduled:      1:1 Mental Health Therapy (virtual):  Date: Wednesday, 12/13/2023  Time: 5:00 pm - 6:00 pm  Provider: Cj Laboy MA  [facility]  Location: [facility], [address], [address], [place] [address]  Phone: [phone]  Type: Teletherapy     Patient Instructions  Please fill New Patient Form by using following link. All forms need to be completed 24 hours prior to the appointment date/time by going to [email or web address] Please call us on  24 hours prior to your scheduled appointment to confirm that you are able to attend. We will provide you information about how to log into video call software when you call.     Psychiatry/Medication Management:  Date: Monday, 12/18/2023  Time: 1:00 pm - 2:00 pm  Provider: Carlos Martinez MA, CNP,RN (male provider)  Location: [facility], [address], [address], [place] [address]  Phone: [phone]  Type: Telepsychiatry     Patient Instructions  Please fill New Patient Form by using following link. All forms need to be completed 24 hours prior to the appointment date/time by going to [email or web address] Please call us on [phone] 96 hours prior to your scheduled appointment to confirm that you are able to attend. We will provide you information about how to log into video call software when you call.          Recovery apps for your phone to locate current in person and zoom recovery meetings  Pink Taliaferro - meeting fernando  AA  - meeting fernando  Meeting guide - meeting fernando  Quick NA meeting - meeting fernando  Carlos Alberto- has various apps    Resources:  Some AA/NA meetings are being held online however most have returned to in-person or a hybrid combination please check online to verify*  Need Support Now? If you or someone you know is struggling or in crisis, help is available.  "Call or text 988 or chat 988Logicalwareline.org  AA meetings search for them at: https://aa-intergroup.org (worldwide meeting listings)  AA meetings for [place] area can be found online at: https://a[place].org (click local online meetings listings)  NA meetings for [place] area can be found online at: https://www.nam[place].org  (click find a meeting)  AA and NA Sponsors are excellent resources for support and you can find one at any support group meeting.   Alcoholics Anonymous (https://aa.org/): for information 24 hours/day  AA Intergroup service office in [place] (http://www.aastpaul.org/) 813.896.5745  AA Intergroup service office in [facility]: [phone]. (http://www.aaWappZappis.org/)  Narcotics Anonymous (www.[facility] Safetyinn[place].org) (192) 835-3121  https://aafairviewriverside.org/meetings  SMART Recovery - self management for addiction recovery:  www.smartrecovery.org  Pathways ~ A Health Crisis Resource & Support Center:  123.694.6523.  https://prescribetoprevent.org/patient-education/videos/  http://www.harmreduction.org  Crisis Text Line  Text 481182  You will be connected with a trained live crisis counselor to provide support. Por espanol, texto  CALI a 201678 o texto a 442-AYUDAME en WhatsApp  [place] Hope Line  1.938.SUICIDE [8499066]  [facility] [phone]  Support Group:  AA/NA and Sponsor/support.  Fast Tracker  Linking people to mental health and substance use disorder resources  AlaMarkatrack[place].org   [place] Mental Health Warm Line  Peer to peer support  Monday thru Saturday, 12 pm to 10 pm  931.576.4955 or 0.844.988.5123  Text \"Support\" to 79542  National [place] on Mental Illness (JEREMIAH)  155.970.2403 or 1.888.JEREMIAH.HELPS  Alcoholics Anonymous (www.alcoholics-anonymous.org): Check your phone book for your local chapter.  Suicide Awareness Voices of Education (SAVE) (www.save.org): 843-280-EOHC (7212)  National Suicide Prevention Line (www.mentalhealthmn.org): " 986-309-YXXX (4861)  Mental Health Consumer/Survivor Network of [place] (www.mhcsn.net): 921.418.1449 or 575-015-6584  Mental Health Association of [place] (www.mentalhealth.org): 314.276.6788 or 990-551-0106   Substance Abuse and Mental Health Services (www.samhsa.gov)  [place] Opioid Prevention Coalition: www.opioidcoalition.org    [place] Recovery Connection (MRC)  [facility] connects people seeking recovery to resources that help foster and sustain long-term recovery.  Whether you are seeking resources for treatment, transportation, housing, job training, education, health care or other pathways to recovery, [facility] is a great place to start.  653.270.5190.  www.[place]Say-Hey.iPG [employer]    Great Pod casts for nutrition and wellness  Listen on Apple Podcasts  Dishing Up Nutrition   [employer]   Nutrition       Understand the connection between what you eat and how you feel. Hosted by licensed nutritionists and dietitians from [employer] we share practical, real-life solutions for healthier living through nutrition.     General Medication Instructions:   See your medication sheet(s) for instructions.   Take all medications as prescribed.  Make no changes unless your primary care provider suggests them.   Go to all your primary care provider visits.  Be sure to have all your required lab tests. This way, your medicines can be refilled on time.  Do not use any forms of alcohol.    Please Note:  If you have any questions at anytime after you are discharged please call [facility] detox unit 3[facility] at [phone].  [facility], Behavioral Intake [phone]  Medical Records call [phone]  Outpatient Behavioral Intake call [phone]  LP+ Wait List/Bed Availability call [phone]    Please remember to take all of your behavioral discharge planning and lab paperwork to any follow up appointments, it contains your lab results, diagnosis, medication list and discharge  recommendations.      THANK YOU FOR CHOOSING [facility]

## 2023-12-08 NOTE — CONSULTS
[facility]  Consult Note - Hospitalist Service  Date of Admission:  12/7/2023  Consult Requested by: Leonora Horner NP  Reason for Consult: Alcohol withdrawal, type 2 diabetes, elevated ketones, leukocytosis, elevated bilrub    Assessment & Plan   Ravi Ahmadi is a 33 year old male who presents to [facility] in alcohol withdrawal. He has a past medical history of alcoholic hepatitis, hypertension, diabetes type 2, concern for DKA vs starvation ketosis/alcoholic/lactic acidosis, intermittent asthma, MDD, and tobacco use disorder. Pt had a recent admission for alcohol detox from 10/23-26/23.     Medicine will continue to follow for asthma symptoms, auditory hallucinations, and T2DM.     Acute alcohol withdrawal  Polysubstance use disorder(alcohol, cannabinoids)  Hx of alcohol withdrawal seizures  Elevated liver chemistries: Presents seeking detox from alcohol after noting auditory hallucinations.  Relapsed from alcohol 2 weeks ago after getting out of rehab. Drinks half to three quarters of a 175mL bottle of alcohol every 2 days. Last drink was Monday.  History of seizures with last on 8/23/23.  Patient also notes that he uses cannabis and smokes cigarettes. Blood alcohol level of <0.01. Utox negative. Alk phos WNL.Total bili 1.7-->1.4 (decreased from prior). AST: ALT of 50: 48, in 2:1 pattern with alcohol use. MSSA score of 9-14 since arrival. No abdominal pain, N/V.   - Management per Psychiatry  - No need for BMP/hepatic panel unless clinically indicated   - Agree with MSSA protocol  - Agree with MVI, folate, thiamine 100 mg BID   - Seizure precautions  - Trend BMP/LFTs OP with PCP within 1 week     Auditory hallucinations  Visual hallucinations, resolved:  Has has visual and auditory hallucinations with previous alcohol detox, however noting increase since stopping drinking on Monday. After some further discussion, pt noting also possible hallucinations vs paranoia even  "while in detox since October. Likely multifactorial with alcohol usage with possible underlying bipolar disorder. No confusion, oculomotor difficulties, ataxic gait.   - Treat etoh withdrawal  - Continue PTA Abilify   - Monitor     MDD vs Bipolar disorder: Hx of MDD. Also started on Abilify at OSH.Noting some ongoing auditory hallucinations as above. Mother with bipolar disorder.  - Per Psychiatry  - Alcohol withdrawal treatment as above.   - Continue PTA Abilify    HTN: Normotensive during stay.  - PTA lisinopril   - Always recheck BP if high or low and correlate with clinical situation  - Treat pain, anxiety, and any withdrawal s/s if present  - Notify provider if SBP >170 or DBP >110 after careful reassessment, treatment of pain/anxiety/withdrawal/home PTA med administration  - Trend     Tobacco use disorder:   - Nicotine patch and as needed gum/lozenges lozenges    Asthma  Chronic cough: Mild wheezing noted on exam in [facility]. Using albuterol more with his increase in smoking nearly every day. Mild leukocytosis, which has since resolved. No nasal congestion, SOB, fevers, increase in sputum from baseline, negative Covid test, stable O2 sats.   - PRN albuterol   - Start Breo daily   - Tobacco cessation as above   - Asthma action plan with PCP     T2DM (A1c of 6 on 12/8/23)  C/f DKA vs starvation/alcoholic/lactic acidosis on 8/17-8/20 admission: See trends below. Recent dx of T2DM with A1c of 10.8 on 6/12/23. Admission for probable DKA as above, but given good BG control, T2DM, anion gap metabolic acidosis likely secondary to starvation/alcoholic/also query metformin induced lactic acidosis/JAY at that time. PtA include lantus and metformin, however metformin was stopped after 10/23 detox admission, but pt restarted on his own on 12/6. Intermittently taking lantus for \"high BG\" over past two weeks. Hypoglycemic episodes in 50-60s at rehab.   - Lantus 5 units daily  - Stop metformin 1000 mg given recent Etoh usage, " diarrhea   - MSSI   - Hypoglycemic protocol   - POCT BG before meals and bedtime, has Binu/glucometer at home  - Follow-up with PCP 12/12, can discuss other BG lower agents at that time    Recent Labs   Lab 12/08/23  1203 12/08/23  0750 12/08/23  0637 12/07/23  2210 12/07/23  1345   * 140* 103* 126* 138*       Diarrhea: Noted since Monday. No acute abdominal pain, N/V. Likely d/t withdrawal.  - Monitor    Leukocytosis, resolved: See trends below. No acute s/s of infection.  - Monitor for s/s of infection   - Monitor OP in 1-2 weeks with PCP     WBC Count   Date Value Ref Range Status   12/08/2023 10.8 4.0 - 11.0 10e3/uL Final   12/07/2023 13.6 (H) 4.0 - 11.0 10e3/uL Final   10/25/2023 9.3 4.0 - 11.0 10e3/uL Final     Thrombocytopenia, mild: 148 upon admission. Likely d/t etoh usage.  - Monitor OP in 1-2 weeks with PCP        The patient's care was discussed with the Bedside Nurse, Patient, and Primary team.    Clinically Significant Risk Factors Present on Admission           # Hypercalcemia: Highest Ca = 10.4 mg/dL in last 2 days, will monitor as appropriate        # Hypertension: Noted on problem list      # Obesity: Estimated body mass index is 31.74 kg/m  as calculated from the following:    Height as of this encounter: 1.829 m (6').    Weight as of this encounter: 106.1 kg (234 lb).       # Financial/Environmental Concerns:    # Asthma: noted on problem list        MAKENNA Murrell [facility]  Hospitalist Service  Securely message with [employer] (more info)  Text page via [facility] Paging/Directory     This chart documentation was completed with Dragon voice-recognition software. Even though reviewed, this chart may still contain some grammatical, spelling, and word errors. Please contact the author for any questions or clarifications.   _____________________________________________________________________    Chief Complaint   Alcohol withdrawal     History is obtained from the patient    History of Present  Illness   Ravi Ahmadi is a 33 year old male who presents to  in alcohol withdrawal. He has a past medical history of alcoholic hepatitis, hypertension, diabetes type 2, concern for DKA vs starvation ketosis/lactic acidosis, intermittent asthma, MDD,  and tobacco use disorder. Pt had a recent admission for alcohol detox from 10/23-26/23.     Presents seeking detox from alcohol after noting auditory hallucinations and having diarrhea.  Relapsed from alcohol 2 weeks ago after getting out of rehab. Drinks half to three quarters of a 175mL bottle of alcohol every 2 days. Last drink was Monday.  History of seizures with last on 8/23/23.  Patient also notes that he uses cannabis and smokes cigarettes. He notes he has had auditory hallucinations with detox. However they got better after his last detox stay, but still noted some concerns for hallucinations vs paranoia during his stay at detox/at home (thinking other pt and family were talking about him). He had more frequent auditory hallucinations since he stopped drinking Monday. Also noting some visual hallucinations of thinking he was see people who were not there. Denies any visual hallucinations during my exam. Mother has history of bipolar disorder, father had substance use issues.   with irregular blood sugar checks.  He also restarted his metformin yesterday.   Blood alcohol level of <0.01. Utox negative. Alk phos WNL.Total bili 1.7-->1.4. AST: ALT of 50: 48, in 2:1 pattern with alcohol use. MSSA score of 9-14 since arrival. Anion gap of 17 which has since resolved to 11 after receiving fluids. VBG with no acidosis or decreased bicarb noted.     Patient reports recent diarrhea since Monday since he stopped drinking, but denies any abdominal pain or blood in his stool.      Patient reports intermittently using his Lantus only when his blood sugar is high over the past two weeks. Notes some symptomatic hypoglycemia during his detox stay. Notes he had been  decreasing his carbohydrates in his diet over the last several months. He had stopped taking his Metformin over the last month during his last discharge from the detox, but started taking it again yesterday.    Noting a chronic cough with smoking. Increasing smoking lately. Denies any exposure to any sick contacts, fevers, chills, nasal congestion, chest pain, N/V, increase in productive sputum from baseline (white). Notes using his inhaler more with his smoking. Mild leukocytosis in 13s. Negative Covid swab. Some expiratory wheezing upon exam in [facility]. O2 sats stable.      Past Medical History    Past Medical History:   Diagnosis Date    Abnormal LFTs     suspected secondary to alcohol use    Alcohol use disorder     with hx of hospitalizations for withdrawal    Atopic dermatitis     Intermittent asthma     exercise    Tobacco use     Type 2 diabetes mellitus (H)     Diagnosed during hosptial stay in 8/2023 with A1c 8.4       Past Surgical History   Past Surgical History:   Procedure Laterality Date    CL AFF SURGICAL PATHOLOGY      gravel/foreign body removal in abdomen after accident       Medications   I have reviewed this patient's current medications  Medications Prior to Admission   Medication Sig Dispense Refill Last Dose    folic acid (FOLVITE) 1 MG tablet Take 1 tablet (1 mg) by mouth daily 30 tablet 0 12/6/2023    gabapentin (NEURONTIN) 300 MG capsule Take 1 capsule (300 mg) by mouth 3 times daily Taper on provider visit 42 capsule 0 12/6/2023    lisinopril (ZESTRIL) 10 MG tablet Take 1 tablet (10 mg) by mouth daily 30 tablet 0 12/6/2023    multivitamin w/minerals (THERA-VIT-M) tablet Take 1 tablet by mouth daily 30 tablet 0 12/6/2023    pantoprazole (PROTONIX) 40 MG EC tablet Take 1 tablet (40 mg) by mouth every morning (before breakfast) 12 tablet 0 12/6/2023    thiamine (B-1) 100 MG tablet Take 1 tablet (100 mg) by mouth daily 30 tablet 0 12/6/2023    Alcohol Swabs PADS Use to swab the area of the  injection or steven as directed  Per insurance coverage 100 each 0     blood glucose (NO BRAND SPECIFIED) lancets standard To use to test glucose level in the blood.  Use to test blood sugar  4  times daily as directed. To accompany glucose monitor brands per insurance coverage. 200 each 0     blood glucose (NO BRAND SPECIFIED) test strip To use to test glucose level in the blood. Use to test blood sugar  4 times daily as directed. To accompany glucose monitor brands per insurance coverage. 200 strip 0     blood glucose calibration (NO BRAND SPECIFIED) solution Used to calibrate the blood glucose monitor as needed and as directed.  To accompany  blood glucose brands per insurance coverage 1 each 0     blood glucose monitoring (NO BRAND SPECIFIED) meter device kit Use as directed Per insurance coverage 1 kit 0     glucose (BD GLUCOSE) 4 g chewable tablet Take 4 tablets by mouth every 15 minutes as needed for low blood sugar 20 tablet 0     insulin glargine (LANTUS PEN) 100 UNIT/ML pen Inject 8 Units Subcutaneous every morning Hold if BG < 100 2 mL 0     insulin pen needle (32G X 4 MM) 32G X 4 MM miscellaneous Use as directed by provider Per insurance coverage 200 each 0     nicotine (NICODERM CQ) 14 MG/24HR 24 hr patch Place 1 patch onto the skin daily 30 patch 0     Sharps Container MISC Use as directed to dispose of needles, lancets and other sharps 1 each 0         Review of Systems    The 10 point Review of Systems is negative other than noted in the HPI or here.     Social History   I have reviewed this patient's social history and updated it with pertinent information if needed.  Social History     Tobacco Use    Smoking status: Some Days     Packs/day: 0.50     Years: 7.00     Additional pack years: 0.00     Total pack years: 3.50     Types: Cigarettes    Smokeless tobacco: Never    Tobacco comments:     7 cigarettes per day   Substance Use Topics    Alcohol use: Yes     Alcohol/week: 12.5 standard drinks of  alcohol     Types: 15 Standard drinks or equivalent per week    Drug use: No         Allergies   No Known Allergies     Physical Exam   Vital Signs: Temp: 97.3  F (36.3  C) Temp src: Temporal BP: 126/71 Pulse: 108   Resp: 16 SpO2: 93 % O2 Device: None (Room air)    Weight: 234 lbs 0 oz    General Appearance: In NAD, sitting in bed  Respiratory: PHILLIP with expiratory wheezing, diminished in bases   Cardiovascular: S1, S2, no m/r/g, no peripheral edema  GI: BS+, all 4 quadrants, no masses, non-tender upon palpation  Skin: Intact on face, arms, legs, No wounds, bruising, or lesions noted.  Other: A&Ox4, moving all extremities, endorsing auditory hallucinations       Medical Decision Making       65 MINUTES SPENT BY ME on the date of service doing chart review, history, exam, documentation & further activities per the note.      Data     I have personally reviewed the following data over the past 24 hrs:    10.8  \   15.5   / 148 (L)     139 101 17.2 /  140 (H)   3.6 27 0.89 \     ALT: 48 AST: 50 (H) AP: 82 TBILI: 1.4 (H)   ALB: 4.0 TOT PROTEIN: 7.5 LIPASE: N/A     TSH: 1.82 T4: N/A A1C: 6.0 (H)       Imaging results reviewed over the past 24 hrs:   No results found for this or any previous visit (from the past 24 hour(s)).

## 2023-12-09 LAB
GLUCOSE BLDC GLUCOMTR-MCNC: 123 MG/DL (ref 70–99)
GLUCOSE BLDC GLUCOMTR-MCNC: 139 MG/DL (ref 70–99)
GLUCOSE BLDC GLUCOMTR-MCNC: 146 MG/DL (ref 70–99)
GLUCOSE BLDC GLUCOMTR-MCNC: 147 MG/DL (ref 70–99)
GLUCOSE BLDC GLUCOMTR-MCNC: 150 MG/DL (ref 70–99)

## 2023-12-09 PROCEDURE — 250N000013 HC RX MED GY IP 250 OP 250 PS 637: Performed by: PSYCHIATRY & NEUROLOGY

## 2023-12-09 PROCEDURE — 99232 SBSQ HOSP IP/OBS MODERATE 35: CPT

## 2023-12-09 PROCEDURE — 250N000013 HC RX MED GY IP 250 OP 250 PS 637

## 2023-12-09 PROCEDURE — 250N000013 HC RX MED GY IP 250 OP 250 PS 637: Performed by: NURSE PRACTITIONER

## 2023-12-09 PROCEDURE — 128N000004 HC R&B CD ADULT

## 2023-12-09 RX ADMIN — INSULIN ASPART 1 UNITS: 100 INJECTION, SOLUTION INTRAVENOUS; SUBCUTANEOUS at 08:39

## 2023-12-09 RX ADMIN — TRAZODONE HYDROCHLORIDE 50 MG: 50 TABLET ORAL at 21:53

## 2023-12-09 RX ADMIN — ACAMPROSATE CALCIUM 666 MG: 333 TABLET, DELAYED RELEASE ORAL at 14:26

## 2023-12-09 RX ADMIN — PANTOPRAZOLE SODIUM 40 MG: 40 TABLET, DELAYED RELEASE ORAL at 08:38

## 2023-12-09 RX ADMIN — DIAZEPAM 10 MG: 5 TABLET ORAL at 18:12

## 2023-12-09 RX ADMIN — ACAMPROSATE CALCIUM 666 MG: 333 TABLET, DELAYED RELEASE ORAL at 20:01

## 2023-12-09 RX ADMIN — ACAMPROSATE CALCIUM 666 MG: 333 TABLET, DELAYED RELEASE ORAL at 08:38

## 2023-12-09 RX ADMIN — LISINOPRIL 10 MG: 10 TABLET ORAL at 08:38

## 2023-12-09 RX ADMIN — THIAMINE HCL TAB 100 MG 100 MG: 100 TAB at 12:25

## 2023-12-09 RX ADMIN — FLUTICASONE FUROATE AND VILANTEROL TRIFENATATE 1 PUFF: 100; 25 POWDER RESPIRATORY (INHALATION) at 08:44

## 2023-12-09 RX ADMIN — DIAZEPAM 10 MG: 5 TABLET ORAL at 01:25

## 2023-12-09 RX ADMIN — Medication 1 TABLET: at 08:37

## 2023-12-09 RX ADMIN — DIAZEPAM 5 MG: 5 TABLET ORAL at 12:26

## 2023-12-09 RX ADMIN — NICOTINE 1 PATCH: 14 PATCH, EXTENDED RELEASE TRANSDERMAL at 09:37

## 2023-12-09 RX ADMIN — INSULIN GLARGINE 5 UNITS: 100 INJECTION, SOLUTION SUBCUTANEOUS at 21:49

## 2023-12-09 RX ADMIN — Medication 25 MCG: at 08:38

## 2023-12-09 RX ADMIN — ALBUTEROL SULFATE 2 PUFF: 90 AEROSOL, METERED RESPIRATORY (INHALATION) at 01:25

## 2023-12-09 RX ADMIN — HYDROXYZINE HYDROCHLORIDE 25 MG: 25 TABLET, FILM COATED ORAL at 12:26

## 2023-12-09 RX ADMIN — ALBUTEROL SULFATE 2 PUFF: 90 AEROSOL, METERED RESPIRATORY (INHALATION) at 21:49

## 2023-12-09 RX ADMIN — THIAMINE HCL TAB 100 MG 100 MG: 100 TAB at 08:38

## 2023-12-09 RX ADMIN — FOLIC ACID 1 MG: 1 TABLET ORAL at 08:38

## 2023-12-09 RX ADMIN — HYDROXYZINE HYDROCHLORIDE 25 MG: 25 TABLET, FILM COATED ORAL at 08:38

## 2023-12-09 RX ADMIN — ARIPIPRAZOLE 10 MG: 10 TABLET ORAL at 08:38

## 2023-12-09 RX ADMIN — INSULIN ASPART 1 UNITS: 100 INJECTION, SOLUTION INTRAVENOUS; SUBCUTANEOUS at 12:28

## 2023-12-09 ASSESSMENT — ACTIVITIES OF DAILY LIVING (ADL)
ADLS_ACUITY_SCORE: 30
ADLS_ACUITY_SCORE: 30
ORAL_HYGIENE: INDEPENDENT
LAUNDRY: WITH SUPERVISION
ADLS_ACUITY_SCORE: 30
DRESS: INDEPENDENT
ADLS_ACUITY_SCORE: 30
ADLS_ACUITY_SCORE: 30
ORAL_HYGIENE: INDEPENDENT
ADLS_ACUITY_SCORE: 30
DRESS: SCRUBS (BEHAVIORAL HEALTH)
HYGIENE/GROOMING: INDEPENDENT
ADLS_ACUITY_SCORE: 30
HYGIENE/GROOMING: INDEPENDENT
ADLS_ACUITY_SCORE: 30

## 2023-12-09 NOTE — PLAN OF CARE
Problem: Alcohol Withdrawal  Goal: Alcohol Withdrawal Symptom Control  Outcome: Progressing     Problem: Sleep Disturbance  Goal: Adequate Sleep/Rest  Outcome: Progressing   Goal Outcome Evaluation:    The Pt slept okay; his MSSA scores were 8 and 6. He received  Valium 10 mg  and prn Inhaler once. The fifteen-minute safety checks are in progress with no related incidents. His BG was 123 at 0200.

## 2023-12-09 NOTE — PLAN OF CARE
Problem: Alcohol Withdrawal  Goal: Alcohol Withdrawal Symptom Control  Outcome: Progressing   Goal Outcome Evaluation:    Plan of Care Reviewed With: patient    Pt is A&O x 3, flat blunted affect, anxious moods.   Pt continues to endorse auditory hallucinations.rated anxiety 6/10 and depression 5/10.  MSSA 7/10, no Valium given  BG was 146, I unit of Nov log given prior to breakfast. Ate 50% of breakfast    /79   Pulse 93   Temp 97.6  F (36.4  C) (Oral)   Resp 16   Ht 1.829 m (6')   Wt 106.1 kg (234 lb)   SpO2 96%   BMI 31.74 kg/m         1200 Assessment:  MSSA 8, Valium 5 mg given at 1228  Endorsed anxiety 6/10 and Depression 5/10, PRN Hydroxyzine 25 mg given.  Denies SI/HI/SIB, contracts for safety while on unit. Pt is withdrawn, isolated in room, out for meals and back to sleep.  Pt is visible on unit, no interaction with peers noticed this whole shift.  Staff will continue to monitor and update changes.  /74   Pulse 93   Temp 97.9  F (36.6  C) (Temporal)   Resp 16   Ht 1.829 m (6')   Wt 106.1 kg (234 lb)   SpO2 95%   BMI 31.74 kg/m

## 2023-12-09 NOTE — PLAN OF CARE
Goal Outcome Evaluation:    Plan of Care Reviewed With: patient Plan of Care Reviewed With: patient    Pt sat with peers in the [place] today; played Monopoly with peers in evening. Ate 50% dinner  Pt stating having Auditory Hallucinations afternoon but also that it is lessened since began withdrawing. Denies pain, BG taken afternoon 147 and evening 139; no insulin given. Feeling anxiety. Denied SI. Used his PRN albuterol inhaler before bed. MSSA 7, 8 , 7 . (Re-assessed at 6:15PM as pt noting more tremulous and anxious) gave Ativan at 6:15 PM last. Jose for safety.

## 2023-12-09 NOTE — PROGRESS NOTES
[facility]    Medicine Progress Note - Hospitalist Service    Date of Admission:  12/7/2023    Assessment & Plan   Ravi Ahmadi is a 33 year old male who presents to [facility] in alcohol withdrawal. He has a past medical history of alcoholic hepatitis, hypertension, diabetes type 2, concern for DKA vs starvation ketosis/alcoholic/lactic acidosis, intermittent asthma, MDD, and tobacco use disorder. Pt had a recent admission for alcohol detox from 10/23-26/23.     Medicine will sign off.    Please see discharge recommendations in bold below.     Acute alcohol withdrawal  Polysubstance use disorder(alcohol, cannabinoids)  Hx of alcohol withdrawal seizures  Elevated liver chemistries: Presents seeking detox from alcohol after noting auditory hallucinations.  Relapsed from alcohol 2 weeks ago after getting out of rehab. Drinks half to three quarters of a 175mL bottle of alcohol every 2 days. Last drink was Monday.  History of seizures with last on 8/23/23.  Patient also notes that he uses cannabis and smokes cigarettes. Blood alcohol level of <0.01. Utox negative. Alk phos WNL.Total bili 1.7-->1.4 (decreased from prior). AST: ALT of 50: 48, in 2:1 pattern with alcohol use. MSSA score of 9-14 since arrival. No abdominal pain, N/V.   - Management per Psychiatry  - No need for BMP/hepatic panel unless clinically indicated   - Agree with [facility] protocol  - Agree with MVI, folate, thiamine 100 mg BID   - Seizure precautions  - Trend BMP/LFTs OP with PCP within 1 week     Auditory hallucinations  Visual hallucinations, resolved:  Has has visual and auditory hallucinations with previous alcohol detox, however noting increase since stopping drinking on Monday. After some further discussion, pt noting also possible hallucinations vs paranoia even while in detox since October. Likely multifactorial with alcohol usage with possible underlying bipolar disorder. No confusion, oculomotor  "difficulties, ataxic gait.   - Treat etoh withdrawal  - Continue PTA Abilify   - Monitor     MDD vs Bipolar disorder: Hx of MDD. Also started on Abilify at OSH.Noting some ongoing auditory hallucinations as above. Mother with bipolar disorder.  - Per Psychiatry  - Alcohol withdrawal treatment as above.   - Continue PTA Abilify    HTN: Normotensive during stay.  - PTA lisinopril   - Always recheck BP if high or low and correlate with clinical situation  - Treat pain, anxiety, and any withdrawal s/s if present  - Notify provider if SBP >170 or DBP >110 after careful reassessment, treatment of pain/anxiety/withdrawal/home PTA med administration  - Trend     Tobacco use disorder:   - Nicotine patch and as needed gum/lozenges lozenges    Asthma  Chronic cough: Mild wheezing noted on exam in [facility]. Using albuterol more with his increase in smoking nearly every day. Mild leukocytosis, which has since resolved. No nasal congestion, SOB, fevers, increase in sputum from baseline, negative Covid test, stable O2 sats.   - PRN albuterol   - Start Breo daily--> continue at discharge  - Tobacco cessation as above   - Asthma action plan with PCP     T2DM (A1c of 6 on 12/8/23)  C/f DKA vs starvation/alcoholic/lactic acidosis on 8/17-8/20 admission: See trends below. Recent dx of T2DM with A1c of 10.8 on 6/12/23. Admission for probable DKA as above, but given good BG control, T2DM, anion gap metabolic acidosis likely secondary to starvation/alcoholic/also query metformin induced lactic acidosis/JAY at that time. PtA include lantus and metformin, however metformin was stopped after 10/23 detox admission, but pt restarted on his own on 12/6. Intermittently taking lantus for \"high BG\" over past two weeks. Hypoglycemic episodes in 50-60s at rehab.   - Lantus 5 units daily--> continue at discharge  - Stop metformin 1000 mg given recent Etoh usage/safe discharge plan to prevent instance of possible lactic acidosis, diarrhea   - MSSI--> do " NOT continue at discharge  - Hypoglycemic protocol   - POCT BG before meals and bedtime, has Binu/glucometer at home--> no supplies needed at discharge  - Follow-up with PCP 12/12, can discuss other BG lower agents at that time    Recent Labs   Lab 12/08/23  1203 12/08/23  0750 12/08/23  0637 12/07/23  2210 12/07/23  1345   * 140* 103* 126* 138*       Diarrhea, resolved: Noted since Monday. No acute abdominal pain, N/V. Likely d/t withdrawal.  - Monitor    Leukocytosis, resolved: See trends below. No acute s/s of infection.  - Monitor for s/s of infection   - Monitor OP in 1-2 weeks with PCP     WBC Count   Date Value Ref Range Status   12/08/2023 10.8 4.0 - 11.0 10e3/uL Final   12/07/2023 13.6 (H) 4.0 - 11.0 10e3/uL Final   10/25/2023 9.3 4.0 - 11.0 10e3/uL Final     Thrombocytopenia, mild: 148 upon admission. Likely d/t etoh usage.  - Monitor OP in 1-2 weeks with PCP              Clinically Significant Risk Factors                  # Hypertension: Noted on problem list        # Obesity: Estimated body mass index is 31.74 kg/m  as calculated from the following:    Height as of this encounter: 1.829 m (6').    Weight as of this encounter: 106.1 kg (234 lb)., PRESENT ON ADMISSION       # Financial/Environmental Concerns:    # Asthma: noted on problem list        Disposition Plan     Expected Discharge Date: 12/09/2023                  The patient's care was discussed with the Bedside Nurse, Patient, and Primary team(via this note).     MAKENNA Murrell [facility]  Hospitalist Service  [facility]  Securely message with [employer] (more info)  Text page via [employer] Paging/Directory     This chart documentation was completed with Dragon voice-recognition software. Even though reviewed, this chart may still contain some grammatical, spelling, and word errors. Please contact the author for any questions or clarifications.    _____________________________________________________________________    Interval History   Nursing/SW/consult/interdisciplinary healthcare worker's notes reviewed.     I reviewed all medications, new labs and imaging results over the last 24 hours. Please see discussion of these results below or in the A/P.    No acute events overnight.  HDS.  O2 sats in the mid to high 90s.  MSSA scores 6-10 in the past 12 hours.  Glucose trending in the 120s-140s with 5 of Lantus as well is only needing 2 units of NovoLog.  Notes he still having hallucinations such as having a bubble follow him around and shadows at night.  Continues to have auditory hallucinations, but have decreased and are muted. No diarrhea noted    ROS: 12 point ROS negative other than the symptoms noted here or above in the assessment and plan.       Physical Exam   Vital Signs: Temp: 97.9  F (36.6  C) Temp src: Temporal BP: 117/74 Pulse: 93   Resp: 16 SpO2: 95 % O2 Device: None (Room air)    Weight: 234 lbs 0 oz    General Appearance:  In NAD, sitting in bed  Respiratory: Clear LS throughout, no wheezing, rhonchi noted  Cardiovascular: S1, S2, no m/r/g, no peripheral edema  GI: BS+, all 4 quadrants, no masses, non-tender upon palpation  Skin: Intact on face, arms, legs, No wounds, bruising, or lesions noted.  Other:  A&Ox4, moving all extremities, endorsing auditory hallucinations      Medical Decision Making       40 MINUTES SPENT BY ME on the date of service doing chart review, history, exam, documentation & further activities per the note.      Data         Imaging results reviewed over the past 24 hrs:   No results found for this or any previous visit (from the past 24 hour(s)).

## 2023-12-09 NOTE — PLAN OF CARE
Goal Outcome Evaluation:    Plan of Care Reviewed With: patient Plan of Care Reviewed With: patient    Pt sat with peers in the [place] today. Ate dinner with a peer. Pt stating still seeing Hallucination that looks like a bubble following him. Auditory Hallucinations afternoon and evening. Denies pain, BG taken afternoon 103 and evening 144. Feeling anxiety and depression. Denied SI. Ate most of his dinner and watched a movie for awhile. Used his ellipta inhaler before bed. MSSA 10,9.

## 2023-12-10 LAB
GLUCOSE BLDC GLUCOMTR-MCNC: 115 MG/DL (ref 70–99)
GLUCOSE BLDC GLUCOMTR-MCNC: 122 MG/DL (ref 70–99)
GLUCOSE BLDC GLUCOMTR-MCNC: 135 MG/DL (ref 70–99)
GLUCOSE BLDC GLUCOMTR-MCNC: 139 MG/DL (ref 70–99)
GLUCOSE BLDC GLUCOMTR-MCNC: 157 MG/DL (ref 70–99)

## 2023-12-10 PROCEDURE — 128N000004 HC R&B CD ADULT

## 2023-12-10 PROCEDURE — G0177 OPPS/PHP; TRAIN & EDUC SERV: HCPCS

## 2023-12-10 PROCEDURE — 250N000013 HC RX MED GY IP 250 OP 250 PS 637: Performed by: PSYCHIATRY & NEUROLOGY

## 2023-12-10 PROCEDURE — 250N000013 HC RX MED GY IP 250 OP 250 PS 637: Performed by: NURSE PRACTITIONER

## 2023-12-10 RX ADMIN — ACAMPROSATE CALCIUM 666 MG: 333 TABLET, DELAYED RELEASE ORAL at 21:21

## 2023-12-10 RX ADMIN — FOLIC ACID 1 MG: 1 TABLET ORAL at 08:34

## 2023-12-10 RX ADMIN — ACAMPROSATE CALCIUM 666 MG: 333 TABLET, DELAYED RELEASE ORAL at 14:37

## 2023-12-10 RX ADMIN — LISINOPRIL 10 MG: 10 TABLET ORAL at 08:35

## 2023-12-10 RX ADMIN — HYDROXYZINE HYDROCHLORIDE 25 MG: 25 TABLET, FILM COATED ORAL at 12:00

## 2023-12-10 RX ADMIN — Medication 25 MCG: at 08:35

## 2023-12-10 RX ADMIN — HYDROXYZINE HYDROCHLORIDE 25 MG: 25 TABLET, FILM COATED ORAL at 17:52

## 2023-12-10 RX ADMIN — THIAMINE HCL TAB 100 MG 100 MG: 100 TAB at 08:35

## 2023-12-10 RX ADMIN — ALBUTEROL SULFATE 2 PUFF: 90 AEROSOL, METERED RESPIRATORY (INHALATION) at 21:21

## 2023-12-10 RX ADMIN — HYDROXYZINE HYDROCHLORIDE 25 MG: 25 TABLET, FILM COATED ORAL at 08:56

## 2023-12-10 RX ADMIN — ACAMPROSATE CALCIUM 666 MG: 333 TABLET, DELAYED RELEASE ORAL at 08:34

## 2023-12-10 RX ADMIN — TRAZODONE HYDROCHLORIDE 50 MG: 50 TABLET ORAL at 21:21

## 2023-12-10 RX ADMIN — DIAZEPAM 10 MG: 5 TABLET ORAL at 12:00

## 2023-12-10 RX ADMIN — ARIPIPRAZOLE 10 MG: 10 TABLET ORAL at 08:34

## 2023-12-10 RX ADMIN — NICOTINE 1 PATCH: 14 PATCH, EXTENDED RELEASE TRANSDERMAL at 08:37

## 2023-12-10 RX ADMIN — INSULIN GLARGINE 5 UNITS: 100 INJECTION, SOLUTION SUBCUTANEOUS at 21:21

## 2023-12-10 RX ADMIN — PANTOPRAZOLE SODIUM 40 MG: 40 TABLET, DELAYED RELEASE ORAL at 08:34

## 2023-12-10 RX ADMIN — Medication 1 TABLET: at 08:34

## 2023-12-10 RX ADMIN — THIAMINE HCL TAB 100 MG 100 MG: 100 TAB at 11:56

## 2023-12-10 RX ADMIN — FLUTICASONE FUROATE AND VILANTEROL TRIFENATATE 1 PUFF: 100; 25 POWDER RESPIRATORY (INHALATION) at 08:34

## 2023-12-10 ASSESSMENT — ACTIVITIES OF DAILY LIVING (ADL)
ADLS_ACUITY_SCORE: 30
DRESS: INDEPENDENT
ADLS_ACUITY_SCORE: 30
DRESS: SCRUBS (BEHAVIORAL HEALTH)
ADLS_ACUITY_SCORE: 30
HYGIENE/GROOMING: INDEPENDENT
HYGIENE/GROOMING: INDEPENDENT
ORAL_HYGIENE: INDEPENDENT
ORAL_HYGIENE: INDEPENDENT
ADLS_ACUITY_SCORE: 30
ADLS_ACUITY_SCORE: 30
LAUNDRY: WITH SUPERVISION
ADLS_ACUITY_SCORE: 30

## 2023-12-10 NOTE — PLAN OF CARE
Problem: Alcohol Withdrawal  Goal: Alcohol Withdrawal Symptom Control  Outcome: Progressing  Intervention: Minimize or Manage Alcohol Withdrawal Symptoms  Recent Flowsheet Documentation  Taken 12/10/2023 1136 by Nikki Johnson RN  Sensory Stimulation Regulation:   auditory stimulation minimized   care clustered   lighting decreased   quiet environment promoted  Seizure Precautions: side rails padded   Goal Outcome Evaluation:    Plan of Care Reviewed With: patient    Patient is A&O x 3, flat affect, anxious moods. Patient denies SI/HI/SIB. Patient stated feeling better and denied auditory and visual hallucinations.Pt has good appetite, ate 100% of breakfast.Endorsed anxiety 6/10, Pt was given PRN Hydroxyzine 25 mg for anxiety.  MSSA 6, no valium given.  , no insulin coverage. Shortly after patient returned to writer stated that is again hearing voices as before.  Pt is withdrawn and isolative, no interaction with peers noticed.   /81  HR 94  Resp 16  Temp 97.5  SATs 95% at RA      1200 assessment:  Patient C/O hallucinations, MSSA 8, Valium 10 mg given. BG  135, no Insulin coverage. Per parameters.  Patient attended group, after he was done with group, reported that he did not hear voices during the group.  Staff will continue to monitor and update changes.  /81 (BP Location: Left arm)   Pulse 92   Temp 97.2  F (36.2  C) (Temporal)   Resp 16   Ht 1.829 m (6')   Wt 106.1 kg (234 lb)   SpO2 95%   BMI 31.74 kg/m

## 2023-12-10 NOTE — CARE PLAN
12/10/23 9685   Group Therapy Session   Group Attendance attended group session   Time Session Began 1315   Time Session Ended 1415   Total Time (minutes) 60   Total # Attendees 8   Group Type life skill;psychoeducation   Group Topic Covered coping skills/lifestyle management;relationship;problem-solving;structured socialization;cognitive activities   Group Session Detail General Health and Coping Skills: education and group discussion on self-esteem and self-compassion.   Patient Participation Detail Pt participated in a group discussion on self-compassion. Pt appeared engaged in the activity and attentive to the discussion. Pt identified positive character traits of a loved one, sharing that their mother is loveable, and identifying personal strengths in themselves.

## 2023-12-10 NOTE — PLAN OF CARE
Problem: Alcohol Withdrawal  Goal: Alcohol Withdrawal Symptom Control  Outcome: Progressing   Goal Outcome Evaluation:       Pt.scored 4 on MSSA. No prn medication given. He slept through the night. Breathing was quiet and spontaneous. No hypoglycemic incident this shift. Will continue to monitor.

## 2023-12-11 LAB
GLUCOSE BLDC GLUCOMTR-MCNC: 115 MG/DL (ref 70–99)
GLUCOSE BLDC GLUCOMTR-MCNC: 119 MG/DL (ref 70–99)
GLUCOSE BLDC GLUCOMTR-MCNC: 128 MG/DL (ref 70–99)
GLUCOSE BLDC GLUCOMTR-MCNC: 138 MG/DL (ref 70–99)
GLUCOSE BLDC GLUCOMTR-MCNC: 181 MG/DL (ref 70–99)

## 2023-12-11 PROCEDURE — H2035 A/D TX PROGRAM, PER HOUR: HCPCS | Mod: HQ

## 2023-12-11 PROCEDURE — 99233 SBSQ HOSP IP/OBS HIGH 50: CPT | Performed by: PSYCHIATRY & NEUROLOGY

## 2023-12-11 PROCEDURE — 128N000004 HC R&B CD ADULT

## 2023-12-11 PROCEDURE — 250N000013 HC RX MED GY IP 250 OP 250 PS 637: Performed by: NURSE PRACTITIONER

## 2023-12-11 PROCEDURE — 250N000013 HC RX MED GY IP 250 OP 250 PS 637: Performed by: PSYCHIATRY & NEUROLOGY

## 2023-12-11 RX ORDER — LANOLIN ALCOHOL/MO/W.PET/CERES
100 CREAM (GRAM) TOPICAL DAILY
Qty: 30 TABLET | Refills: 0 | Status: SHIPPED | OUTPATIENT
Start: 2023-12-11 | End: 2024-01-05

## 2023-12-11 RX ORDER — LISINOPRIL 10 MG/1
10 TABLET ORAL DAILY
Qty: 30 TABLET | Refills: 0 | Status: SHIPPED | OUTPATIENT
Start: 2023-12-11 | End: 2024-01-05

## 2023-12-11 RX ORDER — ARIPIPRAZOLE 10 MG/1
10 TABLET ORAL DAILY
Qty: 30 TABLET | Refills: 0 | Status: SHIPPED | OUTPATIENT
Start: 2023-12-11 | End: 2023-12-12

## 2023-12-11 RX ORDER — ACAMPROSATE CALCIUM 333 MG/1
666 TABLET, DELAYED RELEASE ORAL 3 TIMES DAILY
Qty: 180 TABLET | Refills: 0 | Status: SHIPPED | OUTPATIENT
Start: 2023-12-11 | End: 2024-01-03

## 2023-12-11 RX ORDER — VITAMIN B COMPLEX
25 TABLET ORAL DAILY
Qty: 30 TABLET | Refills: 0 | Status: SHIPPED | OUTPATIENT
Start: 2023-12-11 | End: 2024-01-05

## 2023-12-11 RX ORDER — MULTIPLE VITAMINS W/ MINERALS TAB 9MG-400MCG
1 TAB ORAL DAILY
Qty: 30 TABLET | Refills: 0 | Status: SHIPPED | OUTPATIENT
Start: 2023-12-11 | End: 2024-01-05

## 2023-12-11 RX ORDER — NICOTINE 21 MG/24HR
1 PATCH, TRANSDERMAL 24 HOURS TRANSDERMAL DAILY
Qty: 30 PATCH | Refills: 0 | Status: SHIPPED | OUTPATIENT
Start: 2023-12-11 | End: 2024-08-27

## 2023-12-11 RX ORDER — FOLIC ACID 1 MG/1
1 TABLET ORAL DAILY
Qty: 30 TABLET | Refills: 0 | Status: SHIPPED | OUTPATIENT
Start: 2023-12-11 | End: 2024-01-05

## 2023-12-11 RX ORDER — PANTOPRAZOLE SODIUM 40 MG/1
40 TABLET, DELAYED RELEASE ORAL
Qty: 30 TABLET | Refills: 0 | Status: SHIPPED | OUTPATIENT
Start: 2023-12-11 | End: 2024-01-05

## 2023-12-11 RX ORDER — FLUTICASONE FUROATE AND VILANTEROL 100; 25 UG/1; UG/1
1 POWDER RESPIRATORY (INHALATION) DAILY
Qty: 28 EACH | Refills: 0 | Status: ON HOLD | OUTPATIENT
Start: 2023-12-11 | End: 2024-08-10

## 2023-12-11 RX ADMIN — HYDROXYZINE HYDROCHLORIDE 25 MG: 25 TABLET, FILM COATED ORAL at 08:48

## 2023-12-11 RX ADMIN — ARIPIPRAZOLE 10 MG: 10 TABLET ORAL at 08:47

## 2023-12-11 RX ADMIN — Medication 25 MCG: at 08:47

## 2023-12-11 RX ADMIN — ACAMPROSATE CALCIUM 666 MG: 333 TABLET, DELAYED RELEASE ORAL at 20:23

## 2023-12-11 RX ADMIN — PANTOPRAZOLE SODIUM 40 MG: 40 TABLET, DELAYED RELEASE ORAL at 08:47

## 2023-12-11 RX ADMIN — INSULIN ASPART 1 UNITS: 100 INJECTION, SOLUTION INTRAVENOUS; SUBCUTANEOUS at 08:49

## 2023-12-11 RX ADMIN — HYDROXYZINE HYDROCHLORIDE 25 MG: 25 TABLET, FILM COATED ORAL at 14:23

## 2023-12-11 RX ADMIN — FLUTICASONE FUROATE AND VILANTEROL TRIFENATATE 1 PUFF: 100; 25 POWDER RESPIRATORY (INHALATION) at 08:48

## 2023-12-11 RX ADMIN — ACAMPROSATE CALCIUM 666 MG: 333 TABLET, DELAYED RELEASE ORAL at 14:24

## 2023-12-11 RX ADMIN — INSULIN GLARGINE 5 UNITS: 100 INJECTION, SOLUTION SUBCUTANEOUS at 21:09

## 2023-12-11 RX ADMIN — Medication 1 TABLET: at 08:47

## 2023-12-11 RX ADMIN — ACAMPROSATE CALCIUM 666 MG: 333 TABLET, DELAYED RELEASE ORAL at 08:47

## 2023-12-11 RX ADMIN — NICOTINE 1 PATCH: 14 PATCH, EXTENDED RELEASE TRANSDERMAL at 08:49

## 2023-12-11 RX ADMIN — THIAMINE HCL TAB 100 MG 100 MG: 100 TAB at 11:25

## 2023-12-11 RX ADMIN — TRAZODONE HYDROCHLORIDE 50 MG: 50 TABLET ORAL at 21:09

## 2023-12-11 RX ADMIN — LISINOPRIL 10 MG: 10 TABLET ORAL at 08:48

## 2023-12-11 RX ADMIN — THIAMINE HCL TAB 100 MG 100 MG: 100 TAB at 08:48

## 2023-12-11 RX ADMIN — FOLIC ACID 1 MG: 1 TABLET ORAL at 08:47

## 2023-12-11 ASSESSMENT — ANXIETY QUESTIONNAIRES
5. BEING SO RESTLESS THAT IT IS HARD TO SIT STILL: NEARLY EVERY DAY
7. FEELING AFRAID AS IF SOMETHING AWFUL MIGHT HAPPEN: NEARLY EVERY DAY
GAD7 TOTAL SCORE: 17
3. WORRYING TOO MUCH ABOUT DIFFERENT THINGS: MORE THAN HALF THE DAYS
2. NOT BEING ABLE TO STOP OR CONTROL WORRYING: MORE THAN HALF THE DAYS
6. BECOMING EASILY ANNOYED OR IRRITABLE: MORE THAN HALF THE DAYS
IF YOU CHECKED OFF ANY PROBLEMS ON THIS QUESTIONNAIRE, HOW DIFFICULT HAVE THESE PROBLEMS MADE IT FOR YOU TO DO YOUR WORK, TAKE CARE OF THINGS AT HOME, OR GET ALONG WITH OTHER PEOPLE: VERY DIFFICULT
GAD7 TOTAL SCORE: 17
4. TROUBLE RELAXING: MORE THAN HALF THE DAYS
1. FEELING NERVOUS, ANXIOUS, OR ON EDGE: NEARLY EVERY DAY

## 2023-12-11 ASSESSMENT — ACTIVITIES OF DAILY LIVING (ADL)
ADLS_ACUITY_SCORE: 30
DRESS: SCRUBS (BEHAVIORAL HEALTH)
ADLS_ACUITY_SCORE: 30
ORAL_HYGIENE: INDEPENDENT
ADLS_ACUITY_SCORE: 30
ADLS_ACUITY_SCORE: 30
LAUNDRY: WITH SUPERVISION
HYGIENE/GROOMING: INDEPENDENT
ADLS_ACUITY_SCORE: 30
LAUNDRY: WITH SUPERVISION
ORAL_HYGIENE: INDEPENDENT
HYGIENE/GROOMING: INDEPENDENT
ADLS_ACUITY_SCORE: 30
ADLS_ACUITY_SCORE: 30
DRESS: INDEPENDENT
ADLS_ACUITY_SCORE: 30

## 2023-12-11 ASSESSMENT — PATIENT HEALTH QUESTIONNAIRE - PHQ9: SUM OF ALL RESPONSES TO PHQ QUESTIONS 1-9: 23

## 2023-12-11 NOTE — CONSULTS
Met with pt for CD Consult and completed J[facility]ON Comprehensive Assessment Update (original completed 10/24/23 available in pt's chart) recommending residential LOC.  Unit coordinator to assist with referrals.    Recommendations:  1)  Complete a Residential CD Treatment Program  2)  Abstain from all mood-altering chemicals unless prescribed by a licensed provider.   3)  Attend, at minimum, 2 weekly support group meetings, such as 12 step based (AA/NA), SMART Recovery, Health Realizations, and/or Refuge Recovery meetings.     4)  Actively work with a male mentor/sponsor on a weekly basis.   5)  Follow all the recommendations of your treatment/medical providers.  6)  Patient may benefit from obtaining a full mental health evaluation.     Clinical Substantiation:  Patient has been unable to maintain abstinence from alcohol while living at his current home environment, lacks long-term sober maintenance skills, lacks sober coping skills, lacks a sober peer support network, and has mental health symptoms which are exacerbated by substance abuse.     Referrals/ Alternatives:  [facility]  [facility] [place] [address]  [facility] Waitlist: [phone]  [facility] Admissions: [phone]     DAANES Assessment ID: [id number]     JHON consult completed by:   MAGGIE Neville, [facility]  Substance Use Disorder Evaluation Counselor  E-mail Address: [email or web address] & Liaison Department  [facility], [address] [place] [address]

## 2023-12-11 NOTE — TELEPHONE ENCOUNTER
Date: 12/11/2023    To: NADINE RN    Please call this patient at [phone]  or 3A to complete a medical screening to clarify his medications and medical condition and to complete a COVID-19 screening.      The patient has a history of:  Diabetes    Currently in 3A but wants to leave and admit from community.     INSIDE: This patient had a substance abuse assessment with EILEEN Hernandez, so no paperwork will be sent up to the 6th floor because all of the clinical documentation is in the patient's electronic medical record in [facility].      Thanks,  EILEEN Morel

## 2023-12-11 NOTE — PROGRESS NOTES
"[facility], [place]   Psychiatric Progress Note  Hospital Day: 4        Interim History:   The patient's care was discussed with the treatment team during the daily team meeting and/or staff's chart notes were reviewed.  Staff report patient has been visible in the milieu, reporting some ongoing hallucinations, having some improvement, taking medications as prescribed, eating majority of meals, cooperating with cares including blood glucose checks, labs have been stable, reporting some anxiety, denied SI, received as needed Atarax along with his inhaler before bed, MSSA scores have been less than 8 overnight.    Upon interview, writer introduced him to the patient, reports that the provider that saw him on Friday is of service today and writer is taking over the care.  He does feel like he continues to improve from a withdrawal standpoint, has not noticed ongoing withdrawal symptoms, feels that his tremors have improved and he has been getting his appetite back.  He does continue to have some hallucinations, reports that they have been command in nature, and not so far today, they do a running commentary about his life as well.  Reports that they say negative things, they at times will tell him to do things to harm himself, states that they never tell him to harm others.  He does have his own thoughts about self-harm, states that he \"fantasizes\" about suicide at times, gave examples of having the thought of falling down and hitting his head really hard, states that he is able to combat these thoughts and he does not have any of his own urges to harm himself.  He does feel safe on the unit.  He denies having any HI or visual hallucinations.  He has been tolerating medications, he asked about increase in the Abilify, he has been having some restlessness at night where he feels like he needs to move his legs, reviewed that if medication is adjusted too quickly that this can make this side " effect worse and would not recommend increasing dose quite yet, this medication was resumed after a period of nonadherence and encourage patient to give it some time as he is likely not getting full therapeutic benefit from current dose.  He expressed understanding.  He is interested in attending CD treatment at [facility], states that he needs to go home and take care of a few things instead of going [id number].  Does not feel quite ready to discharge home today if he is out of detox Richard status later in the afternoon given his ongoing hallucinations and suicidal thoughts, he does feel like he would likely be ready to discharge home tomorrow.  Reviewed that his insurance did not cover some of the medications started, writer is working with pharmacy liaison consult around a prior authorization for the acamprosate and that his inhaler will need to be an alternative that was substituted when writer discussed insurance coverage issues with PharmD earlier today.  Patient expressed understanding.  No additional concerns.           Medications:      acamprosate  666 mg Oral TID    ARIPiprazole  10 mg Oral Daily    fluticasone-vilanterol  1 puff Inhalation Daily    folic acid  1 mg Oral Daily    insulin aspart  1-7 Units Subcutaneous TID AC    insulin aspart  1-5 Units Subcutaneous At Bedtime    insulin glargine  5 Units Subcutaneous At Bedtime    lisinopril  10 mg Oral Daily    multivitamin w/minerals  1 tablet Oral Daily    nicotine  1 patch Transdermal Daily    And    nicotine   Transdermal Q8H AKASH    pantoprazole  40 mg Oral QAM AC    thiamine  100 mg Oral BID 09 12    Vitamin D3  25 mcg Oral Daily          Allergies:   No Known Allergies       Labs:     Recent Results (from the past 48 hour(s))   Glucose by meter    Collection Time: 12/09/23  5:17 PM   Result Value Ref Range    GLUCOSE BY METER POCT 147 (H) 70 - 99 mg/dL   Glucose by meter    Collection Time: 12/09/23  9:47 PM   Result Value Ref Range    GLUCOSE  BY METER POCT 139 (H) 70 - 99 mg/dL   Glucose by meter    Collection Time: 12/10/23  2:35 AM   Result Value Ref Range    GLUCOSE BY METER POCT 115 (H) 70 - 99 mg/dL   Glucose by meter    Collection Time: 12/10/23  8:25 AM   Result Value Ref Range    GLUCOSE BY METER POCT 139 (H) 70 - 99 mg/dL   Glucose by meter    Collection Time: 12/10/23 11:55 AM   Result Value Ref Range    GLUCOSE BY METER POCT 135 (H) 70 - 99 mg/dL   Glucose by meter    Collection Time: 12/10/23  4:40 PM   Result Value Ref Range    GLUCOSE BY METER POCT 122 (H) 70 - 99 mg/dL   Glucose by meter    Collection Time: 12/10/23  9:19 PM   Result Value Ref Range    GLUCOSE BY METER POCT 157 (H) 70 - 99 mg/dL   Glucose by meter    Collection Time: 12/11/23  5:22 AM   Result Value Ref Range    GLUCOSE BY METER POCT 115 (H) 70 - 99 mg/dL   Glucose by meter    Collection Time: 12/11/23  8:39 AM   Result Value Ref Range    GLUCOSE BY METER POCT 181 (H) 70 - 99 mg/dL   Glucose by meter    Collection Time: 12/11/23 11:23 AM   Result Value Ref Range    GLUCOSE BY METER POCT 128 (H) 70 - 99 mg/dL          Psychiatric Examination:     /71   Pulse 88   Temp 97.3  F (36.3  C) (Temporal)   Resp 16   Ht 1.829 m (6')   Wt 106.1 kg (234 lb)   SpO2 97%   BMI 31.74 kg/m    Weight is 234 lbs 0 oz  Body mass index is 31.74 kg/m .    Orthostatic Vitals       None          Appearance: awake, alert and adequately groomed  Attitude:  cooperative  Eye Contact:  fair  Mood:  anxious and depressed  Affect:  mood congruent  Speech:  clear, coherent and normal prosody  Language: fluent and intact in English  Psychomotor, Gait, Musculoskeletal:  no evidence of tardive dyskinesia, dystonia, or tics  Thought Process:  logical, linear, and goal oriented  Associations:  no loose associations  Thought Content:   reports some ongoing AH, can be command in nature, denies VH, reports SI with plan at times, denies intent, denies HI  Insight:  fair  Judgement:  fair  Oriented  to:  time, person, and place  Attention Span and Concentration:  intact  Recent and Remote Memory:  intact  Fund of Knowledge:  appropriate           Precautions:     Behavioral Orders   Procedures    Code 1 - Restrict to Unit    Routine Programming     As clinically indicated    Seizure precautions    Status 15     Every 15 minutes.    Withdrawal precautions          Diagnoses:      Alcohol use disorder, severe, dependence with withdrawal with complication including hallucinations and history of seizures  Alcoholic hallucinosis   Unspecified mood disorder, likely bipolar disorder type 2  Benign essential hypertension  Tobacco abuse  Gastritis without bleeding, unspecified chronicity, unspecified gastritis type  Diabetes mellitus due to underlying condition with ketoacidosis without coma, with long-term current use of insulin (H)  Mild intermittent asthma, unspecified whether complicated  Elevated LFTs           Assessment & Plan:   Assessment and hospital summary:  This patient is a 33 year old male with history of alcohol use and mood disorder who presented to ED seeking detox from alcohol. Medically cleared in ED, admitted to  as voluntary patient. Drinking up to 75% if 175mL bottle of alcohol per day, EtOH TC <0.01, UDS negative, did report some occasional cannabis and nicotine use. MSSA with diazepam started for alcohol withdrawal. Withdrawal and seizure precautions in place, did report hx of seizures along with hallucinations. Admission labs ordered and reviewed those resulted. IM consult placed. Has reported some intermittent SI and CAH telling him to harm self, denying SI with intent, denies VH or HI. PTA aripiprazole resumed along with acamprosate being started to target the alcoholic hallucinosis. Pt reports goals for hospitalization are to complete detox and now wanting to engage in CD treatment at [facility].     Psychiatric treatment/inteventions:  Medications:   -MSSA with diazepam, thiamine,  folic acid, multivitamin for alcohol withdrawal   -continue PTA aripiprazole 10mg daily for mood and psychosis  -continue acamprosate 666mg TID for alcoholic hallucinations and alcohol use disorder, have placed pharmacy liaison consult for PA to be completed, appreciate assistance   -PRN hydroxyzine 25mg every 4 hours for anxiety   -PRN trazodone 50mg at bedtime for sleep   -PRN olanzapine 10mg PO or IM TID for agitation/psychosis     -pt now interested in attending CD treatment at [facility], CD consult ordered, appreciate assistance       Laboratory/Imaging: reviewed since admission     Patient will be treated in therapeutic milieu with appropriate individual and group therapies as described.    Medical treatment/interventions:  Medical concerns:   1) Alcohol withdrawal, complicated by hallucinations and history of seizures   -MSSA as above continued, may discontinue later today if patient out of detox status  -PRN zofran and imodium for GI distress related to withdrawal   -withdrawal and seizure precautions    2) IM consult placed for management of other medical concerns, per consult note on 12/8/23:   Ravi Ahmadi is a 33 year old male who presents to  in alcohol withdrawal. He has a past medical history of alcoholic hepatitis, hypertension, diabetes type 2, concern for DKA vs starvation ketosis/alcoholic/lactic acidosis, intermittent asthma, MDD, and tobacco use disorder. Pt had a recent admission for alcohol detox from 10/23-26/23.      Medicine will continue to follow for asthma symptoms, auditory hallucinations, and T2DM.      Acute alcohol withdrawal  Polysubstance use disorder(alcohol, cannabinoids)  Hx of alcohol withdrawal seizures  Elevated liver chemistries: Presents seeking detox from alcohol after noting auditory hallucinations.  Relapsed from alcohol 2 weeks ago after getting out of rehab. Drinks half to three quarters of a 175mL bottle of alcohol every 2 days. Last drink was Monday.   History of seizures with last on 8/23/23.  Patient also notes that he uses cannabis and smokes cigarettes. Blood alcohol level of <0.01. Utox negative. Alk phos WNL.Total bili 1.7-->1.4 (decreased from prior). AST: ALT of 50: 48, in 2:1 pattern with alcohol use. MSSA score of 9-14 since arrival. No abdominal pain, N/V.   - Management per Psychiatry  - No need for BMP/hepatic panel unless clinically indicated   - Agree with MSSA protocol  - Agree with MVI, folate, thiamine 100 mg BID   - Seizure precautions  - Trend BMP/LFTs OP with PCP within 1 week      Auditory hallucinations  Visual hallucinations, resolved:  Has has visual and auditory hallucinations with previous alcohol detox, however noting increase since stopping drinking on Monday. After some further discussion, pt noting also possible hallucinations vs paranoia even while in detox since October. Likely multifactorial with alcohol usage with possible underlying bipolar disorder. No confusion, oculomotor difficulties, ataxic gait.   - Treat etoh withdrawal  - Continue PTA Abilify   - Monitor      MDD vs Bipolar disorder: Hx of MDD. Also started on Abilify at OSH.Noting some ongoing auditory hallucinations as above. Mother with bipolar disorder.  - Per Psychiatry  - Alcohol withdrawal treatment as above.   - Continue PTA Abilify     HTN: Normotensive during stay.  - PTA lisinopril   - Always recheck BP if high or low and correlate with clinical situation  - Treat pain, anxiety, and any withdrawal s/s if present  - Notify provider if SBP >170 or DBP >110 after careful reassessment, treatment of pain/anxiety/withdrawal/home PTA med administration  - Trend      Tobacco use disorder:   - Nicotine patch and as needed gum/lozenges lozenges     Asthma  Chronic cough: Mild wheezing noted on exam in [facility]. Using albuterol more with his increase in smoking nearly every day. Mild leukocytosis, which has since resolved. No nasal congestion, SOB, fevers, increase in sputum  "from baseline, negative Covid test, stable O2 sats.   - PRN albuterol   - Start Breo daily   - Tobacco cessation as above   - Asthma action plan with PCP      T2DM (A1c of 6 on 12/8/23)  C/f DKA vs starvation/alcoholic/lactic acidosis on 8/17-8/20 admission: See trends below. Recent dx of T2DM with A1c of 10.8 on 6/12/23. Admission for probable DKA as above, but given good BG control, T2DM, anion gap metabolic acidosis likely secondary to starvation/alcoholic/also query metformin induced lactic acidosis/JAY at that time. PtA include lantus and metformin, however metformin was stopped after 10/23 detox admission, but pt restarted on his own on 12/6. Intermittently taking lantus for \"high BG\" over past two weeks. Hypoglycemic episodes in 50-60s at rehab.   - Lantus 5 units daily  - Stop metformin 1000 mg given recent Etoh usage, diarrhea   - MSSI   - Hypoglycemic protocol   - POCT BG before meals and bedtime, has Binu/glucometer at home  - Follow-up with PCP 12/12, can discuss other BG lower agents at that time             Recent Labs   Lab 12/08/23  1203 12/08/23  0750 12/08/23  0637 12/07/23  2210 12/07/23  1345   * 140* 103* 126* 138*         Diarrhea: Noted since Monday. No acute abdominal pain, N/V. Likely d/t withdrawal.  - Monitor     Leukocytosis, resolved: See trends below. No acute s/s of infection.  - Monitor for s/s of infection   - Monitor OP in 1-2 weeks with PCP            WBC Count   Date Value Ref Range Status   12/08/2023 10.8 4.0 - 11.0 10e3/uL Final   12/07/2023 13.6 (H) 4.0 - 11.0 10e3/uL Final   10/25/2023 9.3 4.0 - 11.0 10e3/uL Final      Thrombocytopenia, mild: 148 upon admission. Likely d/t etoh usage.  - Monitor OP in 1-2 weeks with PCP            The patient's care was discussed with the Bedside Nurse, Patient, and Primary team.        Clinically Significant Risk Factors Present on Admission []Expand by Default           # Hypercalcemia: Highest Ca = 10.4 mg/dL in last 2 days, will " monitor as appropriate        # Hypertension: Noted on problem list      # Obesity: Estimated body mass index is 31.74 kg/m  as calculated from the following:    Height as of this encounter: 1.829 m (6').    Weight as of this encounter: 106.1 kg (234 lb).       # Financial/Environmental Concerns:    # Asthma: noted on problem list            MAKENNA Murrell CNP  Hospitalist Service      3) 12/9/23 Medicine Progress Note - Hospitalist Service     Date of Admission:  12/7/2023        Assessment & Plan  Ravi Ahmadi is a 33 year old male who presents to  in alcohol withdrawal. He has a past medical history of alcoholic hepatitis, hypertension, diabetes type 2, concern for DKA vs starvation ketosis/alcoholic/lactic acidosis, intermittent asthma, MDD, and tobacco use disorder. Pt had a recent admission for alcohol detox from 10/23-26/23.      Medicine will sign off.     Please see discharge recommendations in bold below.      Acute alcohol withdrawal  Polysubstance use disorder(alcohol, cannabinoids)  Hx of alcohol withdrawal seizures  Elevated liver chemistries: Presents seeking detox from alcohol after noting auditory hallucinations.  Relapsed from alcohol 2 weeks ago after getting out of rehab. Drinks half to three quarters of a 175mL bottle of alcohol every 2 days. Last drink was Monday.  History of seizures with last on 8/23/23.  Patient also notes that he uses cannabis and smokes cigarettes. Blood alcohol level of <0.01. Utox negative. Alk phos WNL.Total bili 1.7-->1.4 (decreased from prior). AST: ALT of 50: 48, in 2:1 pattern with alcohol use. MSSA score of 9-14 since arrival. No abdominal pain, N/V.   - Management per Psychiatry  - No need for BMP/hepatic panel unless clinically indicated   - Agree with MSSA protocol  - Agree with MVI, folate, thiamine 100 mg BID   - Seizure precautions  - Trend BMP/LFTs OP with PCP within 1 week      Auditory hallucinations  Visual hallucinations, resolved:  Has has  visual and auditory hallucinations with previous alcohol detox, however noting increase since stopping drinking on Monday. After some further discussion, pt noting also possible hallucinations vs paranoia even while in detox since October. Likely multifactorial with alcohol usage with possible underlying bipolar disorder. No confusion, oculomotor difficulties, ataxic gait.   - Treat etoh withdrawal  - Continue PTA Abilify   - Monitor      MDD vs Bipolar disorder: Hx of MDD. Also started on Abilify at OSH.Noting some ongoing auditory hallucinations as above. Mother with bipolar disorder.  - Per Psychiatry  - Alcohol withdrawal treatment as above.   - Continue PTA Abilify     HTN: Normotensive during stay.  - PTA lisinopril   - Always recheck BP if high or low and correlate with clinical situation  - Treat pain, anxiety, and any withdrawal s/s if present  - Notify provider if SBP >170 or DBP >110 after careful reassessment, treatment of pain/anxiety/withdrawal/home PTA med administration  - Trend      Tobacco use disorder:   - Nicotine patch and as needed gum/lozenges lozenges     Asthma  Chronic cough: Mild wheezing noted on exam in [facility]. Using albuterol more with his increase in smoking nearly every day. Mild leukocytosis, which has since resolved. No nasal congestion, SOB, fevers, increase in sputum from baseline, negative Covid test, stable O2 sats.   - PRN albuterol   - Start Breo daily--> continue at discharge  - Tobacco cessation as above   - Asthma action plan with PCP      T2DM (A1c of 6 on 12/8/23)  C/f DKA vs starvation/alcoholic/lactic acidosis on 8/17-8/20 admission: See trends below. Recent dx of T2DM with A1c of 10.8 on 6/12/23. Admission for probable DKA as above, but given good BG control, T2DM, anion gap metabolic acidosis likely secondary to starvation/alcoholic/also query metformin induced lactic acidosis/JAY at that time. PtA include lantus and metformin, however metformin was stopped after 10/23  "detox admission, but pt restarted on his own on 12/6. Intermittently taking lantus for \"high BG\" over past two weeks. Hypoglycemic episodes in 50-60s at rehab.   - Lantus 5 units daily--> continue at discharge  - Stop metformin 1000 mg given recent Etoh usage/safe discharge plan to prevent instance of possible lactic acidosis, diarrhea   - MSSI--> do NOT continue at discharge  - Hypoglycemic protocol   - POCT BG before meals and bedtime, has Binu/glucometer at home--> no supplies needed at discharge  - Follow-up with PCP 12/12, can discuss other BG lower agents at that time             Recent Labs   Lab 12/08/23  1203 12/08/23  0750 12/08/23  0637 12/07/23  2210 12/07/23  1345   * 140* 103* 126* 138*         Diarrhea, resolved: Noted since Monday. No acute abdominal pain, N/V. Likely d/t withdrawal.  - Monitor     Leukocytosis, resolved: See trends below. No acute s/s of infection.  - Monitor for s/s of infection   - Monitor OP in 1-2 weeks with PCP            WBC Count   Date Value Ref Range Status   12/08/2023 10.8 4.0 - 11.0 10e3/uL Final   12/07/2023 13.6 (H) 4.0 - 11.0 10e3/uL Final   10/25/2023 9.3 4.0 - 11.0 10e3/uL Final      Thrombocytopenia, mild: 148 upon admission. Likely d/t etoh usage.  - Monitor OP in 1-2 weeks with PCP                        Clinically Significant Risk Factors                   # Hypertension: Noted on problem list        # Obesity: Estimated body mass index is 31.74 kg/m  as calculated from the following:    Height as of this encounter: 1.829 m (6').    Weight as of this encounter: 106.1 kg (234 lb)., PRESENT ON ADMISSION        # Financial/Environmental Concerns:    # Asthma: noted on problem list               Disposition Plan     Expected Discharge Date: 12/09/2023                      The patient's care was discussed with the Bedside Nurse, Patient, and Primary team(via this note).      MAKENNA Murrell [facility]  Hospitalist Service      Disposition Plan   Reason for " ongoing admission: requires detoxification from substance that poses a risk of bodily harm during withdrawal period  Discharge location:  home and then CD treatment  Discharge Medications: ordered.  Follow-up Appointments: not scheduled  Legal Status: voluntary    >50 min total time that was spent in counseling and coordination of care with staff, reviewing medical record since admission, educating patient about treatment options, side effects and benefits and alternative treatments for medications, providing supportive therapy and redirection regarding above symptoms.     This document is created with the help of Dragon dictation system.  All grammatical/typing errors or context distortion are unintentional and inherent to software.    Patient has been seen and evaluated by Rosalba pimentel DO.
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
## 2023-12-11 NOTE — TELEPHONE ENCOUNTER
Pt planning on admitting 12/12 directly from 3A  Pt is medically approved as long as he comes with 30 day of meds and DM supplies and DM completed comp statement

## 2023-12-11 NOTE — CONSULTS
Consulted by Rosalba Azevedo to run a test claim for Acamprosate.    Patient has pharmacy benefits through [employer]. Per insurance, this medication is not covered and requires prior authorization.     Plan covers and prefers Naltrexone and requires trial or known contraindication before they will cover Acamprosate. Disulfuram is also covered.    Messaged provider re: drug change vs PA.      Please feel free to contact me with any other test claims, prior authorizations, or insurance questions regarding outpatient medications.     Thanks!      Diane Ribera [facility]  Discharge Pharmacy Liaison  [facility]  Pronouns: She/Her/Hers    Securely message with [employer] Secure Chat, or [employer]  Phone: [phone]  Fax: [phone]  [email or web address]

## 2023-12-11 NOTE — PLAN OF CARE
Goal Outcome Evaluation:    Plan of Care Reviewed With: patient Plan of Care Reviewed With: patient    Sat sat with peers in the [place] today; attended AA online this evening. Ate 75% dinner  Pt stating having Auditory Hallucinations afternoon getting less pronounced. Denies pain, BG taken afternoon 122 and evening 157; no insulin given. Feeling anxiety. Denied SI. Used his PRN albuterol inhaler before bed. MSSA 6,7. Received Atarax after stating anxiety 6/10. Jose for safety.

## 2023-12-11 NOTE — PLAN OF CARE
Problem: Alcohol Withdrawal  Goal: Alcohol Withdrawal Symptom Control  Outcome: Progressing     Problem: Sleep Disturbance  Goal: Adequate Sleep/Rest  Outcome: Progressing   Goal Outcome Evaluation:          The Patient had a typical respiratory pattern and slept okay. The team carried out 15-minute safety checks without a problem. His MSSA score was 5; He received no Valium during the night. His BP went from 93/60, HR 91, to 107/68, HR 77.

## 2023-12-11 NOTE — PROGRESS NOTES
Writer met with patient to discuss aftercare and discharge planning. Patient stated that he would like to consider going to [facility], but does not want to go directly there. He would like to discharge home and then go on the community wait list. Patient states he is still hearing things, such as muffled voices, and would like to stay on he unit another day. Patient is working on his CD assessment and he is on the external [facility] wait list.  Nurse updated

## 2023-12-11 NOTE — PLAN OF CARE
Goal Outcome Evaluation:    Plan of Care Reviewed With: patient        Patient pleasant and cooperative, visible in milieu.  Flat affect, denies SI/SIB, endorsed depression/anxiety.  Patient currently out of detox from alcohol.  BG this shift 181 and 128, received sliding scale insulin this morning.  Patient to be discharged to [facility] tomorrow.  Looking forward to that, paper work done per LP request.  Good appetite, medication compliant.  Received prn hydroxyzine x2 per request for anxiety.  Patient endorsed some pains in the body, refused pain medication when offered.  Patient still c/o hearing voices, head phone given by staff.  Patient says it's helpful.  Showered, resting comfortably in room, will continue to monitor closely.

## 2023-12-11 NOTE — PROGRESS NOTES
Patient has not required any valium for alcohol detox since 12/10/23 @ 1200 PM. All MSSA scores since that time have been less than 8. Pt is now removed from alcohol detox monitoring status.

## 2023-12-11 NOTE — PROGRESS NOTES
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 your medical providers      Clinical Substantiation:       Pt reports he has never been to CD treatment and has not attended sober support meetings. Pt reports tolerance, withdrawal, and an inability to control his use. Pt reports he has lost jobs due to using. Pt reports he lives with his parents and is unemployed. Pt reports he is not seeing a therapist but would like to. Pt reports he would like to attend inpatient CD treatment. Pt lacks coping skills and relapse prevention strategies. Pt lacks a sober peer support, recovery network.      Referrals/ Alternatives:  [facility] - [phone]  Fax - [phone]     [facility] -   Phone: [phone]   Fax: [phone]     [facility] Team for Referrals  Phone: [phone]  Intake: [phone]   Fax: [phone] f  [phone]     [facility] consult completed by: EILEEN Romo.  Phone Number: [phone]  E-mail Address: [email or web address]  [address] [place] [address]     *Due to regulation of Title 42 of the Code of Federal Regulations (CFR) Part 2: Confidentiality laws apply to this note and the information wherein.  Thus, this note cannot be copy and pasted into any other health care staff's note nor can it be included in general medical records sent to ANY outside agency without the patient's written consent.      DAANES Assessment ID: [id number]

## 2023-12-12 ENCOUNTER — TELEPHONE (OUTPATIENT)
Dept: BEHAVIORAL HEALTH | Facility: CLINIC | Age: 33
End: 2023-12-12

## 2023-12-12 ENCOUNTER — DOCUMENTATION ONLY (OUTPATIENT)
Dept: BEHAVIORAL HEALTH | Facility: CLINIC | Age: 33
End: 2023-12-12

## 2023-12-12 ENCOUNTER — HOSPITAL ENCOUNTER (OUTPATIENT)
Dept: BEHAVIORAL HEALTH | Facility: CLINIC | Age: 33
Discharge: HOME | End: 2023-12-12
Attending: FAMILY MEDICINE
Payer: COMMERCIAL

## 2023-12-12 VITALS
WEIGHT: 234 LBS | BODY MASS INDEX: 31.69 KG/M2 | OXYGEN SATURATION: 97 % | SYSTOLIC BLOOD PRESSURE: 111 MMHG | RESPIRATION RATE: 16 BRPM | TEMPERATURE: 97.8 F | HEART RATE: 85 BPM | HEIGHT: 72 IN | DIASTOLIC BLOOD PRESSURE: 73 MMHG

## 2023-12-12 LAB
ALBUMIN SERPL BCG-MCNC: 4.1 G/DL (ref 3.5–5.2)
ALP SERPL-CCNC: 87 U/L (ref 40–150)
ALT SERPL W P-5'-P-CCNC: 47 U/L (ref 0–70)
AST SERPL W P-5'-P-CCNC: 33 U/L (ref 0–45)
BILIRUB DIRECT SERPL-MCNC: 0.39 MG/DL (ref 0–0.3)
BILIRUB SERPL-MCNC: 0.9 MG/DL
GLUCOSE BLDC GLUCOMTR-MCNC: 112 MG/DL (ref 70–99)
GLUCOSE BLDC GLUCOMTR-MCNC: 124 MG/DL (ref 70–99)
GLUCOSE BLDC GLUCOMTR-MCNC: 139 MG/DL (ref 70–99)
HOLD SPECIMEN: NORMAL
PROT SERPL-MCNC: 7.5 G/DL (ref 6.4–8.3)

## 2023-12-12 PROCEDURE — 82040 ASSAY OF SERUM ALBUMIN: CPT | Performed by: PSYCHIATRY & NEUROLOGY

## 2023-12-12 PROCEDURE — 99239 HOSP IP/OBS DSCHRG MGMT >30: CPT | Performed by: PSYCHIATRY & NEUROLOGY

## 2023-12-12 PROCEDURE — 250N000013 HC RX MED GY IP 250 OP 250 PS 637: Performed by: NURSE PRACTITIONER

## 2023-12-12 PROCEDURE — 250N000013 HC RX MED GY IP 250 OP 250 PS 637: Performed by: PSYCHIATRY & NEUROLOGY

## 2023-12-12 PROCEDURE — 1002N00001 HC LODGING PLUS FACILITY CHARGE ADULT

## 2023-12-12 PROCEDURE — H2035 A/D TX PROGRAM, PER HOUR: HCPCS

## 2023-12-12 PROCEDURE — 36415 COLL VENOUS BLD VENIPUNCTURE: CPT | Performed by: PSYCHIATRY & NEUROLOGY

## 2023-12-12 RX ORDER — IBUPROFEN 200 MG
400 TABLET ORAL EVERY 6 HOURS PRN
COMMUNITY
End: 2024-01-02

## 2023-12-12 RX ORDER — MAGNESIUM HYDROXIDE/ALUMINUM HYDROXICE/SIMETHICONE 120; 1200; 1200 MG/30ML; MG/30ML; MG/30ML
30 SUSPENSION ORAL EVERY 6 HOURS PRN
COMMUNITY
End: 2024-01-02

## 2023-12-12 RX ORDER — BLOOD PRESSURE TEST KIT
KIT MISCELLANEOUS
Qty: 100 EACH | Refills: 0 | Status: ON HOLD | OUTPATIENT
Start: 2023-12-12 | End: 2024-08-10

## 2023-12-12 RX ORDER — BUDESONIDE AND FORMOTEROL FUMARATE DIHYDRATE 160; 4.5 UG/1; UG/1
2 AEROSOL RESPIRATORY (INHALATION) 2 TIMES DAILY
Status: ON HOLD | COMMUNITY
End: 2024-08-10

## 2023-12-12 RX ORDER — LORATADINE 10 MG/1
10 TABLET ORAL DAILY PRN
COMMUNITY
End: 2024-01-02

## 2023-12-12 RX ORDER — ARIPIPRAZOLE 15 MG/1
15 TABLET ORAL DAILY
Qty: 30 TABLET | Refills: 0 | Status: SHIPPED | OUTPATIENT
Start: 2023-12-12 | End: 2024-01-03

## 2023-12-12 RX ORDER — GUAIFENESIN/DEXTROMETHORPHAN 100-10MG/5
10 SYRUP ORAL EVERY 4 HOURS PRN
COMMUNITY
End: 2024-01-05

## 2023-12-12 RX ORDER — LANOLIN ALCOHOL/MO/W.PET/CERES
3 CREAM (GRAM) TOPICAL
COMMUNITY
End: 2024-01-02

## 2023-12-12 RX ORDER — CONTAINER,EMPTY
EACH MISCELLANEOUS
Qty: 1 EACH | Refills: 0 | Status: ON HOLD | OUTPATIENT
Start: 2023-12-12 | End: 2024-08-10

## 2023-12-12 RX ORDER — NALTREXONE HYDROCHLORIDE 50 MG/1
50 TABLET, FILM COATED ORAL DAILY
Qty: 30 TABLET | Refills: 0 | Status: SHIPPED | OUTPATIENT
Start: 2023-12-12 | End: 2024-01-04

## 2023-12-12 RX ORDER — AMOXICILLIN 250 MG
2 CAPSULE ORAL DAILY PRN
COMMUNITY
End: 2024-01-02

## 2023-12-12 RX ORDER — ACETAMINOPHEN 325 MG/1
325-650 TABLET ORAL EVERY 4 HOURS PRN
COMMUNITY
End: 2024-01-02

## 2023-12-12 RX ADMIN — FOLIC ACID 1 MG: 1 TABLET ORAL at 08:36

## 2023-12-12 RX ADMIN — PANTOPRAZOLE SODIUM 40 MG: 40 TABLET, DELAYED RELEASE ORAL at 08:37

## 2023-12-12 RX ADMIN — ACAMPROSATE CALCIUM 666 MG: 333 TABLET, DELAYED RELEASE ORAL at 13:17

## 2023-12-12 RX ADMIN — LISINOPRIL 10 MG: 10 TABLET ORAL at 08:36

## 2023-12-12 RX ADMIN — FLUTICASONE FUROATE AND VILANTEROL TRIFENATATE 1 PUFF: 100; 25 POWDER RESPIRATORY (INHALATION) at 08:36

## 2023-12-12 RX ADMIN — THIAMINE HCL TAB 100 MG 100 MG: 100 TAB at 08:37

## 2023-12-12 RX ADMIN — ACAMPROSATE CALCIUM 666 MG: 333 TABLET, DELAYED RELEASE ORAL at 08:35

## 2023-12-12 RX ADMIN — NICOTINE 1 PATCH: 14 PATCH, EXTENDED RELEASE TRANSDERMAL at 08:37

## 2023-12-12 RX ADMIN — THIAMINE HCL TAB 100 MG 100 MG: 100 TAB at 13:18

## 2023-12-12 RX ADMIN — Medication 25 MCG: at 08:37

## 2023-12-12 RX ADMIN — ARIPIPRAZOLE 10 MG: 10 TABLET ORAL at 08:36

## 2023-12-12 RX ADMIN — Medication 1 TABLET: at 08:37

## 2023-12-12 ASSESSMENT — COLUMBIA-SUICIDE SEVERITY RATING SCALE - C-SSRS
2. HAVE YOU ACTUALLY HAD ANY THOUGHTS OF KILLING YOURSELF LIFETIME?: YES
REASONS FOR IDEATION LIFETIME: COMPLETELY TO END OR STOP THE PAIN (YOU COULDN'T GO ON LIVING WITH THE PAIN OR HOW YOU WERE FEELING)
3. HAVE YOU BEEN THINKING ABOUT HOW YOU MIGHT KILL YOURSELF?: YES
4. HAVE YOU HAD THESE THOUGHTS AND HAD SOME INTENTION OF ACTING ON THEM?: NO
6. HAVE YOU EVER DONE ANYTHING, STARTED TO DO ANYTHING, OR PREPARED TO DO ANYTHING TO END YOUR LIFE?: NO
5. HAVE YOU STARTED TO WORK OUT OR WORKED OUT THE DETAILS OF HOW TO KILL YOURSELF? DO YOU INTEND TO CARRY OUT THIS PLAN?: YES
1. IN THE PAST MONTH, HAVE YOU WISHED YOU WERE DEAD OR WISHED YOU COULD GO TO SLEEP AND NOT WAKE UP?: YES
1. IN THE PAST MONTH, HAVE YOU WISHED YOU WERE DEAD OR WISHED YOU COULD GO TO SLEEP AND NOT WAKE UP?: YES
2. HAVE YOU ACTUALLY HAD ANY THOUGHTS OF KILLING YOURSELF IN THE PAST MONTH?: YES

## 2023-12-12 ASSESSMENT — ANXIETY QUESTIONNAIRES
IF YOU CHECKED OFF ANY PROBLEMS ON THIS QUESTIONNAIRE, HOW DIFFICULT HAVE THESE PROBLEMS MADE IT FOR YOU TO DO YOUR WORK, TAKE CARE OF THINGS AT HOME, OR GET ALONG WITH OTHER PEOPLE: VERY DIFFICULT
GAD7 TOTAL SCORE: 16
6. BECOMING EASILY ANNOYED OR IRRITABLE: SEVERAL DAYS
5. BEING SO RESTLESS THAT IT IS HARD TO SIT STILL: MORE THAN HALF THE DAYS
1. FEELING NERVOUS, ANXIOUS, OR ON EDGE: NEARLY EVERY DAY
7. FEELING AFRAID AS IF SOMETHING AWFUL MIGHT HAPPEN: MORE THAN HALF THE DAYS
3. WORRYING TOO MUCH ABOUT DIFFERENT THINGS: NEARLY EVERY DAY
GAD7 TOTAL SCORE: 16
2. NOT BEING ABLE TO STOP OR CONTROL WORRYING: MORE THAN HALF THE DAYS
4. TROUBLE RELAXING: NEARLY EVERY DAY

## 2023-12-12 ASSESSMENT — ACTIVITIES OF DAILY LIVING (ADL)
ADLS_ACUITY_SCORE: 30
ADLS_ACUITY_SCORE: 30
ORAL_HYGIENE: INDEPENDENT
LAUNDRY: WITH SUPERVISION
ADLS_ACUITY_SCORE: 30
HYGIENE/GROOMING: INDEPENDENT
DRESS: STREET CLOTHES
ADLS_ACUITY_SCORE: 30

## 2023-12-12 ASSESSMENT — PATIENT HEALTH QUESTIONNAIRE - PHQ9: SUM OF ALL RESPONSES TO PHQ QUESTIONS 1-9: 19

## 2023-12-12 NOTE — PROGRESS NOTES
Name: Ravi Ahmadi  Date: 12/12/2023  Medical Record: [id number]    Envelope Number: [id number]  List of Contents (List each item separately in new row):   One Cell Phone, One black cord.   Admission:  I am responsible for any personal items that are not sent to the safe or pharmacy.  [facility] is not responsible for loss, theft or damage of any property in my possession.    Patient Signature:  ___________________________________________       Date/Time:__________________________    Staff Signature: __________________________________       Date/Time:__________________________    [facility] Staff person, if patient is unable/unwilling to sign:      __________________________________________________________       Date/Time: __________________________    **All medications are packaged by [facility] staff and securely stored on [facility]. Medications left by patients at discharge will be packaged by [facility] staff and transported by [facility] staff to inpatient pharmacy for storage.**    Discharge:  [facility] has returned all of my personal belongings:    Patient Signature: ________________________________________     Date/Time: ____________________________________    Staff Signature: ______________________________________     Date/Time:_____________________________________

## 2023-12-12 NOTE — PLAN OF CARE
Problem: Adult Behavioral Health Plan of Care  Goal: Plan of Care Review  Outcome: Progressing     Problem: Sleep Disturbance  Goal: Adequate Sleep/Rest  Outcome: Progressing     Problem: Alcohol Withdrawal  Goal: Alcohol Withdrawal Symptom Control  Outcome: Progressing   Goal Outcome Evaluation:       Pt slept the majority of the shift. MSSA score was 4. No intervention needed. Safety checks maintained. No safety concerns noted thus far. Will continue with same plan of care.

## 2023-12-12 NOTE — PROGRESS NOTES
Writer spoke with Destinee and asked for 30 day supply of all DM supplies including a blood glucose kit and pen needles, we have received the DM comp statement.
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
## 2023-12-12 NOTE — CARE PLAN
12/11/23 1805   Group Therapy Session   Group Attendance attended group session   Time Session Began 1645   Time Session Ended 1745   Total Time (minutes) 60   Total # Attendees 3   Group Type psychotherapeutic;addiction   Group Topic Covered coping skills/lifestyle management;emotions/expression   Group Session Detail CTC-Group members completed/discussed worksheets on thinking traps (catastrophizing, polarized thinking, over-generalizing, blaming, labelling, negative filter, jumping to conclusions, mind reading, emotional reasoning, and musts/should.   Patient Response/Contribution expressed understanding of topic;discussed personal experience with topic;cooperative with task;listened actively   Patient Participation Detail Patient presented to group was pleasant, engaged, and checked in feeling okay.

## 2023-12-12 NOTE — PLAN OF CARE
"Patient more visible in milieu, attending groups. Affect flat, blunted. Mood is anxious. Denies SI, SIB, HI. Reports ongoing auditory hallucinations. Reports he hears music on repeat and also the voices are mocking him. Is able to manage with grounding techniques and reality orientation.  Continues to be \"out of detox\" for alcohol withdrawal.  VSS, appetite good. Denies pain. Blood glucose levels this shift 119 and 138. No sliding scale insulin required.   Patient was given prn Trazodone 50 mg times one for sleep.  Patient plans to discharge to [facility] tomorrow.   Patient does report he has to call and reschedule a primary care appointment that is via telehealth.   Denies any additional concerns.  /81   Pulse 86   Temp 97.3  F (36.3  C) (Temporal)   Resp 16   Ht 1.829 m (6')   Wt 106.1 kg (234 lb)   SpO2 97%   BMI 31.74 kg/m     Problem: Alcohol Withdrawal  Goal: Alcohol Withdrawal Symptom Control  Outcome: Met     Problem: Sleep Disturbance  Goal: Adequate Sleep/Rest  Outcome: Progressing   Goal Outcome Evaluation:    Plan of Care Reviewed With: patient                   "

## 2023-12-12 NOTE — PLAN OF CARE
Goal Outcome Evaluation:    Plan of Care Reviewed With: patient        Patient discharged to [facility], discharge instructions and medications explained to patient with no question asked.  Patient verbalized understanding of the discharge instructions.  Medications and belongings sent with patient upon discharge.  Patient provider working on antabuse to be sent to [facility] when clear.

## 2023-12-12 NOTE — PROGRESS NOTES
Initial Services Plan    Before your first treatment group, please do the following    Immediate health & safety concerns: Look for sober housing and a supportive social network.  Look for a support network (such as AA, NA, DBT group, a Alevism group, etc.)    Suggestions for client during the time between intake & completion of treatment plan:  Tour your treatment center (unit or outpatient clinic).  Introduce yourself to the treatment group.  Spend time getting to know your peers.  Complete the problem list for your treatment plan.  Start drug and alcohol use history.  Review your patient or client handbook.    Client issues to be addressed in the first treatment sessions:  Identify motivations(s) for coming to treatment, i.e. legal, family, job, self  Identify concerns about coming into treatment, i.e. fear of failing again, sharing a room in treatment  Identify outside concerns that may interfere with treatment, i.e. bills not getting paid, homesick for children    Samantha Ashley, [facility]  12/12/2023

## 2023-12-12 NOTE — DISCHARGE SUMMARY
Psychiatric Discharge Summary    Ravi Ahmadi MRN# [id number]   Age: 33 year old YOB: 1990     Date of Admission:  12/7/2023  Date of Discharge:  12/12/2023  2:16 PM  Admitting Physician:  Brett Santana MD  Discharge Physician:  Rosalba Azevedo DO           Event Leading to Hospitalization:   Mr. Ravi Ahmadi is a 33 year old man admitted to [facility] to detox from Alcohol. In the context of auditory hallucinations.  From the ED note:  Ravi Ahmadi is a 33 year old male with history of alcohol use disorder, MDD, DKA, alcohol withdrawal, asthma, and type 2 diabetes mellitus presenting via EMS for a psychiatric evaluation. Ravi reports that he relapsed from alcohol sobriety two weeks ago and stopped drinking again three days ago. He reports use of between half and three quarters of a 175 mL bottle of alcohol every two days. He states that he called EMS for himself, noting auditory hallucinations. He reports a history of auditory hallucinations with voices but denies hearing voices today. He notes recent diarrhea and denies blood in stool. He denies current suicidal ideation. He reports cannabis use and smoking cigarettes. He reports use of PRN insulin with irregular blood glucose checks. He notes restarting metformin yesterday. Ravi is currently unemployed and living with his parents.        See Admission note by Brett Santana MD on 12/8/23 for additional details.          Diagnoses:     Alcohol use disorder, severe, dependence with withdrawal with complication including hallucinations and history of seizures  Alcoholic hallucinosis   Unspecified mood disorder, likely bipolar disorder type 2  Benign essential hypertension  Tobacco abuse  Gastritis without bleeding, unspecified chronicity, unspecified gastritis type  Diabetes mellitus due to underlying condition with ketoacidosis without coma, with long-term current use of insulin (H)  Mild intermittent asthma, unspecified  whether complicated  Elevated LFTs, improving         Labs:     Recent Results (from the past 168 hour(s))   Urine Drug Screen Panel    Collection Time: 12/07/23  1:41 PM   Result Value Ref Range    Amphetamines Urine Screen Negative Screen Negative    Barbituates Urine Screen Negative Screen Negative    Benzodiazepine Urine Screen Negative Screen Negative    Cannabinoids Urine Screen Negative Screen Negative    Cocaine Urine Screen Negative Screen Negative    Fentanyl Qual Urine Screen Negative Screen Negative    Opiates Urine Screen Negative Screen Negative    PCP Urine Screen Negative Screen Negative   Comprehensive metabolic panel    Collection Time: 12/07/23  1:45 PM   Result Value Ref Range    Sodium 137 135 - 145 mmol/L    Potassium 3.6 3.4 - 5.3 mmol/L    Carbon Dioxide (CO2) 22 22 - 29 mmol/L    Anion Gap 17 (H) 7 - 15 mmol/L    Urea Nitrogen 10.3 6.0 - 20.0 mg/dL    Creatinine 0.73 0.67 - 1.17 mg/dL    GFR Estimate >90 >60 mL/min/1.73m2    Calcium 10.4 (H) 8.6 - 10.0 mg/dL    Chloride 98 98 - 107 mmol/L    Glucose 138 (H) 70 - 99 mg/dL    Alkaline Phosphatase 96 40 - 150 U/L    AST 49 (H) 0 - 45 U/L    ALT 43 0 - 70 U/L    Protein Total 8.3 6.4 - 8.3 g/dL    Albumin 4.5 3.5 - 5.2 g/dL    Bilirubin Total 1.7 (H) <=1.2 mg/dL   Alcohol level blood    Collection Time: 12/07/23  1:45 PM   Result Value Ref Range    Alcohol ethyl <0.01 <=0.01 g/dL   CBC with platelets and differential    Collection Time: 12/07/23  1:45 PM   Result Value Ref Range    WBC Count 13.6 (H) 4.0 - 11.0 10e3/uL    RBC Count 5.20 4.40 - 5.90 10e6/uL    Hemoglobin 16.6 13.3 - 17.7 g/dL    Hematocrit 48.1 40.0 - 53.0 %    MCV 93 78 - 100 fL    MCH 31.9 26.5 - 33.0 pg    MCHC 34.5 31.5 - 36.5 g/dL    RDW 13.9 10.0 - 15.0 %    Platelet Count 197 150 - 450 10e3/uL    % Neutrophils 72 %    % Lymphocytes 17 %    % Monocytes 9 %    % Eosinophils 1 %    % Basophils 0 %    % Immature Granulocytes 1 %    NRBCs per 100 WBC 0 <1 /100    Absolute  Neutrophils 9.8 (H) 1.6 - 8.3 10e3/uL    Absolute Lymphocytes 2.3 0.8 - 5.3 10e3/uL    Absolute Monocytes 1.2 0.0 - 1.3 10e3/uL    Absolute Eosinophils 0.1 0.0 - 0.7 10e3/uL    Absolute Basophils 0.1 0.0 - 0.2 10e3/uL    Absolute Immature Granulocytes 0.1 <=0.4 10e3/uL    Absolute NRBCs 0.0 10e3/uL   Ketone Beta-Hydroxybutyrate Quantitative    Collection Time: 12/07/23  1:45 PM   Result Value Ref Range    Ketone (Beta-Hydroxybutyrate) Quantitative 1.90 (HH) <=0.30 mmol/L   Blood gas venous    Collection Time: 12/07/23  2:34 PM   Result Value Ref Range    pH Venous 7.41 7.32 - 7.43    pCO2 Venous 38 (L) 40 - 50 mm Hg    pO2 Venous 72 (H) 25 - 47 mm Hg    Bicarbonate Venous 24 21 - 28 mmol/L    Base Excess/Deficit -0.4 -7.7 - 1.9 mmol/L    FIO2 0    Glucose by meter    Collection Time: 12/07/23 10:10 PM   Result Value Ref Range    GLUCOSE BY METER POCT 126 (H) 70 - 99 mg/dL   Comprehensive metabolic panel    Collection Time: 12/08/23  6:37 AM   Result Value Ref Range    Sodium 139 135 - 145 mmol/L    Potassium 3.6 3.4 - 5.3 mmol/L    Carbon Dioxide (CO2) 27 22 - 29 mmol/L    Anion Gap 11 7 - 15 mmol/L    Urea Nitrogen 17.2 6.0 - 20.0 mg/dL    Creatinine 0.89 0.67 - 1.17 mg/dL    GFR Estimate >90 >60 mL/min/1.73m2    Calcium 10.1 (H) 8.6 - 10.0 mg/dL    Chloride 101 98 - 107 mmol/L    Glucose 103 (H) 70 - 99 mg/dL    Alkaline Phosphatase 82 40 - 150 U/L    AST 50 (H) 0 - 45 U/L    ALT 48 0 - 70 U/L    Protein Total 7.5 6.4 - 8.3 g/dL    Albumin 4.0 3.5 - 5.2 g/dL    Bilirubin Total 1.4 (H) <=1.2 mg/dL   Hemoglobin A1c    Collection Time: 12/08/23  6:37 AM   Result Value Ref Range    Hemoglobin A1C 6.0 (H) <5.7 %   Lipid panel    Collection Time: 12/08/23  6:37 AM   Result Value Ref Range    Cholesterol 235 (H) <200 mg/dL    Triglycerides 100 <150 mg/dL    Direct Measure HDL 55 >=40 mg/dL    LDL Cholesterol Calculated 160 (H) <=100 mg/dL    Non HDL Cholesterol 180 (H) <130 mg/dL   TSH with free T4 reflex and/or T3 as  indicated    Collection Time: 12/08/23  6:37 AM   Result Value Ref Range    TSH 1.82 0.30 - 4.20 uIU/mL   CBC with platelets and differential    Collection Time: 12/08/23  6:37 AM   Result Value Ref Range    WBC Count 10.8 4.0 - 11.0 10e3/uL    RBC Count 4.85 4.40 - 5.90 10e6/uL    Hemoglobin 15.5 13.3 - 17.7 g/dL    Hematocrit 45.9 40.0 - 53.0 %    MCV 95 78 - 100 fL    MCH 32.0 26.5 - 33.0 pg    MCHC 33.8 31.5 - 36.5 g/dL    RDW 14.2 10.0 - 15.0 %    Platelet Count 148 (L) 150 - 450 10e3/uL    % Neutrophils 59 %    % Lymphocytes 28 %    % Monocytes 9 %    % Eosinophils 2 %    % Basophils 1 %    % Immature Granulocytes 1 %    NRBCs per 100 WBC 0 <1 /100    Absolute Neutrophils 6.5 1.6 - 8.3 10e3/uL    Absolute Lymphocytes 3.0 0.8 - 5.3 10e3/uL    Absolute Monocytes 1.0 0.0 - 1.3 10e3/uL    Absolute Eosinophils 0.3 0.0 - 0.7 10e3/uL    Absolute Basophils 0.1 0.0 - 0.2 10e3/uL    Absolute Immature Granulocytes 0.1 <=0.4 10e3/uL    Absolute NRBCs 0.0 10e3/uL   Glucose by meter    Collection Time: 12/08/23  7:50 AM   Result Value Ref Range    GLUCOSE BY METER POCT 140 (H) 70 - 99 mg/dL   Symptomatic COVID-19 Virus (Coronavirus) by PCR Nose    Collection Time: 12/08/23 10:11 AM    Specimen: Nose; Swab   Result Value Ref Range    SARS CoV2 PCR Negative Negative   Glucose by meter    Collection Time: 12/08/23 12:03 PM   Result Value Ref Range    GLUCOSE BY METER POCT 146 (H) 70 - 99 mg/dL   Glucose by meter    Collection Time: 12/08/23  5:33 PM   Result Value Ref Range    GLUCOSE BY METER POCT 103 (H) 70 - 99 mg/dL   Glucose by meter    Collection Time: 12/08/23  9:50 PM   Result Value Ref Range    GLUCOSE BY METER POCT 144 (H) 70 - 99 mg/dL   Glucose by meter    Collection Time: 12/09/23  2:07 AM   Result Value Ref Range    GLUCOSE BY METER POCT 123 (H) 70 - 99 mg/dL   Glucose by meter    Collection Time: 12/09/23  8:16 AM   Result Value Ref Range    GLUCOSE BY METER POCT 146 (H) 70 - 99 mg/dL   Glucose by meter     Collection Time: 12/09/23 11:30 AM   Result Value Ref Range    GLUCOSE BY METER POCT 150 (H) 70 - 99 mg/dL   Glucose by meter    Collection Time: 12/09/23  5:17 PM   Result Value Ref Range    GLUCOSE BY METER POCT 147 (H) 70 - 99 mg/dL   Glucose by meter    Collection Time: 12/09/23  9:47 PM   Result Value Ref Range    GLUCOSE BY METER POCT 139 (H) 70 - 99 mg/dL   Glucose by meter    Collection Time: 12/10/23  2:35 AM   Result Value Ref Range    GLUCOSE BY METER POCT 115 (H) 70 - 99 mg/dL   Glucose by meter    Collection Time: 12/10/23  8:25 AM   Result Value Ref Range    GLUCOSE BY METER POCT 139 (H) 70 - 99 mg/dL   Glucose by meter    Collection Time: 12/10/23 11:55 AM   Result Value Ref Range    GLUCOSE BY METER POCT 135 (H) 70 - 99 mg/dL   Glucose by meter    Collection Time: 12/10/23  4:40 PM   Result Value Ref Range    GLUCOSE BY METER POCT 122 (H) 70 - 99 mg/dL   Glucose by meter    Collection Time: 12/10/23  9:19 PM   Result Value Ref Range    GLUCOSE BY METER POCT 157 (H) 70 - 99 mg/dL   Glucose by meter    Collection Time: 12/11/23  5:22 AM   Result Value Ref Range    GLUCOSE BY METER POCT 115 (H) 70 - 99 mg/dL   Glucose by meter    Collection Time: 12/11/23  8:39 AM   Result Value Ref Range    GLUCOSE BY METER POCT 181 (H) 70 - 99 mg/dL   Glucose by meter    Collection Time: 12/11/23 11:23 AM   Result Value Ref Range    GLUCOSE BY METER POCT 128 (H) 70 - 99 mg/dL   Glucose by meter    Collection Time: 12/11/23  5:55 PM   Result Value Ref Range    GLUCOSE BY METER POCT 119 (H) 70 - 99 mg/dL   Glucose by meter    Collection Time: 12/11/23  8:26 PM   Result Value Ref Range    GLUCOSE BY METER POCT 138 (H) 70 - 99 mg/dL   Glucose by meter    Collection Time: 12/12/23  2:06 AM   Result Value Ref Range    GLUCOSE BY METER POCT 112 (H) 70 - 99 mg/dL   Glucose by meter    Collection Time: 12/12/23  8:18 AM   Result Value Ref Range    GLUCOSE BY METER POCT 139 (H) 70 - 99 mg/dL   Glucose by meter    Collection  Time: 12/12/23 11:56 AM   Result Value Ref Range    GLUCOSE BY METER POCT 124 (H) 70 - 99 mg/dL   Extra Purple Top Tube    Collection Time: 12/12/23 12:16 PM   Result Value Ref Range    Hold Specimen [facility]    Hepatic panel    Collection Time: 12/12/23 12:17 PM   Result Value Ref Range    Protein Total 7.5 6.4 - 8.3 g/dL    Albumin 4.1 3.5 - 5.2 g/dL    Bilirubin Total 0.9 <=1.2 mg/dL    Alkaline Phosphatase 87 40 - 150 U/L    AST 33 0 - 45 U/L    ALT 47 0 - 70 U/L    Bilirubin Direct 0.39 (H) 0.00 - 0.30 mg/dL              Consults:   1) IM consult placed for management of other medical concerns, per consult note on 12/8/23:   Ravi Ahmadi is a 33 year old male who presents to  in alcohol withdrawal. He has a past medical history of alcoholic hepatitis, hypertension, diabetes type 2, concern for DKA vs starvation ketosis/alcoholic/lactic acidosis, intermittent asthma, MDD, and tobacco use disorder. Pt had a recent admission for alcohol detox from 10/23-26/23.      Medicine will continue to follow for asthma symptoms, auditory hallucinations, and T2DM.      Acute alcohol withdrawal  Polysubstance use disorder(alcohol, cannabinoids)  Hx of alcohol withdrawal seizures  Elevated liver chemistries: Presents seeking detox from alcohol after noting auditory hallucinations.  Relapsed from alcohol 2 weeks ago after getting out of rehab. Drinks half to three quarters of a 175mL bottle of alcohol every 2 days. Last drink was Monday.  History of seizures with last on 8/23/23.  Patient also notes that he uses cannabis and smokes cigarettes. Blood alcohol level of <0.01. Utox negative. Alk phos WNL.Total bili 1.7-->1.4 (decreased from prior). AST: ALT of 50: 48, in 2:1 pattern with alcohol use. MSSA score of 9-14 since arrival. No abdominal pain, N/V.   - Management per Psychiatry  - No need for BMP/hepatic panel unless clinically indicated   - Agree with MSSA protocol  - Agree with MVI, folate, thiamine 100 mg BID   -  Seizure precautions  - Trend BMP/LFTs OP with PCP within 1 week      Auditory hallucinations  Visual hallucinations, resolved:  Has has visual and auditory hallucinations with previous alcohol detox, however noting increase since stopping drinking on Monday. After some further discussion, pt noting also possible hallucinations vs paranoia even while in detox since October. Likely multifactorial with alcohol usage with possible underlying bipolar disorder. No confusion, oculomotor difficulties, ataxic gait.   - Treat etoh withdrawal  - Continue PTA Abilify   - Monitor      MDD vs Bipolar disorder: Hx of MDD. Also started on Abilify at OSH.Noting some ongoing auditory hallucinations as above. Mother with bipolar disorder.  - Per Psychiatry  - Alcohol withdrawal treatment as above.   - Continue PTA Abilify     HTN: Normotensive during stay.  - PTA lisinopril   - Always recheck BP if high or low and correlate with clinical situation  - Treat pain, anxiety, and any withdrawal s/s if present  - Notify provider if SBP >170 or DBP >110 after careful reassessment, treatment of pain/anxiety/withdrawal/home PTA med administration  - Trend      Tobacco use disorder:   - Nicotine patch and as needed gum/lozenges lozenges     Asthma  Chronic cough: Mild wheezing noted on exam in [facility]. Using albuterol more with his increase in smoking nearly every day. Mild leukocytosis, which has since resolved. No nasal congestion, SOB, fevers, increase in sputum from baseline, negative Covid test, stable O2 sats.   - PRN albuterol   - Start Breo daily   - Tobacco cessation as above   - Asthma action plan with PCP      T2DM (A1c of 6 on 12/8/23)  C/f DKA vs starvation/alcoholic/lactic acidosis on 8/17-8/20 admission: See trends below. Recent dx of T2DM with A1c of 10.8 on 6/12/23. Admission for probable DKA as above, but given good BG control, T2DM, anion gap metabolic acidosis likely secondary to starvation/alcoholic/also query metformin induced  "lactic acidosis/JAY at that time. PtA include lantus and metformin, however metformin was stopped after 10/23 detox admission, but pt restarted on his own on 12/6. Intermittently taking lantus for \"high BG\" over past two weeks. Hypoglycemic episodes in 50-60s at rehab.   - Lantus 5 units daily  - Stop metformin 1000 mg given recent Etoh usage, diarrhea   - MSSI   - Hypoglycemic protocol   - POCT BG before meals and bedtime, has Binu/glucometer at home  - Follow-up with PCP 12/12, can discuss other BG lower agents at that time                   Recent Labs   Lab 12/08/23  1203 12/08/23  0750 12/08/23  0637 12/07/23  2210 12/07/23  1345   * 140* 103* 126* 138*         Diarrhea: Noted since Monday. No acute abdominal pain, N/V. Likely d/t withdrawal.  - Monitor     Leukocytosis, resolved: See trends below. No acute s/s of infection.  - Monitor for s/s of infection   - Monitor OP in 1-2 weeks with PCP                WBC Count   Date Value Ref Range Status   12/08/2023 10.8 4.0 - 11.0 10e3/uL Final   12/07/2023 13.6 (H) 4.0 - 11.0 10e3/uL Final   10/25/2023 9.3 4.0 - 11.0 10e3/uL Final      Thrombocytopenia, mild: 148 upon admission. Likely d/t etoh usage.  - Monitor OP in 1-2 weeks with PCP            The patient's care was discussed with the Bedside Nurse, Patient, and Primary team.        Clinically Significant Risk Factors Present on Admission []Expand by Default           # Hypercalcemia: Highest Ca = 10.4 mg/dL in last 2 days, will monitor as appropriate        # Hypertension: Noted on problem list      # Obesity: Estimated body mass index is 31.74 kg/m  as calculated from the following:    Height as of this encounter: 1.829 m (6').    Weight as of this encounter: 106.1 kg (234 lb).       # Financial/Environmental Concerns:    # Asthma: noted on problem list            MAKENNA Murrell [facility]  Hospitalist Service        2) 12/9/23 Medicine Progress Note - Hospitalist Service     Date of Admission:  " 12/7/2023        Assessment & Plan  Ravi Ahmadi is a 33 year old male who presents to  in alcohol withdrawal. He has a past medical history of alcoholic hepatitis, hypertension, diabetes type 2, concern for DKA vs starvation ketosis/alcoholic/lactic acidosis, intermittent asthma, MDD, and tobacco use disorder. Pt had a recent admission for alcohol detox from 10/23-26/23.      Medicine will sign off.     Please see discharge recommendations in bold below.      Acute alcohol withdrawal  Polysubstance use disorder(alcohol, cannabinoids)  Hx of alcohol withdrawal seizures  Elevated liver chemistries: Presents seeking detox from alcohol after noting auditory hallucinations.  Relapsed from alcohol 2 weeks ago after getting out of rehab. Drinks half to three quarters of a 175mL bottle of alcohol every 2 days. Last drink was Monday.  History of seizures with last on 8/23/23.  Patient also notes that he uses cannabis and smokes cigarettes. Blood alcohol level of <0.01. Utox negative. Alk phos WNL.Total bili 1.7-->1.4 (decreased from prior). AST: ALT of 50: 48, in 2:1 pattern with alcohol use. MSSA score of 9-14 since arrival. No abdominal pain, N/V.   - Management per Psychiatry  - No need for BMP/hepatic panel unless clinically indicated   - Agree with MSSA protocol  - Agree with MVI, folate, thiamine 100 mg BID   - Seizure precautions  - Trend BMP/LFTs OP with PCP within 1 week      Auditory hallucinations  Visual hallucinations, resolved:  Has has visual and auditory hallucinations with previous alcohol detox, however noting increase since stopping drinking on Monday. After some further discussion, pt noting also possible hallucinations vs paranoia even while in detox since October. Likely multifactorial with alcohol usage with possible underlying bipolar disorder. No confusion, oculomotor difficulties, ataxic gait.   - Treat etoh withdrawal  - Continue PTA Abilify   - Monitor      MDD vs Bipolar disorder: Hx of  "MDD. Also started on Abilify at OSH.Noting some ongoing auditory hallucinations as above. Mother with bipolar disorder.  - Per Psychiatry  - Alcohol withdrawal treatment as above.   - Continue PTA Abilify     HTN: Normotensive during stay.  - PTA lisinopril   - Always recheck BP if high or low and correlate with clinical situation  - Treat pain, anxiety, and any withdrawal s/s if present  - Notify provider if SBP >170 or DBP >110 after careful reassessment, treatment of pain/anxiety/withdrawal/home PTA med administration  - Trend      Tobacco use disorder:   - Nicotine patch and as needed gum/lozenges lozenges     Asthma  Chronic cough: Mild wheezing noted on exam in [facility]. Using albuterol more with his increase in smoking nearly every day. Mild leukocytosis, which has since resolved. No nasal congestion, SOB, fevers, increase in sputum from baseline, negative Covid test, stable O2 sats.   - PRN albuterol   - Start Breo daily--> continue at discharge  - Tobacco cessation as above   - Asthma action plan with PCP      T2DM (A1c of 6 on 12/8/23)  C/f DKA vs starvation/alcoholic/lactic acidosis on 8/17-8/20 admission: See trends below. Recent dx of T2DM with A1c of 10.8 on 6/12/23. Admission for probable DKA as above, but given good BG control, T2DM, anion gap metabolic acidosis likely secondary to starvation/alcoholic/also query metformin induced lactic acidosis/JAY at that time. PtA include lantus and metformin, however metformin was stopped after 10/23 detox admission, but pt restarted on his own on 12/6. Intermittently taking lantus for \"high BG\" over past two weeks. Hypoglycemic episodes in 50-60s at rehab.   - Lantus 5 units daily--> continue at discharge  - Stop metformin 1000 mg given recent Etoh usage/safe discharge plan to prevent instance of possible lactic acidosis, diarrhea   - MSSI--> do NOT continue at discharge  - Hypoglycemic protocol   - POCT BG before meals and bedtime, has Binu/glucometer at home--> " no supplies needed at discharge  - Follow-up with PCP 12/12, can discuss other BG lower agents at that time                   Recent Labs   Lab 12/08/23  1203 12/08/23  0750 12/08/23  0637 12/07/23  2210 12/07/23  1345   * 140* 103* 126* 138*         Diarrhea, resolved: Noted since Monday. No acute abdominal pain, N/V. Likely d/t withdrawal.  - Monitor     Leukocytosis, resolved: See trends below. No acute s/s of infection.  - Monitor for s/s of infection   - Monitor OP in 1-2 weeks with PCP                WBC Count   Date Value Ref Range Status   12/08/2023 10.8 4.0 - 11.0 10e3/uL Final   12/07/2023 13.6 (H) 4.0 - 11.0 10e3/uL Final   10/25/2023 9.3 4.0 - 11.0 10e3/uL Final      Thrombocytopenia, mild: 148 upon admission. Likely d/t etoh usage.  - Monitor OP in 1-2 weeks with PCP                        Clinically Significant Risk Factors                   # Hypertension: Noted on problem list        # Obesity: Estimated body mass index is 31.74 kg/m  as calculated from the following:    Height as of this encounter: 1.829 m (6').    Weight as of this encounter: 106.1 kg (234 lb)., PRESENT ON ADMISSION        # Financial/Environmental Concerns:    # Asthma: noted on problem list               Disposition Plan     Expected Discharge Date: 12/09/2023                      The patient's care was discussed with the Bedside Nurse, Patient, and Primary team(via this note).      MAKENNA Murrell [facility]  Hospitalist Service            Hospital Course:   Ravi Ahmadi is a 33 year old male with history of alcohol use and mood disorder who presented to ED seeking detox from alcohol. Medically cleared in ED, admitted to  as voluntary patient. Drinking up to 75% if 175mL bottle of alcohol per day, EtOH TC <0.01, UDS negative, did report some occasional cannabis and nicotine use. MSSA with diazepam started for alcohol withdrawal. Withdrawal and seizure precautions in place, did report hx of seizures along with  hallucinations. Admission labs ordered and reviewed those resulted. IM consult placed. Has reported some intermittent SI and CAH telling him to harm self, denying SI with intent, denies VH or HI. PTA aripiprazole resumed along with acamprosate being started to target the alcoholic hallucinosis. Pt reported goals for hospitalization are to complete detox and now wanting to engage in CD treatment at [facility].      Ravi Ahmadi did participate in groups and was visible in the milieu. The patient's symptoms of alcohol withdrawal improved. He was accepted for a bed at [facility] on 12/12.  He had had some passive SI that was improving, no plan/intent and reported some improvement in his hallucinations, did discuss plan to increase aripiprazole to 15mg on discharge to further target. A prior authorization was initiated for acamprosate, did discuss R/B/A of alternatives if not covered, pt would be agreeable to trial of naltrexone however continued to state strong preference for acamprosate given it was started by Dr Santana to target glutamate activity due to pts hallucinations. Rechecked LFTs to ensure downward trend in case pt started naltrexone on discharge. LFTs were almost completely normalized. At time of discharge notified that the PA for acamprosate was approved for coverage of 1 month at which time it would need to be re-evaluated for additional approval of ongoing use. Pt had discharged to [facility], writer spoke with RN at  re: update on pts medications including increase in aripiprazole dose and coverage of acamprosate. Did discuss that patient could consider dual therapy with acamprosate and naltrexone if desired or could just continue with the acamprosate for the next month.     Today Ravi Ahmadi reports having passive intermittent thoughts of self harm but denies any current plan or intent, denies having any thoughts of harming others. In addition, he has notable risk factors for self-harm,  including age, single status, psychosis, and substance abuse. However, risk is mitigated by commitment to family, absence of past attempts, ability to volunteer a safety plan, and is future oriented. Therefore, based on all available evidence including the factors cited above, he does not appear to be at imminent risk for self-harm, does not meet criteria for a 72-hr hold, and therefore remains appropriate for ongoing outpatient level of care.     Ravi Ahmadi was  discharged  to   treatment at [facility] . At the time of discharge Ravi Ahmadi was determined to not be a danger to himself or others.          Discharge Medications:     Current Discharge Medication List        START taking these medications    Details   acamprosate (CAMPRAL) 333 MG EC tablet Take 2 tablets (666 mg) by mouth 3 times daily  Qty: 180 tablet, Refills: 0    Associated Diagnoses: Alcohol use disorder      fluticasone-vilanterol (BREO ELLIPTA) 100-25 MCG/ACT inhaler Inhale 1 puff into the lungs daily  Qty: 28 each, Refills: 0    Associated Diagnoses: Mild intermittent asthma, unspecified whether complicated      !! glucose (BD GLUCOSE) 4 g chewable tablet Take 1 tablet by mouth every hour as needed for low blood sugar  Qty: 30 tablet, Refills: 0    Associated Diagnoses: Diabetes mellitus due to underlying condition with ketoacidosis without coma, with long-term current use of insulin (H)      naltrexone (DEPADE/REVIA) 50 MG tablet Take 1 tablet (50 mg) by mouth daily  Qty: 30 tablet, Refills: 0    Associated Diagnoses: Alcohol dependence with withdrawal with complication (H)       !! - Potential duplicate medications found. Please discuss with provider.        CONTINUE these medications which have CHANGED    Details   Alcohol Swabs PADS Use to swab the area of the injection or steven as directed  Per insurance coverage  Qty: 100 each, Refills: 0    Associated Diagnoses: Uncontrolled other specified diabetes mellitus with  hyperglycemia (H)      ARIPiprazole (ABILIFY) 15 MG tablet Take 1 tablet (15 mg) by mouth daily  Qty: 30 tablet, Refills: 0    Associated Diagnoses: Hallucinations      blood glucose (NO BRAND SPECIFIED) lancets standard To use to test glucose level in the blood.  Use to test blood sugar  4  times daily as directed. To accompany glucose monitor brands per insurance coverage.  Qty: 200 each, Refills: 0    Associated Diagnoses: Uncontrolled other specified diabetes mellitus with hyperglycemia (H)      blood glucose (NO BRAND SPECIFIED) test strip To use to test glucose level in the blood. Use to test blood sugar  4 times daily as directed. To accompany glucose monitor brands per insurance coverage.  Qty: 200 strip, Refills: 0    Associated Diagnoses: Uncontrolled other specified diabetes mellitus with hyperglycemia (H)      blood glucose calibration (NO BRAND SPECIFIED) solution Used to calibrate the blood glucose monitor as needed and as directed.  To accompany  blood glucose brands per insurance coverage  Qty: 1 each, Refills: 0    Associated Diagnoses: Uncontrolled other specified diabetes mellitus with hyperglycemia (H)      blood glucose monitoring (NO BRAND SPECIFIED) meter device kit Use as directed Per insurance coverage  Qty: 1 kit, Refills: 0    Associated Diagnoses: Uncontrolled other specified diabetes mellitus with hyperglycemia (H)      folic acid (FOLVITE) 1 MG tablet Take 1 tablet (1 mg) by mouth daily  Qty: 30 tablet, Refills: 0    Associated Diagnoses: Alcohol abuse      insulin glargine (LANTUS PEN) 100 UNIT/ML pen Inject 5 Units Subcutaneous at bedtime  Qty: 3 mL, Refills: 0    Comments: If Lantus is not covered by insurance, may substitute Basaglar or Semglee or other insulin glargine product per insurance preference at same dose and frequency.    Associated Diagnoses: Diabetes mellitus due to underlying condition with ketoacidosis without coma, with long-term current use of insulin (H)       insulin pen needle (32G X 4 MM) 32G X 4 MM miscellaneous Use as directed by provider Per insurance coverage  Qty: 200 each, Refills: 0    Associated Diagnoses: Uncontrolled other specified diabetes mellitus with hyperglycemia (H)      lisinopril (ZESTRIL) 10 MG tablet Take 1 tablet (10 mg) by mouth daily  Qty: 30 tablet, Refills: 0    Associated Diagnoses: Benign essential hypertension      multivitamin w/minerals (THERA-VIT-M) tablet Take 1 tablet by mouth daily  Qty: 30 tablet, Refills: 0    Associated Diagnoses: Alcohol abuse      nicotine (NICODERM CQ) 14 MG/24HR 24 hr patch Place 1 patch onto the skin daily  Qty: 30 patch, Refills: 0    Associated Diagnoses: Tobacco abuse      pantoprazole (PROTONIX) 40 MG EC tablet Take 1 tablet (40 mg) by mouth every morning (before breakfast)  Qty: 30 tablet, Refills: 0    Associated Diagnoses: Gastritis without bleeding, unspecified chronicity, unspecified gastritis type      Sharps Container MISC Use as directed to dispose of needles, lancets and other sharps  Qty: 1 each, Refills: 0    Associated Diagnoses: Uncontrolled other specified diabetes mellitus with hyperglycemia (H)      thiamine (B-1) 100 MG tablet Take 1 tablet (100 mg) by mouth daily  Qty: 30 tablet, Refills: 0    Associated Diagnoses: Alcohol abuse      Vitamin D3 (CHOLECALCIFEROL) 25 mcg (1000 units) tablet Take 1 tablet (25 mcg) by mouth daily  Qty: 30 tablet, Refills: 0    Associated Diagnoses: Mood disorder (H24)           CONTINUE these medications which have NOT CHANGED    Details   !! glucose (BD GLUCOSE) 4 g chewable tablet Take 4 tablets by mouth every 15 minutes as needed for low blood sugar  Qty: 20 tablet, Refills: 0    Associated Diagnoses: Uncontrolled other specified diabetes mellitus with hyperglycemia (H)       !! - Potential duplicate medications found. Please discuss with provider.        STOP taking these medications       gabapentin (NEURONTIN) 300 MG capsule Comments:   Reason for  Stopping:         metFORMIN (GLUCOPHAGE) 1000 MG tablet Comments:   Reason for Stopping:                    Psychiatric Examination:   Appearance:  awake, alert and adequately groomed  Attitude:  cooperative  Eye Contact:  good  Mood:   some anxiety and low mood, improving from admission  Affect:  appropriate and in normal range and mood congruent  Speech:  clear, coherent and normal prosody  Psychomotor Behavior:  no evidence of tardive dyskinesia, dystonia, or tics  Thought Process:  logical, linear, and goal oriented  Associations:  no loose associations  Thought Content:   reports passive SI, no plan/intent, denies HI, reports AH are muffled, improving, denies VH  Insight:  good  Judgment:  intact  Oriented to:  time, person, and place  Attention Span and Concentration:  intact  Recent and Remote Memory:  intact  Language: English, fluent  Fund of Knowledge: appropriate  Muscle Strength and Tone: normal  Gait and Station: Normal         Discharge Plan:   Recommendation:  Attend residential CD treatment at [facility] (admit scheduled 12/12/23 at 2pm door to door from ). Complete program and follow aftercare recommendations as recommended and required.      To find a treatment program:     [email or web address]  Click on find treatment  Enter your zip code and select your city/state.  The Substance Abuse and Mental Health Services Administration (SAMHSA) is the agency within  the U.S. Department of Health and Human Services that leads public health efforts to advance the  behavioral health of the nation. SAMHSA's mission is to reduce the impact of substance abuse and  mental illness on [place] communities.     [facility]org select substance use disorder programs then select find services.  [employer] is a virtual community and health care connection resource. We connect  individuals, families, mental health and substance use disorder providers, physicians, care coordinators,  and others with a real-time,  searchable directory of mental health and substance use disorder resources  and their availability within [place].     Disposition: [facility] - admit scheduled at 2pm on 12/12    Follow-up Appointment:   Appointment Date/Time: 12/12/23 Primary Care Giver: Juancarlos Rivera    Address: [facility]          You are aware you should make a follow up appointment with your primary care doctor for medical and medication management      Residential CD Treatment:   Patient as admit scheduled at [facility] at 2pm on 12/12/23   Please remind [facility] of your standing mental health appointments (below)  - if you cannot attend these appointments while at  please call to reschedule/cancel.           Patient has the following appointment scheduled:      1:1 Mental Health Therapy (virtual):  Date: Wednesday, 12/13/2023  Time: 5:00 pm - 6:00 pm  Provider: Cj Laboy MA  [facility]  Location: [facility], [address], [address], [place] [address]  Phone: [phone]  Type: Teletherapy     Patient Instructions  Please fill New Patient Form by using following link. All forms need to be completed 24 hours prior to the appointment date/time by going to [email or web address] Please call us on  24 hours prior to your scheduled appointment to confirm that you are able to attend. We will provide you information about how to log into video call software when you call.     Psychiatry/Medication Management:  Date: Monday, 12/18/2023  Time: 1:00 pm - 2:00 pm  Provider: Carlos Martinez MA, CNP,RN (male provider)  Location: [facility], [address], [address], [place] [address]  Phone: [phone]  Type: Telepsychiatry     Patient Instructions  Please fill New Patient Form by using following link. All forms need to be completed 24 hours prior to the appointment date/time by going to [email or web address] Please  call us on [phone] 96 hours prior to your scheduled appointment to confirm that you are able to attend. We will provide you information about how to log into video call software when you call.           Attestation:  Patient has been seen and evaluated by me, Rosalba Azevedo DO on day of discharge. 45 minutes were spent in coordination of discharge planning.

## 2023-12-12 NOTE — PROGRESS NOTES
"[facility] Nursing Health Assessment      Vital signs:     There were no vitals taken for this visit.      Transfer from     Counselor: Group [facility]  Drug of Choice: ETOH  Last use: 12/7/23  Home clinic/MD: [facility]   Juancarlos Sood MD      PCP - General, Internal Medicine     Since 7/21/2011     [phone]     Psychiatrist/therapist: none LPRN is getting pt est while at [facility]    Medical history/current conditions:  asthma. DM type 2 insulin dep, HTN     H&P Screen:  H&P within the last 90 days: Yes.  Date: 12/12/23 Location:       Mental Health diagnosis: Auditory hallucinations  \"after some further discussion, pt noting also possible hallucinations vs paranoia even while in detox since October. Likely multifactorial with alcohol usage with possible underlying bipolar disorder. No confusion, oculomotor difficulties, ataxic gait.\"    Pt is able to identify that auditory hallucinationsare not real, he does not report command hallucinations. Pt reports that symptoms are improving since he stopped drinking. Pt says that voices are worse when he is alone. Pt agreed to alert staff immediatly if SI or auditory hallucinations worsened or he feels unsafe at any point in his stay at LP. Appropriate staff notified of pt symptoms         MDD vs Bipolar disorder: Hx of MDD. Also started on Abilify at OSH.Noting some ongoing auditory hallucinations as above. Mother with bipolar disorder.  - Per Psychiatry  - Alcohol withdrawal treatment as above.   - Continue PTA Abilify  Medication compliant?: yes  Recent sucidal thoughts? Yes pt endorses SI with plan no intent, no current thoughts but these thoughts are chronic for him and are intermittent      When? Within the last week   Current thought of self-harm? no    Plan? na    Pain assessment:   Pt. Experiencing pain at this time?  No      LP Falls assessment    Has patient had a fall(s) in the last 6 months?  No  If yes, what were " the circumstances surrounding the fall(s)? none  Does patient have gait dysfunctions? (limping, dragging of toes, shuffling feet, unsteadiness, difficulty standing /walking)  No  Does patient appear to have deconditioning/muscle loss/malnutrition/fatigue? (due to inactivity, bedrest (long hospitalization), medical condition(s) No  Does patient experience confusion, dizziness or vertigo? No  Is patient visually impaired? No   Does patient have any adaptive equipment (hearing aids, wheelchair, walker, prosthesis, crutches, cane, etc..) No  Is patient taking 2 or more of the following medications?  anticonvulsants, anti-hypertensives, diuretics, laxatives, sedatives, and psychotropics (single-select) No  Is patient taking medication (s) that would cause urinary or bowel urgency (ex: lactulose/Furosemide)? No  Does patient have a medical condition (ex: Diabetes, Liver Disease, Respiratory Diseases, Chronic Pain, Heart Disease, Neuropathy, Seizure like Disorder, etc...) that could affect balance/gait or risk for falls? No  ____________________________________________________________________________________________________________________________    [facility] Medical Screen for COVID-19    Do you have any of the following NEW or worsening symptoms NOT attributed to pre-existing conditions?    No    Fever of 100.0  F (37.8 C) or over  Chills  Cough  Shortness of Breath  Loss of taste or smell  Generalized body aches  Persistent headache  Sore throat (or trouble eating or drinking in young children?)  Nausea, Vomiting, or diarrhea (loose stools)    Did you test positive for COVID-19 in the last 10 days or are you waiting on the test results due to an exposure or symptoms?  No    Has anyone told you to self-quarantine due to exposure to someone with COVID-19?  No    If pt responds   YES to any of the symptoms or  Positive COVID-19 result in the last 10 days with or without symptoms or YES to symptoms with pending results  ptwill need to leave unit immediately and can return in 11 days from discharge date.    ______________________________________________________________________________________________________________________    COVID-19 Test completed by LPRN ? No    COVID-19 - Pt informed of the following while at LP:    1) If pt has any of the symptoms below, notify staff immediately.    Fever   Cough   Shortness of breath or difficulty breathing   Chills   Repeated shaking with chills  Muscle pain     RN Assessment of Patient's Ability to Safely Manage and Self-Administer Respiratory Treatments    Has experience in the management of Respiratory (If NA, indicate and move to Integrative Therapies): Yes    Including knowledge and understanding of the importance of:    Does pt have any of the following Respiratory Illness/disorders?   Asthma, COPD, RAD, Bronchitis, Emphysema, Cystic fibrosis, HAKEEM Yes    Which Acute or Chronic Respiratory Illness do you have which requires intervention? asthma   Did pt bring all prescribed respiratory supplies? CPAP, BiPap, Nebulizer, Nebulizer solution, scheduled inhaler, Rescue Inhaler  Yes   Pt understands they must carry  Rescue Inhalers  at all times Yes   Alerting staff with respiratory symptoms?  Wheezing, SOB, Tightness or pain in chest, Excessive Daytime Sleepiness Yes     Does the patient have the physical and mental ability to:     Perform respiratory cares? CPAP, BiPap, Nebulizer tx, scheduled inhaler, Rescue Inhaler tx, respiratory medicines Yes   Determine when and how often to use respiratory treatments? (CPAP, BiPap, Nebulizer tx, scheduled inhaler, Rescue Inhaler tx, respiratory medicines Yes   Nebulizer/CPAP/BiPAP accessories Yes   CPAP/BiPAP Therapy settings Yes     Therefore does the patient, present a risk of harm to themselves or other clients in the facility if allowed to self-administer Respiratory treatments.  Consider factors above.  Yes    I have assessed the patient to be  able to safely administer respiratory treatments.  Yes        Patient tobacco use: pack a day     Are you interested in quitting? yes    NRT (Nicotine Replacement therapy) ordered? yes   Pt is aware of the dangers of tobacco cessation and in contemplation.    Pt given written education.    Nutritional Assessment:    Have you ever purged, binged or restricted yourself as a way to control your weight?   No     Are you on a special diet?   No     Do you have any concerns regarding your nutritional status?   No     Have you had any appetite changes in the last 3 months?   No   Have you had weight loss or weight gain of more than 10 lbs in the last 3 months?   If patient gained or lost more than 10 lbs, then refer to program RN / attending Physician for assessment.   No   Was the patient informed of BMI?    Above,  General nutrition education   Yes   Have you engaged in any risk-taking behavior that would put you at risk for exposure to blood-borne or sexually transmitted diseases?   No   Do you have any dental problems?   No         LPRN reviewed with pt the following information:  Yes  Pt informed if leaving AMA, they will be directed to take medications with them.  Should pt's choose to leave medications at LP, ALL medications will be packaged and delivered to inpatient pharmacy for temporary storage.  Inpt pharmacy will follow protocol to reach out to pt.  If pharmacy unable to reach pt and/or pt does not retrieve medications, they will be destroyed per inpt pharmacy protocol.   In regards to 'medical concerns/medication refill expectations' while at LP:  Pt understands LP has no rounding/managing provider to assess medical issues or to refill medications.  Pt's instructed to make virtual/phone appt/s with community provider/s and notify LPR[facility] of date and time  Pt's understand they may not leave LP to attend any medical appt's.   Pt's understand they are responsible for having a plan to refill medications and to allow  time to troubleshoot.      Pt verbalizes understanding of the above criteria yes    [facility]  Nurse Liaison / CD [facility]  O: [phone]  Fax:[phone]  [facility] [id number]  M-F: 7AM to 5:30PM   Sat-Sun: 7AM to 3PM  After hours: [phone]     Nursing Assessment Summary:  As above     On-going nursing intervention required?   No    Acute care visit recommended: no

## 2023-12-12 NOTE — PROGRESS NOTES
Prior Authorization **INITIATED**    Medication: ACAMPROSATE CALCIUM 333 MG PO [facility]  Insurance Company: [employer] - Phone [phone] Fax [phone]  Pharmacy Filling the Rx: [facility] - [place], [place] - [address]  Filling Pharmacy Phone: [phone]  Filling Pharmacy Fax: [phone]  Start Date: 12/12/2023  Reference #: CoverMyMeds Key: [id number]  Comments:  Plan prefers Naltrexone. Also covers Disulfuram. 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      Diane Ribera [facility]  Discharge Pharmacy Liaison  [facility]charge [facility]  Pronouns: She/Her/Hers    Securely message with RealMatch, Epic Secure Chat, or YumDots  Phone: [phone]  Fax: [phone]  [email or web address]

## 2023-12-12 NOTE — PROGRESS NOTES
Progress Note    This patient had a Substance Use Disorder Update assessment on 12/11/2023 completed by EILEEN Neville.  This patient was seen for a face to face update of the Substance Use Disorder Update assessment on 12/12/2023 by EILEEN Morel.  INSIDE: The patient's Substance Use Disorder Update assessment completed on 12/11/2023 is in the patient's electronic medical record in Epic in the Chart Review section under the Notes/Trans Tab.    Alcohol/Drug use since the last CD evaluation (include date of last use):     No additional substances use since the last CD evaluation. Pt was a transfer from unit [address] [place] [address].      Please note any other clinical changes since the last CD evaluation (such as medication changes, additional legal charges, detoxification admissions, overdoses, etc.)     No significant changes since the last CD evaluation       ASAM Dimensions Original scores Current Scores   I.) Intoxication and Withdrawal: 1 0   II.) Biomedical:  1 1   III.) Emotional and Behavioral:  2 2   IV.) Readiness to Change:  2 2   V.) Relapse Potential: 4 4   VI.) Recovery Environmental: 3 3     Please list clinical justifications for the above ASAM score changes since the original comprehensive assessment:     The patient's score on Dimension I changed from a 1 to a 0.  The patient's withdrawal symptoms had been addressed by his physicians while he had been on 3 A  detoxification unit at [facility] in [place], [place].         Current TC: Current UA:     0.000     Positive for Benzodiazepines and negative for all other screened drugs.       PHQ-9, BROOKE-7   PHQ-9 on 12/12/2023 BROOKE-7 on 12/12/2023   The patient's PHQ-9 score was 19 out of 27, indicating moderately severe depression.   The patient's BROOKE-7 score was 16 out of 21, indicating severe anxiety.       Louisa-Suicide Severity Rating Scale Reassessment   Have you ever wished you were dead or that you could go to sleep and not wake up?  Past  Month:  Yes     Have you actually had any thoughts of killing yourself?  Past Month:  Yes     Have you been thinking about how you might do this?     Past Month:  Yes, Describe: overdosing on pills, falling and hitting my head,    Lifetime:  Yes, Describe:see above   Have you had these thoughts and had some intention of acting on them?     Past Month:  Yes, Describe: see above   Lifetime:  Yes, Describe: see above   Have you started to work out the details of how to kill yourself?   Past Month:  Yes, Describe: collecting pills   Lifetime:  Yes, Describe: see above   Do you intend to carry out this plan?   No     When you have the thoughts how long do they last?  1-4 hours/a lot of time     Are there things - anyone or anything (i.e. family, Voodoo, pain of death) that stopped you from wanting to die or acting on thoughts of suicide?  Protective factors definitely stopped you from attempting suicide       2008  [employer]  Used with permission by Alida Bass, PhD.       Guide to C-SSRS Risk Ratings   NO IDEATION:  with no active thoughts IDEATION: with a wish to die. IDEATION: with active thoughts. Risk Ratings   If Yes No No 0 - Very Low Risk   If NA Yes No 1 - Low Risk   If NA Yes Yes 2 - Low/moderate risk   IDEATION: associated thoughts of methods without intent or plan INTENT: Intent to follow through on suicide PLAN: Plan to follow through on suicide Risk Ratings cont...   If Yes No No 3 - Moderate Risk   If Yes Yes No 4 - High Risk   If Yes Yes Yes 5 - High Risk   The patient's ADDITIONAL RISK FACTORS and lack of PROTECTIVE FACTORS may increase their overall suicide risk ratings.     Additional Risk Factors:   Significant history of having untreated or poorly treated mental health symptoms    Significant history of physical illness or chronic medical problems    Tendency to be socially isolated and/or cut off from the support of others    A recent death of someone close  to the patient and/or unresolved grief and loss issues    A recent loss that was significant to the patient, i.e. loss of job, loss of home, divorce, break-up, etc.    Significant history of trauma and/or abuse issues    History of impulsive or aggressive behaviors    Visiting or calling people to say goodbye    Giving away prized possessions   Protective Factors:   Having people in his/her life that would prevent the patient from considering a suicide attempt (i.e. young children, spouse, parents, etc.)    Having pet(s) that give companionship and/or would prevent the patient from considering a suicide attempt    A positive relationship with his/her clinical medical and/or mental health providers    Having easy access to supportive family members    Having cultural, Christian or spiritual beliefs that discourage suicide    Having restricted access to highly lethal means of suicide     Risk Status   4. - High Risk: YOSEF to family member or close friend and continuous monitoring of SI.      Additional information to support suicide risk rating: Pt was planning to overdose on pills but passed out drunk before he could take the pills. His parents called in a welfare check with the police.

## 2023-12-13 ENCOUNTER — HOSPITAL ENCOUNTER (OUTPATIENT)
Dept: BEHAVIORAL HEALTH | Facility: CLINIC | Age: 33
Discharge: HOME | End: 2023-12-13
Attending: FAMILY MEDICINE
Payer: COMMERCIAL

## 2023-12-13 PROBLEM — F19.20 CHEMICAL DEPENDENCY (H): Status: ACTIVE | Noted: 2023-12-13

## 2023-12-13 PROCEDURE — H2035 A/D TX PROGRAM, PER HOUR: HCPCS | Mod: HQ

## 2023-12-13 PROCEDURE — 1002N00001 HC LODGING PLUS FACILITY CHARGE ADULT

## 2023-12-13 ASSESSMENT — COLUMBIA-SUICIDE SEVERITY RATING SCALE - C-SSRS
ATTEMPT SINCE LAST CONTACT: NO
MOST LETHAL DATE: 66751
SUICIDE, SINCE LAST CONTACT: NO
LETHALITY/MEDICAL DAMAGE CODE MOST LETHAL ACTUAL ATTEMPT: MINOR PHYSICAL DAMAGE
1. SINCE LAST CONTACT, HAVE YOU WISHED YOU WERE DEAD OR WISHED YOU COULD GO TO SLEEP AND NOT WAKE UP?: YES
REASONS FOR IDEATION SINCE LAST CONTACT: DOES NOT APPLY
TOTAL  NUMBER OF ABORTED OR SELF INTERRUPTED ATTEMPTS SINCE LAST CONTACT: NO
TOTAL  NUMBER OF INTERRUPTED ATTEMPTS SINCE LAST CONTACT: NO
6. HAVE YOU EVER DONE ANYTHING, STARTED TO DO ANYTHING, OR PREPARED TO DO ANYTHING TO END YOUR LIFE?: NO

## 2023-12-13 NOTE — PROGRESS NOTES
Acknowledgement of Current Treatment Plan       I have reviewed my treatment plan with my therapist / counselor on 12/13/23.   I agree with the plan as it is written in the electronic health record.    Name: Signature:   Ravi Ahmadi    Name of Therapist / Counselor: Signature:   EILEEN Love          I received my Safety Plan on 12/13/23_________________________________________.        I reported my last use date for alcohol as 12/4/23________________________________.

## 2023-12-13 NOTE — GROUP NOTE
Group Therapy Documentation    PATIENT'S NAME: Ravi Ahmadi  MRN:   [id number]  :   1990  ACCT. NUMBER: [id number]  DATE OF SERVICE: 23  START TIME: 12:30 PM  END TIME:  2:30 PM  FACILITATOR(S): Felipe Hutchins LADC  TOPIC: BEH Group Therapy  Number of patients attending the group:  9  Group Length:  2 Hours    Group Therapy Type: Emotion processing    Summary of Group / Topics Discussed:    Disease of addiction      Group Attendance:  Attended group session    Patient's response to the group topic/interactions:  cooperative with task    Patient appeared to be Actively participating.        Client specific details:  Ravi participated and interacted appropriately with peers and staff in PM group. No triggers to use noted or discussed.

## 2023-12-13 NOTE — GROUP NOTE
Group Therapy Documentation    PATIENT'S NAME: Ravi Ahmadi  MRN:   [id number]  :   1990  ACCT. NUMBER: [id number]  DATE OF SERVICE: 23  START TIME:  9:45 AM  END TIME: 11:15 AM  FACILITATOR(S): Kathleen Ribeiro LADC  TOPIC: BEH Group Therapy  Number of patients attending the group:  9  Group Length:  1.5 Hours    Group Therapy Type: Recovery strategies, Emotion processing, and Daily living/independence skills    Summary of Group / Topics Discussed:    Recovery Principles, Emotions/expression, and Relapse prevention      Group Attendance:  Attended group session    Patient's response to the group topic/interactions:  cooperative with task    Patient appeared to be Actively participating, Attentive, and Engaged.        Client specific details:  Patient attended a group session focusing on anxiety and was attentive and participative.

## 2023-12-13 NOTE — TELEPHONE ENCOUNTER
Please schedule pt with next addiction/psychiatry provider. He will be with [facility] until 1/10/24. Please respond to this message with scheduling information.      RN Pool [id number]

## 2023-12-13 NOTE — PROGRESS NOTES
RN Assessment of Patient's Ability to Safely Self-Administer Insulin Injections      Has experience in the management of diabetes: Yes    Including knowledge and understanding of the importance of:    Good Nutrition: Further Education Needed   Exercise: Further Education Needed   Blood Glucose Monitoring:  Yes     Does the patient have the physical and mental ability to:     Perform blood glucose monitoring Yes   Determine the amount of insulin needed Yes   Prepare the insulin for injection Yes   Self-Administer the insulin Yes   Safely dispose of the needle and syringe Yes   Properly store the insulin Yes     Therefore does the patient, present a risk of harm to themselves or other clients in the facility if allowed to self-administer insulin.  Consider factors above.  No    I have assessed the patient to be able to safely administer insulin injections.  Yes    I delegate the observation of these injections to [facility] staff who have received training in diabetes, blood glucose monitoring, and insulin self-administration.      Per [facility] RN Signature:  Frannie Sears RN    12/12/2023  6:05 PM

## 2023-12-13 NOTE — GROUP NOTE
Psychoeducation Group Documentation    PATIENT'S NAME: Ravi Ahmadi  MRN:   [id number]  :   1990  ACCT. NUMBER: [id number]  DATE OF SERVICE: 23  START TIME:  8:30 AM  END TIME:  9:30 AM  FACILITATOR(S): Reza Cochran LADC; Vi Santana LADC; Felipe Hutchins LADC  TOPIC: BEH Pyschoeducation  Number of patients attending the group:  9  Group Length:  1 Hours    Skills Group Therapy Type: Recovery skills and Emotion regulation skills    Summary of Group / Topics Discussed:    Balanced lifestyle skills and Relapse prevention skills        Group Attendance:  Attended group session    Patient's response to the group topic/interactions:  cooperative with task    Patient appeared to be Attentive.         Client specific details:  Ravi gave appropriate feedback. .

## 2023-12-13 NOTE — PROGRESS NOTES
[facility]  Adult Chemical Dependency Program  Treatment Plan Requirements    These services are provided by the facility for each patient/client according to the individual's treatment plan:  Individual and group counseling  Education  Transition services  Services to address any co-occurring mental illness  Service coordination    Initial Treatment Plan Goals:  Complete all the requirements of Program Orientation.  Maintain medication compliance throughout the program.  Complete requirements for workshop/skills groups based on identified issues on your problem list.  Complete the support group attendance feedback sheet weekly.  Gain family involvement in treatment process to address family issues from the problem list.  Attend and participate in all required groups per individual treatment plan.  Focus attention to individualized issues from the treatment plan.  Complete all requirements for UA's, alcohol screening tests and other testing.  Schedule a physical examination if recommended.    In addition to the above, complete all individual goals as specifically outlines on your treatment plan.    Criteria for discharge:  Patients/clients are discharged from the program following completion of the entire program including Phase I and II or acceptance of other post-treatment referrals such as senior care house, or aftercare at other facilities.  Patients/clients may also be discharged for inappropriate behavior or chemical use.    Favorable Discharge - Patients/clients have completed agreed upon treatment goals, understand their diagnosis and appear motivated about the follow-up care.  Guarded Discharge - Patients/clients have demonstrated some understanding of their diagnosis and recovery process, and have completed some of their treatment goals.  This prognosis also includes patients/clients who have completed some treatment goals but have not made commitment to community support or  23-May-2019 follow through with referrals.  Unfavorable Discharge - Patients/clients have not completed agreed upon treatment goals due to their own choice, have limited understanding of their diagnosis, and have shown minimal or inconsistent behavior conducive to recovery.  Those patients/clients discharged due to behavioral problems will also be unfavorable discharges.                                  Adult CD Treatment Plan     Ravi Ahmadi [id number]   1990 33 year old male        ------------------------------------------------------------------------------------------------------------------  Acute Intoxication/Withdrawal Potential     DIMENSION 1  RISK FACTOR: 0               AssignmentDate Source Prob/Goal/  Intervention Target  Date Initials Outcome Completion  Date   12/13/23 Assessment-Current  Problem:   Patient reports his last use date for alcohol as 12/4/23.    Goal:   Develop effective strategies in order to maintain sobriety.    Intervention:   I will report any use on unit or withdrawal symptomology to counselors and nurse ASAP.                     Ongoing DW           Biomedical Conditions and Complaints       DIMENSION 2  RISK FACTOR: 1               AssignmentDate Source Prob/Goal/  Intervention Target  Date Initials Outcome Completion  Date   12/13/23 Assessment-Current Problem:   Patient reports no biomedical issues that would interfere with his ability to complete [facility] program.     Goal:   Patient is to have no biomedical issues during treatment.    Intervention:   I will report any biomedical issues or concerns to counselors and nurse ASAP.                           Ongoing DW         -----------------------------------------------------------------------------------------------------------------    Emotional/Behavioral/Cognitive Conditions and Complications       DIMENSION 3  RISK FACTOR: 2               AssignmentDate Source Prob/Goal/  Intervention Target  Date Initials Outcome  "Completion  Date   12/13/23 12/13/23 12/13/23 12/13/23  Assessment-Current                                              Assessment-Current                                      Assessment-Current                                    Assessment-Current Problem:   Patient participated in a suicide risk screening and was rated as a low risk for suicide.    Goal:   Patient is to remain safe throughout [facility] treatment program.    Intervention:    1.) I understand that I will be monitored and reassessed if a change in risk rating is indicated.    2.) I will complete a Safety Plan and turn in to counselors.    Problem:  Patient reports issues dealing with depression.    Goal:  Develop coping skills to better deal with depressive issues in a healthy manner.    Intervention:  I will complete \"Understanding Addiction and Depression\" assignment and present in group.    Problem:  Patient reports issues dealing with grief and loss.    Goal:  Develop coping skills to deal with grief and loss issues in a manner not injurious to himself and others.    Intervention:  I will complete \"Grief and Loss\" packet assignment and present in group.    Problem:  Patient reports guilt and shame issues.    Goal:  Develop better coping skills to effectively deal with guilt and shame.    Intervention:  I will complete \"Guilt and Shame\" packet assignment and present in group.                           Ongoing              ASAP                              12/15/23                                        12/18/23                                12/20/23                    -------------------------------------------------------------------------------------------------------------------     Readiness to Change       DIMENSION 4  RISK FACTOR: 2               AssignmentDate Source Prob/Goal/  Intervention Target  Date Initials Outcome " "Completion  Date   12/13/23 12/13/23 Assessment-Current                                       Assessment-Current Problem:   Patient has continued to sabotage efforts to remain sober long term.    Goal:   Develop skills necessary to eliminate self sabotage and remain sober.    Intervention:   I will complete \"First Step\" packet assignment and present in group.    Problem:  Patient has continued to abuse substances despite negative consequences.    Goal:  Develop a better understanding of the connection between using and the negative consequences he experiences.    Intervention:  1.) i will complete \"Drug Use History\" assignment and present in group.    2.) I will read the first 164 pages in the \"Big Book\" and be prepared to discuss with counselor.                           12/22/23 12/25/23            Ongoing DW       -------------------------------------------------------------------------------------------------------------------     Relapse/Continues Use/Continues Problem Potential       DIMENSION 5  RISK FACTOR: 4                   AssignmentDate Source Prob/Goal/  Intervention Target  Date Initials Outcome Completion  Date   12/13/23 12/13/23 Assessment-Current                                             Assessment-Current Problem:   Patient lacks insight regarding his triggers, cues and early warning signs for relapse.    Goal:   Develop a better awareness of his early warning signs for relapse and use that insight to remain sober.    Intervention:   I will complete \"Identifying Relapse Triggers and Cues\" assignment and present in group.    Problem:  Patient lacks insight regarding his potential for future relapses.     Goal;  Develop a keen awareness of the factors that can lead to relapse and avoid them.    Intervention;  1.) I will complete \"5 Years Using vs 5 Years Sober\" assignment and " "present in group.    2.) I will complete   \"25 Sober Coping Skills\" assignment and present in group.    3.) I will complete \"My Own Reason to Quit\" assignment and present in group.    4.) I will complete \"Relapse Prevention Plan\" assignment and present in group.                             12/27/23                                      12/29/23              1/1/23          1/3/24          1/5/24            Recovery Environment       DIMENSION 6  RISK FACTOR: 3                   AssignmentDate Source Prob/Goal/  Intervention Target  Date Initials Outcome Completion  Date   12/13/23 12/13/23 Assessment-Current                                           Assessment-Current Problem:   Patient lacks a sober support network.    Goal:   Develop a sober support network while in [facility] program.    Intervention:   1.) I will attend all 12 Step meetings during [facility] program.    2.) I will work to obtain a sponsor while in [facility] program.    Problem:  Patient lacks and aftercare treatment program.    Goal:  Develop an aftercare treatment program while in [facility] program.    Intervention:  I will work with counselors and support staff to develop an aftercare treatment protocol.                         Ongoing            Ongoing                                1/9/24          Treatment amount, frequency and duration:    A total of 30 hours of therapeutic programming will be completed weekly.    A total of 7 hours of peer-led recovery group attendance will be completed weekly.    Treatment schedule to be followed weekly for duration of treatment:    2 sessions of 2-hour long group therapy each day Monday through Saturday for duration of treatment  1 session of 1-hour group therapy each Sunday for duration of treatment  3 1-hour sessions of skills development Tuesday, Wednesday and Thursday weekly for duration of treatment  1 2-hour session of skills development " Sunday  1 half-hour individual session with [facility] Counselor 1 time weekly for duration of treatment.    1 hour of peer-led recovery meetings each night, Monday through Sunday, for duration of treatment.        Individual abuse prevention plan (required for [facility]) : specific actions, referral:   No additional protection measures required other than the Program Abuse Prevention Plan -No

## 2023-12-13 NOTE — PROGRESS NOTES
"Comprehensive Assessment Summary     Based on client interview, review of previous assessments and   comprehensive assessment interview the following diagnosis and recommendations are:     Patient: Ravi Ahmadi  MRN; [id number]   : 1990  Age: 33 year old Sex: male       Client meets criteria for:   Alcohol Use Disorder 303.90 (F10.20) Severe      Dimension One: Acute Intoxication/Withdrawal Potential     Ratin      (Consider the client's ability to cope with withdrawal symptoms and current state of intoxication) No indication of intoxication or withdrawal. Pt was direct transfer from detox unit 3A. Pt reports last date of use as 23.       Dimension Two: Biomedical Condition and Complications    Ratin    (Consider the degree to which any physical disorder would interfere with treatment for substance abuse, and the client's ability to tolerate any related discomfort; determine the impact of continued chemical use on the unborn child if the client is pregnant) Pt reports no physical limitations or medical conditions identified which would interfere with full program participation. Pt report having a PCP in the community: [facility], who is named Juancarlos Sood Pt reports a hx of auditory hallucinations, type II diabetes, and asthma.        Dimension Three: Emotional/Behavioral/Cognitive Conditions & Complications  Ratin    (Determine the degree to which any condition or complications are likely to interfere with treatment for substance abuse or with functioning in significant life areas and the likelihood of risk of harm to self or others) Pt reports prior MH diagnosis of bi-polar. Pt reports 1x prior psychiatric hospitalization in . Pt reports grief and loss concerns as: \"lost a handful of family members and one friend and missed their funerals.\" Pt reports plans to make \"amends.\" Pt reports guilt and shame concerns as: \"lives with parents and being in-and-out " "of work I'm unable to financially support my end of the living arrangement rita.\" Pt reports that he is interested in psychotherapy while in the [facility] program. Pt reports his strengths as: \"good listener, commitment to complete the [facility] program, and determined for sobriety.\" Pt identified both biological parents have JHON. Pt stated that his father has \"been in-and-out of tx and mother drinks weekly, but not as much as she used to.\" Pt reports not talking to his biological father \"10 years this Yuan due to a bad falling out.\" Pt further stated that his father was \"never in the picture while I was growing up.\" Pt lacks skills to assist in managing co-occurring disorders. Pt exhibits poor impulse control and limited skills in emotional regulation. C-SSRS 2023: \"High Risk.\" PHQ-9 2023: 19 severe depression, BROOKE-7 2023:  severe anxiety.     Dimension Four: Treatment Acceptance/Resistance     Ratin    (Consider the amount of support and encouragement necessary to keep the client involved in treatment) Pt expresses verbal motivation to make lifestyle changes. However, his supportive behaviors have lacked both ambition and commitment. Pt continues to use despite serious consequences in major life areas. He presents as in the contemplative stage. Pt reports that his main motivation for tx at this time as: \"mental health.\"       Dimension Five: Continued Use/Relaspe Prevention     Ratin   (Consider the degree to which the client's recognizes relapse issues and has the skills to prevent relapse of either substance use or mental health problems) Pt presents as a high risk for relapse. He has limited insight into his personal relapse cues and effective prevention strategies. Pt reports no current legal or probation concerns. Pt states that he has admitted to and completed one previous tx this past Oct./Nov. at [facility]. Pt reports that he relapsed four days after leaving this tx " "episode. Pt reports his longest period of continuous sobriety was 33 days while in this previous tx episode. Pt exhibits a very limited range of independent sober living skills. Pt reports his triggers to use as: \"unproductivity, idleness, and falling into old habits.\"         Dimension Six: Recovery Environment     Rating: 3     (Consider the degree to which key areas of the client's life are supportive of or antagonistic to treatment participation and recovery) Pt reports unemployment. He states that his last job was as a \" for [employer].\" Pt indicates that his family relationships have been strained due to his drinking. Pt lacks a sober support network aside from his parents (biological mother and step-father) and sober friends. Pt reports no prior 12-Step involvement or sponsor. Pt reports living with his parents who are somewhat reliant on his for financial support. Pt reports having no children. Pt may require transitional IOP and sober housing as part of his aftercare.           I have reviewed the information on the assessment, psychosocial and medical history and checklist:        it is current  "

## 2023-12-13 NOTE — PROGRESS NOTES
Name: Ravi Ahmadi  Date: 12/12/2023  Medical Record: [id number]  Envelope Number: [id number]  List of Contents (List each item separately in new row):     Aripiprazole 20 mg tabs  Ventolin HFA 90 mcg  Breo Ellipta 100-25 mcg    Admission:  I am responsible for any personal items that are not sent to the safe or pharmacy.  [facility] is not responsible for loss, theft or damage of any property in my possession.    Patient Signature:  ___________________________________________       Date/Time:__________________________    Staff Signature: __________________________________       Date/Time:__________________________    [facility] Staff person, if patient is unable/unwilling to sign:    __________________________________________________________       Date/Time: __________________________    **All medications are packaged by [facility] staff and securely stored on [facility]. Medications left by patients at discharge will be packaged by [facility] staff and transported by [facility] staff to inpatient pharmacy for storage.**    Discharge:  [facility] has returned all of my personal belongings:    Patient Signature: ________________________________________     Date/Time: ____________________________________    Staff Signature: ______________________________________     Date/Time:_____________________________________

## 2023-12-13 NOTE — PROGRESS NOTES
Comprehensive Assessment UPDATE         Comprehensive Summary Update and Review  Counselor met with patient on 12/13/2023 and reviewed the Comprehensive Assessment.       The following updates have been made: None

## 2023-12-13 NOTE — PROGRESS NOTES
Prior Authorization **APPROVED**    Authorization Effective Date: 12/12/2023  Authorization Expiration Date: 12/12/2023  Medication: ACAMPROSATE CALCIUM 333 MG PO TBEC  Approved Dose/Quantity: 1 fill only of #180  Reference #: CoverMyMeds Key: [id number]   Insurance Company: [employer] - Phone [phone] Fax [phone]  Expected CoPay: $0.00      CoPay Card Available: No    Foundation Assistance Needed: n/a  Which Pharmacy is filling the prescription (Not needed for infusion/clinic administered): [facility] [place], [place] - [address]  Pharmacy Notified: Yes  Patient Notified: Yes  Comments:  Plan prefers Naltrexone. Also covers Disulfuram. 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    Per insurance,only approved for one fill. Patient would need to be reassessed by outpatient provider in a month if Acamprosate is still needed due to support for glutamate-related hallucinations or if just Naltrexone can be used. Provider has noted on discharge summary.      Diane Ribera CPhT  Discharge Pharmacy Liaison  [facility]  Pronouns: She/Her/Hers    Securely message with [employer], Epic Secure Chat, or [employer]  Phone: [phone]  Fax: [phone]  [email or web address]

## 2023-12-14 ENCOUNTER — HOSPITAL ENCOUNTER (OUTPATIENT)
Dept: BEHAVIORAL HEALTH | Facility: CLINIC | Age: 33
Discharge: HOME | End: 2023-12-14
Attending: FAMILY MEDICINE
Payer: COMMERCIAL

## 2023-12-14 PROCEDURE — H2035 A/D TX PROGRAM, PER HOUR: HCPCS | Mod: HQ

## 2023-12-14 PROCEDURE — 1002N00001 HC LODGING PLUS FACILITY CHARGE ADULT

## 2023-12-14 NOTE — GROUP NOTE
Psychoeducation Group Documentation    PATIENT'S NAME: Ravi Ahmadi  MRN:   [id number]  :   1990  ACCT. NUMBER: [id number]  DATE OF SERVICE: 23  START TIME:  3:00 PM  END TIME:  4:00 PM  FACILITATOR(S): Leonora Perez LADC; Jose Martines LADC  TOPIC: BEH Pyschoeducation  Number of patients attending the group:  26  Group Length:  1 Hours    Skills Group Therapy Type: Healthy behaviors development    Summary of Group / Topics Discussed:    Balanced lifestyle skills and Symptom management skills    Group Attendance:  Attended group session    Patient's response to the group topic/interactions:  cooperative with task    Patient appeared to be Attentive and Engaged.         Client specific details: Pt listened respectfully to Dr. Wright's presentation on neurobiology of addiction research and asked appropriate questions.

## 2023-12-14 NOTE — GROUP NOTE
Group Therapy Documentation    PATIENT'S NAME: Ravi Ahmadi  MRN:   [id number]  :   1990  ACCT. NUMBER: [id number]  DATE OF SERVICE: 23  START TIME: 12:30 PM  END TIME:  2:30 PM  FACILITATOR(S): Felipe Hutchins LADC  TOPIC: BEH Group Therapy  Number of patients attending the group:  10  Group Length:  2 Hours    Group Therapy Type: Health and wellbeing     Summary of Group / Topics Discussed:    Relapse prevention      Group Attendance:  Attended group session    Patient's response to the group topic/interactions:  cooperative with task    Patient appeared to be Actively participating.        Client specific details:  Ravi participated and interacted appropriately with peers and staff in PM group. No triggers to use noted or discussed.

## 2023-12-14 NOTE — GROUP NOTE
Group Therapy Documentation    PATIENT'S NAME: Ravi Ahmadi  MRN:   [id number]  :   1990  ACCT. NUMBER: [id number]  DATE OF SERVICE: 23  START TIME:  9:00 AM  END TIME: 11:00 AM  FACILITATOR(S): Kathleen Ribeiro LADC  TOPIC: BEH Group Therapy  Number of patients attending the group:  9  Group Length:  2 Hours    Group Therapy Type: Recovery strategies, Emotion processing, and Daily living/independence skills    Summary of Group / Topics Discussed:    Recovery Principles, Sober coping skills, Relationship/socialization, and Relapse prevention      Group Attendance:  Attended group session    Patient's response to the group topic/interactions:  cooperative with task    Patient appeared to be Actively participating, Attentive, and Engaged.        Client specific details:  Patient attended group session and was attentive and participative.

## 2023-12-15 ENCOUNTER — HOSPITAL ENCOUNTER (OUTPATIENT)
Dept: BEHAVIORAL HEALTH | Facility: CLINIC | Age: 33
Discharge: HOME | End: 2023-12-15
Attending: FAMILY MEDICINE
Payer: COMMERCIAL

## 2023-12-15 ENCOUNTER — TELEPHONE (OUTPATIENT)
Dept: BEHAVIORAL HEALTH | Facility: CLINIC | Age: 33
End: 2023-12-15
Payer: COMMERCIAL

## 2023-12-15 DIAGNOSIS — F51.01 PRIMARY INSOMNIA: Primary | ICD-10-CM

## 2023-12-15 PROCEDURE — 1002N00001 HC LODGING PLUS FACILITY CHARGE ADULT

## 2023-12-15 PROCEDURE — H2035 A/D TX PROGRAM, PER HOUR: HCPCS | Mod: HQ

## 2023-12-15 RX ORDER — TRAZODONE HYDROCHLORIDE 50 MG/1
50 TABLET, FILM COATED ORAL
Qty: 30 TABLET | Refills: 0 | Status: SHIPPED | OUTPATIENT
Start: 2023-12-15 | End: 2024-01-04

## 2023-12-15 NOTE — TELEPHONE ENCOUNTER
Pt does not have his cell phone. Please schedule next available video visit and call [phone] or send a epic message to alert us of the appt time and date. Thank you.

## 2023-12-15 NOTE — TELEPHONE ENCOUNTER
PLEASE SCHEDULE ASAP        Transition Clinic Referral     Type of Referral:      __x___Medication Only    Referring Provider Name: Alena Richardson MD    Referring Provider Contact Phone Number: [phone]    Reason for Transition Clinic Referral: awaiting long-term psych appt. [facility] and requesting psychiatric stabilization per concerns in RN note. Pt is experiencing persistent auditory hallucinations     Next Level of Care Patient Will Be Transitioned To: Psychiatry    Start Date for Next Level of Care (Psychiatry provider visit - Required):   Ravi Ahmadi MRN: [id number]      Date: 12/27/2023 Status: Scheduled     Time: 9:00 AM Length: 60             Provider: Geronimo Webb MD Department: [facility] PSYCHIATRY     Encounter #: [id number] Notes: [address] [place] [address] [id number] [phone]       Type of Clinical Assessment Completed (Crisis, DA, JHON, etc.): JHON    Date Clinical Assessment (JHON/comprehensive assessment) was Completed: H&P on 12/8    Diagnosis: Alcohol Use Disorder (AUD)    What Would Be Helpful from the Transition Clinic: acute psychiatric medication stabilization to improve treatment participation     Needs: NO    Does Patient Have Access to Technology: yes    Patient E-mail Address: na    Current Patient Phone Number: [phone] ; [phone]    Clinician Gender Preference (if applicable): NO    Alena Richardson MD

## 2023-12-15 NOTE — TELEPHONE ENCOUNTER
First attempt to contact pt. Saadr left a VM with TC contact info and encouraged a phone call back to schedule initial psychiatry appointment. Saadr will postpone for tomorrow.    Malena Giraldo  12/1175/2023

## 2023-12-15 NOTE — TELEPHONE ENCOUNTER
I prescribed trazodone as noted below.     I see he had been scheduled for primary care at [facility] 12/12/23, when he admitted to [facility].   If he was unable to attend this appointment, has it been rescheduled?      1. Primary insomnia    - traZODone (DESYREL) 50 MG tablet; Take 1 tablet (50 mg) by mouth nightly as needed for sleep  Dispense: 30 tablet; Refill: 0

## 2023-12-15 NOTE — GROUP NOTE
Group Therapy Documentation    PATIENT'S NAME: Ravi Ahmadi  MRN:   [id number]  :   1990  ACCT. NUMBER: [id number]  DATE OF SERVICE: 12/15/23  START TIME: 12:30 PM  END TIME:  2:30 PM  FACILITATOR(S): Felipe Hutchins LADC  TOPIC: BEH Group Therapy  Number of patients attending the group:  10  Group Length:  2 Hours    Group Therapy Type: Recovery strategies    Summary of Group / Topics Discussed:    Recovery Principles      Group Attendance:  {Group Attendance:936340}    Patient's response to the group topic/interactions:  {OPBEHCLIENTRESPONSE:407153}    Patient appeared to be {Engagement:[id number]}.        Client specific details:  ***.

## 2023-12-15 NOTE — GROUP NOTE
Group Therapy Documentation    PATIENT'S NAME: Ravi Ahmadi  MRN:   [id number]  :   1990  ACCT. NUMBER: [id number]  DATE OF SERVICE: 12/15/23  START TIME: 12:30 PM  END TIME:  2:30 PM  FACILITATOR(S): Mary Jeffers LADC  TOPIC: BEH Group Therapy  Number of patients attending the group:  2  Group Length:  2 Hours    Group Therapy Type: Recovery strategies    Summary of Group / Topics Discussed:    Recovery Principles       Patients viewed an addiction/mental health based film. This film addressed: addiction as a disease, relationships and addiction, engagement in AA, and evaluating priorities when getting sober.   Patients engaged in a thorough discussion about the film, and shared personal experiences, and how the film helped them process their own life events.          Group Attendance:  Attended group session    Patient's response to the group topic/interactions:  cooperative with task    Patient appeared to be Actively participating.        Client specific details:  Ravi attended PM group therapy. Patient engaged in viewing the film, discussion and participated in sharing feedback to their peers.   .

## 2023-12-15 NOTE — GROUP NOTE
Group Therapy Documentation    PATIENT'S NAME: Ravi Ahmadi  MRN:   [id number]  :   1990  ACCT. NUMBER: [id number]  DATE OF SERVICE: 12/15/23  START TIME:  9:00 AM  END TIME: 11:00 AM  FACILITATOR(S): Felipe Hutchins LADC; Kathleen Ribeiro LADC  TOPIC: BEH Group Therapy  Number of patients attending the group:  10  Group Length:  2 Hours    Group Therapy Type: Recovery strategies    Summary of Group / Topics Discussed:    Recovery Principles      Group Attendance:  Attended group session    Patient's response to the group topic/interactions:  cooperative with task    Patient appeared to be Actively participating.        Client specific details:  Ravi participated and interacted appropriately with peers and staff in AM group. No triggers to use noted or discussed.

## 2023-12-15 NOTE — TELEPHONE ENCOUNTER
Pt has auditory hallucinations, he is set up with [facility] psychiatry on 12/21. Pt is requesting something for sleep in the mean time.  Pt does not have any outside provider. Please advise.

## 2023-12-16 ENCOUNTER — HOSPITAL ENCOUNTER (OUTPATIENT)
Dept: BEHAVIORAL HEALTH | Facility: CLINIC | Age: 33
Discharge: HOME | End: 2023-12-16
Attending: FAMILY MEDICINE
Payer: COMMERCIAL

## 2023-12-16 PROCEDURE — H2035 A/D TX PROGRAM, PER HOUR: HCPCS | Mod: HQ

## 2023-12-16 PROCEDURE — 1002N00001 HC LODGING PLUS FACILITY CHARGE ADULT

## 2023-12-16 NOTE — GROUP NOTE
Group Therapy Documentation    PATIENT'S NAME: Ravi Ahmadi  MRN:   [id number]  :   1990  ACCT. NUMBER: [id number]  DATE OF SERVICE: 23  START TIME: 12:30 PM  END TIME:  1:30 PM  FACILITATOR(S): Camilla Romo; Reza Cochran [facility]; [facility] Rodney [facility]  TOPIC: BEH Group Therapy  Number of patients attending the group:  30  Group Length:  2 Hours    Group Therapy Type: Recovery strategies, Daily living/independence skills, and Health and wellbeing     Summary of Group / Topics Discussed:    Relationship/socialization    Group lecture was presented on the topic of healthy relationship strategies by a member of counseling staff. Feedback was provided by participants throughout group lecture.       Group Attendance:  Attended group session    Patient's response to the group topic/interactions:  cooperative with task    Patient appeared to be Actively participating, Attentive, and Engaged.        Client specific details:  Patient actively engaged in group discussion.

## 2023-12-16 NOTE — GROUP NOTE
Group Therapy Documentation    PATIENT'S NAME: Ravi Ahmadi  MRN:   [id number]  :   1990  ACCT. NUMBER: [id number]  DATE OF SERVICE: 23  START TIME:  9:00 AM  END TIME: 11:00 AM  FACILITATOR(S): Reza Cochran LADC; Ricardo Rodney LADC; Camilla Romo  TOPIC: BEH Group Therapy  Number of patients attending the group:  30  Group Length:  2 Hours    Group Therapy Type: Recovery strategies and Health and wellbeing     Summary of Group / Topics Discussed:    Recovery Principles, Sober coping skills, and Relapse prevention    Group lecture was presented by counseling staff on the topic of relapse prevention. Group participants provided and accepted feedback.      Group Attendance:  Attended group session    Patient's response to the group topic/interactions:  cooperative with task    Patient appeared to be Actively participating, Attentive, and Engaged.        Client specific details:  Patient actively engaged in group lecture/discussion.

## 2023-12-17 ENCOUNTER — HOSPITAL ENCOUNTER (OUTPATIENT)
Dept: BEHAVIORAL HEALTH | Facility: CLINIC | Age: 33
Discharge: HOME | End: 2023-12-17
Attending: FAMILY MEDICINE
Payer: COMMERCIAL

## 2023-12-17 PROCEDURE — H2035 A/D TX PROGRAM, PER HOUR: HCPCS | Mod: HQ

## 2023-12-17 PROCEDURE — 1002N00001 HC LODGING PLUS FACILITY CHARGE ADULT

## 2023-12-17 NOTE — GROUP NOTE
Group Therapy Documentation    PATIENT'S NAME: Ravi Ahmadi  MRN:   [id number]  :   1990  ACCT. NUMBER: [id number]  DATE OF SERVICE: 23  START TIME:  8:45 AM  END TIME: 10:30 AM  FACILITATOR(S): Ricardo Rodney LADC  TOPIC: BEH Group Therapy  Number of patients attending the group:  30  Group Length:  2 Hours    Group Therapy Type: Health and wellbeing     Summary of Group / Topics Discussed:    Self-care activities    Group lecture was presented by nursing staff on the topic of sexual health. Feedback was provided by participants throughout group session.         Group Attendance:  Attended group session    Patient's response to the group topic/interactions:  cooperative with task    Patient appeared to be Actively participating, Attentive, and Engaged.        Client specific details:  Patient actively engaged in group discussion.

## 2023-12-18 ENCOUNTER — HOSPITAL ENCOUNTER (OUTPATIENT)
Dept: BEHAVIORAL HEALTH | Facility: CLINIC | Age: 33
Discharge: HOME | End: 2023-12-18
Attending: FAMILY MEDICINE
Payer: COMMERCIAL

## 2023-12-18 PROCEDURE — 1002N00001 HC LODGING PLUS FACILITY CHARGE ADULT

## 2023-12-18 PROCEDURE — H2035 A/D TX PROGRAM, PER HOUR: HCPCS | Mod: HQ

## 2023-12-18 ASSESSMENT — ANXIETY QUESTIONNAIRES
1. FEELING NERVOUS, ANXIOUS, OR ON EDGE: NEARLY EVERY DAY
4. TROUBLE RELAXING: MORE THAN HALF THE DAYS
GAD7 TOTAL SCORE: 13
6. BECOMING EASILY ANNOYED OR IRRITABLE: SEVERAL DAYS
7. FEELING AFRAID AS IF SOMETHING AWFUL MIGHT HAPPEN: MORE THAN HALF THE DAYS
GAD7 TOTAL SCORE: 13
5. BEING SO RESTLESS THAT IT IS HARD TO SIT STILL: SEVERAL DAYS
3. WORRYING TOO MUCH ABOUT DIFFERENT THINGS: MORE THAN HALF THE DAYS
2. NOT BEING ABLE TO STOP OR CONTROL WORRYING: MORE THAN HALF THE DAYS

## 2023-12-18 ASSESSMENT — PATIENT HEALTH QUESTIONNAIRE - PHQ9: SUM OF ALL RESPONSES TO PHQ QUESTIONS 1-9: 19

## 2023-12-18 NOTE — GROUP NOTE
"Group Therapy Documentation    PATIENT'S NAME: Ravi Ahmadi  MRN:   [id number]  :   1990  ACCT. NUMBER: [id number]  DATE OF SERVICE: 23  START TIME:  9:00 AM  END TIME: 11:00 AM  FACILITATOR(S): Kathleen Ribeiro LADC  TOPIC: BEH Group Therapy  Number of patients attending the group:  8    Group Length:  2 Hours    Group Therapy Type: Recovery strategies, Emotion processing, and Daily living/independence skills    Summary of Group / Topics Discussed:    Recovery Principles, Sober coping skills, Relationship/socialization, and Relapse prevention      Group Attendance:  Attended group session    Patient's response to the group topic/interactions:  cooperative with task    Patient appeared to be Actively participating, Attentive, and Engaged.        Client specific details:  Patient completed his :\"Grief and Loss\" assignment and presented it in group. Patient discussed the factors in his life that cause  grief and loss and better coping skills he can utilize to increase the quality of his life. Patient received positive feedback from his peers..    "

## 2023-12-18 NOTE — PROGRESS NOTES
[facility] Weekly Treatment Plan Review      ATTENDANCE for the following date span 23:   to 23      Weekly Treatment Plan Review     Treatment Plan initiated on: 23.    Dimension1: Acute Intoxication/Withdrawal Potential -   Previous Dimension Ratin  Current Dimension Ratin  Date of Last Use: 23  Any reports of withdrawal symptoms - Patient reports mild shakes and hallucinations.      Dimension 2: Biomedical Conditions & Complications -   Previous Dimension Ratin  Current Dimension Ratin  Medical Concerns: None at this time  Current Medications & Medication Changes:  Current Outpatient Medications   Medication    acamprosate (CAMPRAL) 333 MG EC tablet    acetaminophen (TYLENOL) 325 MG tablet    Alcohol Swabs PADS    alum & mag hydroxide-simethicone (MAALOX) 200-200-20 MG/5ML SUSP suspension    ARIPiprazole (ABILIFY) 15 MG tablet    benzocaine-menthol (CEPACOL) 15-3.6 MG lozenge    blood glucose (NO BRAND SPECIFIED) lancets standard    blood glucose (NO BRAND SPECIFIED) test strip    blood glucose calibration (NO BRAND SPECIFIED) solution    blood glucose monitoring (NO BRAND SPECIFIED) meter device kit    budesonide-formoterol (SYMBICORT) 160-4.5 MCG/ACT Inhaler    fluticasone-vilanterol (BREO ELLIPTA) 100-25 MCG/ACT inhaler    folic acid (FOLVITE) 1 MG tablet    glucose (BD GLUCOSE) 4 g chewable tablet    glucose (BD GLUCOSE) 4 g chewable tablet    guaiFENesin-dextromethorphan (ROBITUSSIN DM) 100-10 MG/5ML syrup    ibuprofen (ADVIL/MOTRIN) 200 MG tablet    insulin glargine (LANTUS PEN) 100 UNIT/ML pen    insulin pen needle (32G X 4 MM) 32G X 4 MM miscellaneous    lisinopril (ZESTRIL) 10 MG tablet    loratadine (CLARITIN) 10 MG tablet    melatonin 3 MG tablet    multivitamin w/minerals (THERA-VIT-M) tablet    naltrexone (DEPADE/REVIA) 50 MG tablet    nicotine (NICODERM CQ) 14 MG/24HR 24 hr patch    pantoprazole (PROTONIX) 40 MG EC tablet    senna-docusate  (SENOKOT-S/PERICOLACE) 8.6-50 MG tablet    Sharps Container MISC    thiamine (B-1) 100 MG tablet    traZODone (DESYREL) 50 MG tablet    Vitamin D3 (CHOLECALCIFEROL) 25 mcg (1000 units) tablet     No current facility-administered medications for this encounter.     Facility-Administered Medications Ordered in Other Encounters   Medication    Self Administer Medications: Behavioral Services     Medication Prescriber:  See Chart   Taking meds as prescribed? Yes  Medication side effects or concerns: None  Outside medical appointments this week (list provider and reason for visit):  TBD    Narrative: Patient reports no biomedical issues or concerns that would prohibit him from completing [facility]. Patient appears fully functioning and able to seek  medical attention as needed..       Dimension 3: Emotional/Behavioral Conditions & Complications -   Previous Dimension Ratin  Current Dimension Ratin  PHQ2:       2023    12:51 PM 2021    11:55 AM 2015    11:25 AM 2015     9:37 AM   PHQ-2 (  [employer])   Q1: Little interest or pleasure in doing things 2 0 1 0   Q2: Feeling down, depressed or hopeless 2 0 0 0   PHQ-2 Score 4 0 1 0   PHQ-2 Total Score (12-17 Years)- Positive if 3 or more points; Administer PHQ-A if positive  0     Q1: Little interest or pleasure in doing things More than half the days Not at all     Q2: Feeling down, depressed or hopeless More than half the days Not at all     PHQ-2 Score 4 0        GAD2:        No data to display              Mental health diagnosis Depression and Anxiety  Date of last SIB:  N/A  Date of  last SI:  N/A  Date of last HI: N/A  Behavioral Targets: Follow recommendations of medical provider. Report any alcohol or drug use to counselor and any increase in withdrawal symptoms to nurse. Stabilize and maintain mental health.   Risk factors: early recovery, grief and loss, guilt and shame, medical concerns, poorly managed mental health.    Protective  "factors:  Forward future  thinking, dedication to family and friends, abstinence from substances, adherence with prescribed medications, agreement to safety plan, structured day, access to a variety of clinical interventions,     Current  Assignments:  \"Understanding Depression and Addiction\" \"Grief and Loss\"    Narrative: Patient is working to gain insight regarding how his substance abuse negatively affects his mental and emotional well being. Patient reports having hallucinations. Patient reports that his stress level has subsided since admitting to treatment. Patient has completed the above noted assignments and presented them in group. Patient reports no suicidal ideation at this time.    [place] Suicide Severity Rating Scale (Short Version)      10/4/2023    11:57 AM 10/4/2023     1:34 PM 10/23/2023     7:00 PM 12/7/2023     1:50 PM 12/7/2023     9:00 PM 12/12/2023     3:00 PM 12/13/2023     3:00 PM   [place] Suicide Severity Rating (Short Version)   Over the past 2 weeks have you felt down, depressed, or hopeless? yes  no no      Over the past 2 weeks have you had thoughts of killing yourself? yes  no no      Have you ever attempted to kill yourself? no  yes no      When did this last happen?   within the last month (but not today)       Q1 Wished to be Dead (Past Month) yes yes yes  no yes    Q2 Suicidal Thoughts (Past Month) yes yes yes  yes yes    Comments   10/1/2023       Q3 Suicidal Thought Method yes yes yes  yes yes    Comments   take muscle relaxers and alcohol.  overdose     Q4 Suicidal Intent without Specific Plan no  no  no no    Q5 Suicide Intent with Specific Plan yes no yes  no yes    Q6 Suicide Behavior (Lifetime) no    no no    Level of Risk per Screen high risk moderate risk high risk  low risk high risk    High Risk Required Interventions   Provider notified       Required Interventions   Room searched;Room made safe       1. Wish to be Dead (Since Last Contact)       Y   Description of " "Most Severe Ideation (Since Last Contact)       Pt rated as High Risk   Frequency (Since Last Contact)       3   Duration (Since Last Contact)       2   Deterrents (Since Last Contact)       1   Reasons for Ideation (Since Last Contact)       0   Actual Attempt (Since Last Contact)       N   Has subject engaged in non-suicidal self-injurious behavior? (Since Last Contact)       N   Interrupted Attempts (Since Last Contact)       N   Aborted or Self-Interrupted Attempt (Since Last Contact)       N   Preparatory Acts or Behavior (Since Last Contact)       N   Suicide (Since Last Contact)       N   Most Lethal Attempt Date       10/4/2023   Actual Lethality/Medical Damage Code (Most Lethal Attempt)       1   Calculated C-SSRS Risk Score (Since Last Contact)       Low Risk         Dimension 4: Treatment Acceptance / Resistance -   Previous Dimension Ratin  Current Dimension Ratin  JHON Diagnosis:  Alcohol Use Disorder   303.90 (F10.20) Severe    Commitment to tx process/Stage of change-Patient appears to be in the contemplative stage of change at this time.    JHON assignments - \"First Step\" and \"Drug Use History\"    Narrative - Patient is working towards increasing his internal motivation for change. Patient has attended groups, meetings and lectures on time and offers meaningful feedback to his peers regarding homework assignments and topics for group discussion. Patient reports that he is motivated by wanting to gain back the favor of his family and the desire to improve his health. Patient will be working on the above listed assignments in the coming weeks and will present them in group upon completion.    Dimension 5: Relapse / Continued Problem Potential -   Previous Dimension Ratin  Current Dimension Ratin  Relapses this week - None  Urges to use - YES, List    UA results - N/A    Narrative- Patient is working towards gaining awareness regarding his triggers, cues and early warning signs for " "relapse. Patient reports that his cravings this week rate a   6, 1-(low)-10-(high). Patient reports that he's managed potential cravings by focusing his mind on other things. Patient attended a \"Relationship Workshop\" this past weekend and completed all activities. Patient will be working on a number of assignments in the coming weeks and will present them all in group upon completion.    Dimension 6: Recovery Environment -   Previous Dimension Rating:  3  Current Dimension Rating:  3  Family Involvement - Yes  Summarize attendance at family groups and family sessions -N/A  Family supportive of treatment?  Yes  Recreational activities - walking, watching movies    Narrative - Patient attended al 12 Step meetings according to his treatment plan. Patient has been working to built his sober support network by spending free time with same-gender peers. Patient will be working with counselors and support in the coming weeks to develop an aftercare treatment protocol.    Progress made on transition planning goals: Yes    Justification for Continued Treatment at this Level of Care: Risk ratings indicate continued need for this level of care. Pt has been unable to maintain abstinence from alcohol and drugs while at the outpatient level of care, lacks long-term sober maintenance skills, lacks sober coping skills and has medical issues which are exacerbated by substance abuse.     Treatment coordination activities this week:  Working with counselors    Need for peer recovery support referral? No    Discharge Planning:  Target Discharge Date/Timeframe:  1/9/24   Med Mgmt Provider/Appt:  DANIELITO   Ind therapy Provider/Appt:  DANIELITO   Family therapy Provider/Appt:  DANIELITO   Other referrals:  TBD      Has vulnerable adult status changed? No    Interdisciplinary Clinical Supervision including: LADC and Mental health professional    Are Treatment Plan goals/objectives effective? Yes  *If no, list changes to treatment plan:    Are the current " goals meeting client's needs? Yes  *If no, list the changes to treatment plan.    Patient Input / Response: Pt contributed to treatment plan review.    *Client agrees with any changes to the treatment plan: Yes  *Client received copy of changes: N/A  *Client is aware of right to access a treatment plan review: Yes

## 2023-12-18 NOTE — GROUP NOTE
Group Therapy Documentation    PATIENT'S NAME: Ravi Ahmadi  MRN:   [id number]  :   1990  ACCT. NUMBER: [id number]  DATE OF SERVICE: 23  START TIME: 12:30 PM  END TIME:  2:30 PM  FACILITATOR(S): Felipe Hutchins LADC; Julia Foss  TOPIC: BEH Group Therapy  Number of patients attending the group:  7  Group Length:  2 Hours    Group Therapy Type: Health and wellbeing     Summary of Group / Topics Discussed:    Spiritual Care      Group Attendance:  Attended group session    Patient's response to the group topic/interactions:  cooperative with task    Patient appeared to be Actively participating.        Client specific details:  Ravi participated and interacted appropriately with peers and staff in spiritual group. No triggers to use noted or discussed.

## 2023-12-18 NOTE — ADDENDUM NOTE
Encounter addended by: Reza Cochran LADC on: 12/18/2023 11:05 AM   Actions taken: Charge Capture section accepted

## 2023-12-19 ENCOUNTER — HOSPITAL ENCOUNTER (OUTPATIENT)
Dept: BEHAVIORAL HEALTH | Facility: CLINIC | Age: 33
Discharge: HOME | End: 2023-12-19
Attending: FAMILY MEDICINE
Payer: COMMERCIAL

## 2023-12-19 PROCEDURE — H2035 A/D TX PROGRAM, PER HOUR: HCPCS | Mod: HQ

## 2023-12-19 PROCEDURE — 1002N00001 HC LODGING PLUS FACILITY CHARGE ADULT

## 2023-12-19 NOTE — GROUP NOTE
Group Therapy Documentation    PATIENT'S NAME: Ravi Ahmadi  MRN:   [id number]  :   1990  ACCT. NUMBER: [id number]  DATE OF SERVICE: 23  START TIME: 12:30 PM  END TIME:  1:30 PM  FACILITATOR(S): Ricardo Rodney LADC; Reza Cochran LADC  TOPIC: BEH Group Therapy  Number of patients attending the group:  29  Group Length:  2 Hours    Group Therapy Type: Recovery strategies    Summary of Group / Topics Discussed:    Sober coping skills, Disease of addiction, Relapse prevention, and Self-care activities    Group began with a discussion regarding things that people need to maintain sobriety/recovery. Patient then split into groups to answer recovery related questions, followed by group feedback.        Group Attendance:  Attended group session    Patient's response to the group topic/interactions:  cooperative with task    Patient appeared to be Actively participating.        Client specific details:  Patient actively engaged in group discussion/ activity.

## 2023-12-19 NOTE — GROUP NOTE
Group Therapy Documentation    PATIENT'S NAME: Ravi Ahmadi  MRN:   [id number]  :   1990  ACCT. NUMBER: [id number]  DATE OF SERVICE: 23  START TIME:  3:00 PM  END TIME:  4:00 PM  FACILITATOR(S): Mary Jeffers LADC; Felipe Hutchins LADC  TOPIC: BEH Group Therapy  Number of patients attending the group:  25  Group Length:  2 Hours    Group Therapy Type: Recovery strategies    Summary of Group / Topics Discussed:    Recovery Principles      Facilitator EILEEN Castro conducted group on Stress and Anxiety.   What are they? Where do they manifest from, internal vs external? Stress Response explained and examined.       Group Attendance:  Attended group session    Patient's response to the group topic/interactions:  cooperative with task    Patient appeared to be Actively participating.        Client specific details:  Ravi attended afternoon skills group and participated in processing discussion. Patient remained engaged and participated throughout group session.       EILENE Cabrera

## 2023-12-19 NOTE — GROUP NOTE
"Group Therapy Documentation    PATIENT'S NAME: Ravi Ahmadi  MRN:   [id number]  :   1990  ACCT. NUMBER: [id number]  DATE OF SERVICE: 23  START TIME:  9:00 AM  END TIME: 11:00 AM  FACILITATOR(S): Kathleen Ribeiro LADC  TOPIC: BEH Group Therapy  Number of patients attending the group:  6    Group Length:  2 Hours    Group Therapy Type: Recovery strategies, Emotion processing, and Daily living/independence skills    Summary of Group / Topics Discussed:    Recovery Principles, Sober coping skills, and Relapse prevention      Group Attendance:  Attended group session    Patient's response to the group topic/interactions:  cooperative with task    Patient appeared to be Actively participating, Attentive, and Engaged.        Client specific details:  Patient completed his \"Guilt and Shame\" assignment and presented in group. Patient discussed the things in his life that have caused him to feel less than and better to live which would help him feel better about himself..Patient received positive feedback from his peers.    "

## 2023-12-19 NOTE — GROUP NOTE
"Group Therapy Documentation    PATIENT'S NAME: Ravi Ahmadi  MRN:   [id number]  :   1990  ACCT. NUMBER: [id number]  DATE OF SERVICE: 23  START TIME: 12:30 PM  END TIME:  2:30 PM  FACILITATOR(S): Ricardo Rodney LADC  TOPIC: BEH Group Therapy  Number of patients attending the group:  6  Group Length:  2 Hours    Group Therapy Type: Recovery strategies, Emotion processing, and Daily living/independence skills    Summary of Group / Topics Discussed:    Sober coping skills and Relapse prevention      Group included a discussion regarding growth and change, followed by the presentation of a participants \"Drug Use History\" assignment. Feedback was provided by participants throughout group session.       Group Attendance:  Attended group session    Patient's response to the group topic/interactions:  cooperative with task    Patient appeared to be Actively participating, Attentive, and Engaged.        Client specific details:  Patient actively engaged in group discussion.    "

## 2023-12-19 NOTE — ADDENDUM NOTE
Encounter addended by: Ricardo Rodney [facility] on: 12/19/2023 3:24 PM   Actions taken: Clinical Note Signed

## 2023-12-20 ENCOUNTER — HOSPITAL ENCOUNTER (OUTPATIENT)
Dept: BEHAVIORAL HEALTH | Facility: CLINIC | Age: 33
Discharge: HOME | End: 2023-12-20
Attending: FAMILY MEDICINE
Payer: COMMERCIAL

## 2023-12-20 PROCEDURE — H2035 A/D TX PROGRAM, PER HOUR: HCPCS | Mod: HQ

## 2023-12-20 PROCEDURE — 1002N00001 HC LODGING PLUS FACILITY CHARGE ADULT

## 2023-12-20 NOTE — GROUP NOTE
"Psychoeducation Group Documentation    PATIENT'S NAME: Ravi Ahmadi  MRN:   [id number]  :   1990  ACCT. NUMBER: [id number]  DATE OF SERVICE: 23  START TIME:  8:30 AM  END TIME:  9:30 AM  FACILITATOR(S): Kristal Michelle LADC  TOPIC: BEH Pyschoeducation  Number of patients attending the group:  24  Group Length:  1 Hours    Skills Group Therapy Type: Recovery skills and Relationship skills development    Summary of Group / Topics Discussed:    Relationship/social skills  Presenter talked about the \"butterfly effect\" means that everything is deeply interconnected, such that one small occurrence can influence a much larger complex system.        Group Attendance:  Attended group session    Patient's response to the group topic/interactions:  cooperative with task    Patient appeared to be Engaged.         Client specific details:  Patient was present during AM skills group and participated in activity.      "

## 2023-12-20 NOTE — PROGRESS NOTES
[facility]      Mental Health & Addiction Service Line    Transition Clinic: Psychiatry Note  Medication Management              VISIT INFORMATION    Date:  2023     Number:  -Initial     Referral source:  -Inpatient psychiatry  -[facility]      Patient Identifying Information:  Legal name: Ravi Ahmadi  Preferred name: Ravi  : 1990  Preferred pronouns: He/him      Participants:   -Patient  -Provider          Telehealth visit details:  Type of service:  Video  Patient location:  At home, Off site  Provider Location:  [facility]  Platform utilized:  Amwell until 4:14 pm then changed to Klocwork due to IT issues    Start time: 4:13 pm  End time:  4:40 pm      HPI    -Withdrawing from alcohol + experiencing hallucinations (AH/VH) as well as paranoia   -Went through detox + psychiatric hospitalization  -Now at [facility]          PSYCHIATRIC ROS    Sleep:   -It's hard to fall and stay asleep   -Waking multiple times per night  -Getting 5 to 6 hours per night  -Don't fell well rested the next day      Appetite/Weight Changes:   -Appetite is reduced   -Pants are baggier than usual      Energy Levels:   -5/10      Trauma hx:   -Did not discuss in detail during the appointment      Depression/Anxiety:   -Mood is low, down, anhedonia      Psychosis  -Having both visual + auditory hallucinations  -They voices are accusatory, yell at me, or say things that muffled  -Sometimes hear background noise, can't really distinguish what it is       Suicidal ideations:   +Passive ideations  -Denies intent or plan      SIB:  -Denies current engagement of self harm       Side effects:  -None reported        MENTAL HEALTH HISTORY    Suicide attempts:  -None reported    Per 2023 EILEEN De encounter:  -Ideations with intent to overdose, however passed out from alcohol before able to do so + parents   called a welfare check w/ police        Inpatient psychiatric hospitalizations:  -1x  -Most recent: [facility], [facility] from 12/7 to 12/12/2023  -No hx of commitments       ECT:  -None reported         Medication Trials:    Benzodiazepines:  -Librium 10 mg TID            SUBSTANCE USE    Prior use:  -Polysubstance abuse: alcohol, THC  -Hx of seizures during alcohol withdrawal   -Recently drinking up to 75% of 175 mL bottle per day  -Currently attending [facility]          SOCIAL HISTORY  -Was reviewed within the EMR per: 12/8/2023 H&P by Dr. KOLE Santana MD          MEDICAL HISTORY    Current:  -The problem list was reviewed prior to the appointment  -The patient denies any concerning physical and or medical symptoms during the interviewing process      Developmental:   -Mother had normal pregnancy: Yes  -Met age appropriate milestones: Yes  -Participated in special education classes and or had an IEP: No but attended an alternative high school  -Hx of autism spectrum disorder, learning disability, and or other cognitive disorder: No      Neurological:  -Denies any hx of: concussions, or TBI        MEDICATIONS      Current Outpatient Medications:     acamprosate (CAMPRAL) 333 MG EC tablet, Take 2 tablets (666 mg) by mouth 3 times daily, Disp: 180 tablet, Rfl: 0    acetaminophen (TYLENOL) 325 MG tablet, Take 325-650 mg by mouth every 4 hours as needed for mild pain, Disp: , Rfl:     Alcohol Swabs PADS, Use to swab the area of the injection or steven as directed  Per insurance coverage, Disp: 100 each, Rfl: 0    alum & mag hydroxide-simethicone (MAALOX) 200-200-20 MG/5ML SUSP suspension, Take 30 mLs by mouth every 6 hours as needed for indigestion, Disp: , Rfl:     ARIPiprazole (ABILIFY) 15 MG tablet, Take 1 tablet (15 mg) by mouth daily, Disp: 30 tablet, Rfl: 0    benzocaine-menthol (CEPACOL) 15-3.6 MG lozenge, Place 1 lozenge inside cheek every 2 hours as needed for sore throat, Disp: , Rfl:     blood glucose (NO BRAND SPECIFIED) lancets standard, To use to  test glucose level in the blood.  Use to test blood sugar  4  times daily as directed. To accompany glucose monitor brands per insurance coverage., Disp: 200 each, Rfl: 0    blood glucose (NO BRAND SPECIFIED) test strip, To use to test glucose level in the blood. Use to test blood sugar  4 times daily as directed. To accompany glucose monitor brands per insurance coverage., Disp: 200 strip, Rfl: 0    blood glucose calibration (NO BRAND SPECIFIED) solution, Used to calibrate the blood glucose monitor as needed and as directed.  To accompany  blood glucose brands per insurance coverage, Disp: 1 each, Rfl: 0    blood glucose monitoring (NO BRAND SPECIFIED) meter device kit, Use as directed Per insurance coverage, Disp: 1 kit, Rfl: 0    budesonide-formoterol (SYMBICORT) 160-4.5 MCG/ACT Inhaler, Inhale 2 puffs into the lungs 2 times daily, Disp: , Rfl:     fluticasone-vilanterol (BREO ELLIPTA) 100-25 MCG/ACT inhaler, Inhale 1 puff into the lungs daily, Disp: 28 each, Rfl: 0    folic acid (FOLVITE) 1 MG tablet, Take 1 tablet (1 mg) by mouth daily, Disp: 30 tablet, Rfl: 0    glucose (BD GLUCOSE) 4 g chewable tablet, Take 1 tablet by mouth every hour as needed for low blood sugar, Disp: 30 tablet, Rfl: 0    glucose (BD GLUCOSE) 4 g chewable tablet, Take 4 tablets by mouth every 15 minutes as needed for low blood sugar, Disp: 20 tablet, Rfl: 0    guaiFENesin-dextromethorphan (ROBITUSSIN DM) 100-10 MG/5ML syrup, Take 10 mLs by mouth every 4 hours as needed for cough, Disp: , Rfl:     ibuprofen (ADVIL/MOTRIN) 200 MG tablet, Take 400 mg by mouth every 6 hours as needed for pain, Disp: , Rfl:     insulin glargine (LANTUS PEN) 100 UNIT/ML pen, Inject 5 Units Subcutaneous at bedtime, Disp: 3 mL, Rfl: 0    insulin pen needle (32G X 4 MM) 32G X 4 MM miscellaneous, Use as directed by provider Per insurance coverage, Disp: 200 each, Rfl: 0    lisinopril (ZESTRIL) 10 MG tablet, Take 1 tablet (10 mg) by mouth daily, Disp: 30 tablet,  Rfl: 0    loratadine (CLARITIN) 10 MG tablet, Take 10 mg by mouth daily as needed for allergies, Disp: , Rfl:     melatonin 3 MG tablet, Take 3 mg by mouth nightly as needed for sleep, Disp: , Rfl:     multivitamin w/minerals (THERA-VIT-M) tablet, Take 1 tablet by mouth daily, Disp: 30 tablet, Rfl: 0    naltrexone (DEPADE/REVIA) 50 MG tablet, Take 1 tablet (50 mg) by mouth daily, Disp: 30 tablet, Rfl: 0    nicotine (NICODERM CQ) 14 MG/24HR 24 hr patch, Place 1 patch onto the skin daily, Disp: 30 patch, Rfl: 0    pantoprazole (PROTONIX) 40 MG EC tablet, Take 1 tablet (40 mg) by mouth every morning (before breakfast), Disp: 30 tablet, Rfl: 0    senna-docusate (SENOKOT-S/PERICOLACE) 8.6-50 MG tablet, Take 2 tablets by mouth daily as needed for constipation, Disp: , Rfl:     Sharps Container MISC, Use as directed to dispose of needles, lancets and other sharps, Disp: 1 each, Rfl: 0    thiamine (B-1) 100 MG tablet, Take 1 tablet (100 mg) by mouth daily, Disp: 30 tablet, Rfl: 0    traZODone (DESYREL) 50 MG tablet, Take 1 tablet (50 mg) by mouth nightly as needed for sleep, Disp: 30 tablet, Rfl: 0    Vitamin D3 (CHOLECALCIFEROL) 25 mcg (1000 units) tablet, Take 1 tablet (25 mcg) by mouth daily, Disp: 30 tablet, Rfl: 0  No current facility-administered medications for this visit.    Facility-Administered Medications Ordered in Other Visits:     Self Administer Medications: Behavioral Services, , Does not apply, See Admin Instructions, Alena Richardson MD          If a controlled substance has been prescribed during the appointment:    -The [place] Prescription Monitoring Program has been reviewed and there are no current concerns with: diversionary activity, early refill requests, and or obtaining the medication from multiple providers.          VITALS    BP Readings from Last 3 Encounters:   12/12/23 111/73   12/07/23 123/77   10/26/23 (!) 130/92       Pulse Readings from Last 3 Encounters:   12/12/23 85   12/07/23 96    10/26/23 93       Wt Readings from Last 3 Encounters:   12/07/23 106.1 kg (234 lb)   12/07/23 106.6 kg (235 lb)   10/23/23 113.4 kg (250 lb)               LABS    The following have been reviewed prior to or during the appointment:  -12/7 to 12/8/2023          SCALES          12/11/2023    11:27 AM 12/12/2023     3:00 PM 12/18/2023     2:00 PM   PHQ   PHQ-9 Total Score 23 19 19   Q9: Thoughts of better off dead/self-harm past 2 weeks More than half the days More than half the days More than half the days            12/11/2023    11:27 AM 12/12/2023     3:00 PM 12/18/2023     2:00 PM   BROOKE-7 SCORE   Total Score 17 16 13          Answers submitted by the patient for this visit:  Patient Health Questionnaire (Submitted on 12/21/2023)  If you checked off any problems, how difficult have these problems made it for you to do your work, take care of things at home, or get along with other people?: Extremely difficult  PHQ9 TOTAL SCORE: 23    BROOKE-7 (Submitted on 12/21/2023)  BROOKE 7 TOTAL SCORE: 17      MENTAL STATUS EXAMINATION    Appearance: Adequately Groomed, Attire Appropriate for the Season, Viewed for 1 min  General Behavior:  Cooperative  Speech: Fluent, Normal rate and volume  Musculoskeletal:  YESENIA  Mood: Anxious + Depressed  Affect: YESENIA  Attention: Intact  Orientation:  Person, Place, Time, Situation  Thought Associations:  Intact  Thought Content: AH/VH  Thought Processes: Organized, Normal rate  Memory: No overt impairment; no screenings or formal testing performed  Language: Intact  Judgement: Fair to good  Insight: Fair to good        ASSESSMENT/CLINICAL IMPRESSIONS    Summary:    Ravi Ahmadi is a 32 y/o male with a history of: MDD, Unspecified Mood Disorder, and Polysubstance abuse (alcohol + THC).    Recently hospitalized in Oct/2023 and again from 12/7 to 12/12/2023 in the context of alcohol withdrawal.    Is attending today's appointment in consultation while participating in [facility] residential c/d  treatment programming   at [facility] for the management of psychotropics/bridging until able to establish long term outpatient   psychiatric services.    Hallucinations are most likely due to AUD however currently AH/VH are increasingly difficult for Ravi to filter   and are distressing to him.   He also notes disrupted sleep patterns, and rates quality + quantity of sleep as poor.    Discussed r/b of Olanzapine, as well as utilizing two antipsychotics hopefully for a short period (6 months or less)   of time to assist with re-stabilization (targeting hallucinations + sleep) since Ravi is also a Type II Diabetic.    He endorses passive ideations but denies intent or plan and remains motivated to continue to remain sober.        DSM-V and or working diagnosis:    1.  Alcohol Use Disorder    2.  Moderate episode of recurrent major depressive disorder (H)     3.  Hallucinations      Rule outs:    4.  MDD w/psychotic features versus Substance Induced Psychosis + Mood Disorder    5.  Bipolar II            SAFETY EVALUATION:  Suicidal ideations:  -passive ideations  -denies intent or plan  Homicidal ideations:  -denies  Access to guns:   -none; staying at [facility]  Risk factors:  -male, single  -JHON  Protective and mitigating factors:  -parents  -engaged in c/d programming  Risk assessment:  -low to medium      SAFETY PLAN:  -Has numbers of at least 1 family member + 1 friend in personal contact list  -Knows clinic number(s) + operation of regular business hours   -Reviewed to utilize 988 or text [place] to [phone] for mental health crisis  -Discussed to call 911 and or return to the nearest Emergency Department if unable to maintain safety of self or others (due to severity of suicidal and or homicidal ideations)          TREATMENT PLAN      Medications:  Start:  Olanzapine/Zyprexa 3.75 to 7.5 mg (1/2 to 1 tab) at bedtime     Continue:  Acamprosate/Campral 666 mg (2 tabs) three times  daily;    Aripiprazole/Abilify 15 mg daily    Naltrexone/Depade/Revia 50 mg daily    Trazodone/Desyrel 50 mg as needed for sleep      Labs:  -None Obtained        Therapy and or Non-pharmacological modalities:  -Continue c/d treatment programming through [facility]        Disposition:  -Has been advised of consultative Transition Clinic model    -You do not need to return to the Transition Clinic for medication management    -Please make sure to keep the appointment for longitudinal outpatient psychiatry services  (see below):     Department: [facility] Psychiatry  Provider: Dr. YANET Webb MD  Location: [place] [address], [address] [place] [address], [address]   Phone: [phone]  Date/Time/Visit type: 12/27/2023 @ 8:45 am in person        Total time: 62 minutes per:    -Review of EMR   -Appointment time  -Documentation   -Coordination with [facility] nursing staff          Billie MENSAH-CNP,  [facility]-BC          ----------------------------------------------------------------------------------------------------------------------        TREATMENT RISK STATEMENT    The risks, benefits, alternatives, and potential adverse effects have been explained and are understood by the patient.  The patient agrees to the treatment plan with their ability to do so.      The patient knows to call the clinic: [phone]  for any problems or concerns until the next psychiatry visit, regardless if it is within or outside of the [employer] system.     If unable to reach clinic staff (via phone call or medical messaging) during the normal business hours: 8:00 am to 4:30 pm then it is recommended accessing the nearest: emergency department, urgent care facility, or utilizing local (varies based on county of residence) and national crisis #'s or text messaging services for immediate  assistance.          ----------------------------------------------------------------------------------------------------------------------        If applicable the following has been discussed with the patient, parent/guardian, and or attending family member during the appointment:    Risks of polypharmacy and possible drug interactions with current medication list + common OTC products, herbs, and supplements.  Moving forward, it is suggested to intermittently check-in with a clinic or retail pharmacist whenever new medications or OTC/h/s are consumed.    Risks of polypharmacy and possible drug + drug interactions with current medication list.  Moving forward, it is suggested to intermittently check-in with a clinic or retail pharmacist whenever new prescription medications are added to your treatment regimen.    Recommendation to adhere to CDC guidelines as it relates to alcohol consumption.  If taking benzodiazepines, you should abstain from alcohol intake due to increased risks of CNS and respiratory depression, as well as psychomotor impairment.    If possible, it is recommended to avoid concurrent use of prescribed: opioids + benzodiazepines due to increased risks of CNS and respiratory depression, as well as the increased risk of overdose.     Recommendation to minimize and or abstain from THC use (unless you are prescribed medical marijuana).    Recommendation to abstain from illicit substances including but not limited to the following: heroin, street fentanyl, cocaine, methamphetamines, bath salts, and other synthetic products.    Recommendation to abstain from tobacco/smoking/nicotine, alcohol, THC, and all illicit substances if trying to become or are pregnant.    Do not take opioids, stimulants, and or other prescription medications unless they are specifically prescribed for you.     Black Box Warnings associated with the prescribed psychotropic(s).     Potential adverse effects of antipsychotics  including but not limited to the following: weight gain, metabolic syndrome, EPS/Tardive Dyskinesias, and Neuroleptic Malignant Syndrome.    Potential adverse effects of stimulants including but not limited to the following: sudden death, MI, stroke, HTN, cardiomyopathy (long term use), seizures, do, psychosis, and aggressive behaviors.

## 2023-12-20 NOTE — GROUP NOTE
Group Therapy Documentation    PATIENT'S NAME: Ravi Ahmadi  MRN:   [id number]  :   1990  ACCT. NUMBER: [id number]  DATE OF SERVICE: 23  START TIME:  9:45 AM  END TIME: 11:15 AM  FACILITATOR(S): Kathleen Ribeiro LADC  TOPIC: BEH Group Therapy  Number of patients attending the group:  6    Group Length:  1.5 Hours    Group Therapy Type: Recovery strategies, Emotion processing, and Daily living/independence skills    Summary of Group / Topics Discussed:    Recovery Principles, Sober coping skills, Relationship/socialization, and Relapse prevention      Group Attendance:  Attended group session    Patient's response to the group topic/interactions:  cooperative with task    Patient appeared to be Actively participating, Attentive, and Engaged.        Client specific details:  Patient attended group session and was attentive and participative.

## 2023-12-20 NOTE — GROUP NOTE
Group Therapy Documentation    PATIENT'S NAME: Ravi Ahmadi  MRN:   [id number]  :   1990  ACCT. NUMBER: [id number]  DATE OF SERVICE: 23  START TIME: 12:30 PM  END TIME:  2:30 PM  FACILITATOR(S): Ricardo Rodney LADC  TOPIC: BEH Group Therapy  Number of patients attending the group:  6  Group Length:  2 Hours    Group Therapy Type: Recovery strategies and Emotion processing    Summary of Group / Topics Discussed:    Sober coping skills, Disease of addiction, and Emotions/expression    Group session included the presentation of assignment's by group participants. Feedback was provided throughout group session.       Group Attendance:  Attended group session    Patient's response to the group topic/interactions:  cooperative with task    Patient appeared to be Actively participating, Attentive, and Engaged.        Client specific details:  Patient actively engaged in group discussion.

## 2023-12-21 ENCOUNTER — HOSPITAL ENCOUNTER (OUTPATIENT)
Dept: BEHAVIORAL HEALTH | Facility: CLINIC | Age: 33
Discharge: HOME | End: 2023-12-21
Attending: FAMILY MEDICINE
Payer: COMMERCIAL

## 2023-12-21 ENCOUNTER — TELEPHONE (OUTPATIENT)
Dept: BEHAVIORAL HEALTH | Facility: CLINIC | Age: 33
End: 2023-12-21
Payer: COMMERCIAL

## 2023-12-21 ENCOUNTER — VIRTUAL VISIT (OUTPATIENT)
Dept: BEHAVIORAL HEALTH | Facility: CLINIC | Age: 33
End: 2023-12-21
Payer: COMMERCIAL

## 2023-12-21 DIAGNOSIS — R44.3 HALLUCINATIONS: ICD-10-CM

## 2023-12-21 DIAGNOSIS — F33.1 MODERATE EPISODE OF RECURRENT MAJOR DEPRESSIVE DISORDER (H): ICD-10-CM

## 2023-12-21 DIAGNOSIS — F10.90 ALCOHOL USE DISORDER: Primary | ICD-10-CM

## 2023-12-21 PROCEDURE — 99205 OFFICE O/P NEW HI 60 MIN: CPT | Mod: VID | Performed by: NURSE PRACTITIONER

## 2023-12-21 PROCEDURE — 1002N00001 HC LODGING PLUS FACILITY CHARGE ADULT

## 2023-12-21 PROCEDURE — H2035 A/D TX PROGRAM, PER HOUR: HCPCS | Mod: HQ

## 2023-12-21 PROCEDURE — H2035 A/D TX PROGRAM, PER HOUR: HCPCS

## 2023-12-21 RX ORDER — OLANZAPINE 7.5 MG/1
TABLET, FILM COATED ORAL
Qty: 30 TABLET | Refills: 0 | Status: SHIPPED | OUTPATIENT
Start: 2023-12-21 | End: 2023-12-27

## 2023-12-21 RX ORDER — OLANZAPINE 7.5 MG/1
TABLET, FILM COATED ORAL
Qty: 30 TABLET | Refills: 0 | Status: SHIPPED | OUTPATIENT
Start: 2023-12-21 | End: 2023-12-21

## 2023-12-21 ASSESSMENT — PATIENT HEALTH QUESTIONNAIRE - PHQ9
SUM OF ALL RESPONSES TO PHQ QUESTIONS 1-9: 23
10. IF YOU CHECKED OFF ANY PROBLEMS, HOW DIFFICULT HAVE THESE PROBLEMS MADE IT FOR YOU TO DO YOUR WORK, TAKE CARE OF THINGS AT HOME, OR GET ALONG WITH OTHER PEOPLE: EXTREMELY DIFFICULT
SUM OF ALL RESPONSES TO PHQ QUESTIONS 1-9: 23

## 2023-12-21 ASSESSMENT — ANXIETY QUESTIONNAIRES
3. WORRYING TOO MUCH ABOUT DIFFERENT THINGS: MORE THAN HALF THE DAYS
2. NOT BEING ABLE TO STOP OR CONTROL WORRYING: MORE THAN HALF THE DAYS
GAD7 TOTAL SCORE: 17
1. FEELING NERVOUS, ANXIOUS, OR ON EDGE: NEARLY EVERY DAY
GAD7 TOTAL SCORE: 17
4. TROUBLE RELAXING: MORE THAN HALF THE DAYS
7. FEELING AFRAID AS IF SOMETHING AWFUL MIGHT HAPPEN: NEARLY EVERY DAY
IF YOU CHECKED OFF ANY PROBLEMS ON THIS QUESTIONNAIRE, HOW DIFFICULT HAVE THESE PROBLEMS MADE IT FOR YOU TO DO YOUR WORK, TAKE CARE OF THINGS AT HOME, OR GET ALONG WITH OTHER PEOPLE: VERY DIFFICULT
5. BEING SO RESTLESS THAT IT IS HARD TO SIT STILL: NEARLY EVERY DAY
6. BECOMING EASILY ANNOYED OR IRRITABLE: MORE THAN HALF THE DAYS

## 2023-12-21 NOTE — TELEPHONE ENCOUNTER
1.    Per internal staff messaging: Gissell Rodriguez, RN:    RN checked with pt about it, pt states he has noticed bright red blood in his stool, it started earlier this week, blood is only with wiping after a BM.     Pt denies any dizziness, lightheadedness, or pain. Pt states his stool has been hard and his BMs have only been every other day.     Pt advised he could try our stock of PRN Senna for hard stools. Pt was asked to come and let nursing staff know if the bleeding increases or if he develops any new symptoms.

## 2023-12-21 NOTE — PROGRESS NOTES
Pt reported he has noticed blood in his stool. Pt states it started earlier this week, blood is only with wiping after a BM. Pt denies any dizziness, lightheadedness, or pain. Pt states his stool has been hard and his BMs have only been every other day. Pt advised he could try PRN Senna for hard stools. Pt asked to come and let nursing staff know if the bleeding increases or if he develops any new symptoms. Pt's next follow up appointment is on 12/27.

## 2023-12-21 NOTE — GROUP NOTE
Group Therapy Documentation    PATIENT'S NAME: Ravi Ahmadi  MRN:   [id number]  :   1990  ACCT. NUMBER: [id number]  DATE OF SERVICE: 23  START TIME: 12:30 PM  END TIME:  2:30 PM  FACILITATOR(S): Ricardo Rodney LADC  TOPIC: BEH Group Therapy  Number of patients attending the group:  6  Group Length:  2 Hours    Group Therapy Type: Recovery strategies, Emotion processing, Daily living/independence skills, and Health and wellbeing     Summary of Group / Topics Discussed:    Recovery Principles and Disease of addiction    Group session included a discussion regarding change, twelve step philosophy, and qualities of a growth mindset. Feedback was provided by participants throughout group session.       Group Attendance:  Attended group session    Patient's response to the group topic/interactions:  cooperative with task    Patient appeared to be Actively participating, Attentive, and Engaged.        Client specific details:  Patient actively engaged in group in roup discussion. Patient attended a therapy appointment during the second hour of group session.

## 2023-12-21 NOTE — PATIENT INSTRUCTIONS
Start:  Olanzapine/Zyprexa 3.75 to 7.5 mg (1/2 to 1 tab) at bedtime     Continue:  Acamprosate/Campral 666 mg (2 tabs) three times daily;    Aripiprazole/Abilify 15 mg daily    Naltrexone/Depade/Revia 50 mg daily    Trazodone/Desyrel 50 mg as needed for sleep    --------------------------    -You do not need to return to the Transition Clinic for medication management    -Please make sure to keep the appointment for longitudinal outpatient psychiatry services  (see below):     Department: [facility] Psychiatry  Provider:   Location: [place] [address], [address] [place] [address], [address]   Phone: [phone]  Date/Time/Visit type: 12/27/2023 @ 8:45 am in person

## 2023-12-21 NOTE — Clinical Note
Nav Webb-  Mich KATE since Ravi moulton will be seeing you tomorrow.  Please let me know if there's any questions or concerns related to the progress notes.  Billie

## 2023-12-21 NOTE — TELEPHONE ENCOUNTER
1.  Called [facility] nursing staff at: [phone]    -Writer did not see (due to IT issues) in the 12/21/2023 rooming notes, patient reporting blood in stool, therefore did not discuss during [facility] psych consult    2.  [facility] nursing staff: Kerri will check in with patient to discuss if there are any red flag associated symptoms and or send to Emergency Department if necessary
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
## 2023-12-21 NOTE — GROUP NOTE
"Group Therapy Documentation    PATIENT'S NAME: Ravi Ahmadi  MRN:   [id number]  :   1990  ACCT. NUMBER: [id number]  DATE OF SERVICE: 23  START TIME:  9:00 AM  END TIME: 11:00 AM  FACILITATOR(S): Kathleen Ribeiro LADC  TOPIC: BEH Group Therapy  Number of patients attending the group:  6    Group Length:  2 Hours    Group Therapy Type: Recovery strategies, Emotion processing, and Daily living/independence skills    Summary of Group / Topics Discussed:    Recovery Principles, Sober coping skills, Relationship/socialization, and Relapse prevention      Group Attendance:  Attended group session    Patient's response to the group topic/interactions:  cooperative with task    Patient appeared to be Actively participating, Attentive, and Engaged.        Client specific details:  Patient completed his \"Identifying Relapse Triggers and Cues\" assignment and presented in group. Patient cited his early warning signs for relapse and ways to combat those cravings and remain sober long term. Patient received positive feedback from his peers.    "

## 2023-12-21 NOTE — PROGRESS NOTES
"  Mental Health and Collaborative Care Psychiatry Service Rooming Note      Most pressing mental health concern at this time: Depression, anxiety, difficulties sleeping, and hallucinations (auditory&visual). Hearing \" mean voices \" and music. Occasionally sees stuff, like something crossing over hallways      Any new physical health conditions or diagnoses affecting you that we should be aware of: Blood in stool - haven't mention this to nurses      Side effects related to medications patient would like to discuss with the provider:  No      Are you taking your medications as prescribed?  yes  If not, why? NA      Do you need refills of any of the medications?  no  If so, which ones? NA      Are you taking any recreational substances? Not currently      Add attendance guidelines .phrase here   Care team has reviewed attendance agreement with patient. Patient advised that two failed appointments within 6 months may lead to termination of current episode of care.       **If appointment is with [facility]-include the following:      \"The [facility] is a temporary psychiatry service that helps to bridge the time to your next appointment. It is not intended to be a long-term service and you are expected to attend your scheduled appointment with your next provider.\"    [ X ] Patient/Parent verbalized understanding     If you need support between appointments, please call [phone] and let them know you're seen by [facility] Psychiatry. You may also send a [employer] message to reach us.     New (awaiting) Mental health provider or next programming: LP patient  Date of scheduled apt: TBD     Patient would like the video visit invitation sent by:   Mora Hines LPN  December 21, 2023  3:41 PM        "

## 2023-12-22 ENCOUNTER — HOSPITAL ENCOUNTER (OUTPATIENT)
Dept: BEHAVIORAL HEALTH | Facility: CLINIC | Age: 33
Discharge: HOME | End: 2023-12-22
Attending: FAMILY MEDICINE
Payer: COMMERCIAL

## 2023-12-22 PROCEDURE — 1002N00001 HC LODGING PLUS FACILITY CHARGE ADULT

## 2023-12-22 PROCEDURE — H2035 A/D TX PROGRAM, PER HOUR: HCPCS | Mod: HQ

## 2023-12-22 NOTE — GROUP NOTE
Group Therapy Documentation    PATIENT'S NAME: Ravi Ahmadi  MRN:   [id number]  :   1990  ACCT. NUMBER: [id number]  DATE OF SERVICE: 23  START TIME: 12:30 PM  END TIME:  2:30 PM  FACILITATOR(S): Sofy Silva LADC; Ricardo Rodney LADC; Mary Jeffers LADC  TOPIC: BEH Group Therapy  Number of patients attending the group:  23  Group Length:  2 Hours    Group Therapy Type: Emotion processing    Summary of Group / Topics Discussed:    Recovery Principles, Relationship/socialization, and Leisure explorations/use of leisure time      Group Attendance:  Attended group session    Patient's response to the group topic/interactions:  cooperative with task    Patient appeared to be Attentive and Engaged.        Client specific details: Ravi was an active participant in watching a holiday movie with themes of goodwill, forgiveness, greed, and kindness. Patient participated in a large group discussion on these themes and how they could relate to the characters in the film.

## 2023-12-22 NOTE — GROUP NOTE
"Group Therapy Documentation    PATIENT'S NAME: Ravi Ahmadi  MRN:   [id number]  :   1990  ACCT. NUMBER: [id number]  DATE OF SERVICE: 23  START TIME:  9:00 AM  END TIME: 11:00 AM  FACILITATOR(S): Ricardo Rodney LADC  TOPIC: BEH Group Therapy  Number of patients attending the group:  6  Group Length:  2 Hours    Group Therapy Type: Recovery strategies, Emotion processing, and Daily living/independence skills    Summary of Group / Topics Discussed:    Disease of addiction and Emotions/expression    Group began with check-ins, followed by a graduation for a group participant. A participant then shared his \"Drug Use History\" assignment. Feedback was provided by participants throughout group session.       Group Attendance:  Attended group session    Patient's response to the group topic/interactions:  cooperative with task    Patient appeared to be Actively participating, Attentive, and Engaged.        Client specific details: Patient actively engaged in group discussion.    "

## 2023-12-23 ENCOUNTER — HOSPITAL ENCOUNTER (OUTPATIENT)
Dept: BEHAVIORAL HEALTH | Facility: CLINIC | Age: 33
Discharge: HOME | End: 2023-12-23
Attending: FAMILY MEDICINE
Payer: COMMERCIAL

## 2023-12-23 PROCEDURE — H2035 A/D TX PROGRAM, PER HOUR: HCPCS | Mod: HQ

## 2023-12-23 PROCEDURE — 1002N00001 HC LODGING PLUS FACILITY CHARGE ADULT

## 2023-12-23 NOTE — GROUP NOTE
Psychoeducation Group Documentation    PATIENT'S NAME: Ravi Ahmadi  MRN:   [id number]  :   1990  ACCT. NUMBER: [id number]  DATE OF SERVICE: 23  START TIME: 12:30 PM  END TIME:  2:30 PM  FACILITATOR(S): Vi Santana LADC; Tr Zurita LADC  TOPIC: BEH Pyschoeducation  Number of patients attending the group: 6  Group Length:  2 Hours    Skills Group Therapy Type: Recovery skills, Emotion regulation skills, and Relationship skills development    Summary of Group / Topics Discussed:    Patients attended Lecture on  Healthy vs Unhealthy Relationships .   Each patient engaged in Q&A after the lecture was presented.         Group Attendance:  Attended group session    Patient's response to the group topic/interactions:  cooperative with task    Patient appeared to be Engaged.         Client specific details:  Naren, participated in group discussion after the lecture.

## 2023-12-23 NOTE — GROUP NOTE
Group Therapy Documentation    PATIENT'S NAME: Ravi Ahmadi  MRN:   [id number]  :   1990  ACCT. NUMBER: [id number]  DATE OF SERVICE: 23  START TIME:  9:00 AM  END TIME: 11:00 AM  FACILITATOR(S): Tr Zurita LADC  TOPIC: BEH Group Therapy  Number of patients attending the group:  7  Group Length:  2 Hours    Group Therapy Type: Recovery strategies    Summary of Group / Topics Discussed:    Recovery Principles, Mindfulness/Relaxation, Coping/DBT informed care, Trauma informed care, Disease of addiction, and Emotions/expression      Group Attendance:  Attended group session    Patient's response to the group topic/interactions:  cooperative with task    Patient appeared to be Attentive and Engaged.        Client specific details:  The patient participated in the morning lecture on recovery resources.

## 2023-12-24 ENCOUNTER — HOSPITAL ENCOUNTER (OUTPATIENT)
Dept: BEHAVIORAL HEALTH | Facility: CLINIC | Age: 33
Discharge: HOME | End: 2023-12-24
Attending: FAMILY MEDICINE
Payer: COMMERCIAL

## 2023-12-24 PROCEDURE — 1002N00001 HC LODGING PLUS FACILITY CHARGE ADULT

## 2023-12-24 PROCEDURE — H2035 A/D TX PROGRAM, PER HOUR: HCPCS | Mod: HQ

## 2023-12-24 NOTE — GROUP NOTE
Group Therapy Documentation    PATIENT'S NAME: Ravi Ahmadi  MRN:   [id number]  :   1990  ACCT. NUMBER: [id number]  DATE OF SERVICE: 23  START TIME: 12:30 PM  END TIME:  1:30 PM  FACILITATOR(S): Kristal Michelle LADC; Kathleen Ribeiro LADC; Vi Santana LADC  TOPIC: BEH Group Therapy  Number of patients attending the group:  27  Group Length:  1 Hours    Group Therapy Type:  Recreational therapeutic group    Summary of Group / Topics Discussed:    Mindfulness/Relaxation and Leisure explorations/use of leisure time  Recreational therapeutic group. Movie presentation and processing.       Group Attendance:  Attended group session    Patient's response to the group topic/interactions:  cooperative with task    Patient appeared to be Engaged.        Client specific details:  Patient engaged during PM group session.

## 2023-12-24 NOTE — GROUP NOTE
Psychoeducation Group Documentation    PATIENT'S NAME: Ravi Ahmadi  MRN:   [id number]  :   1990  ACCT. NUMBER: [id number]  DATE OF SERVICE: 23  START TIME:  9:00 AM  END TIME: 11:00 AM  FACILITATOR(S): Kristal Michelle LADC; Kathleen Ribeiro LADC  TOPIC: BEH Pyschoeducation  Number of patients attending the group:  29  Group Length:  2 Hours    Skills Group Therapy Type: NURSING - HIV/AIDS     Summary of Group / Topics Discussed:    Nursing- Frannie Sears presented a lecture on HIV/AIDS.         Group Attendance:  Attended group session    Patient's response to the group topic/interactions:  cooperative with task    Patient appeared to be Engaged.         Client specific details:  Patient participated in AM group with the nurse.

## 2023-12-25 ENCOUNTER — HOSPITAL ENCOUNTER (OUTPATIENT)
Dept: BEHAVIORAL HEALTH | Facility: CLINIC | Age: 33
Discharge: HOME | End: 2023-12-25
Attending: FAMILY MEDICINE
Payer: COMMERCIAL

## 2023-12-25 PROCEDURE — H2035 A/D TX PROGRAM, PER HOUR: HCPCS | Mod: HQ

## 2023-12-25 PROCEDURE — 1002N00001 HC LODGING PLUS FACILITY CHARGE ADULT

## 2023-12-25 NOTE — GROUP NOTE
Group Therapy Documentation    PATIENT'S NAME: Ravi Ahmadi  MRN:   [id number]  :   1990  ACCT. NUMBER: [id number]  DATE OF SERVICE: 23  START TIME:  9:00 AM  END TIME: 11:00 AM  FACILITATOR(S): Tr Zurita LADC; Sofy Silva LADC  TOPIC: BEH Group Therapy  Number of patients attending the group:  6  Group Length:  2 Hours    Group Therapy Type: Recovery strategies    Summary of Group / Topics Discussed:    Recovery Principles, Mindfulness/Relaxation, Coping/DBT informed care, Trauma informed care, Disease of addiction, and Emotions/expression      Group Attendance:  Attended group session    Patient's response to the group topic/interactions:  cooperative with task    Patient appeared to be Attentive and Engaged.        Client specific details:  The patient participated in morning group, which consisted of a team building activity.

## 2023-12-25 NOTE — GROUP NOTE
Group Therapy Documentation    PATIENT'S NAME: Ravi Ahmadi  MRN:   [id number]  :   1990  ACCT. NUMBER: [id number]  DATE OF SERVICE: 23  START TIME: 12:30 PM  END TIME:  1:30 PM  FACILITATOR(S): Sofy Silva LADC; Tr Zurita LADC  TOPIC: BEH Group Therapy  Number of patients attending the group:  26  Group Length:  1 Hours    Group Therapy Type: Recovery strategies    Summary of Group / Topics Discussed:    Sober coping skills and Leisure explorations/use of leisure time      Group Attendance:  Attended group session    Patient's response to the group topic/interactions:  cooperative with task    Patient appeared to be Actively participating, Attentive, and Engaged.        Client specific details: Ravi was an active participant in therapeutic art activity.

## 2023-12-26 ENCOUNTER — HOSPITAL ENCOUNTER (OUTPATIENT)
Dept: BEHAVIORAL HEALTH | Facility: CLINIC | Age: 33
Discharge: HOME | End: 2023-12-26
Attending: FAMILY MEDICINE
Payer: COMMERCIAL

## 2023-12-26 PROCEDURE — 1002N00001 HC LODGING PLUS FACILITY CHARGE ADULT

## 2023-12-26 PROCEDURE — H2035 A/D TX PROGRAM, PER HOUR: HCPCS | Mod: HQ

## 2023-12-26 NOTE — GROUP NOTE
Group Therapy Documentation    PATIENT'S NAME: Ravi Ahmadi  MRN:   [id number]  :   1990  ACCT. NUMBER: [id number]  DATE OF SERVICE: 23  START TIME:  9:00 AM  END TIME: 11:00 AM  FACILITATOR(S): Ricardo Rodney LADC  TOPIC: BEH Group Therapy  Number of patients attending the group:  6  Group Length:  2 Hours    Group Therapy Type: Recovery strategies, Emotion processing, Daily living/independence skills, and Health and wellbeing     Summary of Group / Topics Discussed:    Sober coping skills, Balanced lifestyle, and Emotions/expression    Group began with check-ins, followed by a participant's graduation. Group then discussed the topics of materialism and emotional intelligence. Feedback was provided by participants throughout group session.       Group Attendance:  Attended group session    Patient's response to the group topic/interactions:  cooperative with task    Patient appeared to be Actively participating, Attentive, and Engaged.        Client specific details:  Patient actively engaged in group discussion.

## 2023-12-26 NOTE — GROUP NOTE
Group Therapy Documentation    PATIENT'S NAME: Ravi Ahmadi  MRN:   [id number]  :   1990  ACCT. NUMBER: [id number]  DATE OF SERVICE: 23  START TIME:  3:00 PM  END TIME:  4:00 PM  FACILITATOR(S): Sofy Silva LADC; Tr Zurita LADC; Kathleen Ribeiro LADC  TOPIC: BEH Group Therapy  Number of patients attending the group:  26  Group Length:  1 Hours    Group Therapy Type: Emotion processing    Summary of Group / Topics Discussed:    Coping/DBT informed care      Group Attendance:  Attended group session    Patient's response to the group topic/interactions:  cooperative with task    Patient appeared to be Attentive and Engaged.        Client specific details: Ravi was an active participant in lecture on DBT skills.

## 2023-12-26 NOTE — GROUP NOTE
Group Therapy Documentation    PATIENT'S NAME: Ravi Ahmadi  MRN:   [id number]  :   1990  ACCT. NUMBER: [id number]  DATE OF SERVICE: 23  START TIME: 12:30 PM  END TIME:  2:30 PM  FACILITATOR(S): Ricardo Rodney LADC; Julia Foss  TOPIC: BEH Group Therapy  Number of patients attending the group:  6  Group Length:  2 Hours    Group Therapy Type: Recovery strategies, Emotion processing, Daily living/independence skills, and Health and wellbeing     Summary of Group / Topics Discussed:    Spiritual Care    Group session was facilitated by staff  regularity spirituality. Feedback was provided by participants throughout group session.       Group Attendance:  Attended group session    Patient's response to the group topic/interactions:  cooperative with task    Patient appeared to be Actively participating, Attentive, and Engaged.        Client specific details:  Patient actively engaged in group discussion.

## 2023-12-27 ENCOUNTER — HOSPITAL ENCOUNTER (OUTPATIENT)
Dept: BEHAVIORAL HEALTH | Facility: CLINIC | Age: 33
Discharge: HOME | End: 2023-12-27
Attending: FAMILY MEDICINE
Payer: COMMERCIAL

## 2023-12-27 ENCOUNTER — OFFICE VISIT (OUTPATIENT)
Dept: PSYCHIATRY | Facility: CLINIC | Age: 33
End: 2023-12-27
Attending: PSYCHIATRY & NEUROLOGY
Payer: COMMERCIAL

## 2023-12-27 VITALS
WEIGHT: 222.2 LBS | BODY MASS INDEX: 30.14 KG/M2 | SYSTOLIC BLOOD PRESSURE: 125 MMHG | DIASTOLIC BLOOD PRESSURE: 82 MMHG | HEART RATE: 78 BPM

## 2023-12-27 DIAGNOSIS — R45.851 SUICIDAL IDEATION: ICD-10-CM

## 2023-12-27 DIAGNOSIS — E11.9 TYPE 2 DIABETES MELLITUS WITHOUT COMPLICATION, WITHOUT LONG-TERM CURRENT USE OF INSULIN (H): ICD-10-CM

## 2023-12-27 DIAGNOSIS — F29 PSYCHOSIS, UNSPECIFIED PSYCHOSIS TYPE (H): Primary | ICD-10-CM

## 2023-12-27 DIAGNOSIS — R44.3 HALLUCINATIONS: ICD-10-CM

## 2023-12-27 DIAGNOSIS — F32.A DEPRESSION, UNSPECIFIED DEPRESSION TYPE: ICD-10-CM

## 2023-12-27 DIAGNOSIS — F33.1 MODERATE EPISODE OF RECURRENT MAJOR DEPRESSIVE DISORDER (H): ICD-10-CM

## 2023-12-27 DIAGNOSIS — F10.20 ALCOHOL USE DISORDER, SEVERE, DEPENDENCE (H): ICD-10-CM

## 2023-12-27 PROCEDURE — 1002N00001 HC LODGING PLUS FACILITY CHARGE ADULT

## 2023-12-27 PROCEDURE — H2035 A/D TX PROGRAM, PER HOUR: HCPCS | Mod: HQ

## 2023-12-27 PROCEDURE — 90792 PSYCH DIAG EVAL W/MED SRVCS: CPT | Performed by: STUDENT IN AN ORGANIZED HEALTH CARE EDUCATION/TRAINING PROGRAM

## 2023-12-27 RX ORDER — OLANZAPINE 10 MG/1
10 TABLET ORAL AT BEDTIME
Qty: 30 TABLET | Refills: 0 | Status: SHIPPED | OUTPATIENT
Start: 2023-12-27 | End: 2024-01-03

## 2023-12-27 ASSESSMENT — PATIENT HEALTH QUESTIONNAIRE - PHQ9
SUM OF ALL RESPONSES TO PHQ QUESTIONS 1-9: 23
SUM OF ALL RESPONSES TO PHQ QUESTIONS 1-9: 23
10. IF YOU CHECKED OFF ANY PROBLEMS, HOW DIFFICULT HAVE THESE PROBLEMS MADE IT FOR YOU TO DO YOUR WORK, TAKE CARE OF THINGS AT HOME, OR GET ALONG WITH OTHER PEOPLE: VERY DIFFICULT

## 2023-12-27 ASSESSMENT — PAIN SCALES - GENERAL: PAINLEVEL: NO PAIN (0)

## 2023-12-27 NOTE — PROGRESS NOTES
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 home, or get along with other people?: Very difficult  PHQ9 TOTAL SCORE: 23

## 2023-12-27 NOTE — PROGRESS NOTES
Name: Ravi Ahmadi  Date: 12/27/2023  Medical Record: [id number]    Envelope Number: [id number]  List of Contents (List each item separately in new row):   Olanzapine 7.5 mg tabs.   Admission:  I am responsible for any personal items that are not sent to the safe or pharmacy.  [place] is not responsible for loss, theft or damage of any property in my possession.    Patient Signature:  ___________________________________________       Date/Time:__________________________    Staff Signature: __________________________________       Date/Time:__________________________    [facility] Staff person, if patient is unable/unwilling to sign:      __________________________________________________________       Date/Time: __________________________    **All medications are packaged by [facility] staff and securely stored on [facility]ging [facility]. Medications left by patients at discharge will be packaged by [facility] staff and transported by [facility] staff to inpatient pharmacy for storage.**    Discharge:  [place] has returned all of my personal belongings:    Patient Signature: ________________________________________     Date/Time: ____________________________________    Staff Signature: ______________________________________     Date/Time:_____________________________________

## 2023-12-27 NOTE — NURSING NOTE
Chief Complaint   Patient presents with    Eval/Assessment     - Khurram Almeida, Visit Facilitator

## 2023-12-27 NOTE — GROUP NOTE
Group Therapy Documentation    PATIENT'S NAME: Ravi Ahmadi  MRN:   [id number]  :   1990  ACCT. NUMBER: [id number]  DATE OF SERVICE: 23  START TIME: 12:30 PM  END TIME:  2:30 PM  FACILITATOR(S): Kathleen Ribeiro LADC  TOPIC: BEH Group Therapy  Number of patients attending the group:  6    Group Length:  2 Hours    Group Therapy Type: Recovery strategies, Emotion processing, and Daily living/independence skills    Summary of Group / Topics Discussed:    Recovery Principles, Sober coping skills, Relationship/socialization, and Relapse prevention      Group Attendance:  Attended group session    Patient's response to the group topic/interactions:  cooperative with task    Patient appeared to be Actively participating, Attentive, and Engaged.        Client specific details:  Patient attended group session and was attentive and participative.

## 2023-12-27 NOTE — GROUP NOTE
Psychoeducation Group Documentation    PATIENT'S NAME: Ravi Ahmadi  MRN:   [id number]  :   1990  ACCT. NUMBER: [id number]  DATE OF SERVICE: 23  START TIME:  8:30 AM  END TIME:  9:30 AM  FACILITATOR(S): Reza Cochran LADC; Max Romero MD; Mary Jeffers LADC  TOPIC: BEH Pyschoeducation  Number of patients attending the group:  27  Group Length:  1 Hours    Skills Group Therapy Type: Recovery skills and Emotion regulation skills    Summary of Group / Topics Discussed:    Relationship/social skills and Balanced lifestyle skills        Group Attendance:  Attended group session    Patient's response to the group topic/interactions:  listened actively    Patient appeared to be Attentive.         Client specific details:  Goran gave appropriate feedback. .

## 2023-12-27 NOTE — GROUP NOTE
Group Therapy Documentation    PATIENT'S NAME: Ravi Ahmadi  MRN:   [id number]  :   1990  ACCT. NUMBER: [id number]  DATE OF SERVICE: 23  START TIME:  9:45 AM  END TIME: 11:15 AM  FACILITATOR(S): Samantha Ashley [facility]; [facility] Rod[facility]  TOPIC: BEH Group Therapy  Number of patients attending the group:  6  Group Length:  2 Hours    Group Therapy Type: Recovery strategies, Emotion processing, and Daily living/independence skills    Summary of Group / Topics Discussed:    Sober coping skills, Trauma informed care, and Emotions/expression    Group began with check-ins, followed by discussion lead by mental health staff. Topics of group discussion included Trauma, ACES, and Mindfulness. Feedback was provided by participants throughout group session.      Group Attendance:  Attended group session    Patient's response to the group topic/interactions:  cooperative with task    Patient appeared to be Actively participating, Attentive, and Engaged.        Client specific details:  Patient attended a psychiatry appointment, and was excused from th first hour of group session. Patient actively engaged in group discussion.

## 2023-12-28 ENCOUNTER — HOSPITAL ENCOUNTER (OUTPATIENT)
Dept: BEHAVIORAL HEALTH | Facility: CLINIC | Age: 33
Discharge: HOME | End: 2023-12-28
Attending: FAMILY MEDICINE
Payer: COMMERCIAL

## 2023-12-28 PROCEDURE — H2035 A/D TX PROGRAM, PER HOUR: HCPCS | Mod: HQ

## 2023-12-28 PROCEDURE — 1002N00001 HC LODGING PLUS FACILITY CHARGE ADULT

## 2023-12-28 NOTE — GROUP NOTE
Group Therapy Documentation    PATIENT'S NAME: Ravi Ahmadi  MRN:   [id number]  :   1990  ACCT. NUMBER: [id number]  DATE OF SERVICE: 23  START TIME:  3:00 PM  END TIME:  4:00 PM  FACILITATOR(S): Felipe Hutchins LADC; Ricardo Rodney LADC; Vi Santana LADC  TOPIC: BEH Group Therapy  Number of patients attending the group:  5  Group Length:  1 Hours    Group Therapy Type: Health and wellbeing     Summary of Group / Topics Discussed:    Mindfulness/Relaxation      Group Attendance:  Attended group session    Patient's response to the group topic/interactions:  cooperative with task    Patient appeared to be Actively participating.        Client specific details:  Ravi participated and interacted appropriately with peers and staff in skills group. No triggers to use noted or discussed.

## 2023-12-28 NOTE — GROUP NOTE
Group Therapy Documentation    PATIENT'S NAME: Ravi Ahmadi  MRN:   [id number]  :   1990  ACCT. NUMBER: [id number]  DATE OF SERVICE: 23  START TIME: 12:30 PM  END TIME:  2:30 PM  FACILITATOR(S): Felipe Hutchins LADC  TOPIC: BEH Group Therapy  Number of patients attending the group:  5  Group Length:  2 Hours    Group Therapy Type: Recovery strategies    Summary of Group / Topics Discussed:    Recovery Principles      Group Attendance:  Attended group session    Patient's response to the group topic/interactions:  cooperative with task    Patient appeared to be Actively participating.        Client specific details:  Ravi participated and interacted appropriately with peers and staff in PM group. No triggers to use noted or discussed.

## 2023-12-28 NOTE — PROGRESS NOTES
"[facility] Weekly Treatment Plan Review    Treatment plan review for the following date span: 12/17/2023 To 12/25/2023    Weekly Treatment Plan Review     Treatment Plan initiated on: 12/13/2023    Dimension1: Acute Intoxication/Withdrawal Potential -   Date of Last Use 12/04/2023  Any reports of withdrawal symptoms - No      Dimension 2: Biomedical Conditions & Complications -   Medical Concerns: Pt reported having \"Hallucinations\", will be addressed by primary counselors and RN's if requested.   Vitals:   BP Readings from Last 3 Encounters:   12/27/23 125/82   12/12/23 111/73   12/07/23 123/77     Pulse Readings from Last 3 Encounters:   12/27/23 78   12/12/23 85   12/07/23 96     Wt Readings from Last 3 Encounters:   12/27/23 100.8 kg (222 lb 3.2 oz)   12/07/23 106.1 kg (234 lb)   12/07/23 106.6 kg (235 lb)     Temp Readings from Last 3 Encounters:   12/12/23 97.8  F (36.6  C) (Oral)   12/07/23 97.8  F (36.6  C) (Oral)   10/26/23 98.3  F (36.8  C) (Oral)      Current Medications & Medication Changes:  Current Outpatient Medications   Medication    acamprosate (CAMPRAL) 333 MG EC tablet    acetaminophen (TYLENOL) 325 MG tablet    Alcohol Swabs PADS    alum & mag hydroxide-simethicone (MAALOX) 200-200-20 MG/5ML SUSP suspension    ARIPiprazole (ABILIFY) 15 MG tablet    benzocaine-menthol (CEPACOL) 15-3.6 MG lozenge    blood glucose (NO BRAND SPECIFIED) lancets standard    blood glucose (NO BRAND SPECIFIED) test strip    blood glucose calibration (NO BRAND SPECIFIED) solution    blood glucose monitoring (NO BRAND SPECIFIED) meter device kit    budesonide-formoterol (SYMBICORT) 160-4.5 MCG/ACT Inhaler    fluticasone-vilanterol (BREO ELLIPTA) 100-25 MCG/ACT inhaler    folic acid (FOLVITE) 1 MG tablet    glucose (BD GLUCOSE) 4 g chewable tablet    glucose (BD GLUCOSE) 4 g chewable tablet    guaiFENesin-dextromethorphan (ROBITUSSIN DM) 100-10 MG/5ML syrup    ibuprofen (ADVIL/MOTRIN) 200 MG tablet    insulin " "glargine (LANTUS PEN) 100 UNIT/ML pen    insulin pen needle (32G X 4 MM) 32G X 4 MM miscellaneous    lisinopril (ZESTRIL) 10 MG tablet    loratadine (CLARITIN) 10 MG tablet    melatonin 3 MG tablet    multivitamin w/minerals (THERA-VIT-M) tablet    naltrexone (DEPADE/REVIA) 50 MG tablet    nicotine (NICODERM CQ) 14 MG/24HR 24 hr patch    OLANZapine (ZYPREXA) 10 MG tablet    pantoprazole (PROTONIX) 40 MG EC tablet    senna-docusate (SENOKOT-S/PERICOLACE) 8.6-50 MG tablet    sertraline (ZOLOFT) 50 MG tablet    Sharps Container MISC    thiamine (B-1) 100 MG tablet    traZODone (DESYREL) 50 MG tablet    Vitamin D3 (CHOLECALCIFEROL) 25 mcg (1000 units) tablet     No current facility-administered medications for this encounter.     Facility-Administered Medications Ordered in Other Encounters   Medication    Self Administer Medications: Behavioral Services     Taking meds as prescribed? Yes  Medication side effects or concerns:None reported.  Outside medical appointments this week (list provider and reason for visit): Pt reported Psychiatry.       Dimension 3: Emotional/Behavioral Conditions & Complications -   Mental health diagnosis: Depression and Anxiety     Date of last SIB:  N/A  Date of  last SI:  N/A  Date of last HI: N/A  Behavioral Targets:Follow recommendations of medical provider. Report any alcohol or drug use to counselor and any increase in withdrawal symptoms to nurse. Stabilize and maintain mental health.   Current MH Assignments:     \" I will complete \"Understanding Addiction and Depression\" assignment and present in group.  \" I will complete \"Grief and Loss\" packet assignment and present in group.   \" I will complete \"Guilt and Shame\" packet assignment and present in group.     PHQ2:       12/28/2023    12:00 PM 6/26/2023    12:51 PM 1/7/2021    11:55 AM 2/19/2015    11:25 AM 2/5/2015     9:37 AM   PHQ-2 ( 1999 [employer])   Q1: Little interest or pleasure in doing things 3 2 0 1 0   Q2: Feeling down, " "depressed or hopeless 3 2 0 0 0   PHQ-2 Score 6 4 0 1 0   PHQ-2 Total Score (12-17 Years)- Positive if 3 or more points; Administer PHQ-A if positive   0     Q1: Little interest or pleasure in doing things  More than half the days Not at all     Q2: Feeling down, depressed or hopeless  More than half the days Not at all     PHQ-2 Score  4 0        GAD2:       12/27/2023     8:47 AM 12/28/2023    12:00 PM   BROOKE-2   Feeling nervous, anxious, or on edge 3 3   Not being able to stop or control worrying 3 2   BROOKE-2 Total Score 6 5       Additional Narrative: Current Mental Health symptoms include: Anxiety and Depression. Active interventions to stabilize mental health symptoms this week : Patient reports that his stress level has subsided since admitting to treatment. Patient has completed the above noted assignments and presented them in group. Patient reports no suicidal ideation at this time. Pt completed    \"Understanding Depression and Addiction\"  and \"Grief and Loss\"  assignments. Patient reports no change in his mood, but uses coping skills: grounding and meditation.     Dimension 4: Treatment Acceptance / Resistance -   JHON Diagnosis:  Alcohol Use Disorder   303.90 (F10.20) Severe    Commitment to tx process/Stage of change- Patient appears to be in the contemplative stage of change at this time.   JHON assignments - \"First Step\" and \"Drug Use History\"   Behavior plan -  None  Responsibility contract - None  Peer restrictions - None    Additional Narrative - Patient is still in the process of working towards increasing his internal motivation for change. Patient has attended groups, meetings and lectures on time and offers meaningful feedback to his peers regarding homework assignments and topics for group discussion. Patient reports that he is motivated by wanting to gain back the favor of his family and the desire to improve his health. Patient will be working on the above listed assignments in the coming weeks " "and will present them in group upon completion.       Dimension 5: Relapse / Continued Problem Potential -   Relapses this week - None  Urges to use - YES, List patient reports cravings, his level out of 10 was a 3 with 10 being the highest.   UA results - No results found for this or any previous visit (from the past 168 hour(s)).  Identified triggers - Patient reported due to \"stress\".   Coping skills identified - focusing on other things, meditation, and grounding technics. Patient is able to utilize these skills when needed.    Additional Narrative- Patient is working towards gaining awareness regarding his triggers, cues and early warning signs for relapse. Patient reports that his cravings this week rate a  3, 1-(low)-10-(high). Patient reports that he's managed potential cravings by focusing his mind on other things. Patient attended a \"Relationship Workshop\" this past weekend and completed all activities. Patient will be working on a number of assignments in the coming weeks and will present them all in group upon completion.    Dimension 6: Recovery Environment -   Family Involvement - Yes, pt reports his mom, dad, sister, cousin, and uncle.   Community support group attendance - Patient attended all 12 Step meetings required.    Recreational activities - Patient engage in \"Board games and Movies.     Additional Narrative - Patient have been working to built his sober support network by spending free time with same-gender peers. Patient will be working with counselors and support in the coming weeks to develop an aftercare treatment protocol.     Progress made on transition planning goals: Yes    Justification for Continued Treatment at this Level of Care:  Risk ratings indicate continued need for this level of care. Pt has been unable to maintain abstinence from alcohol and drugs while at the outpatient level of care, lacks long-term sober maintenance skills, lacks sober coping skills and has medical " issues which are exacerbated by substance abuse.     Treatment coordination activities this week:   Continue to work with his primary counselors.   Need for peer recovery support referral? No    Discharge Planning:  Target Discharge Date/Timeframe: 01/09/2024   Med Mgmt Provider/Appt: DANIELITO   Ind therapy Provider/Appt: TBD   Other referrals: TBD    Dimension Scale Review     Prior ratings: Dim1 - 1 DIM2 - 1 DIM3 - 2 DIM4 - 2 DIM5 - 4 DIM6 -3     Current ratings: Dim1 - 0 DIM2 - 1 DIM3 - 2 DIM4 - 2 DIM5 - 4 DIM6 -3       If client is 18 or older, has vulnerable adult status change? No    Are Treatment Plan goals/objectives effective? Yes  *If no, list changes to treatment plan:    Are the current goals meeting client's needs? Yes  *If no, list the changes to treatment plan.      *Client agrees with any changes to the treatment plan: Yes  *Client received copy of changes: N/A  *Client is aware of right to access a treatment plan review: Yes       EILEEN Finney on 12/28/2023 at 12:52 PM

## 2023-12-28 NOTE — GROUP NOTE
Group Therapy Documentation    PATIENT'S NAME: Ravi Ahmadi  MRN:   [id number]  :   1990  ACCT. NUMBER: [id number]  DATE OF SERVICE: 23  START TIME:  9:00 AM  END TIME: 11:00 AM  FACILITATOR(S): Ricardo Rodney LADC  TOPIC: BEH Group Therapy  Number of patients attending the group:  6  Group Length:  2 Hours    Group Therapy Type: Recovery strategies, Emotion processing, and Daily living/independence skills    Summary of Group / Topics Discussed:    Sober coping skills, Relationship/socialization, Disease of addiction, Emotions/expression, and Self-care activities    Group began with check-ins, followed by a reflection reading regarding perfectionism and self-criticism. Group then discussed the topics of emotional intelligence. Feedback was provided by participants throughout group session.       Group Attendance:  Attended group session    Patient's response to the group topic/interactions:  cooperative with task    Patient appeared to be Actively participating, Attentive, and Engaged.        Client specific details:  Patient actively engaged in roup  discussion.

## 2023-12-29 ENCOUNTER — HOSPITAL ENCOUNTER (OUTPATIENT)
Dept: BEHAVIORAL HEALTH | Facility: CLINIC | Age: 33
Discharge: HOME | End: 2023-12-29
Attending: FAMILY MEDICINE
Payer: COMMERCIAL

## 2023-12-29 LAB — SARS-COV-2 RNA RESP QL NAA+PROBE: NEGATIVE

## 2023-12-29 PROCEDURE — 1002N00001 HC LODGING PLUS FACILITY CHARGE ADULT

## 2023-12-29 PROCEDURE — H2035 A/D TX PROGRAM, PER HOUR: HCPCS | Mod: HQ

## 2023-12-29 PROCEDURE — 87635 SARS-COV-2 COVID-19 AMP PRB: CPT | Performed by: FAMILY MEDICINE

## 2023-12-29 NOTE — GROUP NOTE
Group Therapy Documentation    PATIENT'S NAME: Ravi Ahmadi  MRN:   [id number]  :   1990  ACCT. NUMBER: [id number]  DATE OF SERVICE: 23  START TIME: 12:30 PM  END TIME:  2:30 PM  FACILITATOR(S): Felipe Hutchins LADC; Kristal Michelle LADC; Leonora Perez LADC  TOPIC: BEH Group Therapy  Number of patients attending the group:  21  Group Length:  2 Hours    Group Therapy Type: Daily living/independence skills    Summary of Group / Topics Discussed:    Sober coping skills, Relationship/socialization, and Self-care activities    Group Attendance:  Attended group session    Patient's response to the group topic/interactions:  cooperative with task    Patient appeared to be Attentive and Engaged.        Client specific details: Pt watched a recovery-related film with peers and participated in subsequent discussion.

## 2023-12-29 NOTE — GROUP NOTE
Group Therapy Documentation    PATIENT'S NAME: Ravi Ahmadi  MRN:   [id number]  :   1990  ACCT. NUMBER: [id number]  DATE OF SERVICE: 23  START TIME:  9:00 AM  END TIME: 11:00 AM  FACILITATOR(S): Ricardo Rodney LADC  TOPIC: BEH Group Therapy  Number of patients attending the group:  5  Group Length:  2 Hours    Group Therapy Type: Recovery strategies, Emotion processing, and Daily living/independence skills    Summary of Group / Topics Discussed:    Sober coping skills, Relationship/socialization, and Emotions/expression    Group began with check-ins, followed by a refection reading regarding resilience. Group then discussed about how fear can be a barrier in recovery. Feedback was provided by participants throughout group session.       Group Attendance:  Attended group session    Patient's response to the group topic/interactions:  cooperative with task    Patient appeared to be Actively participating, Attentive, and Engaged.        Client specific details:  Patient actively engaged in group discussion.

## 2023-12-30 ENCOUNTER — HOSPITAL ENCOUNTER (OUTPATIENT)
Dept: BEHAVIORAL HEALTH | Facility: CLINIC | Age: 33
Discharge: HOME | End: 2023-12-30
Attending: FAMILY MEDICINE
Payer: COMMERCIAL

## 2023-12-30 PROCEDURE — 1002N00001 HC LODGING PLUS FACILITY CHARGE ADULT

## 2023-12-30 PROCEDURE — H2035 A/D TX PROGRAM, PER HOUR: HCPCS | Mod: HQ

## 2023-12-30 NOTE — GROUP NOTE
Group Therapy Documentation    PATIENT'S NAME: Ravi Ahmadi  MRN:   [id number]  :   1990  ACCT. NUMBER: [id number]  DATE OF SERVICE: 23  START TIME: 12:30 PM  END TIME:  2:30 PM  FACILITATOR(S): Kristal Mihcelle LADC; Tr Zurita LADC; Sofy Silva LADC  TOPIC: BEH Group Therapy  Number of patients attending the group:  25  Group Length:  2 Hours    Group Therapy Type: Recovery strategies    Summary of Group / Topics Discussed:    Relationship/socialization and Balanced lifestyle  Lecture on family relationships - relationship weekend.      Group Attendance:  Attended group session    Patient's response to the group topic/interactions:  cooperative with task    Patient appeared to be Engaged.        Client specific details:  Ravi was attentive and shared appropriate feedback.

## 2023-12-30 NOTE — GROUP NOTE
Group Therapy Documentation    PATIENT'S NAME: Ravi Ahmadi  MRN:   [id number]  :   1990  ACCT. NUMBER: [id number]  DATE OF SERVICE: 23  START TIME:  9:00 AM  END TIME: 11:00 AM  FACILITATOR(S): Sofy Silva LADC; Kristal Michelle LADC; Tr Zurita LADC  TOPIC: BEH Group Therapy  Number of patients attending the group:  23  Group Length:  2 Hours    Group Therapy Type: Recovery strategies    Summary of Group / Topics Discussed:    Relationship/socialization      Group Attendance:  Attended group session    Patient's response to the group topic/interactions:  cooperative with task    Patient appeared to be Attentive and Engaged.        Client specific details: Ravi was an active participant in weekend relationships workshop on setting and maintaining healthy boundaries.

## 2023-12-31 ENCOUNTER — HOSPITAL ENCOUNTER (OUTPATIENT)
Dept: BEHAVIORAL HEALTH | Facility: CLINIC | Age: 33
Discharge: HOME | End: 2023-12-31
Attending: FAMILY MEDICINE
Payer: COMMERCIAL

## 2023-12-31 LAB — SARS-COV-2 RNA RESP QL NAA+PROBE: NEGATIVE

## 2023-12-31 PROCEDURE — H2035 A/D TX PROGRAM, PER HOUR: HCPCS | Mod: HQ

## 2023-12-31 PROCEDURE — 87635 SARS-COV-2 COVID-19 AMP PRB: CPT | Performed by: FAMILY MEDICINE

## 2023-12-31 PROCEDURE — 1002N00001 HC LODGING PLUS FACILITY CHARGE ADULT

## 2023-12-31 NOTE — GROUP NOTE
Group Therapy Documentation    PATIENT'S NAME: Ravi Ahmadi  MRN:   [id number]  :   1990  ACCT. NUMBER: [id number]  DATE OF SERVICE: 23  START TIME:  8:45 AM  END TIME: 10:30 AM  FACILITATOR(S): Sofy Silva LADC; Teresa Mcdonnell RN; Tr Zurita LADC  TOPIC: BEH Group Therapy  Number of patients attending the group:  23  Group Length:  2 Hours    Group Therapy Type: Health and wellbeing     Summary of Group / Topics Discussed:    Hepatitis      Group Attendance:  Attended group session    Patient's response to the group topic/interactions:  cooperative with task    Patient appeared to be Attentive and Engaged.        Client specific details: Ravi was an active participant in RN lecture on Hepatitis A, B, and C.

## 2023-12-31 NOTE — GROUP NOTE
Psychoeducation Group Documentation    PATIENT'S NAME: Ravi Ahmadi  MRN:   [id number]  :   1990  ACCT. NUMBER: [id number]  DATE OF SERVICE: 23  START TIME: 12:30 PM  END TIME:  1:30 PM  FACILITATOR(S): Tr Zurita LADC; Sofy Silva LADC  TOPIC: BEH Pyschoeducation  Number of patients attending the group:  5  Group Length:  1 Hours    Skills Group Therapy Type: Recovery skills    Summary of Group / Topics Discussed:    Relationship/social skills        Group Attendance:  Attended group session    Patient's response to the group topic/interactions:  cooperative with task    Patient appeared to be Attentive and Engaged.         Client specific details:  The patient participated in the afternoon lecture on recovery barriers.

## 2024-01-01 ENCOUNTER — HOSPITAL ENCOUNTER (OUTPATIENT)
Dept: BEHAVIORAL HEALTH | Facility: CLINIC | Age: 34
Discharge: HOME | End: 2024-01-01
Attending: FAMILY MEDICINE
Payer: COMMERCIAL

## 2024-01-01 LAB — SARS-COV-2 RNA RESP QL NAA+PROBE: NEGATIVE

## 2024-01-01 PROCEDURE — 1002N00001 HC LODGING PLUS FACILITY CHARGE ADULT

## 2024-01-01 PROCEDURE — 87635 SARS-COV-2 COVID-19 AMP PRB: CPT | Performed by: FAMILY MEDICINE

## 2024-01-01 PROCEDURE — H2035 A/D TX PROGRAM, PER HOUR: HCPCS | Mod: HQ

## 2024-01-01 NOTE — GROUP NOTE
Group Therapy Documentation    PATIENT'S NAME: Ravi Ahmadi  MRN:   [id number]  :   1990  ACCT. NUMBER: [id number]  DATE OF SERVICE: 24  START TIME: 12:30 PM  END TIME:  2:30 PM  FACILITATOR(S): Ricardo Rodney LADC  TOPIC: BEH Group Therapy  Number of patients attending the group:  5  Group Length:  2 Hours    Group Therapy Type: Recovery strategies, Emotion processing, Daily living/independence skills, and Health and wellbeing     Summary of Group / Topics Discussed:    Sober coping skills, Balanced lifestyle, Disease of addiction, Emotions/expression, Relapse prevention, and Self-care activities    Group began with check-ins, followed by a reflection reading regarding change. Participants then discussed how New Years Resolutions could be helpful or harmful. Feedback was provided by participants throughout group session.       Group Attendance:  Attended group session    Patient's response to the group topic/interactions:  cooperative with task    Patient appeared to be Actively participating.        Client specific details:  Patient actively engaged in group discussion.

## 2024-01-01 NOTE — GROUP NOTE
Psychoeducation Group Documentation    PATIENT'S NAME: Ravi Ahmadi  MRN:   [id number]  :   1990  ACCT. NUMBER: [id number]  DATE OF SERVICE: 24  START TIME:  9:00 AM  END TIME: 11:00 AM  FACILITATOR(S): Sofy Silva LADC; Vi Santana LADC; Ricardo Rodney LADC  TOPIC: BEH Pyschoeducation  Number of patients attending the group: 4  Group Length:  2 Hours    Skills Group Therapy Type: Recovery skills and Healthy behaviors development    Summary of Group / Topics Discussed:      All patients attended Lecture on  Goals Setting  and participated in an. This activity was consistent with building skills of building a foundation, recovery strategies, and goals setting. Patient engaged in Q&A after the lecture was presented.        Group Attendance:  Attended group session    Patient's response to the group topic/interactions:  cooperative with task    Patient appeared to be Engaged.         Client specific details:  Ravi, participated in group lecture and goal setting activity.

## 2024-01-02 ENCOUNTER — HOSPITAL ENCOUNTER (OUTPATIENT)
Dept: BEHAVIORAL HEALTH | Facility: CLINIC | Age: 34
Discharge: HOME | End: 2024-01-02
Attending: FAMILY MEDICINE
Payer: COMMERCIAL

## 2024-01-02 DIAGNOSIS — F39 MOOD DISORDER (H): ICD-10-CM

## 2024-01-02 DIAGNOSIS — K29.70 GASTRITIS WITHOUT BLEEDING, UNSPECIFIED CHRONICITY, UNSPECIFIED GASTRITIS TYPE: ICD-10-CM

## 2024-01-02 DIAGNOSIS — E11.10 DIABETIC KETOACIDOSIS WITHOUT COMA ASSOCIATED WITH TYPE 2 DIABETES MELLITUS (H): Primary | ICD-10-CM

## 2024-01-02 DIAGNOSIS — Z79.4 DIABETES MELLITUS DUE TO UNDERLYING CONDITION WITH KETOACIDOSIS WITHOUT COMA, WITH LONG-TERM CURRENT USE OF INSULIN (H): ICD-10-CM

## 2024-01-02 DIAGNOSIS — F10.10 ALCOHOL ABUSE: ICD-10-CM

## 2024-01-02 DIAGNOSIS — E08.10 DIABETES MELLITUS DUE TO UNDERLYING CONDITION WITH KETOACIDOSIS WITHOUT COMA, WITH LONG-TERM CURRENT USE OF INSULIN (H): ICD-10-CM

## 2024-01-02 DIAGNOSIS — I10 BENIGN ESSENTIAL HYPERTENSION: ICD-10-CM

## 2024-01-02 DIAGNOSIS — E13.65 UNCONTROLLED OTHER SPECIFIED DIABETES MELLITUS WITH HYPERGLYCEMIA (H): ICD-10-CM

## 2024-01-02 PROCEDURE — 1002N00001 HC LODGING PLUS FACILITY CHARGE ADULT

## 2024-01-02 PROCEDURE — H2035 A/D TX PROGRAM, PER HOUR: HCPCS | Mod: HQ

## 2024-01-02 RX ORDER — LISINOPRIL 10 MG/1
10 TABLET ORAL DAILY
Qty: 30 TABLET | Refills: 0 | OUTPATIENT
Start: 2024-01-02

## 2024-01-02 RX ORDER — MULTIPLE VITAMINS W/ MINERALS TAB 9MG-400MCG
1 TAB ORAL DAILY
Qty: 30 TABLET | Refills: 0 | OUTPATIENT
Start: 2024-01-02

## 2024-01-02 RX ORDER — PANTOPRAZOLE SODIUM 40 MG/1
40 TABLET, DELAYED RELEASE ORAL
Qty: 30 TABLET | Refills: 0 | OUTPATIENT
Start: 2024-01-02

## 2024-01-02 RX ORDER — FOLIC ACID 1 MG/1
1 TABLET ORAL DAILY
Qty: 30 TABLET | Refills: 0 | OUTPATIENT
Start: 2024-01-02

## 2024-01-02 RX ORDER — VITAMIN B COMPLEX
25 TABLET ORAL DAILY
Qty: 30 TABLET | Refills: 0 | OUTPATIENT
Start: 2024-01-02

## 2024-01-02 RX ORDER — BUDESONIDE AND FORMOTEROL FUMARATE DIHYDRATE 160; 4.5 UG/1; UG/1
2 AEROSOL RESPIRATORY (INHALATION) 2 TIMES DAILY
Qty: 6 G | Refills: 0 | OUTPATIENT
Start: 2024-01-02

## 2024-01-02 RX ORDER — LANOLIN ALCOHOL/MO/W.PET/CERES
100 CREAM (GRAM) TOPICAL DAILY
Qty: 30 TABLET | Refills: 0 | OUTPATIENT
Start: 2024-01-02

## 2024-01-02 NOTE — PROGRESS NOTES
"[facility] Weekly Treatment Plan Review    Treatment plan review for the following date span:  12/26/2023 Through 01/01/2024    ATTENDANCE  Patient did have any absences during this time period (list absence dates and reason for absence). Psychiatry appoint.     Weekly Treatment Plan Review     Treatment Plan initiated on: 12/13/2023.    Dimension1: Acute Intoxication/Withdrawal Potential -   Date of Last Use 12/04/2023  Any reports of withdrawal symptoms - No      Dimension 2: Biomedical Conditions & Complications -   Medical Concerns: Patients reports conscious \"Hallucinations\", will continue to be addressed by primary counselors, psychiatrist and Rn's when  requested.    Vitals:   BP Readings from Last 3 Encounters:   12/27/23 125/82   12/12/23 111/73   12/07/23 123/77     Pulse Readings from Last 3 Encounters:   12/27/23 78   12/12/23 85   12/07/23 96     Wt Readings from Last 3 Encounters:   12/27/23 100.8 kg (222 lb 3.2 oz)   12/07/23 106.1 kg (234 lb)   12/07/23 106.6 kg (235 lb)     Temp Readings from Last 3 Encounters:   12/12/23 97.8  F (36.6  C) (Oral)   12/07/23 97.8  F (36.6  C) (Oral)   10/26/23 98.3  F (36.8  C) (Oral)      Current Medications & Medication Changes:  Current Outpatient Medications   Medication    acamprosate (CAMPRAL) 333 MG EC tablet    Alcohol Swabs PADS    ARIPiprazole (ABILIFY) 15 MG tablet    blood glucose (NO BRAND SPECIFIED) lancets standard    blood glucose (NO BRAND SPECIFIED) test strip    blood glucose calibration (NO BRAND SPECIFIED) solution    blood glucose monitoring (NO BRAND SPECIFIED) meter device kit    budesonide-formoterol (SYMBICORT) 160-4.5 MCG/ACT Inhaler    fluticasone-vilanterol (BREO ELLIPTA) 100-25 MCG/ACT inhaler    folic acid (FOLVITE) 1 MG tablet    glucose (BD GLUCOSE) 4 g chewable tablet    glucose (BD GLUCOSE) 4 g chewable tablet    guaiFENesin-dextromethorphan (ROBITUSSIN DM) 100-10 MG/5ML syrup    insulin glargine (LANTUS PEN) 100 UNIT/ML " "pen    insulin pen needle (32G X 4 MM) 32G X 4 MM miscellaneous    lisinopril (ZESTRIL) 10 MG tablet    multivitamin w/minerals (THERA-VIT-M) tablet    naltrexone (DEPADE/REVIA) 50 MG tablet    nicotine (NICODERM CQ) 14 MG/24HR 24 hr patch    OLANZapine (ZYPREXA) 10 MG tablet    pantoprazole (PROTONIX) 40 MG EC tablet    sertraline (ZOLOFT) 50 MG tablet    [employer]ar[employer] Container MISC    thiamine (B-1) 100 MG tablet    traZODone (DESYREL) 50 MG tablet    Vitamin D3 (CHOLECALCIFEROL) 25 mcg (1000 units) tablet     No current facility-administered medications for this encounter.     Facility-Administered Medications Ordered in Other Encounters   Medication    Self Administer Medications: Behavioral Services     Taking meds as prescribed? Yes  Medication side effects or concerns: Patient reported urinating frequently.   Outside medical appointments this week (list provider and reason for visit):  Phychiatric appointment.       Dimension 3: Emotional/Behavioral Conditions & Complications -   Mental health diagnosis: Depression and Anxiety     Date of last SIB:  N/A  Date of  last SI:   N/A  Date of last HI:  N/A  Behavioral Targets: Follow recommendations of medical provider. Report any alcohol or drug use to counselor and any increase in withdrawal symptoms to nurse. Stabilize and maintain mental health.   Current  Assignments:     \"Understanding Addiction and Depression\" assignment and present in group.  \" I will complete \"Grief and Loss\" packet assignment and present in group.   \" I will complete \"Guilt and Shame\" packet assignment and present in group.    PHQ2:       1/2/2024    10:00 AM 12/28/2023    12:00 PM 6/26/2023    12:51 PM 1/7/2021    11:55 AM 2/19/2015    11:25 AM 2/5/2015     9:37 AM   PHQ-2 ( 1999 [employer])   Q1: Little interest or pleasure in doing things 2 3 2 0 1 0   Q2: Feeling down, depressed or hopeless 2 3 2 0 0 0   PHQ-2 Score 4 6 4 0 1 0   PHQ-2 Total Score (12-17 Years)- Positive if 3 or more points; " "Administer PHQ-A if positive    0     Q1: Little interest or pleasure in doing things   More than half the days Not at all     Q2: Feeling down, depressed or hopeless   More than half the days Not at all     PHQ-2 Score   4 0        GAD2:       12/27/2023     8:47 AM 12/28/2023    12:00 PM 1/2/2024    10:00 AM   BROOKE-2   Feeling nervous, anxious, or on edge 3 3 3   Not being able to stop or control worrying 3 2 3   BROOKE-2 Total Score 6 5 6         Dimension 4: Treatment Acceptance / Resistance -   JHON Diagnosis: Alcohol Use Disorder  303.90 (F10.20) Severe    Commitment to tx process/Stage of change-   JHON assignments - Patient appears to be in the contemplative stage of change at this time.    JHON assignments -   \"First Step\"    \"Drug Use History\"      Additional Narrative - Patient is still in the process of working towards increasing his internal motivation for change. Patient has attended groups, meetings and lectures on time and offers meaningful feedback to his peers regarding homework assignments and topics for group discussion. Patient reports that he is motivated by wanting to gain back the favor of his family and the desire to improve his health. Patient will be working on the above listed assignments in the coming weeks and will present them in group upon completion.      Dimension 5: Relapse / Continued Problem Potential -   Relapses this week - None  Urges to use -  Patient reports 1/10.  UA results - See chart  Recent Results (from the past 168 hour(s))   Asymptomatic COVID-19 Virus (Coronavirus) by PCR Nose    Collection Time: 12/29/23  1:05 PM    Specimen: Nose; Swab   Result Value Ref Range    SARS CoV2 PCR Negative Negative   Asymptomatic COVID-19 Virus (Coronavirus) by PCR Nose    Collection Time: 12/31/23  7:35 AM    Specimen: Nose; Swab   Result Value Ref Range    SARS CoV2 PCR Negative Negative   Asymptomatic COVID-19 Virus (Coronavirus) by PCR Nose    Collection Time: 01/01/24  6:41 AM    " "Specimen: Nose; Swab   Result Value Ref Range    SARS CoV2 PCR Negative Negative     Identified triggers - None reported  Coping skills identified - grounding and breathing exercises. Patient is able to utilize these skills when needed.      Dimension 6: Recovery Environment -   Family Involvement - Yes, reported his mom, dad, sister, cousin, and his uncle.   Community support group attendance - Patient attended all 12 Step meetings required.    Recreational activities - [employer] and movies.     Progress made on transition planning goals: Patient reports his aftercare plan includes \"[place] and attending AA meetings.     Justification for Continued Treatment at this Level of Care: Risk ratings indicate continued need for this level of care. Pt has been unable to maintain abstinence from alcohol and drugs while at the outpatient level of care, lacks long-term sober maintenance skills, lacks sober coping skills and has medical issues which are   Treatment coordination activities this week: Continue working with his primary counselors for aftercare plans.    Need for peer recovery support referral? No    Discharge Planning:  Target Discharge Date/Timeframe: 01/09/2024   Med Mgmt Provider/Appt:  TBD   Ind therapy Provider/Appt:  TBD   Other referrals:  TBD    Dimension Scale Review     Prior ratings: Dim1 - 0 DIM2 - 1 DIM3 - 2 DIM4 - 2 DIM5 - 4 DIM6 -3     Current ratings: Dim1 - 0 DIM2 - 1 DIM3 - 2 DIM4 - 2 DIM5 - 4 DIM6 -3       If client is 18 or older, has vulnerable adult status change? N/A    Are Treatment Plan goals/objectives effective? Yes  *If no, list changes to treatment plan:    Are the current goals meeting client's needs? Yes  *If no, list the changes to treatment plan.      *Client agrees with any changes to the treatment plan: N/A  *Client received copy of changes: N/A  *Client is aware of right to access a treatment plan review: Yes       EILEEN Finney on 1/2/2024 at 10:53 AM   "

## 2024-01-03 ENCOUNTER — HOSPITAL ENCOUNTER (OUTPATIENT)
Dept: BEHAVIORAL HEALTH | Facility: CLINIC | Age: 34
Discharge: HOME | End: 2024-01-03
Attending: FAMILY MEDICINE
Payer: COMMERCIAL

## 2024-01-03 ENCOUNTER — OFFICE VISIT (OUTPATIENT)
Dept: PSYCHIATRY | Facility: CLINIC | Age: 34
End: 2024-01-03
Attending: STUDENT IN AN ORGANIZED HEALTH CARE EDUCATION/TRAINING PROGRAM
Payer: COMMERCIAL

## 2024-01-03 ENCOUNTER — TELEPHONE (OUTPATIENT)
Dept: PSYCHIATRY | Facility: CLINIC | Age: 34
End: 2024-01-03
Payer: COMMERCIAL

## 2024-01-03 VITALS
BODY MASS INDEX: 29.59 KG/M2 | SYSTOLIC BLOOD PRESSURE: 103 MMHG | DIASTOLIC BLOOD PRESSURE: 67 MMHG | HEART RATE: 68 BPM | WEIGHT: 218.2 LBS

## 2024-01-03 DIAGNOSIS — F10.20 ALCOHOL USE DISORDER, SEVERE, DEPENDENCE (H): ICD-10-CM

## 2024-01-03 DIAGNOSIS — F33.1 MODERATE EPISODE OF RECURRENT MAJOR DEPRESSIVE DISORDER (H): ICD-10-CM

## 2024-01-03 DIAGNOSIS — E11.9 TYPE 2 DIABETES MELLITUS WITHOUT COMPLICATION, WITHOUT LONG-TERM CURRENT USE OF INSULIN (H): ICD-10-CM

## 2024-01-03 DIAGNOSIS — F29 PSYCHOSIS, UNSPECIFIED PSYCHOSIS TYPE (H): Primary | ICD-10-CM

## 2024-01-03 DIAGNOSIS — F51.01 PRIMARY INSOMNIA: ICD-10-CM

## 2024-01-03 DIAGNOSIS — R44.3 HALLUCINATIONS: ICD-10-CM

## 2024-01-03 DIAGNOSIS — F32.A DEPRESSION, UNSPECIFIED DEPRESSION TYPE: ICD-10-CM

## 2024-01-03 DIAGNOSIS — R45.851 SUICIDAL IDEATION: ICD-10-CM

## 2024-01-03 DIAGNOSIS — F10.90 ALCOHOL USE DISORDER: ICD-10-CM

## 2024-01-03 DIAGNOSIS — F29 PSYCHOSIS, UNSPECIFIED PSYCHOSIS TYPE (H): ICD-10-CM

## 2024-01-03 DIAGNOSIS — F10.239 ALCOHOL DEPENDENCE WITH WITHDRAWAL WITH COMPLICATION (H): ICD-10-CM

## 2024-01-03 PROCEDURE — G0463 HOSPITAL OUTPT CLINIC VISIT: HCPCS | Performed by: STUDENT IN AN ORGANIZED HEALTH CARE EDUCATION/TRAINING PROGRAM

## 2024-01-03 PROCEDURE — H2035 A/D TX PROGRAM, PER HOUR: HCPCS

## 2024-01-03 PROCEDURE — H2035 A/D TX PROGRAM, PER HOUR: HCPCS | Mod: HQ

## 2024-01-03 PROCEDURE — 99214 OFFICE O/P EST MOD 30 MIN: CPT | Performed by: STUDENT IN AN ORGANIZED HEALTH CARE EDUCATION/TRAINING PROGRAM

## 2024-01-03 PROCEDURE — 1002N00001 HC LODGING PLUS FACILITY CHARGE ADULT

## 2024-01-03 RX ORDER — OLANZAPINE 5 MG/1
5 TABLET ORAL EVERY MORNING
Qty: 30 TABLET | Refills: 0 | Status: SHIPPED | OUTPATIENT
Start: 2024-01-03 | End: 2024-01-10

## 2024-01-03 RX ORDER — OLANZAPINE 5 MG/1
5 TABLET ORAL DAILY PRN
Qty: 30 TABLET | Refills: 0 | Status: SHIPPED | OUTPATIENT
Start: 2024-01-03 | End: 2024-01-10

## 2024-01-03 RX ORDER — ARIPIPRAZOLE 10 MG/1
10 TABLET ORAL AT BEDTIME
Qty: 30 TABLET | Refills: 0 | Status: SHIPPED | OUTPATIENT
Start: 2024-01-03 | End: 2024-01-10

## 2024-01-03 ASSESSMENT — PAIN SCALES - GENERAL: PAINLEVEL: NO PAIN (0)

## 2024-01-03 NOTE — TELEPHONE ENCOUNTER
Dr Webb, would you approve these refill for pt?  Thankyou.  Teresa Mcdonnell RN    [facility]  Nurse Liaison / [facility]  [address]  O:[phone]  F:[phone]  [place] [id number]

## 2024-01-03 NOTE — PROGRESS NOTES
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 Readings from Last 3 Encounters:   01/03/24 99 kg (218 lb 3.2 oz)   12/27/23 100.8 kg (222 lb 3.2 oz)   12/07/23 106.1 kg (234 lb)     BP Readings from Last 3 Encounters:   01/03/24 103/67   12/27/23 125/82   12/12/23 111/73

## 2024-01-03 NOTE — TELEPHONE ENCOUNTER
Vielka, Dr. Yovana ADAMS, whom you just saw this morning: he forgot to ask you for refills of all his medications, in preparation for his discharge on 1/9/24.  These can also be sent to [facility].  Thank you!
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
## 2024-01-03 NOTE — GROUP NOTE
Psychoeducation Group Documentation    PATIENT'S NAME: Ravi Ahmadi  MRN:   [id number]  :   1990  ACCT. NUMBER: [id number]  DATE OF SERVICE: 24  START TIME:  8:30 AM  END TIME:  9:30 AM  FACILITATOR(S): Mary Jeffers LADC; Kristal Michelle LADC; Ricardo Rodney LADC  TOPIC: BEH Pyschoeducation  Number of patients attending the group:  23  Group Length:  1 Hours    Skills Group Therapy Type: Psychoeducation- Addiction and the Liver    Summary of Group / Topics Discussed:    Facilitators lead group with Guest Speaker: Dr Law presented a 60 minute Psychoeducational lecture on the effects in the body from alcohol and drugs, specifically focusing on the Liver.   Patients were given factual information on substance use and the liver and how to heal the liver was reviewed as well. Patients were given an opportunity to ask questions and engage in discussion regarding this topic.         Group Attendance:  Excused from group session for medical appt.

## 2024-01-03 NOTE — GROUP NOTE
Group Therapy Documentation    PATIENT'S NAME: Ravi Ahmadi  MRN:   [id number]  :   1990  ACCT. NUMBER: [id number]  DATE OF SERVICE: 24  START TIME: 12:30 PM  END TIME:  2:30 PM  FACILITATOR(S): Felipe Hutchins LADC; Julia Foss  TOPIC: BEH Group Therapy  Number of patients attending the group:  5  Group Length:  2 Hours    Group Therapy Type: Health and wellbeing     Summary of Group / Topics Discussed:    Spiritual Care      Group Attendance:  Attended group session    Patient's response to the group topic/interactions:  cooperative with task    Patient appeared to be Actively participating.        Client specific details:  Goran participated and interacted appropriately with peers and staff in spiritual group. No triggers to use noted or discussed. Pt attended a one hour psychotherapy appointment during group.

## 2024-01-03 NOTE — GROUP NOTE
Group Therapy Documentation    PATIENT'S NAME: Ravi Ahmadi  MRN:   [id number]  :   1990  ACCT. NUMBER: [id number]  DATE OF SERVICE: 24  START TIME:  9:45 AM  END TIME: 11:15 AM  FACILITATOR(S): Samantha Ashley [facility]; [facility]ney [facility]  TOPIC: BEH Group Therapy  Number of patients attending the group:  5  Group Length:  2 Hours    Group Therapy Type: Recovery strategies, Emotion processing, Daily living/independence skills, and Health and wellbeing     Summary of Group / Topics Discussed:    Co-occurring illnesses symptom management and Emotions/expression    Group began with check-ins, followed by a discussion led by mental health staff. Participants provided feedback throughout group session.       Group Attendance:  Attended group session    Patient's response to the group topic/interactions:  cooperative with task    Patient appeared to be Actively participating, Attentive, and Engaged.        Client specific details:  Patient actively engaged in group discussion.

## 2024-01-03 NOTE — PROGRESS NOTES
Name: Ravi Ahmadi  Date: 1/3/2024  Medical Record: [id number]    Envelope Number: [id number]  List of Contents (List each item separately in new row):   Aripiprazole 30mg tabs    Admission:  I am responsible for any personal items that are not sent to the safe or pharmacy.  [facility] is not responsible for loss, theft or damage of any property in my possession.    Patient Signature:  ___________________________________________       Date/Time:__________________________    Staff Signature: __________________________________       Date/Time:__________________________    [facility] Staff person, if patient is unable/unwilling to sign:      __________________________________________________________       Date/Time: __________________________    **All medications are packaged by [facility] staff and securely stored on [facility]. Medications left by patients at discharge will be packaged by [facility] staff and transported by [facility] staff to inpatient pharmacy for storage.**  Discharge:  [facility] has returned all of my personal belongings:    Patient Signature: ________________________________________     Date/Time: ____________________________________    Staff Signature: ______________________________________     Date/Time:_____________________________________

## 2024-01-03 NOTE — PROGRESS NOTES
Name: Ravi Ahmadi  Date: 1/3/2024  Medical Record: [id number]  Envelope Number: [id number]  List of Contents (List each item separately in new row):     Ventolin HFA 90 mcg, Breo Ellipta 100-25 mcg (Repackaged from Env.# [id number])    Admission:  I am responsible for any personal items that are not sent to the safe or pharmacy.  [facility] is not responsible for loss, theft or damage of any property in my possession.    Patient Signature:  ___________________________________________       Date/Time:__________________________    Staff Signature: __________________________________       Date/Time:__________________________    [facility] Staff person, if patient is unable/unwilling to sign:    __________________________________________________________       Date/Time: __________________________    **All medications are packaged by [facility] staff and securely stored on [facility]. Medications left by patients at discharge will be packaged by [facility] staff and transported by [facility] staff to inpatient pharmacy for storage.**    Discharge:  [facility] has returned all of my personal belongings:    Patient Signature: ________________________________________     Date/Time: ____________________________________    Staff Signature: ______________________________________     Date/Time:_____________________________________

## 2024-01-03 NOTE — NURSING NOTE
Chief Complaint   Patient presents with    Recheck Medication     Depression, unspecified depression     Carolina Encinas on 1/3/2024 at 8:48 AM

## 2024-01-03 NOTE — PROGRESS NOTES
"[facility] Weekly Treatment Plan Review    Treatment plan review for the following date span:  12/26/2023 Through 01/01/2024    ATTENDANCE  Patient did have any absences during this time period (list absence dates and reason for absence). Psychiatry appoint.     Weekly Treatment Plan Review     Treatment Plan initiated on: 12/13/2023.    Dimension1: Acute Intoxication/Withdrawal Potential - 0  Date of Last Use 12/04/2023  Any reports of withdrawal symptoms - No      Dimension 2: Biomedical Conditions & Complications - 1  Medical Concerns: Patients reports conscious \"Hallucinations\", will continue to be addressed by primary counselors, psychiatrist and Rn's when  requested.    Vitals:   BP Readings from Last 3 Encounters:   12/27/23 125/82   12/12/23 111/73   12/07/23 123/77     Pulse Readings from Last 3 Encounters:   12/27/23 78   12/12/23 85   12/07/23 96     Wt Readings from Last 3 Encounters:   12/27/23 100.8 kg (222 lb 3.2 oz)   12/07/23 106.1 kg (234 lb)   12/07/23 106.6 kg (235 lb)     Temp Readings from Last 3 Encounters:   12/12/23 97.8  F (36.6  C) (Oral)   12/07/23 97.8  F (36.6  C) (Oral)   10/26/23 98.3  F (36.8  C) (Oral)      Current Medications & Medication Changes:  Current Outpatient Medications   Medication    acamprosate (CAMPRAL) 333 MG EC tablet    Alcohol Swabs PADS    ARIPiprazole (ABILIFY) 15 MG tablet    blood glucose (NO BRAND SPECIFIED) lancets standard    blood glucose (NO BRAND SPECIFIED) test strip    blood glucose (NO BRAND SPECIFIED) test strip    blood glucose calibration (NO BRAND SPECIFIED) solution    blood glucose monitoring (NO BRAND SPECIFIED) meter device kit    budesonide-formoterol (SYMBICORT) 160-4.5 MCG/ACT Inhaler    fluticasone-vilanterol (BREO ELLIPTA) 100-25 MCG/ACT inhaler    folic acid (FOLVITE) 1 MG tablet    glucose (BD GLUCOSE) 4 g chewable tablet    glucose (BD GLUCOSE) 4 g chewable tablet    guaiFENesin-dextromethorphan (ROBITUSSIN DM) 100-10 MG/5ML " "syrup    insulin glargine (LANTUS PEN) 100 UNIT/ML pen    insulin pen needle (32G X 4 MM) 32G X 4 MM miscellaneous    lisinopril (ZESTRIL) 10 MG tablet    multivitamin w/minerals (THERA-VIT-M) tablet    naltrexone (DEPADE/REVIA) 50 MG tablet    nicotine (NICODERM CQ) 14 MG/24HR 24 hr patch    OLANZapine (ZYPREXA) 10 MG tablet    pantoprazole (PROTONIX) 40 MG EC tablet    sertraline (ZOLOFT) 50 MG tablet    [employer] Container MISC    thiamine (B-1) 100 MG tablet    traZODone (DESYREL) 50 MG tablet    Vitamin D3 (CHOLECALCIFEROL) 25 mcg (1000 units) tablet     No current facility-administered medications for this encounter.     Facility-Administered Medications Ordered in Other Encounters   Medication    Self Administer Medications: Behavioral Services     Taking meds as prescribed? Yes  Medication side effects or concerns: Patient reported urinating frequently.   Outside medical appointments this week (list provider and reason for visit):  Phychiatric appointment.       Dimension 3: Emotional/Behavioral Conditions & Complications - 2  Mental health diagnosis: Depression and Anxiety     Date of last SIB:  N/A  Date of  last SI:   N/A  Date of last HI:  N/A  Behavioral Targets: Follow recommendations of medical provider. Report any alcohol or drug use to counselor and any increase in withdrawal symptoms to nurse. Stabilize and maintain mental health.   Current  Assignments:     \"Understanding Addiction and Depression\" assignment and present in group.  \" I will complete \"Grief and Loss\" packet assignment and present in group.   \" I will complete \"Guilt and Shame\" packet assignment and present in group.    PHQ2:       1/2/2024    10:00 AM 12/28/2023    12:00 PM 6/26/2023    12:51 PM 1/7/2021    11:55 AM 2/19/2015    11:25 AM 2/5/2015     9:37 AM   PHQ-2 ( 1999 [employer])   Q1: Little interest or pleasure in doing things 2 3 2 0 1 0   Q2: Feeling down, depressed or hopeless 2 3 2 0 0 0   PHQ-2 Score 4 6 4 0 1 0   PHQ-2 Total " "Score (12-17 Years)- Positive if 3 or more points; Administer PHQ-A if positive    0     Q1: Little interest or pleasure in doing things   More than half the days Not at all     Q2: Feeling down, depressed or hopeless   More than half the days Not at all     PHQ-2 Score   4 0        GAD2:       12/27/2023     8:47 AM 12/28/2023    12:00 PM 1/2/2024    10:00 AM   BROOKE-2   Feeling nervous, anxious, or on edge 3 3 3   Not being able to stop or control worrying 3 2 3   BROOKE-2 Total Score 6 5 6         Dimension 4: Treatment Acceptance / Resistance - 2  JHON Diagnosis: Alcohol Use Disorder  303.90 (F10.20) Severe    Commitment to tx process/Stage of change-   JHON assignments - Patient appears to be in the contemplative stage of change at this time.    JHON assignments -   \"First Step\"    \"Drug Use History\"      Additional Narrative - Patient is still in the process of working towards increasing his internal motivation for change. Patient has attended groups, meetings and lectures on time and offers meaningful feedback to his peers regarding homework assignments and topics for group discussion. Patient reports that he is motivated by wanting to gain back the favor of his family and the desire to improve his health. Patient will be working on the above listed assignments in the coming weeks and will present them in group upon completion.      Dimension 5: Relapse / Continued Problem Potential - 4  Relapses this week - None  Urges to use -  Patient reports 1/10.  UA results - See chart  Recent Results (from the past 168 hour(s))   Asymptomatic COVID-19 Virus (Coronavirus) by PCR Nose    Collection Time: 12/29/23  1:05 PM    Specimen: Nose; Swab   Result Value Ref Range    SARS CoV2 PCR Negative Negative   Asymptomatic COVID-19 Virus (Coronavirus) by PCR Nose    Collection Time: 12/31/23  7:35 AM    Specimen: Nose; Swab   Result Value Ref Range    SARS CoV2 PCR Negative Negative   Asymptomatic COVID-19 Virus (Coronavirus) by PCR " "Nose    Collection Time: 01/01/24  6:41 AM    Specimen: Nose; Swab   Result Value Ref Range    SARS CoV2 PCR Negative Negative     Identified triggers - None reported  Coping skills identified - grounding and breathing exercises. Patient is able to utilize these skills when needed.      Dimension 6: Recovery Environment - 3  Family Involvement - Yes, reported his mom, dad, sister, cousin, and his uncle.   Community support group attendance - Patient attended all 12 Step meetings required.    Recreational activities - [employer] and movies.     Progress made on transition planning goals: Patient reports his aftercare plan includes \"[facility] and attending AA meetings.     Justification for Continued Treatment at this Level of Care: Risk ratings indicate continued need for this level of care. Pt has been unable to maintain abstinence from alcohol and drugs while at the outpatient level of care, lacks long-term sober maintenance skills, lacks sober coping skills and has medical issues which are   Treatment coordination activities this week: Continue working with his primary counselors for aftercare plans.    Need for peer recovery support referral? No    Discharge Planning:  Target Discharge Date/Timeframe: 01/09/2024   Med Mgmt Provider/Appt:  DANIELITO   Ind therapy Provider/Appt:  TBD   Other referrals:  TBD    Dimension Scale Review     Prior ratings: Dim1 - 0 DIM2 - 1 DIM3 - 2 DIM4 - 2 DIM5 - 4 DIM6 -3     Current ratings: Dim1 - 0 DIM2 - 1 DIM3 - 2 DIM4 - 2 DIM5 - 4 DIM6 -3       If client is 18 or older, has vulnerable adult status change? N/A    Are Treatment Plan goals/objectives effective? Yes  *If no, list changes to treatment plan:    Are the current goals meeting client's needs? Yes  *If no, list the changes to treatment plan.      *Client agrees with any changes to the treatment plan: N/A  *Client received copy of changes: N/A  *Client is aware of right to access a treatment plan review: Yes       Vi " EILEEN Santana on 1/2/2024 at 10:53 AM

## 2024-01-04 ENCOUNTER — HOSPITAL ENCOUNTER (OUTPATIENT)
Dept: BEHAVIORAL HEALTH | Facility: CLINIC | Age: 34
Discharge: HOME | End: 2024-01-04
Attending: FAMILY MEDICINE
Payer: COMMERCIAL

## 2024-01-04 PROCEDURE — 1002N00001 HC LODGING PLUS FACILITY CHARGE ADULT

## 2024-01-04 PROCEDURE — H2035 A/D TX PROGRAM, PER HOUR: HCPCS | Mod: HQ

## 2024-01-04 RX ORDER — TRAZODONE HYDROCHLORIDE 50 MG/1
50 TABLET, FILM COATED ORAL
Qty: 30 TABLET | Refills: 0 | Status: SHIPPED | OUTPATIENT
Start: 2024-01-04 | End: 2024-02-21

## 2024-01-04 RX ORDER — ARIPIPRAZOLE 10 MG/1
10 TABLET ORAL AT BEDTIME
Qty: 30 TABLET | Refills: 0 | OUTPATIENT
Start: 2024-01-04

## 2024-01-04 RX ORDER — ACAMPROSATE CALCIUM 333 MG/1
666 TABLET, DELAYED RELEASE ORAL 3 TIMES DAILY
Qty: 180 TABLET | Refills: 0 | Status: SHIPPED | OUTPATIENT
Start: 2024-01-04 | End: 2024-02-21

## 2024-01-04 RX ORDER — NALTREXONE HYDROCHLORIDE 50 MG/1
50 TABLET, FILM COATED ORAL DAILY
Qty: 30 TABLET | Refills: 0 | Status: SHIPPED | OUTPATIENT
Start: 2024-01-04 | End: 2024-02-21

## 2024-01-04 RX ORDER — OLANZAPINE 10 MG/1
10 TABLET ORAL AT BEDTIME
Qty: 30 TABLET | Refills: 0 | Status: SHIPPED | OUTPATIENT
Start: 2024-01-04 | End: 2024-01-10

## 2024-01-04 NOTE — GROUP NOTE
Psychoeducation Group Documentation    PATIENT'S NAME: Ravi Ahmadi  MRN:   [id number]  :   1990  ACCT. NUMBER: [id number]  DATE OF SERVICE: 24  START TIME:  3:00 PM  END TIME:  4:00 PM  FACILITATOR(S): Vi Santana LADC; Felipe Hutchins LADC  TOPIC: BEH Pyschoeducation  Number of patients attending the group: 6  Group Length:  1 Hours    Skills Group Therapy Type: Gambling and Q&A    Summary of Group / Topics Discussed:    Patient attended skills group presentation on  Gambling when in Recovery  presented by EILEEN Rodriguez, [facility]. Patient engaged in Q&A after presentation. Each patient had an opportunity to process the information and ask questions of the presenter and receive informational packets.         Group Attendance:  Attended group session    Patient's response to the group topic/interactions:  cooperative with task    Patient appeared to be Attentive.         Client specific details:  Naren, participated in Q&A and activity during skills group.

## 2024-01-04 NOTE — GROUP NOTE
Group Therapy Documentation    PATIENT'S NAME: Ravi Ahmadi  MRN:   [id number]  :   1990  ACCT. NUMBER: [id number]  DATE OF SERVICE: 24  START TIME: 12:30 PM  END TIME:  2:30 PM  FACILITATOR(S): Felipe Hutchins LADC  TOPIC: BEH Group Therapy  Number of patients attending the group:  6  Group Length:  2 Hours    Group Therapy Type: Recovery strategies    Summary of Group / Topics Discussed:    Sober coping skills      Group Attendance:  Attended group session    Patient's response to the group topic/interactions:  cooperative with task    Patient appeared to be Actively participating.        Client specific details:  Goran participated and interacted appropriately with peers and staff in PM group. No triggers to use noted or discussed.

## 2024-01-04 NOTE — TELEPHONE ENCOUNTER
Sent refills for medications managed by our clinic. Some may be too early to fill, will send to patient's preferred pharmacy at appointment scheduled next week (after LP discharge).    Geronimo Webb MD

## 2024-01-04 NOTE — CONFIDENTIAL NOTE
Writer sent Epic Chat notification to Dr. Webb indicating there are pended prescriptions for review / sig.  He will review the rxs this afternoon.

## 2024-01-04 NOTE — GROUP NOTE
"Group Therapy Documentation    PATIENT'S NAME: Ravi Ahmadi  MRN:   [id number]  :   1990  ACCT. NUMBER: [id number]  DATE OF SERVICE: 24  START TIME:  9:00 AM  END TIME: 11:00 AM  FACILITATOR(S): Vi Santana LADC; Felipe Hutchins LADC  TOPIC: BEH Group Therapy  Number of patients attending the group:  6  Group Length:  2 Hours    Group Therapy Type: Recovery strategies, Emotion processing, and Health and wellbeing     Summary of Group / Topics Discussed:    Patients checked in to primary group by describing current emotions, stating an affirmation and a gratitude. All patients processed current challenges they are experiencing.    Facilitator led group discussion on healthy emotional expression and relapse prevention planning. Patients gained knowledge on various resources available to support their recovery goals and learned how utilizing a \"step down\" model of treatment and sober living can greatly improve their success at long term abstinence from problematic substances.     Patients discussed needs and resources that will benefit their recovery journey. Each patient had an opportunity to process and provide constructive feedback to peers who shared   Pt's also, expressed their thought process, and spiritual well-being. Patient stated  SPAM  -something positive about me. Each patient had the opportunity to express their fears of long-term recovery. Pt's supported their peer who present his assignment on \"25 Sober Coping Skills\" and gave positive feedback.       Group Attendance:  Attended group session    Patient's response to the group topic/interactions:  cooperative with task, discussed personal experience with topic, and patient presented his assignment.     Patient appeared to be Engaged.        Client specific details:  Ravi, presented his assignment on \"25 Coping Skills\"He participated in group discussion and gave positive feedback to the group.    "

## 2024-01-05 ENCOUNTER — HOSPITAL ENCOUNTER (OUTPATIENT)
Dept: BEHAVIORAL HEALTH | Facility: CLINIC | Age: 34
Discharge: HOME | End: 2024-01-05
Attending: FAMILY MEDICINE
Payer: COMMERCIAL

## 2024-01-05 PROCEDURE — H2035 A/D TX PROGRAM, PER HOUR: HCPCS | Mod: HQ

## 2024-01-05 PROCEDURE — 1002N00001 HC LODGING PLUS FACILITY CHARGE ADULT

## 2024-01-05 RX ORDER — PANTOPRAZOLE SODIUM 40 MG/1
40 TABLET, DELAYED RELEASE ORAL
Qty: 30 TABLET | Refills: 0 | Status: SHIPPED | OUTPATIENT
Start: 2024-01-05 | End: 2024-08-27

## 2024-01-05 RX ORDER — MULTIPLE VITAMINS W/ MINERALS TAB 9MG-400MCG
1 TAB ORAL DAILY
Qty: 30 TABLET | Refills: 0 | Status: SHIPPED | OUTPATIENT
Start: 2024-01-05 | End: 2024-02-21

## 2024-01-05 RX ORDER — LISINOPRIL 10 MG/1
10 TABLET ORAL DAILY
Qty: 30 TABLET | Refills: 0 | Status: SHIPPED | OUTPATIENT
Start: 2024-01-05

## 2024-01-05 RX ORDER — FOLIC ACID 1 MG/1
1 TABLET ORAL DAILY
Qty: 30 TABLET | Refills: 0 | Status: SHIPPED | OUTPATIENT
Start: 2024-01-05 | End: 2024-02-21

## 2024-01-05 RX ORDER — LANOLIN ALCOHOL/MO/W.PET/CERES
100 CREAM (GRAM) TOPICAL DAILY
Qty: 30 TABLET | Refills: 0 | Status: SHIPPED | OUTPATIENT
Start: 2024-01-05 | End: 2024-02-21

## 2024-01-05 RX ORDER — VITAMIN B COMPLEX
25 TABLET ORAL DAILY
Qty: 30 TABLET | Refills: 0 | Status: SHIPPED | OUTPATIENT
Start: 2024-01-05

## 2024-01-05 NOTE — GROUP NOTE
Group Therapy Documentation    PATIENT'S NAME: Ravi Ahmadi  MRN:   [id number]  :   1990  ACCT. NUMBER: [id number]  DATE OF SERVICE: 24  START TIME: 12:30 PM  END TIME:  2:30 PM  FACILITATOR(S): Felipe Hutchins LADC  TOPIC: BEH Group Therapy  Number of patients attending the group:  6  Group Length:  2 Hours    Group Therapy Type: Health and wellbeing     Summary of Group / Topics Discussed:    Leisure explorations/use of leisure time      Group Attendance:  Attended group session    Patient's response to the group topic/interactions:  cooperative with task    Patient appeared to be Actively participating.        Client specific details:  Goran participated and interacted appropriately with peers and staff in PM group. No triggers to use noted or discussed.

## 2024-01-05 NOTE — GROUP NOTE
"Group Therapy Documentation    PATIENT'S NAME: Ravi Ahmadi  MRN:   [id number]  :   1990  ACCT. NUMBER: [id number]  DATE OF SERVICE: 24  START TIME:  9:00 AM  END TIME: 11:00 AM  FACILITATOR(S): Leonora Perez LADC  TOPIC: BEH Group Therapy  Number of patients attending the group:  6  Group Length:  2 Hours    Group Therapy Type: Recovery strategies and Emotion processing    Summary of Group / Topics Discussed:    Sober coping skills and Emotions/expression    Group Attendance:  Attended group session     Patient's response to the group topic/interactions:  cooperative with task     Patient appeared to be Actively participating, Attentive, and Engaged.         Client specific details: During check-in pt reported feeling peaceful, and shared that he has lied to  his family and friends.\" He participated in a group discussion about how to begin being vulnerable, the difference between \"fixing\" and offering help, and challenging thought distortions. He offered supportive feedback to a peer who requested the group's perspective on his tendency to engage in over-thinking.  "

## 2024-01-05 NOTE — TELEPHONE ENCOUNTER
Pt's PCP has not seen pt since 2021. Pt is set up to see him on 1/16 but will need refills prior. Can you bridge pt for one month?

## 2024-01-06 ENCOUNTER — HOSPITAL ENCOUNTER (OUTPATIENT)
Dept: BEHAVIORAL HEALTH | Facility: CLINIC | Age: 34
Discharge: HOME | End: 2024-01-06
Attending: FAMILY MEDICINE
Payer: COMMERCIAL

## 2024-01-06 PROCEDURE — 1002N00001 HC LODGING PLUS FACILITY CHARGE ADULT

## 2024-01-06 PROCEDURE — H2035 A/D TX PROGRAM, PER HOUR: HCPCS | Mod: HQ

## 2024-01-06 NOTE — GROUP NOTE
Group Therapy Documentation    PATIENT'S NAME: Ravi Ahmadi  MRN:   [id number]  :   1990  ACCT. NUMBER: [id number]  DATE OF SERVICE: 24  START TIME:  9:00 AM  END TIME: 11:00 AM  FACILITATOR(S): Ricardo Rodney LADC  TOPIC: BEH Group Therapy  Number of patients attending the group:  25  Group Length:  2 Hours    Group Therapy Type: Recovery strategies, Daily living/independence skills, and Health and wellbeing     Summary of Group / Topics Discussed:    Mindfulness/Relaxation, Disease of addiction, Relapse prevention, and Self-care activities    Group began with an alumni speaker who shared his experience with recovery, followed by a presentation on mindfulness skills led by a [facility] staff member.       Group Attendance:  Attended group session    Patient's response to the group topic/interactions:  cooperative with task    Patient appeared to be Actively participating, Attentive, and Engaged.        Client specific details:  Patient actively engaged in group discussion.

## 2024-01-06 NOTE — GROUP NOTE
Group Therapy Documentation    PATIENT'S NAME: Ravi Ahmadi  MRN:   [id number]  :   1990  ACCT. NUMBER: [id number]  DATE OF SERVICE: 24  START TIME: 12:30 PM  END TIME:  2:30 PM  FACILITATOR(S): Kathleen Ribeiro LADC; Ricardo Rodney LADC  TOPIC: BEH Group Therapy  Number of patients attending the group:  25  Group Length:  2 Hours    Group Therapy Type: Recovery strategies, Emotion processing, and Daily living/independence skills    Summary of Group / Topics Discussed:    Disease of addiction, Emotions/expression, and Relapse prevention    Group included a several activities regarding relapse prevention presented by counseling staff. Feedback was provided by participants throughout group session.       Group Attendance:  Attended group session    Patient's response to the group topic/interactions:  cooperative with task    Patient appeared to be Actively participating, Attentive, and Engaged.        Client specific details:  Patient actively engaged in group discussion.

## 2024-01-07 ENCOUNTER — HOSPITAL ENCOUNTER (OUTPATIENT)
Dept: BEHAVIORAL HEALTH | Facility: CLINIC | Age: 34
Discharge: HOME | End: 2024-01-07
Attending: FAMILY MEDICINE
Payer: COMMERCIAL

## 2024-01-07 PROCEDURE — H2035 A/D TX PROGRAM, PER HOUR: HCPCS | Mod: HQ

## 2024-01-07 PROCEDURE — 1002N00001 HC LODGING PLUS FACILITY CHARGE ADULT

## 2024-01-07 NOTE — GROUP NOTE
Group Therapy Documentation    PATIENT'S NAME: Ravi Ahmadi  MRN:   [id number]  :   1990  ACCT. NUMBER: [id number]  DATE OF SERVICE: 24  START TIME:  8:45 AM  END TIME: 10:30 AM  FACILITATOR(S): Frannie Sears RN; [facility]ney [facility]  TOPIC: BEH Group Therapy  Number of patients attending the group:  25  Group Length:  2 Hours    Group Therapy Type: Daily living/independence skills and Health and wellbeing     Summary of Group / Topics Discussed:    Balanced lifestyle and Self-care activities    Group lecture regarding healthy eating strategies was presented by nursing staff. Feedback was provided by participants throughout group session.       Group Attendance:  Attended group session    Patient's response to the group topic/interactions:  cooperative with task    Patient appeared to be Actively participating, Attentive, and Engaged.        Client specific details: Patient actively engaged in group discussion.

## 2024-01-07 NOTE — GROUP NOTE
Group Therapy Documentation    PATIENT'S NAME: Ravi Ahmadi  MRN:   [id number]  :   1990  ACCT. NUMBER: [id number]  DATE OF SERVICE: 24  START TIME: 12:30 PM  END TIME:  1:30 PM  FACILITATOR(S): Kathleen Ribeiro LADC  TOPIC: BEH Group Therapy  Number of patients attending the group:  25    Group Length:  1 Hours    Group Therapy Type: Emotion processing and Daily living/independence skills    Summary of Group / Topics Discussed:    Recovery Principles, Sober coping skills, and Relapse prevention      Group Attendance:  Attended group session    Patient's response to the group topic/interactions:  cooperative with task    Patient appeared to be Actively participating, Attentive, and Engaged.        Client specific details:  Patient attended group lecture and was attentive and participative.

## 2024-01-08 ENCOUNTER — TELEPHONE (OUTPATIENT)
Dept: PSYCHIATRY | Facility: CLINIC | Age: 34
End: 2024-01-08

## 2024-01-08 ENCOUNTER — HOSPITAL ENCOUNTER (OUTPATIENT)
Dept: BEHAVIORAL HEALTH | Facility: CLINIC | Age: 34
Discharge: HOME | End: 2024-01-08
Attending: FAMILY MEDICINE
Payer: COMMERCIAL

## 2024-01-08 PROCEDURE — H2035 A/D TX PROGRAM, PER HOUR: HCPCS | Mod: HQ

## 2024-01-08 PROCEDURE — 1002N00001 HC LODGING PLUS FACILITY CHARGE ADULT

## 2024-01-08 NOTE — PROGRESS NOTES
Nursing Discharge Planning Meeting    Writer completed discharge planning meeting with patient. Discharge is planned for 1/10/24.    Discussed appropriate follow up care to manage JHON, MI and medical  and to obtain medication refills. Patient given a copy of their current medications for reference. Questions were answered at this time and the patient verbalized an understanding of the post-discharge follow up plan.  Primary:     [facility]      Juancarlos Sood MD      PCP - General, Internal Medicine     Since 7/21/2011     [phone]     [phone]     Appt date and time: 1/16/24 at 9:30AM (30 minute appt)     Provider: Dr Juancarlos Sood          Psychiatry:     [facility], [facility], 2nd floor [address]     [address] [place] [address] ?[address] ?     [phone]     Appt date and time: 1/10/24 at 9:00AM (30 minute appt)     Provider: Dr Geronimo Webb      Pt has appt slip   Continue to support patient in discharge planning as needed to assure appropriate continuity of care.     Tobacco Cessation  Patient participated in the nicotine replacement therapy for tobacco cessation or reduction during their treatment programming: Yes    The patient was provided with community resources for follow-up to continue tobacco cessation support once in the community. Also the patient was encouraged to discuss their tobacco cessation efforts with the primary care provider.    [facility]  Nurse Liaison / [facility]  O: [phone]  Fax:[phone]  [facility] [id number]  M-F: 7AM to 5:30PM   Sat-Sun: 7AM to 3PM  After hours: [phone]

## 2024-01-08 NOTE — LETTER
2024     To:   [facility]     RE: Ravi Ahmadi  [address]  [facility] [address]  : 1990  MRN: [id number]    I am writing on behalf of my patient, Raiv Ahmadi to document the medical necessity of acamprosate for the treatment of Alcohol Use Disorder. This letter provides information about the patient's medical history and diagnosis and a statement summarizing my treatment rationale.      Summary of Patient History and Diagnosis    Ravi Ahmadi is a 33-year-old gentleman with history of alcohol use disorder, recently diagnosed type 2 diabetes, DKA, tobacco use, asthma, and alcohol induced hepatitis who presents for initial evaluation from residential treatment.  This year he has had multiple episodes of relapse requiring multiple hospitalizations for alcohol withdrawal management.     Treatment Rationale    Patient has had multiple hospitalizations this year for complications of alcohol use disorder with frequent relapse. At recent detox admission he was started on acamprosate and naltrexone combination therapy for alcohol use disorder. He has remained free from relapse since starting this combination, though has generally been in monitored settings. If he were to return to alcohol use he would be at risk for severe medical and psychiatric complications. Given high risk of his condition, would recommend continuing with dual therapy for at least 6 months before re-evaluating.     Duration    6+ months     Summary  In summary, acamprosate is medically necessary for this patient s medical condition. Please call my office at [phone] if I can provide you with any additional information to approve my request. I look forward to receiving your timely response and approval of this request.      Sincerely,  Geronimo Webb MD

## 2024-01-08 NOTE — GROUP NOTE
Group Therapy Documentation    PATIENT'S NAME: Ravi Ahmadi  MRN:   [id number]  :   1990  ACCT. NUMBER: [id number]  DATE OF SERVICE: 24  START TIME: 12:30 PM  END TIME:  2:30 PM  FACILITATOR(S): Felipe Hutchins LADC  TOPIC: BEH Group Therapy  Number of patients attending the group:  6  Group Length:  2 Hours    Group Therapy Type: Recovery strategies    Summary of Group / Topics Discussed:    Recovery Principles      Group Attendance:  Attended group session    Patient's response to the group topic/interactions:  cooperative with task    Patient appeared to be Actively participating.        Client specific details:  Goran participated and interacted appropriately with peers and staff in PM group. No triggers to use noted or discussed.

## 2024-01-08 NOTE — TELEPHONE ENCOUNTER
PA needed on Acamprosate 333mg  Pt insurance is [employer] [facility]  Insurance phone number: [phone]  Pt ID number: [id number]  Please let us know when PA is granted/denied.    Ricardo Preston  [facility]  [phone]    Thank you.

## 2024-01-08 NOTE — GROUP NOTE
"Group Therapy Documentation    PATIENT'S NAME: Ravi Ahmadi  MRN:   [id number]  :   1990  ACCT. NUMBER: [id number]  DATE OF SERVICE: 24  START TIME:  9:00 AM  END TIME: 11:00 AM  FACILITATOR(S): Ricardo Rodney LADC  TOPIC: BEH Group Therapy  Number of patients attending the group:  6  Group Length:  2 Hours    Group Therapy Type: Recovery strategies, Emotion processing, and Daily living/independence skills    Summary of Group / Topics Discussed:    Sober coping skills, Disease of addiction, and Emotions/expression      Group began with check-ins, followed by a reflection reading regarding nature. A group participant then shared his \"Anxiety and Substance Abuse\" assignment. Feedback was provided by participants throughout group session.       Group Attendance:  Attended group session    Patient's response to the group topic/interactions:  cooperative with task    Patient appeared to be Actively participating, Attentive, and Engaged.        Client specific details:  Patient actively engaged in group discussion.    "

## 2024-01-09 NOTE — ADDENDUM NOTE
Encounter addended by: Felipe Hutchins LADC on: 1/9/2024 10:54 AM   Actions taken: Pend clinical note

## 2024-01-09 NOTE — PROGRESS NOTES
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   01/03/24 68   12/27/23 78   12/12/23 85     Wt Readings from Last 3 Encounters:   01/03/24 99 kg (218 lb 3.2 oz)   12/27/23 100.8 kg (222 lb 3.2 oz)   12/07/23 106.1 kg (234 lb)     BP Readings from Last 3 Encounters:   01/03/24 103/67   12/27/23 125/82   12/12/23 111/73

## 2024-01-09 NOTE — ADDENDUM NOTE
Encounter addended by: Felipe Hutchins [facility] on: 1/9/2024 11:25 AM   Actions taken: Pend clinical note, Clinical Note Signed, Episode resolved

## 2024-01-09 NOTE — PROGRESS NOTES
[address] and 6[address] Floors  [place] [address]              Ravi Ahmadi, 1990 : was admitted for evaluation/treatment of chemical dependency at [facility].  She took part in these program(s):     ______ The Inpatient Program   ______ The Outpatient Program   ___X___ The Lodging Plus Program   ______ Lodging Day Outpatient         Date admitted: 12/12/2023    Date discharged: 1/9/2023     Type of discharge:     ___X___ Satisfactory - Successfully Completed Evaluation/Treatment   ______ Discharged Without Completing Treatment   ______ Behavioral Discharge   ______ Transferred to [facility] Program   ______ Transferred to another type of service   ______ Left against medical advice (AMA) / Eloped               Clinicians: EILEEN Monk & EILEEN Grajeda     Date: 1/9/2023           Time: 9:00am

## 2024-01-09 NOTE — PROGRESS NOTES
MICD Discharge Summary/Instructions     Patient: Ravi Ahmadi   MRN: [id number]  : 1990      Personal Safety/Management of Symptoms  - Contact local ER if symptoms increase or SI/HI ideation.     Call Crisis Lines As Needed  [facility] [phone]  Suicide Prevention [phone]  Crisis Connection [phone]   [facility] [phone]        [facility] [phone]                           National Suicide Prevention 1-432.588.3402           Abstinence/Relapse Prevention  * Take all medicines as prescribed, contact provider with concerns.   * Use coping skills from treatment: Call someone, attend meeting, sober event, sponsor,   safe place, read big book, exercise, read prevention plan. SEEK SUPPORT!    Community Resources/Supports   [facility] AA:  [phone] ( )  [place]  Alcoholics Anonymous : [phone]  Narcotics Anonymous : [address] ( [phone])  [place] Recovery Connection: Free recovery resources.  [phone]     Discharge Recommendations:  -Admit/Complete IOP, Follow all recommendations.   -Schedule/Attend Psychiatrist/ Medical Provider as needed.  -Schedule/Attend therapy weekly.  -Attend 3+weekly support meetings (AA, NA, CLIFF,)   -Obtain/Maintain contact with a Sponsor/Stewardson/  -Schedule medical appointments as needed with provider if concerns arise.   -Continue to expand sober network, attend sober events/activities.      Patient Signature:    Date        Staff Signature:      Date

## 2024-01-09 NOTE — PROGRESS NOTES
COUNSELOR S DISCHARGE SUMMARY    Date: 2024     Program Name:  [facility]    Client Name Ravi Ahmadi Date of Birth 1990      MR#  [id number]    Referred by: [facility] detox unit 3A.        Release copies to: Natasha Damian [facility]      Admit date: 2023    Discharge Date: 2024    # of Days Attended: 28    Date Last Attended: 2024    Discharge Status:  With Staff Approval    PROBLEMS PRESENTED AT ADMISSION:  (Include reasons & circumstances for admission)  Ravi Ahmadi is a 33 year old year old male Identified at Birth.      Admitting diagnosis:   Alcohol Use Disorder 303.90 (F10.20) Severe        PROGRAM PARTICIPATION:  While at [facility], Ravi Ahmadi received the following services to address substance use and mental health disorders.  he participated in:  Group Therapy, Individual Therapy, Psychiatric Medication Management, Recreation Activities, Spirituality Groups, DBT Skills Groups, AA/NA meetings , Life Skills Groups, and Psych-education Groups      PROGRESS:     Dimension 1 - Acute Intoxication/Withdrawal Potential:  Admission ASAM Risk Ratin Client displays full functioning with good ability to tolerate and cope with withdrawal discomfort. No signs or symptoms of intoxication or withdrawal or resolving signs or symptoms.  Discharge ASAM Risk Ratin Client displays full functioning with good ability to tolerate and cope with withdrawal discomfort. No signs or symptoms of intoxication or withdrawal or resolving signs or symptoms.    Treatment goal(s):    Develop effective strategies in order to maintain sobriety.    Progress toward goal(s):    I will report any use on unit or withdrawal symptomology to counselors and nurse ASAP.    Completed    Dimension 2 - Biomedical Conditions & Complications:  Admission ASAM Risk Ratin Client tolerates and oscar with physical discomfort and is able to get the services that the  "client needs.  Discharge ASAM Risk Ratin Client tolerates and oscar with physical discomfort and is able to get the services that the client needs.    Treatment goal(s):    Patient is to have no biomedical issues during treatment.    Progress toward goal(s):    I will report any biomedical issues or concerns to counselors and nurse ASAP.    Completed    Dimension 3 - Emotional/Behavioral Conditions & Complications:  Admission ASAM Risk Ratin Client has difficulty with impulse control and lacks coping skills. Client has thoughts of suicide or harm to others without means; however, the thoughts may interfere with participation in some treatment activities. Client has difficulty functioning in significant life areas. Client has moderate symptoms of emotional, behavioral, or cognitive problems. Client is able to participate in most treatment activities.  Discharge ASAM Risk Ratin Client has difficulty with impulse control and lacks coping skills. Client has thoughts of suicide or harm to others without means; however, the thoughts may interfere with participation in some treatment activities. Client has difficulty functioning in significant life areas. Client has moderate symptoms of emotional, behavioral, or cognitive problems. Client is able to participate in most treatment activities.    Treatment goal(s):    Patient is to remain safe throughout LP treatment program.  Develop coping skills to better deal with depressive issues in a healthy manner.  Develop coping skills to deal with grief and loss issues in a manner not injurious to himself and others.  Develop better coping skills to effectively deal with guilt and shame.    Progress toward goal(s):    1.I understand that I will be monitored and reassessed if a change in risk rating is indicated.  2.I will complete a Safety Plan and turn in to counselors.  3.I will complete \"Understanding Addiction and Depression\" assignment and present in group.  4.I " "will complete \"Grief and Loss\" packet assignment and present in group.  5.I will complete \"Guilt and Shame\" packet assignment and present in group.    Completed    Dimension 4 - Treatment Acceptance/Resistance:  Admission ASAM Risk Ratin Client displays verbal compliance, but lacks consistent behaviors; has low motivation for change; and is passively involved in treatment.  Discharge ASAM Risk Ratin Client is motivated with active reinforcement, to explore treatment and strategies for change, but ambivalent about illness or need for change.    Treatment goal(s):    1.Develop skills necessary to eliminate self sabotage and remain sober.  2.Develop a better understanding of the connection between using and the negative consequences he experiences.    Progress toward goal(s):    1.I will complete \"First Step\" packet assignment and present in group.  2.I will complete \"Drug Use History\" assignment and present in group.  3.I will read the first 164 pages in the \"Big Book\" and be prepared to discuss with counselor.    Completed    Dimension 5 - Relapse/Continued Problem Potential:  Admission ASAM Risk Ratin No awareness of the negative impact of mental health problems or substance abuse. No coping skills to arrest mental health or addiction illnesses, or prevent relapse.  Discharge ASAM Risk Ratin Client recognizes relapse issues and prevention strategies, but displays some vulnerability for further substance use or mental health problems.    Treatment goal(s):    1.Develop a better awareness of his early warning signs for relapse and use that insight to remain sober.  2.Develop a keen awareness of the factors that can lead to relapse and avoid them.    Progress toward goal(s):    1.I will complete \"Identifying Relapse Triggers and Cues\"   2.I will complete \"5 Years Using vs 5 Years Sober\" assignment and present in group.  3.I will complete   \"25 Sober Coping Skills\" assignment and present in group.  4.I " "will complete \"My Own Reason to Quit\" assignment and present in group.  5.I will complete \"Relapse Prevention Plan\" assignment and present in group.    Completed    Dimension 6 - Recovery Environment: (family, recreation, legal, education, etc.)  Admission ASAM Risk Rating: 3 Client is not engaged in structured, meaningful activity and the client's peers, family, significant other, and living environment are unsupportive, or there is significant criminal justice system involvement.  Discharge ASAM Risk Ratin Client has passive social  or family and significant other are not interested in the client's recovery. The client is engaged in structured meaningful activity.    Treatment goal(s):   1.Develop a sober support network while in Lodging Plus program.  2.Develop an aftercare treatment program while in Lodging Plus program.    Progress toward goal(s):    1.I will attend all 12 Step meetings during Lodging Plus program.  2.I will work to obtain a sponsor while in Lodging Plus program.  3.I will work with counselors and support staff to develop an aftercare treatment protocol.    Completed    Client strengths identified during treatment were:   Patient maintained a positive attitude while in treatment. Patient was an active participant in group therapy and was always open for feedback from his counselors and peers. Patient appears motivated for recovery at this time and willing to incorporate positive changes into his life.      Client needs identified during treatment were:   Sobriety, stabilize mental health, address and manage difficult emotions, avoid relapse, identify and manage triggers to use, and find IOP and sober housing placement.      Discharge Diagnosis:    Alcohol Use Disorder 303.90 (F10.20) Severe      Discharge Medications:   Current Outpatient Medications   Medication    acamprosate (CAMPRAL) 333 MG EC tablet    Alcohol Swabs PADS    ARIPiprazole (ABILIFY) 10 MG tablet    blood " glucose (NO BRAND SPECIFIED) lancets standard    blood glucose (NO BRAND SPECIFIED) test strip    blood glucose (NO BRAND SPECIFIED) test strip    blood glucose calibration (NO BRAND SPECIFIED) solution    blood glucose monitoring (NO BRAND SPECIFIED) meter device kit    budesonide-formoterol (SYMBICORT) 160-4.5 MCG/ACT Inhaler    fluticasone-vilanterol (BREO ELLIPTA) 100-25 MCG/ACT inhaler    folic acid (FOLVITE) 1 MG tablet    glucose (BD GLUCOSE) 4 g chewable tablet    glucose (BD GLUCOSE) 4 g chewable tablet    insulin glargine (LANTUS PEN) 100 UNIT/ML pen    insulin pen needle (32G X 4 MM) 32G X 4 MM miscellaneous    lisinopril (ZESTRIL) 10 MG tablet    multivitamin w/minerals (THERA-VIT-M) tablet    naltrexone (DEPADE/REVIA) 50 MG tablet    nicotine (NICODERM CQ) 14 MG/24HR 24 hr patch    OLANZapine (ZYPREXA) 10 MG tablet    OLANZapine (ZYPREXA) 5 MG tablet    OLANZapine (ZYPREXA) 5 MG tablet    pantoprazole (PROTONIX) 40 MG EC tablet    sertraline (ZOLOFT) 50 MG tablet    Sharps Container MISC    thiamine (B-1) 100 MG tablet    traZODone (DESYREL) 50 MG tablet    Vitamin D3 (CHOLECALCIFEROL) 25 mcg (1000 units) tablet     No current facility-administered medications for this encounter.     Facility-Administered Medications Ordered in Other Encounters   Medication    Self Administer Medications: Behavioral Services       Discharge Plan and Recommendations:   1.  Abstain from all mood-altering chemicals unless prescribed by a licensed medical provider, and take all medications as prescribed.  2.  Attend a minimum of three AA/NA/Sober support groups weekly in the community.  3.  Obtain a permanent male sponsor and maintain regular contact with him.  4.  Admit immediately into [facility] IOP and sober living and follow all recommendations.  5.  Continue to invest in building a sober support network.  6.  Continue to monitor and understand relapse triggers and stressors through the use and development of  healthy coping skills.  7.  Obtain a mental health therapist and attend all scheduled programming.  8. Attend all scheduled medical appointments.  9. Take medication as prescribed.    Living arrangements at discharge: Pt reported that he would be attending [facility].  Patient terminated services due program completion.     The following continued care recommendations have been made: Pt was accepted into [facility] IOP and sober living, AA/NA attendance, and individual psychotherapy.       Prognosis: Favorable      EILEEN Grajeda on 1/9/2024 at 10:44 AM

## 2024-01-10 ENCOUNTER — VIRTUAL VISIT (OUTPATIENT)
Dept: PSYCHIATRY | Facility: CLINIC | Age: 34
End: 2024-01-10
Attending: PSYCHIATRY & NEUROLOGY
Payer: COMMERCIAL

## 2024-01-10 DIAGNOSIS — R44.3 HALLUCINATIONS: ICD-10-CM

## 2024-01-10 DIAGNOSIS — F29 PSYCHOSIS, UNSPECIFIED PSYCHOSIS TYPE (H): Primary | ICD-10-CM

## 2024-01-10 DIAGNOSIS — F33.1 MODERATE EPISODE OF RECURRENT MAJOR DEPRESSIVE DISORDER (H): ICD-10-CM

## 2024-01-10 DIAGNOSIS — R25.9 ABNORMAL INVOLUNTARY MOVEMENT: ICD-10-CM

## 2024-01-10 PROCEDURE — 90833 PSYTX W PT W E/M 30 MIN: CPT | Mod: 95 | Performed by: STUDENT IN AN ORGANIZED HEALTH CARE EDUCATION/TRAINING PROGRAM

## 2024-01-10 PROCEDURE — 99214 OFFICE O/P EST MOD 30 MIN: CPT | Mod: 95 | Performed by: STUDENT IN AN ORGANIZED HEALTH CARE EDUCATION/TRAINING PROGRAM

## 2024-01-10 RX ORDER — BENZTROPINE MESYLATE 1 MG/1
1 TABLET ORAL 2 TIMES DAILY
Qty: 60 TABLET | Refills: 0 | Status: SHIPPED | OUTPATIENT
Start: 2024-01-10 | End: 2024-02-21

## 2024-01-10 RX ORDER — OLANZAPINE 5 MG/1
5 TABLET ORAL DAILY PRN
Qty: 30 TABLET | Refills: 0 | Status: SHIPPED | OUTPATIENT
Start: 2024-01-10 | End: 2024-02-21

## 2024-01-10 RX ORDER — ARIPIPRAZOLE 5 MG/1
5 TABLET ORAL AT BEDTIME
Qty: 30 TABLET | Refills: 0 | Status: ON HOLD | OUTPATIENT
Start: 2024-01-10 | End: 2024-08-14

## 2024-01-10 RX ORDER — OLANZAPINE 10 MG/1
10 TABLET ORAL 2 TIMES DAILY
Qty: 60 TABLET | Refills: 0 | Status: SHIPPED | OUTPATIENT
Start: 2024-01-10 | End: 2024-02-21

## 2024-01-10 ASSESSMENT — PAIN SCALES - GENERAL: PAINLEVEL: NO PAIN (0)

## 2024-01-10 NOTE — NURSING NOTE
Is the patient currently in the state of [place]? YES    Visit mode:VIDEO    If the visit is dropped, the patient can be reconnected by: VIDEO VISIT: Text to cell phone:   Telephone Information:   Mobile [phone]       Will anyone else be joining the visit? NO  (If patient encounters technical issues they should call [phone]    How would you like to obtain your AVS? MyChart    Are changes needed to the allergy or medication list? No    Reason for visit: ABRAHAM DALLAS

## 2024-01-10 NOTE — PATIENT INSTRUCTIONS
**For crisis resources, please see the information at the end of this document**   Patient Education    Thank you for coming to the [facility] [place] [facility].     Lab Testing:  If you had lab testing today and your results are reassuring or normal they will be mailed to you or sent through [employer] within 7 days. If the lab tests need quick action we will call you with the results. The phone number we will call with results is # [phone]. If this is not the best number please call our clinic and change the number.     Medication Refills:  If you need any refills please call your pharmacy and they will contact us. Our fax number for refills is [phone].   Three business days of notice are needed for general medication refill requests.   Five business days of notice are needed for controlled substance refill requests.   If you need to change to a different pharmacy, please contact the new pharmacy directly. The new pharmacy will help you get your medications transferred.     Contact Us:  Please call [phone] during business hours (8-5:00 M-F).   If you have medication related questions after clinic hours, or on the weekend, please call [phone].     Financial Assistance [phone]   Medical Records [phone]       MENTAL HEALTH CRISIS RESOURCES:  For a emergency help, please call 911 or go to the nearest Emergency Department.     Emergency Walk-In Options:   EmPATH Unit @ [facility] ([place]): [phone] - Specialized mental health emergency area designed to be calming  [facility] ([place]): [phone]  [facility] ([place]): [phone]  [facility]): [phone]    [facility] Crisis Information:   [place]: [phone]  [place]: [phone]  [place] ([place]) - Adult: [phone]     Child: [phone]  [place] - Adult: [phone]     Child: [phone]  [place]:  [phone]  List of all [facility] resources:   https://[place].gov/dhs/people-we-serve/adults/health-care/mental-health/resources/crisis-contacts.jsp    National Crisis Information:   Crisis Text Line: Text  [place]  to 788503  Suicide & Crisis Lifeline: 988  National Suicide Prevention Lifeline: 1-772-546-TALK (1-656.179.9105)       For online chat options, visit https://suicidepreventionlifeline.org/chat/  Poison Control Center: 5-125-528-2255  Trans Lifeline: 3-795-042-5705 - Hotline for transgender people of all ages  The Cj Project: 8-659-959-3246 - Hotline for LGBT youth     For Non-Emergency Support:   Fast Tracker: Mental Health & Substance Use Disorder Resources -   https://www.InkviteckStatusPagen.org/

## 2024-01-12 NOTE — TELEPHONE ENCOUNTER
Retail Pharmacy Prior Authorization Team   Phone: [phone]    PA Initiation    Medication: ACAMPROSATE CALCIUM 333 MG PO [facility]  Insurance Company: [employer] - Phone [phone] Fax [phone]  Pharmacy Filling the Rx: [place], [place] - [address]  Filling Pharmacy Phone: [phone]  Filling Pharmacy Fax:    Start Date: 1/12/2024

## 2024-01-15 NOTE — TELEPHONE ENCOUNTER
PRIOR AUTHORIZATION DENIED    Medication: ACAMPROSATE CALCIUM 333 MG PO [facility]  Insurance Company: [employer] - Phone [phone] Fax [phone]  Denial Date: 1/12/2024  Denial Reason(s): PER MARQUIS AT [employer] - HALLUCINATIONS NOT RELATED TO WITHDRAWAL AND PATIENT IS CURRENTLY ON NALTREXONE WHICH IS VERY EFFECTIVE AND PER NOTES MEMBER IS TOLERATING WELL     Appeal Information: IF PROVIDER WOULD LIKE TO APPEAL THIS DECISION PLEASE PROVIDE THE PA TEAM WITH A LETTER OF MEDICAL NECESSITY    Patient Notified: No     I will call [employer] and request denial letter to be faxed over.

## 2024-01-17 NOTE — TELEPHONE ENCOUNTER
Medication Appeal Initiation    Medication: ACAMPROSATE CALCIUM 333 MG PO [facility]  Appeal Start Date:  1/17/2024  Insurance Company: [employer]  Insurance Phone: [phone]  Insurance Fax: [phone]  Comments:       FAXED LETTER OF MEDICAL NECESSITY AND CHART NOTES TO INSURANCE

## 2024-01-17 NOTE — TELEPHONE ENCOUNTER
MEDICATION APPEAL APPROVED    Medication: ACAMPROSATE CALCIUM 333 MG PO [facility]  Authorization Effective Date: 1/17/2024  Authorization Expiration Date: 6/16/2024  Appeal Insurance Company: [employer]  [facility] Pharmacy: [facility] [address]  Patient Notified: YES (pharmacy will notify the patient when ready)  Comments:       [facility] [place] received verbal approval that this request has been approved for 6 months.

## 2024-01-25 ENCOUNTER — TELEPHONE (OUTPATIENT)
Dept: PSYCHIATRY | Facility: CLINIC | Age: 34
End: 2024-01-25
Payer: COMMERCIAL

## 2024-01-25 NOTE — TELEPHONE ENCOUNTER
[facility] Call Center    Phone Message    May a detailed message be left on voicemail: yes     Reason for Call: Other: Patient calling to ask about medication combination. He is wondering if he can take both cogentin and olanzapine at the same time or if he should only be taking cogentin.     Action Taken: Message routed to:  Other: P PSYCHIATRY NURSE-P    Travel Screening: Not Applicable

## 2024-01-26 NOTE — TELEPHONE ENCOUNTER
Geronimo Webb MD  You; Haylee Hutchison MD17 minutes ago (12:16 PM)     [place]    Yes, the plan we discussed at last visit was to increase Zyprexa to 10mg in the AM, 10mg in the PM, and 5mg as needed. We reduced Abilify from 10mg to 5mg, and added Cogentin 1mg BID for suspected EPS.    Patient should also schedule follow-up as soon as able - please let me know if there are additional questions. My note should also have information on this dosing if there are questions (it is complete and signed, just waiting for cosignature).    Geronimo     Reached out to patient to relay above information. No answer at number provided. LVM, requesting a call back. Clinic number provided.

## 2024-01-26 NOTE — TELEPHONE ENCOUNTER
Reached out to patient and relayed that he is to take Zyprexa 10 mg BID + 5 mg HS and Cogentin 5 mg BID. Patient verbalized understanding. Patient has also decreased Abilify to 5 mg.     No further action needed by this writer

## 2024-02-21 ENCOUNTER — VIRTUAL VISIT (OUTPATIENT)
Dept: PSYCHIATRY | Facility: CLINIC | Age: 34
End: 2024-02-21
Attending: STUDENT IN AN ORGANIZED HEALTH CARE EDUCATION/TRAINING PROGRAM
Payer: COMMERCIAL

## 2024-02-21 VITALS — WEIGHT: 230 LBS | BODY MASS INDEX: 31.15 KG/M2 | HEIGHT: 72 IN

## 2024-02-21 DIAGNOSIS — F33.1 MODERATE EPISODE OF RECURRENT MAJOR DEPRESSIVE DISORDER (H): ICD-10-CM

## 2024-02-21 DIAGNOSIS — F51.01 PRIMARY INSOMNIA: ICD-10-CM

## 2024-02-21 DIAGNOSIS — F29 PSYCHOSIS, UNSPECIFIED PSYCHOSIS TYPE (H): Primary | ICD-10-CM

## 2024-02-21 DIAGNOSIS — F10.90 ALCOHOL USE DISORDER: ICD-10-CM

## 2024-02-21 DIAGNOSIS — E13.65 UNCONTROLLED OTHER SPECIFIED DIABETES MELLITUS WITH HYPERGLYCEMIA (H): ICD-10-CM

## 2024-02-21 DIAGNOSIS — E08.10 DIABETES MELLITUS DUE TO UNDERLYING CONDITION WITH KETOACIDOSIS WITHOUT COMA, WITH LONG-TERM CURRENT USE OF INSULIN (H): ICD-10-CM

## 2024-02-21 DIAGNOSIS — F32.A DEPRESSION, UNSPECIFIED DEPRESSION TYPE: ICD-10-CM

## 2024-02-21 DIAGNOSIS — R44.3 HALLUCINATIONS: ICD-10-CM

## 2024-02-21 DIAGNOSIS — F10.10 ALCOHOL ABUSE: ICD-10-CM

## 2024-02-21 DIAGNOSIS — R25.9 ABNORMAL INVOLUNTARY MOVEMENT: ICD-10-CM

## 2024-02-21 DIAGNOSIS — Z79.4 DIABETES MELLITUS DUE TO UNDERLYING CONDITION WITH KETOACIDOSIS WITHOUT COMA, WITH LONG-TERM CURRENT USE OF INSULIN (H): ICD-10-CM

## 2024-02-21 DIAGNOSIS — F10.239 ALCOHOL DEPENDENCE WITH WITHDRAWAL WITH COMPLICATION (H): ICD-10-CM

## 2024-02-21 PROCEDURE — 99214 OFFICE O/P EST MOD 30 MIN: CPT | Mod: 95 | Performed by: STUDENT IN AN ORGANIZED HEALTH CARE EDUCATION/TRAINING PROGRAM

## 2024-02-21 PROCEDURE — 90833 PSYTX W PT W E/M 30 MIN: CPT | Mod: 95 | Performed by: STUDENT IN AN ORGANIZED HEALTH CARE EDUCATION/TRAINING PROGRAM

## 2024-02-21 RX ORDER — BENZTROPINE MESYLATE 1 MG/1
1 TABLET ORAL 2 TIMES DAILY
Qty: 60 TABLET | Refills: 1 | Status: SHIPPED | OUTPATIENT
Start: 2024-02-21 | End: 2024-06-24

## 2024-02-21 RX ORDER — TRAZODONE HYDROCHLORIDE 50 MG/1
50 TABLET, FILM COATED ORAL
Qty: 30 TABLET | Refills: 1 | Status: SHIPPED | OUTPATIENT
Start: 2024-02-21 | End: 2024-06-24

## 2024-02-21 RX ORDER — LANOLIN ALCOHOL/MO/W.PET/CERES
100 CREAM (GRAM) TOPICAL DAILY
Qty: 30 TABLET | Refills: 1 | Status: SHIPPED | OUTPATIENT
Start: 2024-02-21

## 2024-02-21 RX ORDER — FOLIC ACID 1 MG/1
1 TABLET ORAL DAILY
Qty: 30 TABLET | Refills: 1 | Status: ON HOLD | OUTPATIENT
Start: 2024-02-21 | End: 2024-08-14

## 2024-02-21 RX ORDER — OLANZAPINE 5 MG/1
5 TABLET ORAL DAILY PRN
Qty: 30 TABLET | Refills: 1 | Status: ON HOLD | OUTPATIENT
Start: 2024-02-21 | End: 2024-08-14

## 2024-02-21 RX ORDER — ACAMPROSATE CALCIUM 333 MG/1
666 TABLET, DELAYED RELEASE ORAL 3 TIMES DAILY
Qty: 180 TABLET | Refills: 1 | Status: ON HOLD | OUTPATIENT
Start: 2024-02-21 | End: 2024-08-14

## 2024-02-21 RX ORDER — MULTIPLE VITAMINS W/ MINERALS TAB 9MG-400MCG
1 TAB ORAL DAILY
Qty: 30 TABLET | Refills: 1 | Status: SHIPPED | OUTPATIENT
Start: 2024-02-21

## 2024-02-21 RX ORDER — NALTREXONE HYDROCHLORIDE 50 MG/1
50 TABLET, FILM COATED ORAL DAILY
Qty: 30 TABLET | Refills: 1 | Status: ON HOLD | OUTPATIENT
Start: 2024-02-21 | End: 2024-08-10

## 2024-02-21 RX ORDER — OLANZAPINE 10 MG/1
10 TABLET ORAL 2 TIMES DAILY
Qty: 60 TABLET | Refills: 0 | Status: SHIPPED | OUTPATIENT
Start: 2024-02-21 | End: 2024-06-24

## 2024-02-21 RX ORDER — SERTRALINE HYDROCHLORIDE 100 MG/1
100 TABLET, FILM COATED ORAL DAILY
Qty: 30 TABLET | Refills: 1 | Status: SHIPPED | OUTPATIENT
Start: 2024-02-28 | End: 2024-06-24

## 2024-02-21 ASSESSMENT — PATIENT HEALTH QUESTIONNAIRE - PHQ9
10. IF YOU CHECKED OFF ANY PROBLEMS, HOW DIFFICULT HAVE THESE PROBLEMS MADE IT FOR YOU TO DO YOUR WORK, TAKE CARE OF THINGS AT HOME, OR GET ALONG WITH OTHER PEOPLE: VERY DIFFICULT
SUM OF ALL RESPONSES TO PHQ QUESTIONS 1-9: 14
SUM OF ALL RESPONSES TO PHQ QUESTIONS 1-9: 14

## 2024-02-21 ASSESSMENT — PAIN SCALES - GENERAL: PAINLEVEL: NO PAIN (0)

## 2024-02-21 NOTE — NURSING NOTE
Is the patient currently in the state of [place]? YES    Visit mode:VIDEO    If the visit is dropped, the patient can be reconnected by: VIDEO VISIT: Text to cell phone:   Telephone Information:   Mobile [phone]       Will anyone else be joining the visit? NO  (If patient encounters technical issues they should call [phone]    How would you like to obtain your AVS? MyChart    Are changes needed to the allergy or medication list? No    Reason for visit: RECHECK    Rossy DALLAS

## 2024-02-21 NOTE — PATIENT INSTRUCTIONS
Continue Zyprexa 10mg twice a day, can take 5mg as needed  If suicidal command hallucinations are getting worse, can take an extra 5mg of Zyprexa (also call office to speak with nurse)  Stop Abilify. If symptoms worse after stopping, can restart (call office for refill)  Take sertraline 50mg daily for a week, then go up to 100mg daily  Restart naltrexone and acamprosate  Get brain MRI done (should call to schedule)  Get labs done (call primary clinic)  Schedule PCP appointment for diabetes        **For crisis resources, please see the information at the end of this document**   Patient Education    Thank you for coming to the [facility] [place] [facility].     Lab Testing:  If you had lab testing today and your results are reassuring or normal they will be mailed to you or sent through [employer] within 7 days. If the lab tests need quick action we will call you with the results. The phone number we will call with results is # [phone]. If this is not the best number please call our clinic and change the number.     Medication Refills:  If you need any refills please call your pharmacy and they will contact us. Our fax number for refills is [phone].   Three business days of notice are needed for general medication refill requests.   Five business days of notice are needed for controlled substance refill requests.   If you need to change to a different pharmacy, please contact the new pharmacy directly. The new pharmacy will help you get your medications transferred.     Contact Us:  Please call [phone] during business hours (8-5:00 M-F).   If you have medication related questions after clinic hours, or on the weekend, please call [phone].     Financial Assistance [phone]   Medical Records [phone]       MENTAL HEALTH CRISIS RESOURCES:  For a emergency help, please call 911 or go to the nearest Emergency Department.     Emergency Walk-In Options:   [facility] @ [facility] ([place]): [phone] - Specialized mental health emergency area designed to be calming  [facility] ([place]): [phone]  [facility] ([place]): [phone]  [facility] ([place]): [phone]    County Crisis Information:   [place]: [phone]  [place]: [phone]  [place] ([place]) - Adult: [phone]     Child: [phone]  [place] - Adult: [phone]     Child: [phone]  [place]: [phone]  List of all [facility] resources:   https://[place].[facility]/dhs/people-we-serve/adults/health-care/mental-health/resources/crisis-contacts.jsp    National Crisis Information:   Crisis Text Line: Text  [place]  to 993944  Suicide & Crisis Lifeline: 988  National Suicide Prevention Lifeline: 4-789-748-SENG (1-336.400.4399)       For online chat options, visit https://suicidepreventionlifeline.org/chat/  Poison Control Center: 1-443.593.1946  Trans Lifeline: 1-126.358.3672 - Hotline for transgender people of all ages  [employer]: 2-970-508-5175 - Hotline for LGBT youth     For Non-Emergency Support:   Fast Tracker: Mental Health & Substance Use Disorder Resources -   https://www.[employer].org/

## 2024-03-17 ENCOUNTER — HEALTH MAINTENANCE LETTER (OUTPATIENT)
Age: 34
End: 2024-03-17

## 2024-05-26 ENCOUNTER — HEALTH MAINTENANCE LETTER (OUTPATIENT)
Age: 34
End: 2024-05-26

## 2024-06-18 ENCOUNTER — MYC MEDICAL ADVICE (OUTPATIENT)
Dept: PSYCHIATRY | Facility: CLINIC | Age: 34
End: 2024-06-18
Payer: COMMERCIAL

## 2024-06-18 DIAGNOSIS — F33.1 MODERATE EPISODE OF RECURRENT MAJOR DEPRESSIVE DISORDER (H): ICD-10-CM

## 2024-06-18 DIAGNOSIS — F29 PSYCHOSIS, UNSPECIFIED PSYCHOSIS TYPE (H): ICD-10-CM

## 2024-06-18 DIAGNOSIS — F32.A DEPRESSION, UNSPECIFIED DEPRESSION TYPE: ICD-10-CM

## 2024-06-18 DIAGNOSIS — R25.9 ABNORMAL INVOLUNTARY MOVEMENT: ICD-10-CM

## 2024-06-18 DIAGNOSIS — R44.3 HALLUCINATIONS: ICD-10-CM

## 2024-06-18 DIAGNOSIS — F51.01 PRIMARY INSOMNIA: ICD-10-CM

## 2024-06-26 RX ORDER — OLANZAPINE 10 MG/1
10 TABLET ORAL 2 TIMES DAILY
Qty: 60 TABLET | Refills: 0 | Status: ON HOLD | OUTPATIENT
Start: 2024-06-26 | End: 2024-08-14

## 2024-06-26 RX ORDER — ARIPIPRAZOLE 5 MG/1
5 TABLET ORAL AT BEDTIME
Qty: 30 TABLET | Refills: 0 | OUTPATIENT
Start: 2024-06-26

## 2024-06-26 RX ORDER — SERTRALINE HYDROCHLORIDE 100 MG/1
100 TABLET, FILM COATED ORAL DAILY
Qty: 30 TABLET | Refills: 0 | Status: ON HOLD | OUTPATIENT
Start: 2024-06-26 | End: 2024-08-14

## 2024-06-26 RX ORDER — BENZTROPINE MESYLATE 1 MG/1
1 TABLET ORAL 2 TIMES DAILY
Qty: 60 TABLET | Refills: 0 | Status: ON HOLD | OUTPATIENT
Start: 2024-06-26 | End: 2024-08-14

## 2024-06-26 RX ORDER — TRAZODONE HYDROCHLORIDE 50 MG/1
50 TABLET, FILM COATED ORAL
Qty: 30 TABLET | Refills: 0 | Status: ON HOLD | OUTPATIENT
Start: 2024-06-26 | End: 2024-08-14

## 2024-06-26 NOTE — TELEPHONE ENCOUNTER
Briefly reviewed chart, sent refills for sertraline, olanzapine, trazodone, and benztropine. At last visit aripiprazole was discontinued, unclear if he has been taking this - will defer refill for now, would recommend patient be seen as soon as possible to arrange further refills.    Geronimo Webb MD

## 2024-08-04 ENCOUNTER — HEALTH MAINTENANCE LETTER (OUTPATIENT)
Age: 34
End: 2024-08-04

## 2024-08-09 ENCOUNTER — APPOINTMENT (OUTPATIENT)
Dept: CT IMAGING | Facility: CLINIC | Age: 34
End: 2024-08-09
Attending: EMERGENCY MEDICINE
Payer: COMMERCIAL

## 2024-08-09 ENCOUNTER — APPOINTMENT (OUTPATIENT)
Dept: GENERAL RADIOLOGY | Facility: CLINIC | Age: 34
End: 2024-08-09
Attending: EMERGENCY MEDICINE
Payer: COMMERCIAL

## 2024-08-09 ENCOUNTER — HOSPITAL ENCOUNTER (INPATIENT)
Facility: CLINIC | Age: 34
LOS: 5 days | Discharge: HOME | End: 2024-08-14
Attending: EMERGENCY MEDICINE | Admitting: INTERNAL MEDICINE
Payer: COMMERCIAL

## 2024-08-09 DIAGNOSIS — R56.9 SEIZURE (H): ICD-10-CM

## 2024-08-09 DIAGNOSIS — R44.3 HALLUCINATIONS: ICD-10-CM

## 2024-08-09 DIAGNOSIS — F10.939 ALCOHOL WITHDRAWAL SEIZURE WITH COMPLICATION (H): ICD-10-CM

## 2024-08-09 DIAGNOSIS — F29 PSYCHOSIS, UNSPECIFIED PSYCHOSIS TYPE (H): ICD-10-CM

## 2024-08-09 DIAGNOSIS — I48.91 ATRIAL FIBRILLATION WITH RVR (H): Primary | ICD-10-CM

## 2024-08-09 DIAGNOSIS — F32.A DEPRESSION, UNSPECIFIED DEPRESSION TYPE: ICD-10-CM

## 2024-08-09 DIAGNOSIS — F10.10 ALCOHOL ABUSE: ICD-10-CM

## 2024-08-09 DIAGNOSIS — R25.9 ABNORMAL INVOLUNTARY MOVEMENT: ICD-10-CM

## 2024-08-09 DIAGNOSIS — J45.20 MILD INTERMITTENT ASTHMA WITHOUT COMPLICATION: ICD-10-CM

## 2024-08-09 DIAGNOSIS — F33.1 MODERATE EPISODE OF RECURRENT MAJOR DEPRESSIVE DISORDER (H): ICD-10-CM

## 2024-08-09 DIAGNOSIS — S09.90XA CLOSED HEAD INJURY, INITIAL ENCOUNTER: ICD-10-CM

## 2024-08-09 DIAGNOSIS — R56.9 ALCOHOL WITHDRAWAL SEIZURE WITH COMPLICATION (H): ICD-10-CM

## 2024-08-09 DIAGNOSIS — F51.01 PRIMARY INSOMNIA: ICD-10-CM

## 2024-08-09 DIAGNOSIS — F10.90 ALCOHOL USE DISORDER: ICD-10-CM

## 2024-08-09 LAB
ALBUMIN SERPL BCG-MCNC: 4.7 G/DL (ref 3.5–5.2)
ALBUMIN UR-MCNC: 50 MG/DL
ALP SERPL-CCNC: 72 U/L (ref 40–150)
ALT SERPL W P-5'-P-CCNC: 159 U/L (ref 0–70)
ANION GAP SERPL CALCULATED.3IONS-SCNC: 25 MMOL/L (ref 7–15)
APPEARANCE UR: CLEAR
AST SERPL W P-5'-P-CCNC: 140 U/L (ref 0–45)
AST SERPL W P-5'-P-CCNC: ABNORMAL U/L
BASE EXCESS BLDV CALC-SCNC: 0 MMOL/L (ref -3–3)
BASE EXCESS BLDV CALC-SCNC: 4 MMOL/L (ref -3–3)
BASOPHILS # BLD AUTO: 0.1 10E3/UL (ref 0–0.2)
BASOPHILS NFR BLD AUTO: 1 %
BILIRUB SERPL-MCNC: 1.5 MG/DL
BILIRUB UR QL STRIP: NEGATIVE
BUN SERPL-MCNC: 7.5 MG/DL (ref 6–20)
CALCIUM SERPL-MCNC: 9.5 MG/DL (ref 8.8–10.4)
CHLORIDE SERPL-SCNC: 89 MMOL/L (ref 98–107)
COLOR UR AUTO: ABNORMAL
CREAT SERPL-MCNC: 0.65 MG/DL (ref 0.67–1.17)
EGFRCR SERPLBLD CKD-EPI 2021: >90 ML/MIN/1.73M2
EOSINOPHIL # BLD AUTO: 0 10E3/UL (ref 0–0.7)
EOSINOPHIL NFR BLD AUTO: 0 %
ERYTHROCYTE [DISTWIDTH] IN BLOOD BY AUTOMATED COUNT: 12.6 % (ref 10–15)
ETHANOL SERPL-MCNC: <0.01 G/DL
FLUAV RNA SPEC QL NAA+PROBE: NEGATIVE
FLUBV RNA RESP QL NAA+PROBE: NEGATIVE
GLUCOSE SERPL-MCNC: 156 MG/DL (ref 70–99)
GLUCOSE UR STRIP-MCNC: NEGATIVE MG/DL
HCO3 BLDV-SCNC: 24 MMOL/L (ref 21–28)
HCO3 BLDV-SCNC: 29 MMOL/L (ref 21–28)
HCO3 SERPL-SCNC: 22 MMOL/L (ref 22–29)
HCT VFR BLD AUTO: 47.3 % (ref 40–53)
HGB BLD-MCNC: 16.1 G/DL (ref 13.3–17.7)
HGB UR QL STRIP: ABNORMAL
HYALINE CASTS: 1 /LPF
IMM GRANULOCYTES # BLD: 0 10E3/UL
IMM GRANULOCYTES NFR BLD: 1 %
KETONES UR STRIP-MCNC: NEGATIVE MG/DL
LACTATE BLD-SCNC: 2 MMOL/L
LACTATE BLD-SCNC: 9.5 MMOL/L
LEUKOCYTE ESTERASE UR QL STRIP: NEGATIVE
LYMPHOCYTES # BLD AUTO: 0.7 10E3/UL (ref 0.8–5.3)
LYMPHOCYTES NFR BLD AUTO: 11 %
MAGNESIUM SERPL-MCNC: 1.5 MG/DL (ref 1.7–2.3)
MCH RBC QN AUTO: 31.5 PG (ref 26.5–33)
MCHC RBC AUTO-ENTMCNC: 34 G/DL (ref 31.5–36.5)
MCV RBC AUTO: 93 FL (ref 78–100)
MONOCYTES # BLD AUTO: 0.6 10E3/UL (ref 0–1.3)
MONOCYTES NFR BLD AUTO: 9 %
MUCOUS THREADS #/AREA URNS LPF: PRESENT /LPF
NEUTROPHILS # BLD AUTO: 5.2 10E3/UL (ref 1.6–8.3)
NEUTROPHILS NFR BLD AUTO: 79 %
NITRATE UR QL: NEGATIVE
NRBC # BLD AUTO: 0 10E3/UL
NRBC BLD AUTO-RTO: 0 /100
PCO2 BLDV: 33 MM HG (ref 40–50)
PCO2 BLDV: 47 MM HG (ref 40–50)
PH BLDV: 7.41 [PH] (ref 7.32–7.43)
PH BLDV: 7.46 [PH] (ref 7.32–7.43)
PH UR STRIP: 6.5 [PH] (ref 5–7)
PLATELET # BLD AUTO: 123 10E3/UL (ref 150–450)
PO2 BLDV: 58 MM HG (ref 25–47)
PO2 BLDV: 64 MM HG (ref 25–47)
POTASSIUM SERPL-SCNC: 3.6 MMOL/L (ref 3.4–5.3)
PROT SERPL-MCNC: 7.7 G/DL (ref 6.4–8.3)
RBC # BLD AUTO: 5.11 10E6/UL (ref 4.4–5.9)
RBC URINE: <1 /HPF
RSV RNA SPEC NAA+PROBE: NEGATIVE
SAO2 % BLDV: 91 % (ref 70–75)
SAO2 % BLDV: 92 % (ref 70–75)
SARS-COV-2 RNA RESP QL NAA+PROBE: NEGATIVE
SODIUM SERPL-SCNC: 136 MMOL/L (ref 135–145)
SP GR UR STRIP: 1.01 (ref 1–1.03)
TROPONIN T SERPL HS-MCNC: 9 NG/L
TSH SERPL DL<=0.005 MIU/L-ACNC: 3.07 UIU/ML (ref 0.3–4.2)
UROBILINOGEN UR STRIP-MCNC: NORMAL MG/DL
WBC # BLD AUTO: 6.6 10E3/UL (ref 4–11)
WBC URINE: <1 /HPF

## 2024-08-09 PROCEDURE — 84484 ASSAY OF TROPONIN QUANT: CPT | Performed by: EMERGENCY MEDICINE

## 2024-08-09 PROCEDURE — 96365 THER/PROPH/DIAG IV INF INIT: CPT

## 2024-08-09 PROCEDURE — 96368 THER/DIAG CONCURRENT INF: CPT

## 2024-08-09 PROCEDURE — 96375 TX/PRO/DX INJ NEW DRUG ADDON: CPT

## 2024-08-09 PROCEDURE — 81001 URINALYSIS AUTO W/SCOPE: CPT | Performed by: EMERGENCY MEDICINE

## 2024-08-09 PROCEDURE — 82247 BILIRUBIN TOTAL: CPT | Performed by: EMERGENCY MEDICINE

## 2024-08-09 PROCEDURE — 258N000003 HC RX IP 258 OP 636

## 2024-08-09 PROCEDURE — 99291 CRITICAL CARE FIRST HOUR: CPT | Mod: 25

## 2024-08-09 PROCEDURE — 87637 SARSCOV2&INF A&B&RSV AMP PRB: CPT | Performed by: EMERGENCY MEDICINE

## 2024-08-09 PROCEDURE — 250N000011 HC RX IP 250 OP 636

## 2024-08-09 PROCEDURE — 84155 ASSAY OF PROTEIN SERUM: CPT | Performed by: EMERGENCY MEDICINE

## 2024-08-09 PROCEDURE — 87040 BLOOD CULTURE FOR BACTERIA: CPT | Performed by: EMERGENCY MEDICINE

## 2024-08-09 PROCEDURE — 93005 ELECTROCARDIOGRAM TRACING: CPT

## 2024-08-09 PROCEDURE — 96376 TX/PRO/DX INJ SAME DRUG ADON: CPT

## 2024-08-09 PROCEDURE — 36415 COLL VENOUS BLD VENIPUNCTURE: CPT | Performed by: EMERGENCY MEDICINE

## 2024-08-09 PROCEDURE — 84100 ASSAY OF PHOSPHORUS: CPT | Performed by: INTERNAL MEDICINE

## 2024-08-09 PROCEDURE — 85025 COMPLETE CBC W/AUTO DIFF WBC: CPT | Performed by: EMERGENCY MEDICINE

## 2024-08-09 PROCEDURE — 72125 CT NECK SPINE W/O DYE: CPT

## 2024-08-09 PROCEDURE — 250N000009 HC RX 250: Performed by: EMERGENCY MEDICINE

## 2024-08-09 PROCEDURE — 83036 HEMOGLOBIN GLYCOSYLATED A1C: CPT | Performed by: INTERNAL MEDICINE

## 2024-08-09 PROCEDURE — HZ2ZZZZ DETOXIFICATION SERVICES FOR SUBSTANCE ABUSE TREATMENT: ICD-10-PCS | Performed by: EMERGENCY MEDICINE

## 2024-08-09 PROCEDURE — 82803 BLOOD GASES ANY COMBINATION: CPT

## 2024-08-09 PROCEDURE — 82077 ASSAY SPEC XCP UR&BREATH IA: CPT | Performed by: EMERGENCY MEDICINE

## 2024-08-09 PROCEDURE — 83735 ASSAY OF MAGNESIUM: CPT | Performed by: EMERGENCY MEDICINE

## 2024-08-09 PROCEDURE — 250N000011 HC RX IP 250 OP 636: Performed by: EMERGENCY MEDICINE

## 2024-08-09 PROCEDURE — 258N000003 HC RX IP 258 OP 636: Performed by: EMERGENCY MEDICINE

## 2024-08-09 PROCEDURE — 200N000001 HC R&B ICU

## 2024-08-09 PROCEDURE — 250N000009 HC RX 250

## 2024-08-09 PROCEDURE — 70450 CT HEAD/BRAIN W/O DYE: CPT

## 2024-08-09 PROCEDURE — 71045 X-RAY EXAM CHEST 1 VIEW: CPT

## 2024-08-09 PROCEDURE — 84443 ASSAY THYROID STIM HORMONE: CPT | Performed by: EMERGENCY MEDICINE

## 2024-08-09 PROCEDURE — 82010 KETONE BODYS QUAN: CPT | Performed by: INTERNAL MEDICINE

## 2024-08-09 PROCEDURE — 84145 PROCALCITONIN (PCT): CPT | Performed by: INTERNAL MEDICINE

## 2024-08-09 RX ORDER — ADENOSINE 3 MG/ML
6 INJECTION, SOLUTION INTRAVENOUS ONCE
Status: COMPLETED | OUTPATIENT
Start: 2024-08-09 | End: 2024-08-09

## 2024-08-09 RX ORDER — DIAZEPAM 5 MG
10 TABLET ORAL EVERY 30 MIN PRN
Status: DISCONTINUED | OUTPATIENT
Start: 2024-08-09 | End: 2024-08-10

## 2024-08-09 RX ORDER — DIAZEPAM 10 MG/2ML
20 INJECTION, SOLUTION INTRAMUSCULAR; INTRAVENOUS ONCE
Status: DISCONTINUED | OUTPATIENT
Start: 2024-08-09 | End: 2024-08-10

## 2024-08-09 RX ORDER — DIAZEPAM 10 MG/2ML
INJECTION, SOLUTION INTRAMUSCULAR; INTRAVENOUS
Status: DISCONTINUED
Start: 2024-08-09 | End: 2024-08-09 | Stop reason: HOSPADM

## 2024-08-09 RX ORDER — ADENOSINE 3 MG/ML
12 INJECTION, SOLUTION INTRAVENOUS ONCE
Status: COMPLETED | OUTPATIENT
Start: 2024-08-09 | End: 2024-08-09

## 2024-08-09 RX ORDER — PIPERACILLIN SODIUM, TAZOBACTAM SODIUM 4; .5 G/20ML; G/20ML
4.5 INJECTION, POWDER, LYOPHILIZED, FOR SOLUTION INTRAVENOUS ONCE
Status: COMPLETED | OUTPATIENT
Start: 2024-08-09 | End: 2024-08-09

## 2024-08-09 RX ORDER — DIAZEPAM 10 MG/2ML
10 INJECTION, SOLUTION INTRAMUSCULAR; INTRAVENOUS ONCE
Status: COMPLETED | OUTPATIENT
Start: 2024-08-09 | End: 2024-08-09

## 2024-08-09 RX ORDER — MAGNESIUM SULFATE HEPTAHYDRATE 40 MG/ML
2 INJECTION, SOLUTION INTRAVENOUS ONCE
Status: COMPLETED | OUTPATIENT
Start: 2024-08-09 | End: 2024-08-09

## 2024-08-09 RX ORDER — DIAZEPAM 10 MG/2ML
5-10 INJECTION, SOLUTION INTRAMUSCULAR; INTRAVENOUS EVERY 30 MIN PRN
Status: DISCONTINUED | OUTPATIENT
Start: 2024-08-09 | End: 2024-08-10

## 2024-08-09 RX ORDER — DILTIAZEM HYDROCHLORIDE 5 MG/ML
10 INJECTION INTRAVENOUS ONCE
Status: COMPLETED | OUTPATIENT
Start: 2024-08-09 | End: 2024-08-09

## 2024-08-09 RX ADMIN — DIAZEPAM 10 MG: 5 INJECTION INTRAMUSCULAR; INTRAVENOUS at 22:33

## 2024-08-09 RX ADMIN — DILTIAZEM HYDROCHLORIDE 5 MG/HR: 5 INJECTION, SOLUTION INTRAVENOUS at 21:56

## 2024-08-09 RX ADMIN — SODIUM CHLORIDE 1000 ML: 9 INJECTION, SOLUTION INTRAVENOUS at 21:30

## 2024-08-09 RX ADMIN — DIAZEPAM 10 MG: 5 INJECTION INTRAMUSCULAR; INTRAVENOUS at 21:25

## 2024-08-09 RX ADMIN — PIPERACILLIN AND TAZOBACTAM 4.5 G: 4; .5 INJECTION, POWDER, FOR SOLUTION INTRAVENOUS at 21:52

## 2024-08-09 RX ADMIN — SODIUM CHLORIDE 1000 ML: 9 INJECTION, SOLUTION INTRAVENOUS at 21:39

## 2024-08-09 RX ADMIN — DILTIAZEM HYDROCHLORIDE 10 MG: 5 INJECTION INTRAVENOUS at 21:37

## 2024-08-09 RX ADMIN — ADENOSINE 12 MG: 3 INJECTION, SOLUTION INTRAVENOUS at 21:34

## 2024-08-09 RX ADMIN — DIAZEPAM 10 MG: 5 INJECTION INTRAMUSCULAR; INTRAVENOUS at 23:10

## 2024-08-09 RX ADMIN — ADENOSINE 6 MG: 3 INJECTION INTRAVENOUS at 21:31

## 2024-08-09 RX ADMIN — VANCOMYCIN HYDROCHLORIDE 2500 MG: 10 INJECTION, POWDER, LYOPHILIZED, FOR SOLUTION INTRAVENOUS at 21:53

## 2024-08-09 RX ADMIN — FOLIC ACID: 5 INJECTION, SOLUTION INTRAMUSCULAR; INTRAVENOUS; SUBCUTANEOUS at 23:00

## 2024-08-09 RX ADMIN — MAGNESIUM SULFATE HEPTAHYDRATE 2 G: 40 INJECTION, SOLUTION INTRAVENOUS at 21:36

## 2024-08-09 ASSESSMENT — LIFESTYLE VARIABLES
AUDITORY DISTURBANCES: VERY MILD HARSHNESS OR ABILITY TO FRIGHTEN
AUDITORY DISTURBANCES: VERY MILD HARSHNESS OR ABILITY TO FRIGHTEN
NAUSEA AND VOMITING: NO NAUSEA AND NO VOMITING
VISUAL DISTURBANCES: NOT PRESENT
TACTILE DISTURBANCES: MODERATE ITCHING, PINS AND NEEDLES, BURNING OR NUMBNESS
ANXIETY: MODERATELY ANXIOUS, OR GUARDED, SO ANXIETY IS INFERRED
ANXIETY: MODERATELY ANXIOUS, OR GUARDED, SO ANXIETY IS INFERRED
ORIENTATION AND CLOUDING OF SENSORIUM: ORIENTED AND CAN DO SERIAL ADDITIONS
TACTILE DISTURBANCES: MODERATE ITCHING, PINS AND NEEDLES, BURNING OR NUMBNESS
VISUAL DISTURBANCES: NOT PRESENT
TREMOR: 6
HEADACHE, FULLNESS IN HEAD: NOT PRESENT
ORIENTATION AND CLOUDING OF SENSORIUM: ORIENTED AND CAN DO SERIAL ADDITIONS
AGITATION: NORMAL ACTIVITY
NAUSEA AND VOMITING: NO NAUSEA AND NO VOMITING
TOTAL SCORE: 19
PAROXYSMAL SWEATS: 5
HEADACHE, FULLNESS IN HEAD: NOT PRESENT
AGITATION: NORMAL ACTIVITY
TOTAL SCORE: 19
PAROXYSMAL SWEATS: 5
TREMOR: 6

## 2024-08-09 ASSESSMENT — ACTIVITIES OF DAILY LIVING (ADL)
ADLS_ACUITY_SCORE: 37
ADLS_ACUITY_SCORE: 37

## 2024-08-09 NOTE — LETTER
[facility] [address] [facility]  [address]  [place] [address]  Phone: [phone]    August 14, 2024        Ravi Ahmadi  [address]   [facility] [address]        RE: Ravi Ahmadi    To Whom it may concern    Mr Ahmadi was admitted in the hospital from 8/9/2024 to 8/14/22024.  Please excuse him from work during his hospitalization .  Please contact my office for any questions.    Cristina Mcduffie MD   Hospitalist   [facility]   Ph: [phone]

## 2024-08-10 ENCOUNTER — APPOINTMENT (OUTPATIENT)
Dept: CARDIOLOGY | Facility: CLINIC | Age: 34
End: 2024-08-10
Attending: INTERNAL MEDICINE
Payer: COMMERCIAL

## 2024-08-10 LAB
ATRIAL RATE - MUSE: NORMAL BPM
B-OH-BUTYR SERPL-SCNC: 0.95 MMOL/L
DIASTOLIC BLOOD PRESSURE - MUSE: NORMAL MMHG
GLUCOSE BLDC GLUCOMTR-MCNC: 104 MG/DL (ref 70–99)
GLUCOSE BLDC GLUCOMTR-MCNC: 112 MG/DL (ref 70–99)
GLUCOSE BLDC GLUCOMTR-MCNC: 116 MG/DL (ref 70–99)
GLUCOSE BLDC GLUCOMTR-MCNC: 92 MG/DL (ref 70–99)
HBA1C MFR BLD: 6.1 %
INTERPRETATION ECG - MUSE: NORMAL
LACTATE SERPL-SCNC: 0.7 MMOL/L (ref 0.7–2)
LVEF ECHO: NORMAL
P AXIS - MUSE: NORMAL DEGREES
PHOSPHATE SERPL-MCNC: 1.5 MG/DL (ref 2.5–4.5)
PHOSPHATE SERPL-MCNC: 2.8 MG/DL (ref 2.5–4.5)
PR INTERVAL - MUSE: NORMAL MS
PROCALCITONIN SERPL IA-MCNC: 0.1 NG/ML
QRS DURATION - MUSE: 92 MS
QT - MUSE: 304 MS
QTC - MUSE: 515 MS
R AXIS - MUSE: 103 DEGREES
SYSTOLIC BLOOD PRESSURE - MUSE: NORMAL MMHG
T AXIS - MUSE: -2 DEGREES
VENTRICULAR RATE- MUSE: 173 BPM

## 2024-08-10 PROCEDURE — 250N000013 HC RX MED GY IP 250 OP 250 PS 637: Performed by: INTERNAL MEDICINE

## 2024-08-10 PROCEDURE — C8929 TTE W OR WO FOL WCON,DOPPLER: HCPCS

## 2024-08-10 PROCEDURE — 258N000003 HC RX IP 258 OP 636: Performed by: INTERNAL MEDICINE

## 2024-08-10 PROCEDURE — 93306 TTE W/DOPPLER COMPLETE: CPT | Mod: 26 | Performed by: INTERNAL MEDICINE

## 2024-08-10 PROCEDURE — 250N000009 HC RX 250: Performed by: INTERNAL MEDICINE

## 2024-08-10 PROCEDURE — 94640 AIRWAY INHALATION TREATMENT: CPT

## 2024-08-10 PROCEDURE — 250N000011 HC RX IP 250 OP 636: Performed by: INTERNAL MEDICINE

## 2024-08-10 PROCEDURE — 999N000215 HC STATISTIC HFNC ADULT NON-CPAP

## 2024-08-10 PROCEDURE — 94640 AIRWAY INHALATION TREATMENT: CPT | Mod: 76

## 2024-08-10 PROCEDURE — 200N000001 HC R&B ICU

## 2024-08-10 PROCEDURE — 36415 COLL VENOUS BLD VENIPUNCTURE: CPT | Performed by: INTERNAL MEDICINE

## 2024-08-10 PROCEDURE — 99418 PROLNG IP/OBS E/M EA 15 MIN: CPT | Performed by: INTERNAL MEDICINE

## 2024-08-10 PROCEDURE — 99207 PR APP CREDIT; MD BILLING SHARED VISIT: CPT | Performed by: INTERNAL MEDICINE

## 2024-08-10 PROCEDURE — 99223 1ST HOSP IP/OBS HIGH 75: CPT | Performed by: INTERNAL MEDICINE

## 2024-08-10 PROCEDURE — 255N000002 HC RX 255 OP 636: Performed by: INTERNAL MEDICINE

## 2024-08-10 PROCEDURE — 83605 ASSAY OF LACTIC ACID: CPT | Performed by: INTERNAL MEDICINE

## 2024-08-10 PROCEDURE — 999N000157 HC STATISTIC RCP TIME EA 10 MIN

## 2024-08-10 PROCEDURE — 84100 ASSAY OF PHOSPHORUS: CPT | Performed by: INTERNAL MEDICINE

## 2024-08-10 RX ORDER — NALOXONE HYDROCHLORIDE 0.4 MG/ML
0.2 INJECTION, SOLUTION INTRAMUSCULAR; INTRAVENOUS; SUBCUTANEOUS
Status: DISCONTINUED | OUTPATIENT
Start: 2024-08-10 | End: 2024-08-14 | Stop reason: HOSPADM

## 2024-08-10 RX ORDER — NICOTINE POLACRILEX 4 MG
15-30 LOZENGE BUCCAL
Status: DISCONTINUED | OUTPATIENT
Start: 2024-08-10 | End: 2024-08-14 | Stop reason: HOSPADM

## 2024-08-10 RX ORDER — DEXTROSE MONOHYDRATE 25 G/50ML
25-50 INJECTION, SOLUTION INTRAVENOUS
Status: DISCONTINUED | OUTPATIENT
Start: 2024-08-10 | End: 2024-08-14 | Stop reason: HOSPADM

## 2024-08-10 RX ORDER — NICOTINE POLACRILEX 4 MG
15-30 LOZENGE BUCCAL
Status: DISCONTINUED | OUTPATIENT
Start: 2024-08-10 | End: 2024-08-10

## 2024-08-10 RX ORDER — ONDANSETRON 2 MG/ML
4 INJECTION INTRAMUSCULAR; INTRAVENOUS EVERY 6 HOURS PRN
Status: DISCONTINUED | OUTPATIENT
Start: 2024-08-10 | End: 2024-08-12

## 2024-08-10 RX ORDER — ARIPIPRAZOLE 5 MG/1
5 TABLET ORAL AT BEDTIME
Status: DISCONTINUED | OUTPATIENT
Start: 2024-08-10 | End: 2024-08-13

## 2024-08-10 RX ORDER — MULTIPLE VITAMINS W/ MINERALS TAB 9MG-400MCG
1 TAB ORAL DAILY
Status: DISCONTINUED | OUTPATIENT
Start: 2024-08-10 | End: 2024-08-14 | Stop reason: HOSPADM

## 2024-08-10 RX ORDER — ONDANSETRON 4 MG/1
4 TABLET, ORALLY DISINTEGRATING ORAL EVERY 6 HOURS PRN
Status: DISCONTINUED | OUTPATIENT
Start: 2024-08-10 | End: 2024-08-12

## 2024-08-10 RX ORDER — ALBUTEROL SULFATE 90 UG/1
2 AEROSOL, METERED RESPIRATORY (INHALATION) EVERY 4 HOURS PRN
Status: DISCONTINUED | OUTPATIENT
Start: 2024-08-10 | End: 2024-08-14 | Stop reason: HOSPADM

## 2024-08-10 RX ORDER — NALOXONE HYDROCHLORIDE 0.4 MG/ML
0.4 INJECTION, SOLUTION INTRAMUSCULAR; INTRAVENOUS; SUBCUTANEOUS
Status: DISCONTINUED | OUTPATIENT
Start: 2024-08-10 | End: 2024-08-14 | Stop reason: HOSPADM

## 2024-08-10 RX ORDER — METOPROLOL TARTRATE 1 MG/ML
2.5 INJECTION, SOLUTION INTRAVENOUS EVERY 4 HOURS PRN
Status: DISCONTINUED | OUTPATIENT
Start: 2024-08-10 | End: 2024-08-14 | Stop reason: HOSPADM

## 2024-08-10 RX ORDER — FOLIC ACID 1 MG/1
1 TABLET ORAL DAILY
Status: DISCONTINUED | OUTPATIENT
Start: 2024-08-10 | End: 2024-08-14 | Stop reason: HOSPADM

## 2024-08-10 RX ORDER — FLUMAZENIL 0.1 MG/ML
0.2 INJECTION, SOLUTION INTRAVENOUS
Status: DISCONTINUED | OUTPATIENT
Start: 2024-08-10 | End: 2024-08-14 | Stop reason: HOSPADM

## 2024-08-10 RX ORDER — DIAZEPAM 10 MG/2ML
5-10 INJECTION, SOLUTION INTRAMUSCULAR; INTRAVENOUS EVERY 30 MIN PRN
Status: DISCONTINUED | OUTPATIENT
Start: 2024-08-10 | End: 2024-08-13

## 2024-08-10 RX ORDER — BENZTROPINE MESYLATE 1 MG/1
1 TABLET ORAL 2 TIMES DAILY
Status: DISCONTINUED | OUTPATIENT
Start: 2024-08-10 | End: 2024-08-14 | Stop reason: HOSPADM

## 2024-08-10 RX ORDER — SODIUM CHLORIDE 9 MG/ML
INJECTION, SOLUTION INTRAVENOUS CONTINUOUS
Status: ACTIVE | OUTPATIENT
Start: 2024-08-10 | End: 2024-08-11

## 2024-08-10 RX ORDER — DIAZEPAM 5 MG
10 TABLET ORAL EVERY 30 MIN PRN
Status: DISCONTINUED | OUTPATIENT
Start: 2024-08-10 | End: 2024-08-13

## 2024-08-10 RX ORDER — DEXTROSE MONOHYDRATE 50 MG/ML
INJECTION, SOLUTION INTRAVENOUS CONTINUOUS
Status: DISCONTINUED | OUTPATIENT
Start: 2024-08-10 | End: 2024-08-10

## 2024-08-10 RX ORDER — LORAZEPAM 2 MG/ML
2 INJECTION INTRAMUSCULAR EVERY 5 MIN PRN
Status: DISCONTINUED | OUTPATIENT
Start: 2024-08-10 | End: 2024-08-14 | Stop reason: HOSPADM

## 2024-08-10 RX ORDER — OLANZAPINE 5 MG/1
5-10 TABLET, ORALLY DISINTEGRATING ORAL EVERY 6 HOURS PRN
Status: DISCONTINUED | OUTPATIENT
Start: 2024-08-10 | End: 2024-08-14 | Stop reason: HOSPADM

## 2024-08-10 RX ORDER — DEXTROSE MONOHYDRATE 25 G/50ML
25-50 INJECTION, SOLUTION INTRAVENOUS
Status: DISCONTINUED | OUTPATIENT
Start: 2024-08-10 | End: 2024-08-10

## 2024-08-10 RX ORDER — MAGNESIUM SULFATE HEPTAHYDRATE 40 MG/ML
4 INJECTION, SOLUTION INTRAVENOUS ONCE
Status: COMPLETED | OUTPATIENT
Start: 2024-08-10 | End: 2024-08-10

## 2024-08-10 RX ORDER — SODIUM CHLORIDE 9 MG/ML
INJECTION, SOLUTION INTRAVENOUS CONTINUOUS
Status: DISCONTINUED | OUTPATIENT
Start: 2024-08-10 | End: 2024-08-10

## 2024-08-10 RX ORDER — IPRATROPIUM BROMIDE AND ALBUTEROL SULFATE 2.5; .5 MG/3ML; MG/3ML
3 SOLUTION RESPIRATORY (INHALATION)
Status: COMPLETED | OUTPATIENT
Start: 2024-08-10 | End: 2024-08-11

## 2024-08-10 RX ORDER — OLANZAPINE 5 MG/1
10 TABLET ORAL 2 TIMES DAILY
Status: DISCONTINUED | OUTPATIENT
Start: 2024-08-10 | End: 2024-08-14 | Stop reason: HOSPADM

## 2024-08-10 RX ORDER — HYDROMORPHONE HYDROCHLORIDE 1 MG/ML
.3-.5 INJECTION, SOLUTION INTRAMUSCULAR; INTRAVENOUS; SUBCUTANEOUS
Status: DISCONTINUED | OUTPATIENT
Start: 2024-08-10 | End: 2024-08-14 | Stop reason: HOSPADM

## 2024-08-10 RX ORDER — HALOPERIDOL 5 MG/ML
2.5-5 INJECTION INTRAMUSCULAR EVERY 6 HOURS PRN
Status: DISCONTINUED | OUTPATIENT
Start: 2024-08-10 | End: 2024-08-14 | Stop reason: HOSPADM

## 2024-08-10 RX ORDER — TRAZODONE HYDROCHLORIDE 50 MG/1
50 TABLET, FILM COATED ORAL
Status: DISCONTINUED | OUTPATIENT
Start: 2024-08-10 | End: 2024-08-14 | Stop reason: HOSPADM

## 2024-08-10 RX ADMIN — HUMAN ALBUMIN MICROSPHERES AND PERFLUTREN 9 ML: 10; .22 INJECTION, SOLUTION INTRAVENOUS at 13:59

## 2024-08-10 RX ADMIN — DIAZEPAM 10 MG: 5 TABLET ORAL at 21:01

## 2024-08-10 RX ADMIN — SODIUM PHOSPHATE, MONOBASIC, MONOHYDRATE AND SODIUM PHOSPHATE, DIBASIC, ANHYDROUS 15 MMOL: 142; 276 INJECTION, SOLUTION INTRAVENOUS at 06:21

## 2024-08-10 RX ADMIN — DIAZEPAM 10 MG: 5 INJECTION INTRAMUSCULAR; INTRAVENOUS at 00:09

## 2024-08-10 RX ADMIN — DIAZEPAM 10 MG: 5 INJECTION INTRAMUSCULAR; INTRAVENOUS at 01:56

## 2024-08-10 RX ADMIN — MAGNESIUM SULFATE HEPTAHYDRATE 4 G: 40 INJECTION, SOLUTION INTRAVENOUS at 19:37

## 2024-08-10 RX ADMIN — SODIUM CHLORIDE: 9 INJECTION, SOLUTION INTRAVENOUS at 00:46

## 2024-08-10 RX ADMIN — PHENOBARBITAL SODIUM 130 MG: 130 INJECTION INTRAMUSCULAR; INTRAVENOUS at 01:47

## 2024-08-10 RX ADMIN — IPRATROPIUM BROMIDE AND ALBUTEROL SULFATE 3 ML: .5; 3 SOLUTION RESPIRATORY (INHALATION) at 11:39

## 2024-08-10 RX ADMIN — SODIUM PHOSPHATE, MONOBASIC, MONOHYDRATE AND SODIUM PHOSPHATE, DIBASIC, ANHYDROUS 15 MMOL: 142; 276 INJECTION, SOLUTION INTRAVENOUS at 04:04

## 2024-08-10 RX ADMIN — SODIUM CHLORIDE: 9 INJECTION, SOLUTION INTRAVENOUS at 07:56

## 2024-08-10 RX ADMIN — FOLIC ACID 1 MG: 1 TABLET ORAL at 08:41

## 2024-08-10 RX ADMIN — BENZTROPINE MESYLATE 1 MG: 1 TABLET ORAL at 21:01

## 2024-08-10 RX ADMIN — IPRATROPIUM BROMIDE AND ALBUTEROL SULFATE 3 ML: .5; 3 SOLUTION RESPIRATORY (INHALATION) at 19:57

## 2024-08-10 RX ADMIN — TRAZODONE HYDROCHLORIDE 50 MG: 50 TABLET ORAL at 21:01

## 2024-08-10 RX ADMIN — SERTRALINE HYDROCHLORIDE 50 MG: 50 TABLET ORAL at 15:55

## 2024-08-10 RX ADMIN — SODIUM CHLORIDE: 9 INJECTION, SOLUTION INTRAVENOUS at 15:49

## 2024-08-10 RX ADMIN — THIAMINE HCL TAB 100 MG 100 MG: 100 TAB at 08:41

## 2024-08-10 RX ADMIN — ONDANSETRON 4 MG: 4 TABLET, ORALLY DISINTEGRATING ORAL at 08:49

## 2024-08-10 RX ADMIN — ONDANSETRON 4 MG: 4 TABLET, ORALLY DISINTEGRATING ORAL at 01:50

## 2024-08-10 RX ADMIN — OLANZAPINE 10 MG: 5 TABLET, FILM COATED ORAL at 21:01

## 2024-08-10 RX ADMIN — DIAZEPAM 10 MG: 5 TABLET ORAL at 23:22

## 2024-08-10 RX ADMIN — Medication 1 TABLET: at 08:41

## 2024-08-10 RX ADMIN — DIAZEPAM 10 MG: 5 INJECTION INTRAMUSCULAR; INTRAVENOUS at 05:10

## 2024-08-10 RX ADMIN — IPRATROPIUM BROMIDE AND ALBUTEROL SULFATE 3 ML: .5; 3 SOLUTION RESPIRATORY (INHALATION) at 15:06

## 2024-08-10 RX ADMIN — ARIPIPRAZOLE 5 MG: 5 TABLET ORAL at 21:02

## 2024-08-10 ASSESSMENT — ACTIVITIES OF DAILY LIVING (ADL)
ADLS_ACUITY_SCORE: 26
ADLS_ACUITY_SCORE: 22
ADLS_ACUITY_SCORE: 26
ADLS_ACUITY_SCORE: 22
ADLS_ACUITY_SCORE: 23
ADLS_ACUITY_SCORE: 22
ADLS_ACUITY_SCORE: 22
ADLS_ACUITY_SCORE: 26
ADLS_ACUITY_SCORE: 22
ADLS_ACUITY_SCORE: 26
ADLS_ACUITY_SCORE: 22
ADLS_ACUITY_SCORE: 26
ADLS_ACUITY_SCORE: 22
ADLS_ACUITY_SCORE: 26
ADLS_ACUITY_SCORE: 26
ADLS_ACUITY_SCORE: 22
ADLS_ACUITY_SCORE: 22
ADLS_ACUITY_SCORE: 26
ADLS_ACUITY_SCORE: 26
ADLS_ACUITY_SCORE: 22

## 2024-08-10 NOTE — ED PROVIDER NOTES
History   Chief Complaint:  Alcohol Intoxication, Fall, and Atrial Fib     HPI   Ravi Ahmadi is a 34 year old male with a history of DKA, diabetes and alcohol use disorder who presents to the ED for an evaluation of Alcohol intoxication,fall and atrial fib. The patient reports that he had a seizure tonight and fell down hitting the back of his head. He states that his last drink was last night and he usually has withdrawals with the first 24 hours. He complains of swelling in the back of his head, nose bleed and a headache. No active bleeding.     Independent Historian:   Patient, as per HPI.       Review of External Notes:   I have reviewed the  addiction medication note from 02/21/2024 for a history of history of alcohol use disorder, type 2 diabetes, DKA    Medications:    No current outpatient medications on file.    Past Medical History:    Past Medical History:   Diagnosis Date    Abnormal LFTs     Alcohol use disorder     Atopic dermatitis     Intermittent asthma     Tobacco use     Type 2 diabetes mellitus (H)      Past Surgical History:    Past Surgical History:   Procedure Laterality Date    [facility] AFF SURGICAL PATHOLOGY      gravel/foreign body removal in abdomen after accident      Physical Exam   Patient Vitals for the past 24 hrs:   BP Temp Temp src Pulse Resp SpO2   08/10/24 0130 -- -- -- (!) 135 29 90 %   08/10/24 0100 136/84 -- -- 92 12 97 %   08/10/24 0045 -- -- -- (!) 121 22 91 %   08/10/24 0030 -- -- -- (!) 142 27 93 %   08/10/24 0015 -- -- -- (!) 141 23 94 %   08/10/24 0000 -- 98  F (36.7  C) Oral (!) 133 21 98 %   08/09/24 2345 107/65 -- -- -- -- --   08/09/24 2330 -- -- -- (!) 149 15 98 %   08/09/24 2315 (!) 120/101 -- -- (!) 170 18 96 %   08/09/24 2312 (!) 137/118 -- -- (!) 161 19 95 %   08/09/24 2311 -- -- -- (!) 170 14 96 %   08/09/24 2308 -- -- -- (!) 160 13 97 %   08/09/24 2305 (!) 165/140 -- -- (!) 152 16 98 %   08/09/24 2302 (!) 105/98 -- -- (!) 141 22 98 %   08/09/24 2300 -- -- --  (!) 149 19 94 %   08/09/24 2244 -- 98.3  F (36.8  C) Temporal -- -- --   08/09/24 2242 -- -- -- (!) 165 12 95 %   08/09/24 2240 (!) 149/82 -- -- (!) 168 14 95 %   08/09/24 2235 (!) 169/125 -- -- (!) 160 13 96 %   08/09/24 2231 -- -- -- (!) 149 (!) 47 98 %   08/09/24 2203 -- -- -- (!) 160 28 97 %   08/09/24 2157 -- -- -- (!) 147 17 97 %   08/09/24 2155 (!) 137/98 -- -- (!) 151 20 97 %   08/09/24 2141 -- -- -- (!) 168 25 97 %   08/09/24 2138 128/75 -- -- (!) 170 26 94 %   08/09/24 2134 124/87 -- -- (!) 168 26 95 %   08/09/24 2123 -- -- -- (!) 174 11 93 %   08/09/24 2113 (!) 148/90 -- -- 75 -- 91 %      General: Altered, appears disheveled and unkempt. Cooperative.     In mild distress  HEENT:  Head:  Atraumatic  Ears:  External ears are normal  Mouth/Throat:  Oropharynx is without erythema or exudate and mucous membranes are dry.   Eyes:   Conjunctivae normal and EOM are normal. No scleral icterus.  CV:  Tachycardic rate, irregular rhythm, normal heart sounds and radial pulses are 2+ and symmetric.  Resp:  Breath sounds are clear bilaterally    Non-labored, no retractions or accessory muscle use  GI:  Abdomen is soft, no distension, no tenderness. No rebound or guarding.    MS:  Normal range of motion. No edema.    Back atraumatic.    No midline cervical, thoracic, or lumbar tenderness  Skin:  Warm and dry.  No rash or lesions noted.  Neuro:   Altered, ?post ictal. Globally weak. GCS: 14  Psych: Unable to assess due to concerns for post ictal status    Emergency Department Course     EKG  ECG taken at 2123, ECG read at 2137  High HR  Atrial fibrillation with rapid ventricular response with premature ventricular or aberrantly conducted complexes  Rightward axis  Marked ST abnormality, possible inferior subendocardial injury    No significant change as compared to prior, dated 10/23/23.  Rate 173 bpm. OR interval * ms. QRS duration 92 ms. QT/QTc 304/515 ms. P-R-T axes * 103 -2.     Imaging:  CT Cervical Spine w/o  Contrast   Final Result   IMPRESSION:   HEAD CT:   1.  No acute intracranial process.      CERVICAL SPINE CT:   1.  No CT evidence for acute fracture or post traumatic subluxation.      CT Head w/o Contrast   Final Result   IMPRESSION:   HEAD CT:   1.  No acute intracranial process.      CERVICAL SPINE CT:   1.  No CT evidence for acute fracture or post traumatic subluxation.      XR Chest Port 1 View   Final Result   IMPRESSION: Stable size of cardiomediastinal silhouette. There are some subtle hazy opacities in the left lower lobe, infectious/inflammatory process is possible. Right lung is clear. No definite pleural effusion or pneumothorax. No acute bony    abnormality.         Report per radiology    Laboratory:  Labs Ordered and Resulted from Time of ED Arrival to Time of ED Departure   COMPREHENSIVE METABOLIC PANEL - Abnormal       Result Value    Sodium 136      Potassium 3.6      Carbon Dioxide (CO2) 22      Anion Gap 25 (*)     Urea Nitrogen 7.5      Creatinine 0.65 (*)     GFR Estimate >90      Calcium 9.5      Chloride 89 (*)     Glucose 156 (*)     Alkaline Phosphatase 72      AST         (*)     Protein Total 7.7      Albumin 4.7      Bilirubin Total 1.5 (*)    MAGNESIUM - Abnormal    Magnesium 1.5 (*)    ROUTINE UA WITH MICROSCOPIC REFLEX TO CULTURE - Abnormal    Color Urine Light Yellow      Appearance Urine Clear      Glucose Urine Negative      Bilirubin Urine Negative      Ketones Urine Negative      Specific Gravity Urine 1.011      Blood Urine Trace (*)     pH Urine 6.5      Protein Albumin Urine 50 (*)     Urobilinogen Urine Normal      Nitrite Urine Negative      Leukocyte Esterase Urine Negative      Mucus Urine Present (*)     RBC Urine <1      WBC Urine <1      Hyaline Casts Urine 1     CBC WITH PLATELETS AND DIFFERENTIAL - Abnormal    WBC Count 6.6      RBC Count 5.11      Hemoglobin 16.1      Hematocrit 47.3      MCV 93      MCH 31.5      MCHC 34.0      RDW 12.6      Platelet Count  123 (*)     % Neutrophils 79      % Lymphocytes 11      % Monocytes 9      % Eosinophils 0      % Basophils 1      % Immature Granulocytes 1      NRBCs per 100 WBC 0      Absolute Neutrophils 5.2      Absolute Lymphocytes 0.7 (*)     Absolute Monocytes 0.6      Absolute Eosinophils 0.0      Absolute Basophils 0.1      Absolute Immature Granulocytes 0.0      Absolute NRBCs 0.0     ISTAT GASES LACTATE VENOUS POCT - Abnormal    Lactic Acid POCT 9.5 (*)     Bicarbonate Venous POCT 24      O2 Sat, Venous POCT 91 (*)     pCO2 Venous POCT 33 (*)     pH Venous POCT 7.46 (*)     pO2 Venous POCT 58 (*)     Base Excess/Deficit (+/-) POCT 0.0     AST - Abnormal     (*)    ISTAT GASES LACTATE VENOUS POCT - Abnormal    Lactic Acid POCT 2.0      Bicarbonate Venous POCT 29 (*)     O2 Sat, Venous POCT 92 (*)     pCO2 Venous POCT 47      pH Venous POCT 7.41      pO2 Venous POCT 64 (*)     Base Excess/Deficit (+/-) POCT 4.0 (*)    TROPONIN T, HIGH SENSITIVITY - Normal    Troponin T, High Sensitivity 9     TSH WITH FREE T4 REFLEX - Normal    TSH 3.07     ETHYL ALCOHOL LEVEL - Normal    Alcohol ethyl <0.01     INFLUENZA A/B, RSV, & SARS-COV2 PCR - Normal    Influenza A PCR Negative      Influenza B PCR Negative      RSV PCR Negative      SARS CoV2 PCR Negative     LACTIC ACID WHOLE BLOOD   BLOOD CULTURE        Procedures     Emergency Department Course & Assessments:       Interventions:  Medications   diltiazem (CARDIZEM) 125 mg in sodium chloride 0.9 % 125 mL infusion (10 mg/hr Intravenous Rate/Dose Change 8/10/24 0110)   diazepam (VALIUM) injection 20 mg ( Intravenous Canceled Entry 8/9/24 0787)   OLANZapine zydis (zyPREXA) ODT tab 5-10 mg (has no administration in time range)     Or   haloperidol lactate (HALDOL) injection 2.5-5 mg (has no administration in time range)   flumazenil (ROMAZICON) injection 0.2 mg (has no administration in time range)   melatonin tablet 5 mg (has no administration in time range)   glucose gel  15-30 g (has no administration in time range)     Or   dextrose 50 % injection 25-50 mL (has no administration in time range)     Or   glucagon injection 1 mg (has no administration in time range)   diazepam (VALIUM) tablet 10 mg ( Oral See Alternative 8/10/24 0156)     Or   diazepam (VALIUM) injection 5-10 mg (10 mg Intravenous $Given 8/10/24 0156)   thiamine (B-1) tablet 100 mg (has no administration in time range)   folic acid (FOLVITE) tablet 1 mg (has no administration in time range)   multivitamin w/minerals (THERA-VIT-M) tablet 1 tablet (has no administration in time range)   ondansetron (ZOFRAN ODT) ODT tab 4 mg (4 mg Oral $Given 8/10/24 0150)     Or   ondansetron (ZOFRAN) injection 4 mg ( Intravenous See Alternative 8/10/24 0150)   HYDROmorphone (PF) (DILAUDID) injection 0.3-0.5 mg (has no administration in time range)   LORazepam (ATIVAN) injection 2 mg (has no administration in time range)   sodium chloride 0.9 % infusion ( Intravenous $New Bag 8/10/24 0046)   naloxone (NARCAN) injection 0.2 mg (has no administration in time range)     Or   naloxone (NARCAN) injection 0.4 mg (has no administration in time range)     Or   naloxone (NARCAN) injection 0.2 mg (has no administration in time range)     Or   naloxone (NARCAN) injection 0.4 mg (has no administration in time range)   diazepam (VALIUM) injection 10 mg (10 mg Intravenous $Given 8/9/24 2125)   adenosine (ADENOCARD) injection 6 mg (6 mg Intravenous $Given 8/9/24 2131)   sodium chloride 0.9% BOLUS 1,000 mL (0 mLs Intravenous Stopped 8/9/24 2241)   magnesium sulfate 2 g in 50 mL sterile water intermittent infusion (0 g Intravenous Stopped 8/9/24 2150)   piperacillin-tazobactam (ZOSYN) 4.5 g vial to attach to  mL bag (0 g Intravenous Stopped 8/9/24 2236)   sodium chloride 0.9% BOLUS 1,000 mL (0 mLs Intravenous Stopped 8/9/24 2246)   adenosine (ADENOCARD) injection 12 mg (12 mg Intravenous $Given 8/9/24 8527)   diltiazem (CARDIZEM) injection 10 mg  (10 mg Intravenous $Given 8/9/24 2137)   vancomycin (VANCOCIN) 2,500 mg in 0.9% NaCl 500 mL intermittent infusion (2,500 mg Intravenous $New Bag 8/9/24 2153)   sodium chloride 0.9 % 1,000 mL with Infuvite Adult 10 mL, thiamine 500 mg, folic acid 1 mg infusion ( Intravenous $New Bag 8/9/24 2300)   PHENobarbital (LUMINAL) 130 mg in sodium chloride 0.9 % 100 mL intermittent infusion (130 mg Intravenous $Given 8/10/24 0147)        Independent Interpretation (X-rays, CTs, rhythm strip):  I independently reviewed CT imaging of the head which shows no acute cranial hemorrhage.    Consultations/Discussion of Management or Tests:   ED Course as of 08/10/24 0203   Fri Aug 09, 2024   2116 I have obtained history and evaluated the patient.      2242 I spoke with Dr. Thompson of the hospitalist service who agreed to admission.       Social Determinants of Health affecting care:   None    Disposition:  The patient was admitted to the hospital under the care of Dr. Thompson.     Impression & Plan    CMS Diagnoses: [id number],,,1)@  Sepsis ED evaluation   The patient has signs of septic shock as evidenced by:  1. Presence of Sepsis, AND  2. Lactic Acidosis with value greater than or equal to 4     Time septic shock diagnosis confirmed = 2124 08/09/24   as this was the time when Lactate was resulted and the level was greater than or equal to 4    3 Hour Septic Shock Bundle Completion:  1. Initial Lactic Acid Result:   Recent Labs   Lab Test 08/09/24  2251 08/09/24 2124 09/04/23  1146   LACT 2.0 9.5* 1.0     2. Blood Cultures before Antibiotics: Yes  Note: Due to a national blood culture bottle shortage, reduced blood cultures may have been drawn on this patient.  3. Broad Spectrum Antibiotics Administered:  yes       Anti-infectives (From admission through now)      Start     Dose/Rate Route Frequency Ordered Stop    08/09/24 2145  vancomycin (VANCOCIN) 2,500 mg in 0.9% NaCl 500 mL  "intermittent infusion         2,500 mg  over 120 Minutes Intravenous ONCE 08/09/24 2142 08/09/24 2353    08/09/24 2140  piperacillin-tazobactam (ZOSYN) 4.5 g vial to attach to  mL bag        Note to Pharmacy: For [facility], [facility] and [facility]: For Zosyn-naive patients, use the \"Zosyn initial dose + extended infusion\" order panel.    4.5 g  over 30 Minutes Intravenous ONCE 08/09/24 2135 08/09/24 2236            4. IF 30 mL/kg bolus criteria met based on:  -Lactate > 4  OR  -Initial Hypotension:  Definition:  2 low BP readings (SBP <90, MAP <65, or decrease > 40 from baseline due to infection) within 3 hrs of each other during the time period of 6 hrs before and 3 hrs  after time zero  THEN: Fluid volume administered in ED:  Full 30 mL/kg bolus given (see amount below). (Ideal body weight)    BMI Readings from Last 1 Encounters:   02/21/24 31.19 kg/m      30 mL/kg fluids based on weight: Patient actual weight not available.  30 mL/kg fluids based on IBW (must be >= 60 inches tall): Patient ideal weight not available.  Septic Shock reassessment:  Repeat Lactic Acid Level: ordered by reflex for 3 hours after initial lactic acid collection.  Repeat lactate 2.0.  MAP>65 after initial IVF bolus, will continue to monitor fluid status and vital signs  I attest to having performed a repeat sepsis exam and assessment of perfusion at 2235 and the results demonstrate improved perfusion.     Medical Decision Making:  Patient is a 34-year-old male with a history of alcohol abuse and psychosis who presents with a witnessed seizure-like event.  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  Patient arrived with EMS postictal.  Patient was tachycardic on arrival with a elevated heart rate in the 170s.  Initial EKG was concerning for atrial fibrillation with rapid ventricular rate versus SVT.  I suspect patient is quite " dehydrated in the setting of alcohol withdrawal and potential alcohol withdrawal seizure.  Patient had a broad workup performed out of concern for metabolic or electrolyte abnormalities as well as potential infectious etiologies given the seizure and altered mental status today.  Suspect the majority of his symptoms are related to alcohol withdrawal seizure.  Patient did initially receive 2 L of IV fluids and IV Valium out of concern for alcohol withdrawal.  I did provide a dose of 6 mg of IV adenosine with no change in heart rhythm.  A 12 mg dose of IV adenosine was then administered and there was a brief episode of slowing of the rhythm which appeared to show atrial fibrillation.  Lower concern for SVT at this time.  Given unclear timing of when the patient developed this elevated irregular rhythm, I do not feel comfortable cardioverting the patient at this time.  His blood pressure has otherwise remained stable at this time.  Given clinical stability, no indication for emergent electrical cardioversion at this time.  We will trial a rate control medication with diltiazem while we continue to resuscitate the patient from his suspected alcohol withdrawal symptoms.  Patient has required additional doses of Valium to help treat his ongoing withdrawal symptoms.  Thankfully CT imaging of the head and cervical spine was negative for acute intracranial processes or acute fracture or posttraumatic subluxation of the cervical spine.  Chest x-ray showed a stable cardiomediastinal silhouette.  There was a subtle hazy opacity in the left lower lobe that could be infectious or inflammatory in nature.  Given the patient's initial lactate was quite elevated greater than 9.5 concern for potential septic etiologies.  Lower concern for septic shock but cannot fully excluded at this time and so a single blood culture was obtained and patient was given broad-spectrum antibiotics with Zosyn and vancomycin.  Patient's repeat lactate  after initial resuscitative efforts returned normal at 2.0.  I suspect the patient may have a prolonged alcohol withdrawal course and will require ongoing benzodiazepine medications upon admission.  Will plan to admit to the intensive care unit this evening under the care of Dr. Thompson.  I spoke with Dr. Thompson of the hospitalist service who agreed to admission.    Of note there has been no repeat seizure activity while in the emergency department.    Critical Care time:  was 45 minutes for this patient excluding procedures.    Diagnosis:    ICD-10-CM    1. Atrial fibrillation with RVR (H)  I48.91       2. Alcohol withdrawal seizure with complication (H)  F10.939     R56.9       3. Seizure (H)  R56.9       4. Closed head injury, initial encounter  S09.90XA          Discharge Medications:  Current Discharge Medication List         8/9/2024   Piyush Welch MD White, Scott, MD  08/10/24 0203       Piyush Welch MD  08/10/24 0205

## 2024-08-10 NOTE — CONSULTS
[facility]    Neurology Consultation    Ravi Ahmadi MRN# [id number]   YOB: 1990 Age: 34 year old    Code Status:Full Code   Date of Admission: 8/9/2024  Date of Consult:08/10/2024                                                                                       Assessment and Plan:                                         #.  Alcohol withdrawal seizures  -- S/p 130 mg of IV phenobarbital at 1 AM, as he has had no further seizures and is alert and oriented with no concern for any subclinical seizures, would not recommend any further dosing but would instead follow the [facility] protocol.  --Continue [facility] protocol  --CT head negative for any acute abnormalities  --Recommend supplementation with thiamine, folate given alcohol use disorder  --Recommend referral to chemical dependency  -Patient instructed of no driving for 3 months per [place] state law following any event of unknown loss of conscious or seizure with loss of consciousness.    General Neurology service will sign off as no additional neurologic evaluation or management from our service is required at this time.  Please contact General Neurology service if questions related to neurologic status or follow up needed during this hospitalization.    ----------------------------------------------------------------------------------  ----------------------------------------------------------------------------------  Reason for consult: as above       Chief Complaint:   Alcohol withdrawal seizures           History of Present Illness:   This patient is a 34 year old male who presents with seizures.  Has history of type 2 diabetes, depression versus bipolar, and asthma.    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  Last drink was the evening prior.  Recently has been drinking 1.75 L of bourbon a day.  While in the ER he was tachycardic with lactic acid elevated but  Pulmonary Progress Note  [facility]      Patient - Fabiola Lopez,  Age - 74 y.o.    - 1951      Room Number -    MRN -  [id number]   [facility] # - [de-identified]  Date of Admission -  2018  7:29 PM    Follow-up: Acute exacerbation of COPD    Consulting Bettie Borja MD  Primary Care Physician - Manuel Méndez MD     SUBJECTIVE   Patient seen and examined at bedside this morning. Vital signs currently stable. Currently receiving hemodialysis with plans to remove 1.5 L over 3 hours. She requested to be off BiPAP early this morning. He was placed on 4 L nasal cannula and then became more short of breath with sats dropping to 85%. Seems to be more agitated today. Currently in restraints. Pan cultures negative thus far. OBJECTIVE   VITALS    height is 5' 8\" (1.727 m) and weight is 134 lb 11.2 oz (61.1 kg). His temperature is 97 °F (36.1 °C). His blood pressure is 142/66 (abnormal) and his pulse is 82. His respiration is 17 and oxygen saturation is 92%. Body mass index is 20.48 kg/m².   Temperature Range: Temp: 97 °F (36.1 °C) Temp  Av.9 °F (36.6 °C)  Min: 96.6 °F (35.9 °C)  Max: 99.1 °F (37.3 °C)  BP Range:  Systolic (94LAI), KBM:026 , Min:97 , RYB:520     Diastolic (56EQV), OON:71, Min:51, Max:86    Pulse Range: Pulse  Av.8  Min: 78  Max: 90  Respiration Range: Resp  Av.7  Min: 17  Max: 27  Current Pulse Ox[de-identified]  SpO2: 92 %  24HR Pulse Ox Range:  SpO2  Av %  Min: 87 %  Max: 100 %  Oxygen Amount and Delivery: O2 Flow Rate (L/min): 4 L/min    Wt Readings from Last 3 Encounters:   18 134 lb 11.2 oz (61.1 kg)   16 133 lb (60.3 kg)   11/23/15 134 lb 3.2 oz (60.9 kg)       I/O (24 Hours)    Intake/Output Summary (Last 24 hours) at 18 0856  Last data filed at 18 0500   Gross per 24 hour   Intake          1722.17 ml   Output             2605 ml   Net improving from 9.5-2.  Chest x-ray with left lower lobe hazy opacities.  He was admitted to the ICU for monitoring for alcohol withdrawal seizures.  Is gone through alcohol withdrawal seizures in the past.  Was given one-time dose of phenobarbital as well as Valium per CIWAS protocol.  CT head negative for any acute processes or abnormalities.    Patient reports no history of any prior seizures.  States for about 3 to 4 years would have occasional muscle jerks, commonly when sleeping but on occasion while at work.  He states sometimes this would come on with withdrawals from alcohol.  However has never had alcohol seizures like the when he had yesterday.  Denies any history of head concussions or trauma prior to the fall he had yesterday.  Denies any birth trauma, NICU stay, family history of seizures.  Denies any weakness, vision changes, speech abnormalities, but reports some numbness in the base of his feet.  He states the numbness is come on the last year.  He does have diabetes that has been recently diagnosed.  Reports feeling occasional chills and then gets hot.    MEDICAL DOCUMENTATION REVIEWED: H&P           Past Medical History:     Past Medical History:   Diagnosis Date    Abnormal LFTs     suspected secondary to alcohol use    Alcohol use disorder     with hx of hospitalizations for withdrawal    Atopic dermatitis     Intermittent asthma     exercise    Tobacco use     Type 2 diabetes mellitus (H)     Diagnosed during hosptial stay in 8/2023 with A1c 8.4         Past Surgical History:     Past Surgical History:   Procedure Laterality Date    CL AFF SURGICAL PATHOLOGY      gravel/foreign body removal in abdomen after accident          Social History:     Social History     Socioeconomic History    Marital status: Single   Tobacco Use    Smoking status: Every Day     Current packs/day: 1.00     Average packs/day: 1 pack/day for 7.0 years (7.0 ttl pk-yrs)     Types: Cigarettes    Smokeless tobacco: Never    Vaping Use    Vaping status: Never Used   Substance and Sexual Activity    Alcohol use: Yes     Alcohol/week: 12.5 standard drinks of alcohol     Types: 15 Standard drinks or equivalent per week    Drug use: Not Currently     Types: Marijuana    Sexual activity: Yes     Partners: Female     Birth control/protection: Condom         Family History:     Family History   Problem Relation Age of Onset    Asthma Mother     Diabetes Paternal Grandmother     Respiratory Maternal Grandfather     Family History Negative Sister           Home Medications:     Prior to Admission Medications   Prescriptions Last Dose Informant Patient Reported? Taking?   ARIPiprazole (ABILIFY) 5 MG tablet   No No   Sig: Take 1 tablet (5 mg) by mouth at bedtime   Alcohol Swabs PADS   No No   Sig: Use to swab the area of the injection or steven as directed  Per insurance coverage   OLANZapine (ZYPREXA) 10 MG tablet   No No   Sig: Take 1 tablet (10 mg) by mouth 2 times daily Take in morning and at bedtime   OLANZapine (ZYPREXA) 5 MG tablet   No No   Sig: Take 1 tablet (5 mg) by mouth daily as needed (hallucinations)   Sharps Container MISC   No No   Sig: Use as directed to dispose of needles, lancets and other sharps   Vitamin D3 (CHOLECALCIFEROL) 25 mcg (1000 units) tablet   No No   Sig: Take 1 tablet (25 mcg) by mouth daily   acamprosate (CAMPRAL) 333 MG EC tablet   No No   Sig: Take 2 tablets (666 mg) by mouth 3 times daily   benztropine (COGENTIN) 1 MG tablet   No No   Sig: Take 1 tablet (1 mg) by mouth 2 times daily   blood glucose (NO BRAND SPECIFIED) lancets standard   No No   Sig: To use to test glucose level in the blood.  Use to test blood sugar  4  times daily as directed. To accompany glucose monitor brands per insurance coverage.   blood glucose (NO BRAND SPECIFIED) test strip   No No   Sig: Use to test blood sugar 4 times daily or as directed.   blood glucose (NO BRAND SPECIFIED) test strip   No No   Sig: To use to test glucose level  in the blood. Use to test blood sugar  4 times daily as directed. To accompany glucose monitor brands per insurance coverage.   blood glucose calibration (NO BRAND SPECIFIED) solution   No No   Sig: Used to calibrate the blood glucose monitor as needed and as directed.  To accompany  blood glucose brands per insurance coverage   blood glucose monitoring (NO BRAND SPECIFIED) meter device kit   No No   Sig: Use as directed Per insurance coverage   budesonide-formoterol (SYMBICORT) 160-4.5 MCG/ACT Inhaler   Yes No   Sig: Inhale 2 puffs into the lungs 2 times daily   fluticasone-vilanterol (BREO ELLIPTA) 100-25 MCG/ACT inhaler   No No   Sig: Inhale 1 puff into the lungs daily   folic acid (FOLVITE) 1 MG tablet   No No   Sig: Take 1 tablet (1 mg) by mouth daily   glucose (BD GLUCOSE) 4 g chewable tablet   No No   Sig: Take 4 tablets by mouth every 15 minutes as needed for low blood sugar   glucose (BD GLUCOSE) 4 g chewable tablet   No No   Sig: Take 1 tablet by mouth every hour as needed for low blood sugar   insulin glargine (LANTUS PEN) 100 UNIT/ML pen   No No   Sig: Inject 5 Units Subcutaneous at bedtime   insulin pen needle (32G X 4 MM) 32G X 4 MM miscellaneous   No No   Sig: Use as directed by provider Per insurance coverage   lisinopril (ZESTRIL) 10 MG tablet   No No   Sig: Take 1 tablet (10 mg) by mouth daily   multivitamin w/minerals (THERA-VIT-M) tablet   No No   Sig: Take 1 tablet by mouth daily   naltrexone (DEPADE/REVIA) 50 MG tablet   No No   Sig: Take 1 tablet (50 mg) by mouth daily   nicotine (NICODERM CQ) 14 MG/24HR 24 hr patch   No No   Sig: Place 1 patch onto the skin daily   pantoprazole (PROTONIX) 40 MG EC tablet   No No   Sig: Take 1 tablet (40 mg) by mouth every morning (before breakfast)   sertraline (ZOLOFT) 100 MG tablet   No No   Sig: Take 1 tablet (100 mg) by mouth daily   sertraline (ZOLOFT) 50 MG tablet   No No   Sig: Take 1 tablet (50 mg) by mouth daily For seven days then increase to 100mg  independently by me. Above discussed and I agree with resident note except where indicated in the EMR revision history. Also see my additional comments and changes indicated by discrete font, text color, italics, and/or initials. Labs, cultures, and radiographs where available were reviewed.        #1 we'll check repeat ABG after dialysis done  2 continue steroids and aerosols  #3 chest x-ray shows no significant changes therefore we'll continue antibiotics  4 he is a DNR CCA, no intubation this is been discussed with him multiple times  5 check H&H's every 12  Continue ICU monitoring  CCT =35 min  Electronically signed by Raza Peck MD on 2/13/2018 at 10:33 AM   thiamine (B-1) 100 MG tablet   No No   Sig: Take 1 tablet (100 mg) by mouth daily   traZODone (DESYREL) 50 MG tablet   No No   Sig: Take 1 tablet (50 mg) by mouth nightly as needed for sleep      Facility-Administered Medications: None          Allergy:   No Known Allergies       Inpatient Medications:   Scheduled Meds:  Current Facility-Administered Medications   Medication Dose Route Frequency Provider Last Rate Last Admin    folic acid (FOLVITE) tablet 1 mg  1 mg Oral Daily Kemal Thompson MD   1 mg at 08/10/24 0841    insulin aspart (NovoLOG) injection (RAPID ACTING)  1-7 Units Subcutaneous TID AC Kemal Thompson MD        insulin aspart (NovoLOG) injection (RAPID ACTING)  1-5 Units Subcutaneous At Bedtime Kemal Thompson MD        multivitamin w/minerals (THERA-VIT-M) tablet 1 tablet  1 tablet Oral Daily Kemal Thompson MD   1 tablet at 08/10/24 0841    thiamine (B-1) tablet 100 mg  100 mg Oral Daily Kemal Thompson MD   100 mg at 08/10/24 0841     PRN Meds:   Current Facility-Administered Medications   Medication Dose Route Frequency Provider Last Rate Last Admin    albuterol (PROVENTIL HFA/VENTOLIN HFA) inhaler  2 puff Inhalation Q4H PRN Kemal Thompson MD        glucose gel 15-30 g  15-30 g Oral Q15 Min PRN Kemal Thompson MD        Or    dextrose 50 % injection 25-50 mL  25-50 mL Intravenous Q15 Min PRN Kemal Thompson MD        Or    glucagon injection 1 mg  1 mg Subcutaneous Q15 Min PRN Kemal Thompson MD        diazepam (VALIUM) tablet 10 mg  10 mg Oral Q30 Min PRN Kemal Thompson MD        Or    diazepam (VALIUM) injection 5-10 mg  5-10 mg Intravenous Q30 Min PRN Kemal Thompson MD   10 mg at 08/10/24 0510    flumazenil (ROMAZICON) injection 0.2 mg  0.2 mg Intravenous q1 min prn Kemal Thompson MD        OLANZapine zydis (zyPREXA) ODT tab 5-10 mg  5-10 mg Oral Q6H PRN Kemal Thompson MD         Or    haloperidol lactate (HALDOL) injection 2.5-5 mg  2.5-5 mg Intravenous Q6H PRN Kemal Thompson MD        HYDROmorphone (PF) (DILAUDID) injection 0.3-0.5 mg  0.3-0.5 mg Intravenous Q2H PRN Kemal Thompson MD        LORazepam (ATIVAN) injection 2 mg  2 mg Intravenous Q5 Min PRN Kemal Thompson MD        melatonin tablet 5 mg  5 mg Oral QPM PRN Kemal Thompson MD        metoprolol (LOPRESSOR) injection 2.5 mg  2.5 mg Intravenous Q4H PRN Kemal Thompson MD        naloxone (NARCAN) injection 0.2 mg  0.2 mg Intravenous Q2 Min PRN Kemal Thompson MD        Or    naloxone (NARCAN) injection 0.4 mg  0.4 mg Intravenous Q2 Min PRN Kemal Thompson MD        Or    naloxone (NARCAN) injection 0.2 mg  0.2 mg Intramuscular Q2 Min PRN Kemal Thompson MD        Or    naloxone (NARCAN) injection 0.4 mg  0.4 mg Intramuscular Q2 Min PRN Kemal Thompson MD        ondansetron (ZOFRAN ODT) ODT tab 4 mg  4 mg Oral Q6H PRN Kemal Thompson MD   4 mg at 08/10/24 0849    Or    ondansetron (ZOFRAN) injection 4 mg  4 mg Intravenous Q6H PRN Kemal Thompson MD                 Physical Exam:   Physical Exam   Vitals:  Height:Data Unavailable  Weight:238 lbs 15.66 oz   Temp: 98.8  F (37.1  C) Temp src: Oral BP: 130/86 Pulse: 72   Resp: 12 SpO2: 95 % O2 Device: High Flow Nasal Cannula (HFNC) Oxygen Delivery: 45 LPM  General Appearance: No acute distress, normal body habitus  Neuro Exam:  Mental Status Exam: Alert and oriented to situation, hospital, month, year. Language and speech intact.  Able to follow simple and cross body commands fund of knowledge normal.  Cranial Nerves: Vision/visual fields intact to finger counting. Pupils are equal and reactive to light. Extraocular movements intact. Facial strength and sensation is normal. No jaw or tongue deviation.  Motor: Motor tone and bulk are intact in extremities.  Full strength in the  bilateral upper and lower extremities.  Tremors noted in the bilateral upper extremities at rest and with action.  Reflexes: Diminished reflexes of 1/4 throughout.  Plantar signs normal.  Sensory: Vibration and cold intact in all 4 extremities.  Patient is somewhat hypersensitive to pinprick at the feet.  Coordination: Coordination intact.  Extremities: No clubbing, no cyanosis, no edema          Data:   ROUTINE IP LABS   CBC RESULTS:     Recent Labs   Lab 08/09/24 2129   WBC 6.6   RBC 5.11   HGB 16.1   HCT 47.3   *     Basic Metabolic Panel:   Recent Labs   Lab Test 08/10/24  0724 08/09/24  2129 12/12/23  1156 12/08/23  0750 12/08/23  0637 12/07/23  2210 12/07/23  1345   NA  --  136  --   --  139  --  137   POTASSIUM  --  3.6  --   --  3.6  --  3.6   CHLORIDE  --  89*  --   --  101  --  98   CO2  --  22  --   --  27  --  22   BUN  --  7.5  --   --  17.2  --  10.3   CR  --  0.65*  --   --  0.89  --  0.73   * 156* 124*   < > 103*   < > 138*   RAEANN  --  9.5  --   --  10.1*  --  10.4*    < > = values in this interval not displayed.     Liver panel:  Recent Labs   Lab Test 08/09/24 2259 08/09/24 2129 12/12/23  1217 12/08/23 0637 12/07/23  1345 10/25/23  0949   PROTTOTAL  --  7.7 7.5 7.5 8.3 8.0   ALBUMIN  --  4.7 4.1 4.0 4.5 4.6   BILITOTAL  --  1.5* 0.9 1.4* 1.7* 2.2*   ALKPHOS  --  72 87 82 96 69   *  --  33 50* 49* 74*   ALT  --  159* 47 48 43 86*     Lipid Profile:  Recent Labs   Lab Test 12/08/23 0637 06/26/23  1341   CHOL 235* 242*   HDL 55 56   * 150*   TRIG 100 179*     Thyroid Panel:  Recent Labs   Lab Test 08/09/24  2129 12/08/23  0637 10/24/23  1237   TSH 3.07 1.82 2.88      Vitamin B12: No lab results found.        IMAGING:   Independent interpretation of the following studies by myself as part of today's encounter.     NCHCT: Personally reviewed and negative for intracranial hemorrhage, overt hypodensities, gray-white matter de-differentiation, or dense vessel  signs.    8/9/24:    HEAD CT:   INTRACRANIAL CONTENTS: No intracranial hemorrhage, extraaxial collection, or mass effect.  No CT evidence of acute infarct. Normal parenchymal attenuation. Normal ventricles and sulci.      VISUALIZED ORBITS/SINUSES/MASTOIDS: No intraorbital abnormality. Mild mucosal thickening scattered about the paranasal sinuses. No middle ear or mastoid effusion.     BONES/SOFT TISSUES: No acute abnormality.     CERVICAL SPINE CT:   VERTEBRA: Normal vertebral body heights and alignment. No fracture or posttraumatic subluxation.      CANAL/FORAMINA: No canal or neural foraminal stenosis.     PARASPINAL: No extraspinal abnormality. Visualized lung fields are clear.                                                                      IMPRESSION:  HEAD CT:  1.  No acute intracranial process.     CERVICAL SPINE CT:  1.  No CT evidence for acute fracture or post traumatic subluxation.      Dang Felipe M.D.  [facility]  Office [phone]

## 2024-08-10 NOTE — PLAN OF CARE
Goal Outcome Evaluation:       Patient arrived from ED around 0030. Alert and oriented, but impulsive. One time dose of phenobarb given. Cardizem weaned off after HR converted to NSR. HR still rises to 130's when moving around. 02 needs increasing, switched to HiFlo. Vallum given per [facility]AUHTEMOCWAS, scoring 7-18. PRN zofran given once. Voiding in urinal. Phos being replaced this AM.         Problem: Adult Inpatient Plan of Care  Goal: Plan of Care Review  Description:   Outcome: Not Progressing  Goal: Patient-Specific Goal (Individualized)  Description:   Outcome: Not Progressing  Goal: Absence of Hospital-Acquired Illness or Injury  Outcome: Not Progressing  Intervention: Identify and Manage Fall Risk  Recent Flowsheet Documentation  Taken 8/10/2024 0400 by Camilla Ulloa RN  Safety Promotion/Fall Prevention:   clutter free environment maintained   safety round/check completed  Taken 8/10/2024 0000 by Camilla Ulloa RN  Safety Promotion/Fall Prevention:   clutter free environment maintained   safety round/check completed  Intervention: Prevent Skin Injury  Recent Flowsheet Documentation  Taken 8/10/2024 0400 by Camilla Ulloa RN  Body Position:   position changed independently   weight shifting  Skin Protection: adhesive use limited  Device Skin Pressure Protection: absorbent pad utilized/changed  Taken 8/10/2024 0200 by Camilla Ulloa RN  Body Position:   position changed independently   weight shifting  Taken 8/10/2024 0000 by Camilla Ulloa RN  Body Position:   position changed independently   weight shifting  Skin Protection: adhesive use limited  Device Skin Pressure Protection: absorbent pad utilized/changed  Intervention: Prevent and Manage VTE (Venous Thromboembolism) Risk  Recent Flowsheet Documentation  Taken 8/10/2024 0400 by Camilla Ulloa RN  VTE Prevention/Management: SCDs on (sequential compression devices)  Taken 8/10/2024 0000 by Camilla Ulloa RN  VTE Prevention/Management: SCDs on  (sequential compression devices)  Intervention: Prevent Infection  Recent Flowsheet Documentation  Taken 8/10/2024 0400 by Camilla Ulloa RN  Infection Prevention:   rest/sleep promoted   single patient room provided  Taken 8/10/2024 0000 by Camilla Ulloa RN  Infection Prevention:   rest/sleep promoted   single patient room provided  Goal: Optimal Comfort and Wellbeing  Outcome: Not Progressing  Intervention: Provide Person-Centered Care  Recent Flowsheet Documentation  Taken 8/10/2024 0400 by Camilla Ulloa RN  Trust Relationship/Rapport:   care explained   choices provided   emotional support provided   questions answered   thoughts/feelings acknowledged  Taken 8/10/2024 0000 by Camilla Ulloa RN  Trust Relationship/Rapport:   care explained   choices provided   emotional support provided   questions answered   thoughts/feelings acknowledged  Goal: Readiness for Transition of Care  Outcome: Not Progressing  Intervention: Mutually Develop Transition Plan  Recent Flowsheet Documentation  Taken 8/10/2024 0000 by Camilla Ulloa RN  Equipment Currently Used at Home: none     Problem: Fall Injury Risk  Goal: Absence of Fall and Fall-Related Injury  Outcome: Not Progressing  Intervention: Identify and Manage Contributors  Recent Flowsheet Documentation  Taken 8/10/2024 0400 by Camilla Ulloa RN  Medication Review/Management: medications reviewed  Taken 8/10/2024 0000 by Camilla Ulloa RN  Medication Review/Management: medications reviewed  Intervention: Promote Injury-Free Environment  Recent Flowsheet Documentation  Taken 8/10/2024 0400 by Camilla Ulloa RN  Safety Promotion/Fall Prevention:   clutter free environment maintained   safety round/check completed  Taken 8/10/2024 0000 by Camilla Ulloa RN  Safety Promotion/Fall Prevention:   clutter free environment maintained   safety round/check completed     Problem: Alcohol Withdrawal  Goal: Alcohol Withdrawal Symptom Control  Outcome: Not  Progressing  Goal: Optimal Neurologic Function  Outcome: Not Progressing  Goal: Readiness for Change Identified  Outcome: Not Progressing

## 2024-08-10 NOTE — ED NOTES
Pt found to be in rapid hear rate of about 200 when arrived in room 22 pt moved to [facility] for 1:1 nursing care     Additional medications including adenosine given with MD and no effect

## 2024-08-10 NOTE — ED NOTES
Bed: ED22  Expected date: 8/9/24  Expected time: 9:05 PM  Means of arrival: Ambulance  Comments:  Isak 542 34M seizures; alch. W/D's

## 2024-08-10 NOTE — PLAN OF CARE
Goal Outcome Evaluation:      Plan of Care Reviewed With: patient    Pt here with seizure, Afib RVR.  A&O x4. CIWA score was 6 and 3 this shift, no valium required. Pt c/o nausea zofran x1 with effective result.  VSS on RA. Tele  NSR. Regular diet, takes pills whole with thin liquids. NS infusing @ 125mL/hr. Continent of B&B. Up with A1/GB. Denies pain.     Problem: Alcohol Withdrawal  Goal: Alcohol Withdrawal Symptom Control  Outcome: Progressing  Intervention: Minimize or Manage Alcohol Withdrawal Symptoms  Recent Flowsheet Documentation  Taken 8/10/2024 1200 by Robert Dwyer RN  Sensory Stimulation Regulation:   auditory stimulation minimized   care clustered   quiet environment promoted  Aspiration Precautions: awake/alert before oral intake  Seizure Precautions: side rails padded  Taken 8/10/2024 0800 by Robert Dwyer RN  Sensory Stimulation Regulation:   auditory stimulation minimized   care clustered   quiet environment promoted  Aspiration Precautions: awake/alert before oral intake  Seizure Precautions: side rails padded     Problem: Alcohol Withdrawal  Goal: Optimal Neurologic Function  Intervention: Prevent Seizure-Related Injury  Recent Flowsheet Documentation  Taken 8/10/2024 1200 by Robert Dwyer RN  Seizure Precautions: side rails padded  Taken 8/10/2024 0800 by Robert Dwyer RN  Seizure Precautions: side rails padded     Problem: Fall Injury Risk  Goal: Absence of Fall and Fall-Related Injury  Intervention: Promote Injury-Free Environment  Recent Flowsheet Documentation  Taken 8/10/2024 1400 by Robert Dwyer RN  Safety Promotion/Fall Prevention: safety round/check completed  Taken 8/10/2024 1200 by Robert Dwyer RN  Safety Promotion/Fall Prevention:   clutter free environment maintained   safety round/check completed   activity supervised   assistive device/personal items within reach   treat reversible contributory factors  Taken 8/10/2024 1000 by Robert Dwyer RN  Safety  Promotion/Fall Prevention: safety round/check completed  Taken 8/10/2024 0800 by Robert Dwyer RN  Safety Promotion/Fall Prevention:   clutter free environment maintained   safety round/check completed   activity supervised   assistive device/personal items within reach   treat reversible contributory factors

## 2024-08-10 NOTE — ED TRIAGE NOTES
Pt brought in by ambulance after staff witnessed pt having a sx lasting a couple minutes. 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 Reports last drink yesterday. History of alcohol use, seizure's, withdrawal sxs, DM. EKG showed Afib RVR which is new for the patient.        Triage Assessment (Adult)       Row Name 08/09/24 2042          Cardiac WDL    Cardiac WDL --  A.FiB RVR new for pt        Cognitive/Neuro/Behavioral WDL    Cognitive/Neuro/Behavioral WDL --  sx, ETOH withdrawal

## 2024-08-10 NOTE — PROGRESS NOTES
Ophthalmology Follow-Up Chronic Neurotrophic Ulceration      Chief Complaint:   Follow-Up to Chronic Neurotrophic Ulceration (Presistent epi defect) OS/ Corneal Ulcer OS Had Seen Adamaris Jarrell O.D. Yesterday  ED    HISTORY OF PRESENT ILLNESS: Peyton Montoya is a 43 year old female who presents today for a follow up to chronic neurotrophic ulceration (persistent epi defect). Patient stated she had seen Adamaris Jarrell O.D. Yesterday she has a corneal ulcer OS. She was taking Ocuflox drops every 15 minutes in the first hour. Today she has been taking her Ocuflox drops every hour with alternating Refresh drops. Her OS is extremely red and painful. She is wearing her scleral lens today in OS she inserted the lens before her appointment with Komal Doherty MD today.    Eye meds:Yesterday was taking the Ocuflox drops every 15 minutes for the first hour  Refresh alternating every hour with Ocuflox- Took drops last at 12:00 PM today  Primary Medical Provider: Provider Not In System       POH:  CNShyann palsy - R - 2017 May,, Upper left eyelid-  Drop foot.              Scleral Lens Fitting Done OS Done By Adamaris Jarrell O.D. On 07/31/18               Neurotrophic Cornea OS July 2018              Persistent Epithelia Defect OS July 2018     HAD PTOSIS surgery, blepharoplasty BUL with brow lift  --5/28/19 [facility]       PMH:  CN7 palsy - R - 2017 May,, Upper left eyelid-  Drop foot.      Social:  reports that she has been smoking Cigarettes.  She has been smoking about 1.00 pack per day. She has never used smokeless tobacco. She reports that she drinks alcohol. She reports that she does not use drugs..    PROBLEM LIST:  Patient Active Problem List   Diagnosis   • Lymphoma, non-Hodgkin's (CMS/HCC)   • Prolactinoma (CMS/HCC)   • Anxiety   • Thrombocytopenia (CMS/HCC)   • Neurotrophic cornea of left eye   • Corneal ulcer of left eye   • Cranial nerve III palsy, left   • Trigeminal nerve palsy  Patient remains on Heated High-Flow Nasal Cannula, 40LPM, 40% tolerating well. Plan on keeping patient on HFNC overnight. Neb tx were given as ordered, BBS diminished. RT to continue to follow.     BP (!) 139/102 (BP Location: Right arm)   Pulse 77   Temp 98.1  F (36.7  C) (Axillary)   Resp (!) 8   Wt 108.4 kg (238 lb 15.7 oz)   SpO2 98%   BMI 32.41 kg/m                History reviewed. No pertinent surgical history.    FH:  I reviewed the technicians history as outlined in EPIC.; there are no additions.  FAMILY OCULAR HX    Glaucoma: no    Retinal detachment: no    Macular degeneration:  no    Amblyopia (lazy eye), strabismus (ET, XT, etc.): no    Eye surgery: no    Other eye diseases: no    Eye meds:Yesterday was taking the Ocuflox drops every 15 minutes for the first hour  Refresh alternating every hour with Ocuflox- Took drops last at 12:00 PM today    ALLERGIES:  No Known Allergies    Pertinent systemic meds: See EPIC for full med list   Prednisone    Review of Systems:  General: No fevers, chills, sweats,   Skin: No rash, bruising, bleeding   Head:  no changes in hearing   Eyes: see HPI.  Chest: No cough, No shortness of breath, chest pain, palpitations   GI/: No abdominal pain, diarrhea, constipation, dysuria, increased frequency of urination, urgency to urinate,   Muscle/Skeletal: No fatigue   Psych: No anxiety, depression, thoughts of hurting self or others   Neuro: No loss of consciousness, numbness or tingling in extremities,   Endocrine: Denies weight changes, changes in thirst, chills, hot or cold intolerance        EXAM:  Base Eye Exam     Visual Acuity (Snellen - Linear)       Right Left    Dist sc 20/15     Dist cc  20/200 +2    Dist ph cc  20/100    Correction:  Contacts          Neuro/Psych     Oriented x3:  Yes    Mood/Affect:  Normal            Slit Lamp and Fundus Exam     Slit Lamp Exam       Right Left    Lids/Lashes suture along eyelid crease- healing   residual eyelid sutures,  1+ edema, LAGOPTHALMOS RLL plug in place     Conjunctiva/Sclera Clear 2+ Injection    Cornea Clear 1mm ulcer inferonasal with open ED (see pics) diffuse horizontal haze from prior ED with diffuse ratty epithelium and staining and loose epithelium     Anterior Chamber Deep and quiet Deep and quiet    Iris Round and regular Round and regular    Lens Clear Clear                         ASSESSMENT/PLAN:    PT with Multiple CN Palsies s/p lymphoma and intrathecal MTX s/p CART therapy          Including CN5 - L side, V1 distribution involvement:    1.   Chronic  Neurotrophic Ulceration (Presistent epi defect)  - OS - intrapalpebral.   Closed after prokera - 9 months ago S/p SHIRA,LLL plugs and scleral lens    Recent Blepharoplasty and ptosis surgery 5/28- now with new surface breakdown - likely from friction and SHIRA edema friction and +Lagopthalmos /incomplete blink     Va down to 20/200 with new open defect and severe surface disease despite reuse of scleral lens     Placed - BCL -8.8 plano - good movement   RLL plug in place     - culture taken      - cont Pres Free AT's - QID  OS   (hold if having tearing with upper/lower plugs)   - restart  ofloxacin - QID  OS        Had a discussion with patient that this will likely be a long-term issue      - consider trial Nerve growth factor OXERVATE x 2 months to help long term breakdown         3.  CN3 Plasy - OS - recovering  - still limited abduction and no elevation     - dilated, fixed pupil - remote  -  Mild exophoria-- PT compensates- no diplopia     - monitor , no diplopia       Thank you for allowing me to participate in your patient's care. Please call our office at  [phone] if you have any questions.  RTC

## 2024-08-10 NOTE — PROGRESS NOTES
[facility]    Medicine Progress Note - Hospitalist Service    Date of Admission:  8/9/2024    Assessment & Plan   Ravi Ahmadi is a 34 year old male admitted on 8/9/2024. He presents with seizure     # Alcohol withdrawal seizure  # Hx Etoh seizures  # Hx hallucinations w/ withdrawal  # Alcohol use disorder, severe  Pt states first (real) seizure but has had in past. 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 Last Etoh evening prior. Came to when police bending over him trying to wake up. In withdrawal, c/o headache. In ED tachy 170s, other VSS. Lactic 9.5->2.0. AG 25. CXR w/ LLL hazy opacities. CT head/C-spine w/o acute process.  Neurology consulted, recommending management of withdrawal symptoms.  - CIWA  - seizure precautions  - prn lorazepam for seizures  - prn valium withdrawal   - phenobarb 130 mg IV x 1, repeat as needed  - vitamins  - procal  - declines CD at this time     # Atrial fibrillation with RVR  HR in ED to 170s. Given adenosine and appears to be afib. Spontaneously converted in ICU. [id number] 1 (DM).   - telemetry   - echo   - prn metoprolol if back in afib  - hold on anticoagulation for now    # Abnormal chest x-ray  # Acute hypoxic respiratory failure - likely due to aspiration pneumonitis  # Wheeze on examination   Found to have sodium of his opacities in left lower lobe.  Likely represents aspiration.  -Continue to monitor, low threshold to manage for possible aspiration pneumonia.  - Trial of duonebs at this time  -Wean supplemental O2 to keep SpO2 >94%     Mild alcoholic hepatitis  AST//159. T bili 1.5 (baseline low normal)  - monitor LFTs     Hypomagnesemia-replete per protocol  Mg 1.5 on presentation   - replace per protocol  - check phos     DM II  States takes 5 units of lantus daily at home. Most recent A1c 6.0% 12/2023  - sliding scale insulin for now  - TID and HS blood sugars      Thrombocytopenia  Mild, plts 123. Baseline low normal.   - monitor for now     # MDD vs Bipolar  # Psychosis  # Anxiety disorder  Per psychiatry notes 12/2023.   - resume meds with rec; this includes Abilify 5 mg nightly, benztropine 1 mg twice daily, Zyprexa 10 mg twice daily, sertraline 50 mg daily, trazodone 50 mg nightly as needed     # Asthma  - prn albuterol available     # Tobacco use disorder  Smokes 1 ppd  - declines NRT          Diet: Advance Diet as Tolerated: Regular Diet Adult    DVT Prophylaxis: Pneumatic Compression Devices  Abernathy Catheter: Not present  Lines: None     Cardiac Monitoring: ACTIVE order. Indication: ICU  Code Status: Full Code      Clinically Significant Risk Factors Present on Admission            # Hypomagnesemia: Lowest Mg = 1.5 mg/dL in last 2 days, will replace as needed  # Anion Gap Metabolic Acidosis: Highest Anion Gap = 25 mmol/L in last 2 days, will monitor and treat as appropriate    # Thrombocytopenia: Lowest platelets = 123 in last 2 days, will monitor for bleeding   # Hypertension: Noted on problem list             # Financial/Environmental Concerns:    # Asthma: noted on problem list              Disposition Plan     Medically Ready for Discharge: Anticipated in 2-4 Days    Also, patient can be transitioned to IMC if able to wean down oxygen.           CRISTOBAL BAKER MD  Hospitalist Service  [facility]  Securely message with [employer] (more info)  Text page via [employer] Paging/Directory   ______________________________________________________________________    Interval History   -Has remained afebrile and hemodynamically stable  -Continues to score on CIWA protocol.  -Denies any acute complaints.     Physical Exam   Vital Signs: Temp: 98.8  F (37.1  C) Temp src: Oral BP: (!) 140/84 Pulse: 98   Resp: 16 SpO2: 98 % O2 Device: High Flow Nasal Cannula (HFNC) Oxygen Delivery: 45 LPM  Weight: 238 lbs 15.66 oz    General Appearance: Lying in bed, on  supplemental oxygen.  HEENT: PERRL: EOMI; moist mucous membrane w/o lesions  Neck: No JVD  Pulmonary: On HFNC, mild wheezes heard on auscultation   CVS: Regular rhythm, no murmurs, rubs or gallops  GI: BS (+), soft nontender, no rebound or guarding   Extremities: No LE edema; pulses strong and equal throughout   Skin: No rashes or lesions  Neurologic: A&O x3      Medical Decision Making       55 MINUTES SPENT BY ME on the date of service doing chart review, history, exam, documentation & further activities per the note.      Data   ------------------------- PAST 24 HR DATA REVIEWED -----------------------------------------------    I have personally reviewed the following data over the past 24 hrs:    6.6  \   16.1   / 123 (L)     136 89 (L) 7.5 /  104 (H)   3.6 22 0.65 (L) \     ALT: 159 (H) AST: 140 (H) AP: 72 TBILI: 1.5 (H)   ALB: 4.7 TOT PROTEIN: 7.7 LIPASE: N/A     Trop: 9 BNP: N/A     TSH: 3.07 T4: N/A A1C: 6.1 (H)     Procal: 0.10 CRP: N/A Lactic Acid: 0.7         Imaging results reviewed over the past 24 hrs:   Recent Results (from the past 24 hour(s))   XR Chest Port 1 View    Narrative    EXAM: XR CHEST PORT 1 VIEW  LOCATION: [facility]  DATE: 8/9/2024    INDICATION: Cough  COMPARISON: Chest radiograph 8/17/2023      Impression    IMPRESSION: Stable size of cardiomediastinal silhouette. There are some subtle hazy opacities in the left lower lobe, infectious/inflammatory process is possible. Right lung is clear. No definite pleural effusion or pneumothorax. No acute bony   abnormality.   CT Head w/o Contrast    Narrative    EXAM: CT CERVICAL SPINE W/O CONTRAST, CT HEAD W/O CONTRAST  LOCATION: [facility]  DATE: 8/9/2024    INDICATION: Fall, trauma, alcohol withdrawal seizure  COMPARISON: None.  TECHNIQUE:   1) Routine CT Head without IV contrast. Multiplanar reformats. Dose reduction techniques were used.  2) Routine CT Cervical Spine without IV contrast.  Multiplanar reformats. Dose reduction techniques were used.    FINDINGS:   HEAD CT:   INTRACRANIAL CONTENTS: No intracranial hemorrhage, extraaxial collection, or mass effect.  No CT evidence of acute infarct. Normal parenchymal attenuation. Normal ventricles and sulci.     VISUALIZED ORBITS/SINUSES/MASTOIDS: No intraorbital abnormality. Mild mucosal thickening scattered about the paranasal sinuses. No middle ear or mastoid effusion.    BONES/SOFT TISSUES: No acute abnormality.    CERVICAL SPINE CT:   VERTEBRA: Normal vertebral body heights and alignment. No fracture or posttraumatic subluxation.     CANAL/FORAMINA: No canal or neural foraminal stenosis.    PARASPINAL: No extraspinal abnormality. Visualized lung fields are clear.      Impression    IMPRESSION:  HEAD CT:  1.  No acute intracranial process.    CERVICAL SPINE CT:  1.  No CT evidence for acute fracture or post traumatic subluxation.   CT Cervical Spine w/o Contrast    Narrative    EXAM: CT CERVICAL SPINE W/O CONTRAST, CT HEAD W/O CONTRAST  LOCATION: [facility]  DATE: 8/9/2024    INDICATION: Fall, trauma, alcohol withdrawal seizure  COMPARISON: None.  TECHNIQUE:   1) Routine CT Head without IV contrast. Multiplanar reformats. Dose reduction techniques were used.  2) Routine CT Cervical Spine without IV contrast. Multiplanar reformats. Dose reduction techniques were used.    FINDINGS:   HEAD CT:   INTRACRANIAL CONTENTS: No intracranial hemorrhage, extraaxial collection, or mass effect.  No CT evidence of acute infarct. Normal parenchymal attenuation. Normal ventricles and sulci.     VISUALIZED ORBITS/SINUSES/MASTOIDS: No intraorbital abnormality. Mild mucosal thickening scattered about the paranasal sinuses. No middle ear or mastoid effusion.    BONES/SOFT TISSUES: No acute abnormality.    CERVICAL SPINE CT:   VERTEBRA: Normal vertebral body heights and alignment. No fracture or posttraumatic subluxation.     CANAL/FORAMINA: No  canal or neural foraminal stenosis.    PARASPINAL: No extraspinal abnormality. Visualized lung fields are clear.      Impression    IMPRESSION:  HEAD CT:  1.  No acute intracranial process.    CERVICAL SPINE CT:  1.  No CT evidence for acute fracture or post traumatic subluxation.

## 2024-08-10 NOTE — PHARMACY-ADMISSION MEDICATION HISTORY
Pharmacy Intern Admission Medication History    Admission medication history is complete. The information provided in this note is only as accurate as the sources available at the time of the update.    Information Source(s): Patient and CareEverywhere/SureScripts via in-person    Pertinent Information:   -- Pt manages own medications but does not appear to be a reliable historian, has not been adherent to medications  -- No fill hx per [facility] () closed on the weekends    Changes made to PTA medication list:  Added: None  Deleted: not using symbicort, breo ellipta, naltrexone, nicotine patch, olanzapine 5mg, pantoprazole  Changed: acamprosate 333mg x2 TID --> x0.5 QPM, sertraline 100mg --> 50mg QD    Allergies reviewed with patient and updates made in EHR: yes    Medication History Completed By: LAWRENCE US 8/10/2024 11:36 AM    PTA Med List   Medication Sig Note Last Dose    acamprosate (CAMPRAL) 333 MG EC tablet Take 2 tablets (666 mg) by mouth 3 times daily (Patient taking differently: Take 0.5 tablets by mouth every evening)  Past Week    ARIPiprazole (ABILIFY) 5 MG tablet Take 1 tablet (5 mg) by mouth at bedtime  Unknown    benztropine (COGENTIN) 1 MG tablet Take 1 tablet (1 mg) by mouth 2 times daily  Unknown    folic acid (FOLVITE) 1 MG tablet Take 1 tablet (1 mg) by mouth daily  Unknown    glucose (BD GLUCOSE) 4 g chewable tablet Take 1 tablet by mouth every hour as needed for low blood sugar  Unknown at PRN    insulin glargine (LANTUS PEN) 100 UNIT/ML pen Inject 5 Units Subcutaneous at bedtime  More than a month    lisinopril (ZESTRIL) 10 MG tablet Take 1 tablet (10 mg) by mouth daily  Past Month    multivitamin w/minerals (THERA-VIT-M) tablet Take 1 tablet by mouth daily  Unknown    OLANZapine (ZYPREXA) 10 MG tablet Take 1 tablet (10 mg) by mouth 2 times daily Take in morning and at bedtime  Unknown    sertraline (ZOLOFT) 50 MG tablet Take 1 tablet (50 mg) by mouth  daily For seven days then increase to 100mg (Patient taking differently: Take 50 mg by mouth daily) 8/10/2024: Pt does not recall dose increase, confirms taking 50mg strength 8/3/2024    thiamine (B-1) 100 MG tablet Take 1 tablet (100 mg) by mouth daily  Unknown    traZODone (DESYREL) 50 MG tablet Take 1 tablet (50 mg) by mouth nightly as needed for sleep  Past Week at PRN    Vitamin D3 (CHOLECALCIFEROL) 25 mcg (1000 units) tablet Take 1 tablet (25 mcg) by mouth daily  Unknown

## 2024-08-10 NOTE — H&P
[facility]    History and Physical - Hospitalist Service       Date of Admission:  8/9/2024    Assessment & Plan      Ravi Ahmadi is a 34 year old male admitted on 8/9/2024. He presents with seizure    Alcohol withdrawal seizure  Hx Etoh seizures  Hx hallucinations w/ withdrawal  Alcohol use disorder, severe  Pt states first (real) seizure but has had in past. 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 Last Etoh evening prior. Came to when police bending over him trying to wake up. In withdrawal, c/o headache. In ED tachy 170s, other VSS. Lactic 9.5->2.0. AG 25. CXR w/ LLL hazy opacities. CT head/C-spine w/o acute process.   - CIWA  - seizure precautions  - prn lorazepam for seizures  - prn valium withdrawal   - phenobarb 130 mg IV x 1, repeat as needed  - vitamins  - procal  - declines CD at this time    Atrial fibrillation with RVR  HR in ED to 170s. Given adenosine and appears to be afib. Spontaneously converted in ICU. [id number] 1 (DM).   - telemetry   - echo   - prn metoprolol if back in afib  - hold on anticoagulation for now    Mild alcoholic hepatitis  AST//159. T bili 1.5 (baseline low normal)  - monitor LFTs    Hypomagnesemia  Mg 1.5 on presentation   - replace per protocol  - check phos    DM II  States takes 5 units of lantus daily at home. Most recent A1c 6.0% 12/2023  - sliding scale insulin for now  - TID and HS blood sugars    Thrombocytopenia  Mild, plts 123. Baseline low normal.   - monitor for now    MDD vs Bipolar  Per psychiatry notes 12/2023.   - resume meds with rec    Asthma  - prn albuterol available    Tobacco use disorder  Smokes 1 ppd  - declines NRT        Diet: Advance Diet as Tolerated: Regular Diet Adult    DVT Prophylaxis: Pneumatic Compression Devices  Abernathy Catheter: Not present  Lines: None     Cardiac Monitoring: ACTIVE order. Indication: ICU  Code Status: Full Code      Clinically  Significant Risk Factors Present on Admission            # Hypomagnesemia: Lowest Mg = 1.5 mg/dL in last 2 days, will replace as needed  # Anion Gap Metabolic Acidosis: Highest Anion Gap = 25 mmol/L in last 2 days, will monitor and treat as appropriate    # Thrombocytopenia: Lowest platelets = 123 in last 2 days, will monitor for bleeding   # Hypertension: Noted on problem list             # Financial/Environmental Concerns:    # Asthma: noted on problem list              Disposition Plan     Medically Ready for Discharge: Anticipated in 2-4 Days           Kmeal Thompson MD  Hospitalist Service  [facility]  Securely message with [employer] (more info)  Text page via [facility] Paging/Directory     ______________________________________________________________________    Chief Complaint   seizure    History is obtained from the patient, electronic health record, and emergency department physician    History of Present Illness   Ravi Ahmadi is a 34 year old male who presents with seizure.  He has a history of alcohol use disorder and has gone through withdrawal in the past.  He also has a history of alcohol withdrawal seizures.  He states this is the first time he has had a severe seizure when he has been hospitalized.  He does note at home sometimes he has jerking motions of his arms or wakes up jerking but is conscious with these.  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  He reportedly fell backwards hitting his head.  Had apparently with a witnessed seizure.  The next thing he remembers with the police bending over him slapping him trying to wake him up.  At the time of visit he is in alcohol withdrawal.  He is alert and oriented and appropriate.  He does appear to minimize his alcohol use.  He reports that he has essentially been drinking since he was 14 years old.  He states that his longest period of sobriety was 30 days.  He states he is resumed drinking 30  days ago and drinks half of the 1.75 L of bourbon a day.  He states he has been into treatment twice in the past.  Denies any chest pain or shortness of breath.  Denies  abdominal pain.  No nausea or vomiting.  Denies other drugs.      Past Medical History    Past Medical History:   Diagnosis Date    Abnormal LFTs     suspected secondary to alcohol use    Alcohol use disorder     with hx of hospitalizations for withdrawal    Atopic dermatitis     Intermittent asthma     exercise    Tobacco use     Type 2 diabetes mellitus (H)     Diagnosed during hosptial stay in 8/2023 with A1c 8.4       Past Surgical History   Past Surgical History:   Procedure Laterality Date    CL AFF SURGICAL PATHOLOGY      gravel/foreign body removal in abdomen after accident       Prior to Admission Medications   Prior to Admission Medications   Prescriptions Last Dose Informant Patient Reported? Taking?   ARIPiprazole (ABILIFY) 5 MG tablet   No No   Sig: Take 1 tablet (5 mg) by mouth at bedtime   Alcohol Swabs PADS   No No   Sig: Use to swab the area of the injection or steven as directed  Per insurance coverage   OLANZapine (ZYPREXA) 10 MG tablet   No No   Sig: Take 1 tablet (10 mg) by mouth 2 times daily Take in morning and at bedtime   OLANZapine (ZYPREXA) 5 MG tablet   No No   Sig: Take 1 tablet (5 mg) by mouth daily as needed (hallucinations)   Sharps Container MISC   No No   Sig: Use as directed to dispose of needles, lancets and other sharps   Vitamin D3 (CHOLECALCIFEROL) 25 mcg (1000 units) tablet   No No   Sig: Take 1 tablet (25 mcg) by mouth daily   acamprosate (CAMPRAL) 333 MG EC tablet   No No   Sig: Take 2 tablets (666 mg) by mouth 3 times daily   benztropine (COGENTIN) 1 MG tablet   No No   Sig: Take 1 tablet (1 mg) by mouth 2 times daily   blood glucose (NO BRAND SPECIFIED) lancets standard   No No   Sig: To use to test glucose level in the blood.  Use to test blood sugar  4  times daily as directed. To accompany glucose  monitor brands per insurance coverage.   blood glucose (NO BRAND SPECIFIED) test strip   No No   Sig: Use to test blood sugar 4 times daily or as directed.   blood glucose (NO BRAND SPECIFIED) test strip   No No   Sig: To use to test glucose level in the blood. Use to test blood sugar  4 times daily as directed. To accompany glucose monitor brands per insurance coverage.   blood glucose calibration (NO BRAND SPECIFIED) solution   No No   Sig: Used to calibrate the blood glucose monitor as needed and as directed.  To accompany  blood glucose brands per insurance coverage   blood glucose monitoring (NO BRAND SPECIFIED) meter device kit   No No   Sig: Use as directed Per insurance coverage   budesonide-formoterol (SYMBICORT) 160-4.5 MCG/ACT Inhaler   Yes No   Sig: Inhale 2 puffs into the lungs 2 times daily   fluticasone-vilanterol (BREO ELLIPTA) 100-25 MCG/ACT inhaler   No No   Sig: Inhale 1 puff into the lungs daily   folic acid (FOLVITE) 1 MG tablet   No No   Sig: Take 1 tablet (1 mg) by mouth daily   glucose (BD GLUCOSE) 4 g chewable tablet   No No   Sig: Take 4 tablets by mouth every 15 minutes as needed for low blood sugar   glucose (BD GLUCOSE) 4 g chewable tablet   No No   Sig: Take 1 tablet by mouth every hour as needed for low blood sugar   insulin glargine (LANTUS PEN) 100 UNIT/ML pen   No No   Sig: Inject 5 Units Subcutaneous at bedtime   insulin pen needle (32G X 4 MM) 32G X 4 MM miscellaneous   No No   Sig: Use as directed by provider Per insurance coverage   lisinopril (ZESTRIL) 10 MG tablet   No No   Sig: Take 1 tablet (10 mg) by mouth daily   multivitamin w/minerals (THERA-VIT-M) tablet   No No   Sig: Take 1 tablet by mouth daily   naltrexone (DEPADE/REVIA) 50 MG tablet   No No   Sig: Take 1 tablet (50 mg) by mouth daily   nicotine (NICODERM CQ) 14 MG/24HR 24 hr patch   No No   Sig: Place 1 patch onto the skin daily   pantoprazole (PROTONIX) 40 MG EC tablet   No No   Sig: Take 1 tablet (40 mg) by mouth  every morning (before breakfast)   sertraline (ZOLOFT) 100 MG tablet   No No   Sig: Take 1 tablet (100 mg) by mouth daily   sertraline (ZOLOFT) 50 MG tablet   No No   Sig: Take 1 tablet (50 mg) by mouth daily For seven days then increase to 100mg   thiamine (B-1) 100 MG tablet   No No   Sig: Take 1 tablet (100 mg) by mouth daily   traZODone (DESYREL) 50 MG tablet   No No   Sig: Take 1 tablet (50 mg) by mouth nightly as needed for sleep      Facility-Administered Medications: None        Review of Systems    The 10 point Review of Systems is negative other than noted in the HPI or here.     Social History   I have reviewed this patient's social history and updated it with pertinent information if needed.  Social History     Tobacco Use    Smoking status: Every Day     Current packs/day: 1.00     Average packs/day: 1 pack/day for 7.0 years (7.0 ttl pk-yrs)     Types: Cigarettes    Smokeless tobacco: Never   Vaping Use    Vaping status: Never Used   Substance Use Topics    Alcohol use: Yes     Alcohol/week: 12.5 standard drinks of alcohol     Types: 15 Standard drinks or equivalent per week    Drug use: Not Currently     Types: Marijuana        Physical Exam   Vital Signs: Temp: 98.3  F (36.8  C) Temp src: Temporal BP: (!) 137/118 Pulse: (!) 161   Resp: 19 SpO2: 95 %      Weight: 0 lbs 0 oz    General Appearance: Alert, very pleasant, in moderate withdrawal  Respiratory: CTA B  Cardiovascular: RRR, no murmur. No edema  GI: obese, soft, nt/nd  Skin: no rashes or lesions grossly    Other: CN grossly intact, [place]. tremulous     Medical Decision Making       75 MINUTES SPENT BY ME on the date of service doing chart review, history, exam, documentation & further activities per the note.      Data   ------------------------- PAST 24 HR DATA REVIEWED -----------------------------------------------    I have personally reviewed the following data over the past 24 hrs:    6.6  \   16.1   / 123 (L)     136 89 (L) 7.5 /  156 (H)    3.6 22 0.65 (L) \     ALT: 159 (H) AST: 140 (H) AP: 72 TBILI: 1.5 (H)   ALB: 4.7 TOT PROTEIN: 7.7 LIPASE: N/A     Trop: 9 BNP: N/A     TSH: 3.07 T4: N/A A1C: N/A     Procal: N/A CRP: N/A Lactic Acid: 2.0         Imaging results reviewed over the past 24 hrs:   Recent Results (from the past 24 hour(s))   XR Chest Port 1 View    Narrative    EXAM: XR CHEST PORT 1 VIEW  LOCATION: [facility]  DATE: 8/9/2024    INDICATION: Cough  COMPARISON: Chest radiograph 8/17/2023      Impression    IMPRESSION: Stable size of cardiomediastinal silhouette. There are some subtle hazy opacities in the left lower lobe, infectious/inflammatory process is possible. Right lung is clear. No definite pleural effusion or pneumothorax. No acute bony   abnormality.   CT Head w/o Contrast    Narrative    EXAM: CT CERVICAL SPINE W/O CONTRAST, CT HEAD W/O CONTRAST  LOCATION: [facility]  DATE: 8/9/2024    INDICATION: Fall, trauma, alcohol withdrawal seizure  COMPARISON: None.  TECHNIQUE:   1) Routine CT Head without IV contrast. Multiplanar reformats. Dose reduction techniques were used.  2) Routine CT Cervical Spine without IV contrast. Multiplanar reformats. Dose reduction techniques were used.    FINDINGS:   HEAD CT:   INTRACRANIAL CONTENTS: No intracranial hemorrhage, extraaxial collection, or mass effect.  No CT evidence of acute infarct. Normal parenchymal attenuation. Normal ventricles and sulci.     VISUALIZED ORBITS/SINUSES/MASTOIDS: No intraorbital abnormality. Mild mucosal thickening scattered about the paranasal sinuses. No middle ear or mastoid effusion.    BONES/SOFT TISSUES: No acute abnormality.    CERVICAL SPINE CT:   VERTEBRA: Normal vertebral body heights and alignment. No fracture or posttraumatic subluxation.     CANAL/FORAMINA: No canal or neural foraminal stenosis.    PARASPINAL: No extraspinal abnormality. Visualized lung fields are clear.      Impression     IMPRESSION:  HEAD CT:  1.  No acute intracranial process.    CERVICAL SPINE CT:  1.  No CT evidence for acute fracture or post traumatic subluxation.   CT Cervical Spine w/o Contrast    Narrative    EXAM: CT CERVICAL SPINE W/O CONTRAST, CT HEAD W/O CONTRAST  LOCATION: [facility]  DATE: 8/9/2024    INDICATION: Fall, trauma, alcohol withdrawal seizure  COMPARISON: None.  TECHNIQUE:   1) Routine CT Head without IV contrast. Multiplanar reformats. Dose reduction techniques were used.  2) Routine CT Cervical Spine without IV contrast. Multiplanar reformats. Dose reduction techniques were used.    FINDINGS:   HEAD CT:   INTRACRANIAL CONTENTS: No intracranial hemorrhage, extraaxial collection, or mass effect.  No CT evidence of acute infarct. Normal parenchymal attenuation. Normal ventricles and sulci.     VISUALIZED ORBITS/SINUSES/MASTOIDS: No intraorbital abnormality. Mild mucosal thickening scattered about the paranasal sinuses. No middle ear or mastoid effusion.    BONES/SOFT TISSUES: No acute abnormality.    CERVICAL SPINE CT:   VERTEBRA: Normal vertebral body heights and alignment. No fracture or posttraumatic subluxation.     CANAL/FORAMINA: No canal or neural foraminal stenosis.    PARASPINAL: No extraspinal abnormality. Visualized lung fields are clear.      Impression    IMPRESSION:  HEAD CT:  1.  No acute intracranial process.    CERVICAL SPINE CT:  1.  No CT evidence for acute fracture or post traumatic subluxation.

## 2024-08-11 LAB
ALBUMIN SERPL BCG-MCNC: 3.8 G/DL (ref 3.5–5.2)
ALP SERPL-CCNC: 71 U/L (ref 40–150)
ALT SERPL W P-5'-P-CCNC: 109 U/L (ref 0–70)
ANION GAP SERPL CALCULATED.3IONS-SCNC: 16 MMOL/L (ref 7–15)
AST SERPL W P-5'-P-CCNC: 111 U/L (ref 0–45)
BASOPHILS # BLD AUTO: 0 10E3/UL (ref 0–0.2)
BASOPHILS NFR BLD AUTO: 1 %
BILIRUB SERPL-MCNC: 1.7 MG/DL
BUN SERPL-MCNC: 3.8 MG/DL (ref 6–20)
CALCIUM SERPL-MCNC: 8.6 MG/DL (ref 8.8–10.4)
CHLORIDE SERPL-SCNC: 96 MMOL/L (ref 98–107)
CREAT SERPL-MCNC: 0.51 MG/DL (ref 0.67–1.17)
EGFRCR SERPLBLD CKD-EPI 2021: >90 ML/MIN/1.73M2
EOSINOPHIL # BLD AUTO: 0.2 10E3/UL (ref 0–0.7)
EOSINOPHIL NFR BLD AUTO: 3 %
ERYTHROCYTE [DISTWIDTH] IN BLOOD BY AUTOMATED COUNT: 12.2 % (ref 10–15)
GLUCOSE BLDC GLUCOMTR-MCNC: 130 MG/DL (ref 70–99)
GLUCOSE BLDC GLUCOMTR-MCNC: 132 MG/DL (ref 70–99)
GLUCOSE BLDC GLUCOMTR-MCNC: 148 MG/DL (ref 70–99)
GLUCOSE BLDC GLUCOMTR-MCNC: 95 MG/DL (ref 70–99)
GLUCOSE SERPL-MCNC: 99 MG/DL (ref 70–99)
HCO3 SERPL-SCNC: 25 MMOL/L (ref 22–29)
HCT VFR BLD AUTO: 43.5 % (ref 40–53)
HGB BLD-MCNC: 14.6 G/DL (ref 13.3–17.7)
IMM GRANULOCYTES # BLD: 0 10E3/UL
IMM GRANULOCYTES NFR BLD: 1 %
LYMPHOCYTES # BLD AUTO: 1.3 10E3/UL (ref 0.8–5.3)
LYMPHOCYTES NFR BLD AUTO: 25 %
MAGNESIUM SERPL-MCNC: 2.1 MG/DL (ref 1.7–2.3)
MCH RBC QN AUTO: 31.7 PG (ref 26.5–33)
MCHC RBC AUTO-ENTMCNC: 33.6 G/DL (ref 31.5–36.5)
MCV RBC AUTO: 94 FL (ref 78–100)
MONOCYTES # BLD AUTO: 0.5 10E3/UL (ref 0–1.3)
MONOCYTES NFR BLD AUTO: 9 %
NEUTROPHILS # BLD AUTO: 3.4 10E3/UL (ref 1.6–8.3)
NEUTROPHILS NFR BLD AUTO: 63 %
NRBC # BLD AUTO: 0 10E3/UL
NRBC BLD AUTO-RTO: 0 /100
PHOSPHATE SERPL-MCNC: 3.3 MG/DL (ref 2.5–4.5)
PLATELET # BLD AUTO: 75 10E3/UL (ref 150–450)
POTASSIUM SERPL-SCNC: 3.1 MMOL/L (ref 3.4–5.3)
POTASSIUM SERPL-SCNC: 3.5 MMOL/L (ref 3.4–5.3)
PROCALCITONIN SERPL IA-MCNC: 0.11 NG/ML
PROT SERPL-MCNC: 6.4 G/DL (ref 6.4–8.3)
RBC # BLD AUTO: 4.61 10E6/UL (ref 4.4–5.9)
SODIUM SERPL-SCNC: 137 MMOL/L (ref 135–145)
WBC # BLD AUTO: 5.4 10E3/UL (ref 4–11)

## 2024-08-11 PROCEDURE — 84100 ASSAY OF PHOSPHORUS: CPT | Performed by: INTERNAL MEDICINE

## 2024-08-11 PROCEDURE — 250N000009 HC RX 250: Performed by: INTERNAL MEDICINE

## 2024-08-11 PROCEDURE — 250N000011 HC RX IP 250 OP 636: Performed by: INTERNAL MEDICINE

## 2024-08-11 PROCEDURE — 250N000013 HC RX MED GY IP 250 OP 250 PS 637: Performed by: INTERNAL MEDICINE

## 2024-08-11 PROCEDURE — 84132 ASSAY OF SERUM POTASSIUM: CPT | Performed by: INTERNAL MEDICINE

## 2024-08-11 PROCEDURE — 999N000157 HC STATISTIC RCP TIME EA 10 MIN

## 2024-08-11 PROCEDURE — 36415 COLL VENOUS BLD VENIPUNCTURE: CPT | Performed by: INTERNAL MEDICINE

## 2024-08-11 PROCEDURE — 83735 ASSAY OF MAGNESIUM: CPT | Performed by: INTERNAL MEDICINE

## 2024-08-11 PROCEDURE — 200N000001 HC R&B ICU

## 2024-08-11 PROCEDURE — 94640 AIRWAY INHALATION TREATMENT: CPT | Mod: 76

## 2024-08-11 PROCEDURE — 99232 SBSQ HOSP IP/OBS MODERATE 35: CPT | Performed by: INTERNAL MEDICINE

## 2024-08-11 PROCEDURE — 85025 COMPLETE CBC W/AUTO DIFF WBC: CPT | Performed by: INTERNAL MEDICINE

## 2024-08-11 PROCEDURE — 94640 AIRWAY INHALATION TREATMENT: CPT

## 2024-08-11 PROCEDURE — 80053 COMPREHEN METABOLIC PANEL: CPT | Performed by: INTERNAL MEDICINE

## 2024-08-11 PROCEDURE — 84145 PROCALCITONIN (PCT): CPT | Performed by: INTERNAL MEDICINE

## 2024-08-11 RX ORDER — DEXTROSE, SODIUM CHLORIDE, SODIUM LACTATE, POTASSIUM CHLORIDE, AND CALCIUM CHLORIDE 5; .6; .31; .03; .02 G/100ML; G/100ML; G/100ML; G/100ML; G/100ML
INJECTION, SOLUTION INTRAVENOUS CONTINUOUS
Status: DISCONTINUED | OUTPATIENT
Start: 2024-08-11 | End: 2024-08-12

## 2024-08-11 RX ORDER — PANTOPRAZOLE SODIUM 40 MG/1
40 TABLET, DELAYED RELEASE ORAL
Status: DISCONTINUED | OUTPATIENT
Start: 2024-08-11 | End: 2024-08-11

## 2024-08-11 RX ORDER — POTASSIUM CHLORIDE 1500 MG/1
40 TABLET, EXTENDED RELEASE ORAL ONCE
Status: COMPLETED | OUTPATIENT
Start: 2024-08-11 | End: 2024-08-11

## 2024-08-11 RX ORDER — IPRATROPIUM BROMIDE AND ALBUTEROL SULFATE 2.5; .5 MG/3ML; MG/3ML
3 SOLUTION RESPIRATORY (INHALATION)
Status: DISPENSED | OUTPATIENT
Start: 2024-08-11 | End: 2024-08-12

## 2024-08-11 RX ORDER — DEXMEDETOMIDINE HYDROCHLORIDE 4 UG/ML
.1-1.2 INJECTION, SOLUTION INTRAVENOUS CONTINUOUS
Status: DISCONTINUED | OUTPATIENT
Start: 2024-08-11 | End: 2024-08-13

## 2024-08-11 RX ORDER — PANTOPRAZOLE SODIUM 40 MG/1
40 TABLET, DELAYED RELEASE ORAL
Status: DISCONTINUED | OUTPATIENT
Start: 2024-08-11 | End: 2024-08-14 | Stop reason: HOSPADM

## 2024-08-11 RX ORDER — DEXTROSE, SODIUM CHLORIDE, SODIUM LACTATE, POTASSIUM CHLORIDE, AND CALCIUM CHLORIDE 5; .6; .31; .03; .02 G/100ML; G/100ML; G/100ML; G/100ML; G/100ML
INJECTION, SOLUTION INTRAVENOUS CONTINUOUS
Status: DISCONTINUED | OUTPATIENT
Start: 2024-08-11 | End: 2024-08-11

## 2024-08-11 RX ORDER — VITAMIN B COMPLEX
25 TABLET ORAL DAILY
Status: DISCONTINUED | OUTPATIENT
Start: 2024-08-11 | End: 2024-08-14 | Stop reason: HOSPADM

## 2024-08-11 RX ADMIN — PANTOPRAZOLE SODIUM 40 MG: 40 TABLET, DELAYED RELEASE ORAL at 13:46

## 2024-08-11 RX ADMIN — IPRATROPIUM BROMIDE AND ALBUTEROL SULFATE 3 ML: .5; 3 SOLUTION RESPIRATORY (INHALATION) at 08:36

## 2024-08-11 RX ADMIN — OLANZAPINE 10 MG: 5 TABLET, FILM COATED ORAL at 08:00

## 2024-08-11 RX ADMIN — HYDROMORPHONE HYDROCHLORIDE 0.5 MG: 1 INJECTION, SOLUTION INTRAMUSCULAR; INTRAVENOUS; SUBCUTANEOUS at 02:10

## 2024-08-11 RX ADMIN — OLANZAPINE 10 MG: 5 TABLET, FILM COATED ORAL at 21:09

## 2024-08-11 RX ADMIN — HALOPERIDOL LACTATE 5 MG: 5 INJECTION, SOLUTION INTRAMUSCULAR at 01:26

## 2024-08-11 RX ADMIN — DEXMEDETOMIDINE HYDROCHLORIDE 0.2 MCG/KG/HR: 400 INJECTION INTRAVENOUS at 02:02

## 2024-08-11 RX ADMIN — DIAZEPAM 10 MG: 5 INJECTION INTRAMUSCULAR; INTRAVENOUS at 02:31

## 2024-08-11 RX ADMIN — DIAZEPAM 10 MG: 5 INJECTION INTRAMUSCULAR; INTRAVENOUS at 20:49

## 2024-08-11 RX ADMIN — DIAZEPAM 10 MG: 5 TABLET ORAL at 23:11

## 2024-08-11 RX ADMIN — DIAZEPAM 10 MG: 5 INJECTION INTRAMUSCULAR; INTRAVENOUS at 01:54

## 2024-08-11 RX ADMIN — THIAMINE HCL TAB 100 MG 100 MG: 100 TAB at 08:01

## 2024-08-11 RX ADMIN — FOLIC ACID 1 MG: 1 TABLET ORAL at 08:00

## 2024-08-11 RX ADMIN — DIAZEPAM 10 MG: 5 INJECTION INTRAMUSCULAR; INTRAVENOUS at 01:18

## 2024-08-11 RX ADMIN — DEXMEDETOMIDINE HYDROCHLORIDE 0.6 MCG/KG/HR: 400 INJECTION INTRAVENOUS at 12:43

## 2024-08-11 RX ADMIN — Medication 1 TABLET: at 08:01

## 2024-08-11 RX ADMIN — Medication 25 MCG: at 13:46

## 2024-08-11 RX ADMIN — ARIPIPRAZOLE 5 MG: 5 TABLET ORAL at 21:09

## 2024-08-11 RX ADMIN — DEXMEDETOMIDINE HYDROCHLORIDE 0.6 MCG/KG/HR: 400 INJECTION INTRAVENOUS at 06:54

## 2024-08-11 RX ADMIN — SERTRALINE HYDROCHLORIDE 50 MG: 50 TABLET ORAL at 08:01

## 2024-08-11 RX ADMIN — POTASSIUM CHLORIDE 40 MEQ: 1500 TABLET, EXTENDED RELEASE ORAL at 08:00

## 2024-08-11 RX ADMIN — IPRATROPIUM BROMIDE AND ALBUTEROL SULFATE 3 ML: .5; 3 SOLUTION RESPIRATORY (INHALATION) at 21:26

## 2024-08-11 RX ADMIN — DEXMEDETOMIDINE HYDROCHLORIDE 0.6 MCG/KG/HR: 400 INJECTION INTRAVENOUS at 18:43

## 2024-08-11 RX ADMIN — DIAZEPAM 10 MG: 5 INJECTION INTRAMUSCULAR; INTRAVENOUS at 00:48

## 2024-08-11 RX ADMIN — BENZTROPINE MESYLATE 1 MG: 1 TABLET ORAL at 21:09

## 2024-08-11 RX ADMIN — SODIUM CHLORIDE, SODIUM LACTATE, POTASSIUM CHLORIDE, CALCIUM CHLORIDE AND DEXTROSE MONOHYDRATE: 5; 600; 310; 30; 20 INJECTION, SOLUTION INTRAVENOUS at 11:19

## 2024-08-11 RX ADMIN — BENZTROPINE MESYLATE 1 MG: 1 TABLET ORAL at 08:00

## 2024-08-11 ASSESSMENT — ACTIVITIES OF DAILY LIVING (ADL)
ADLS_ACUITY_SCORE: 22
ADLS_ACUITY_SCORE: 26
ADLS_ACUITY_SCORE: 22

## 2024-08-11 NOTE — PLAN OF CARE
Goal Outcome Evaluation:      Plan of Care Reviewed With: patient    Pt here with seizure, Afib RVR.  A&O x4. CIWA score btw 1 &5  this shift, no valium required. Continues on dex gtt @0.6 mcg/kg/hr. VSS on 2L NC. Tele  NSR. Regular diet, takes pills whole with thin liquids. D5LR infusing @ 50mL/hr. Continent of B&B. Up with A1/GB. Denies pain. No seizure activities notes this shift.    Problem: Alcohol Withdrawal  Goal: Alcohol Withdrawal Symptom Control  Outcome: Progressing  Intervention: Minimize or Manage Alcohol Withdrawal Symptoms  Recent Flowsheet Documentation  Taken 8/11/2024 1800 by Robert Dwyer RN  Seizure Precautions:   side rails padded   activity supervised   clutter-free environment maintained  Taken 8/11/2024 1600 by Robert Dwyer RN  Sensory Stimulation Regulation:   auditory stimulation minimized   care clustered   quiet environment promoted  Aspiration Precautions: awake/alert before oral intake  Seizure Precautions:   side rails padded   activity supervised   clutter-free environment maintained  Taken 8/11/2024 1400 by Robert Dwyer RN  Seizure Precautions:   side rails padded   activity supervised   clutter-free environment maintained  Taken 8/11/2024 1200 by Robert Dwyer RN  Sensory Stimulation Regulation:   auditory stimulation minimized   care clustered   quiet environment promoted  Aspiration Precautions: awake/alert before oral intake  Seizure Precautions:   side rails padded   activity supervised   clutter-free environment maintained  Taken 8/11/2024 0800 by Robert Dwyer RN  Sensory Stimulation Regulation:   auditory stimulation minimized   care clustered   quiet environment promoted  Aspiration Precautions: awake/alert before oral intake  Seizure Precautions: side rails padded     Problem: Alcohol Withdrawal  Goal: Optimal Neurologic Function  Intervention: Prevent Seizure-Related Injury  Recent Flowsheet Documentation  Taken 8/11/2024 1800 by Robert Dwyer  DOV  Seizure Precautions:   side rails padded   activity supervised   clutter-free environment maintained  Taken 8/11/2024 1600 by Robert Dwyer RN  Seizure Precautions:   side rails padded   activity supervised   clutter-free environment maintained  Taken 8/11/2024 1400 by Robert Dwyer RN  Seizure Precautions:   side rails padded   activity supervised   clutter-free environment maintained  Taken 8/11/2024 1200 by Robert Dwyer RN  Seizure Precautions:   side rails padded   activity supervised   clutter-free environment maintained  Taken 8/11/2024 0800 by Robert Dwyer RN  Seizure Precautions: side rails padded     Problem: Alcohol Withdrawal  Goal: Optimal Neurologic Function  Outcome: Progressing  Intervention: Minimize or Manage Acute Neurologic Symptoms  Recent Flowsheet Documentation  Taken 8/11/2024 1600 by Robert Dwyer RN  Sensory Stimulation Regulation:   auditory stimulation minimized   care clustered   quiet environment promoted  Cerebral Perfusion Promotion: blood pressure monitored  Taken 8/11/2024 1200 by Robert Dwyer RN  Sensory Stimulation Regulation:   auditory stimulation minimized   care clustered   quiet environment promoted  Cerebral Perfusion Promotion: blood pressure monitored  Taken 8/11/2024 0800 by Robret Dwyer RN  Sensory Stimulation Regulation:   auditory stimulation minimized   care clustered   quiet environment promoted  Cerebral Perfusion Promotion: blood pressure monitored  Intervention: Prevent Seizure-Related Injury  Recent Flowsheet Documentation  Taken 8/11/2024 1800 by Robert Dwyer RN  Seizure Precautions:   side rails padded   activity supervised   clutter-free environment maintained  Taken 8/11/2024 1600 by Robert Dwyer RN  Seizure Precautions:   side rails padded   activity supervised   clutter-free environment maintained  Taken 8/11/2024 1400 by Robert Dwyer RN  Seizure Precautions:   side rails padded   activity supervised    clutter-free environment maintained  Taken 8/11/2024 1200 by Robert Dwyer RN  Seizure Precautions:   side rails padded   activity supervised   clutter-free environment maintained  Taken 8/11/2024 0800 by Robert Dwyer, DOV  Seizure Precautions: side rails padded

## 2024-08-11 NOTE — PLAN OF CARE
Goal Outcome Evaluation:\    ICU End of Shift Summary. See flowsheets for vital signs and detailed assessment.    Changes this shift: Patient weaned to room air.  Appetite improved, ate dinner.  PRN valium needed starting at 2320~.  Patient started having hallucinations, worsening head ache, unstable gait, confusing, and need for sitter.  He self removed 2 Ivs and all equipment just prior to this time.  Started on precedex, given valium 5 times and patient was able to rest some from 0315 onward with only mild agitation on awakening.  Reorients easily, but is initially very confused upon awakening.  Did require supplemental O2 during this time for spotaneous desats following valium administration.    Plan: Wean dex, wean O2, reorient.

## 2024-08-11 NOTE — PROGRESS NOTES
Hospitalist service cross-cover note:     Ordered Precedex gtt for increased agitation despite CIWA, haloperidol.     Geronimo Porter MD   Hospitalist

## 2024-08-11 NOTE — PROGRESS NOTES
[facility]    Medicine Progress Note - Hospitalist Service    Date of Admission:  8/9/2024    Assessment & Plan   Ravi Ahmadi is a 34 year old male admitted on 8/9/2024. He presents with seizure in the setting of EtOH withdrawal.      # Alcohol withdrawal seizure  # Hx Etoh seizures  # Hx hallucinations w/ withdrawal  # Alcohol use disorder, severe  - Pt states first (real) seizure but has had in past.   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   - Last Etoh evening prior.   - In ED tachy 170s, other VSS. Lactic 9.5->2.0. AG 25.   - CXR w/ LLL hazy opacities. CT head/C-spine w/o acute process.    - Neurology consulted, recommending management of withdrawal symptoms.  -Given high CIWA scores, patient initiated on Precedex gtt, continue at this time  -Evaluated by neurology, no indication for AEDs  - seizure precautions  - prn lorazepam for seizures  - prn valium withdrawal   - Continue MVI, thiamine, folic acid.  - declines CD at this time  - Bedside attendant  - D5wLR infusion      # Atrial fibrillation with RVR-resolved  - HR in ED to 170s.   - Given adenosine and appears to be afib.   - Spontaneously converted in ICU. [id number] 1 (DM).   -TTE obtained showed normal EF, no structural abnormality noted.  - telemetry   - echo   - prn metoprolol if back in afib  - hold on anticoagulation for now    # Abnormal chest x-ray  # Acute hypoxic respiratory failure - likely due to aspiration pneumonitis  # Wheeze on examination   Found to have sodium of his opacities in left lower lobe.  Likely represents aspiration.  -Continue to monitor, low threshold to manage for possible aspiration pneumonia.  - Trial of duonebs at this time  -Wean supplemental O2 to keep SpO2 >94%     # Mild alcoholic hepatitis  AST//159. T bili 1.5 (baseline low normal)  - monitor LFTs     # Hypomagnesemia-replete per protocol  Mg 1.5 on presentation    - replace per protocol  - check phos    # Anion gap metabolic acidosis-improving with ongoing IV fluids.    # Hypokalemia-replete per protocol     # DM II  States takes 5 units of lantus daily at home. Most recent A1c 6.0% 12/2023  - sliding scale insulin for now  - TID and HS blood sugars     # Thrombocytopenia  Mild, plts 123.  Likely in setting of marrow suppression from alcohol use  - monitor for now     # MDD vs Bipolar  # Psychosis  # Anxiety disorder  Per psychiatry notes 12/2023.   - resume meds with rec; this includes Abilify 5 mg nightly, benztropine 1 mg twice daily, Zyprexa 10 mg twice daily, sertraline 50 mg daily, trazodone 50 mg nightly as needed     # Asthma  - prn albuterol available     # Tobacco use disorder  Smokes 1 ppd  - declines NRT          Diet: Advance Diet as Tolerated: Regular Diet Adult    DVT Prophylaxis: Pneumatic Compression Devices  Abernathy Catheter: Not present  Lines: None     Cardiac Monitoring: ACTIVE order. Indication: ICU  Code Status: Full Code      Clinically Significant Risk Factors        # Hypokalemia: Lowest K = 3.1 mmol/L in last 2 days, will replace as needed   # Hypocalcemia: Lowest Ca = 8.6 mg/dL in last 2 days, will monitor and replace as appropriate   # Hypomagnesemia: Lowest Mg = 1.5 mg/dL in last 2 days, will replace as needed  # Anion Gap Metabolic Acidosis: Highest Anion Gap = 25 mmol/L in last 2 days, will monitor and treat as appropriate    # Thrombocytopenia: Lowest platelets = 75 in last 2 days, will monitor for bleeding   # Hypertension: Noted on problem list               # Financial/Environmental Concerns:    # Asthma: noted on problem list              Disposition Plan     Medically Ready for Discharge: Anticipated in 2-4 Days    Also, patient can be transitioned to IMC if able to wean down oxygen and off Precedex gtt.            CRISTOBAL BAKER MD  Hospitalist Service  [facility]  Securely message with [employer] (more info)  Text  page via [facility] Paging/Directory   ______________________________________________________________________    Interval History   -Has remained afebrile and hemodynamically stable  -Continues to score on CIWA protocol.  -Initiated on Precedex gtt overnight  -No evidence of leukocytosis and labs, coming down on oxygen requirements.  -Patient has a bedside sitter  -Currently denies any acute complaints; p.o. intake has been poor so for    Physical Exam   Vital Signs: Temp: 98.9  F (37.2  C) Temp src: Axillary BP: (!) 128/93 Pulse: 71   Resp: 15 SpO2: 98 % O2 Device: Nasal cannula Oxygen Delivery: 6 LPM  Weight: 237 lbs 3.44 oz    General Appearance: Lying in bed, on supplemental oxygen.  HEENT: PERRL: EOMI; moist mucous membrane w/o lesions  Neck: No JVD  Pulmonary: On nasal cannula, mild wheezes heard on auscultation   CVS: Regular rhythm, no murmurs, rubs or gallops  GI: BS (+), soft nontender, no rebound or guarding   Extremities: No LE edema; pulses strong and equal throughout   Skin: No rashes or lesions  Neurologic: A&O x3      Medical Decision Making       55 MINUTES SPENT BY ME on the date of service doing chart review, history, exam, documentation & further activities per the note.      Data   ------------------------- PAST 24 HR DATA REVIEWED -----------------------------------------------    I have personally reviewed the following data over the past 24 hrs:    5.4  \   14.6   / 75 (L)     137 96 (L) 3.8 (L) /  99   3.1 (L) 25 0.51 (L) \     ALT: 109 (H) AST: 111 (H) AP: 71 TBILI: 1.7 (H)   ALB: 3.8 TOT PROTEIN: 6.4 LIPASE: N/A     Procal: 0.11 CRP: N/A Lactic Acid: N/A         Imaging results reviewed over the past 24 hrs:   Recent Results (from the past 24 hour(s))   Echocardiogram Complete   Result Value    LVEF  50-55%    Narrative    [id number]  [id number]  [id number]  [id number]^JOSEF^SANCHEZ^JAZMINE     [facility]  Echocardiography Laboratory  [address] [place] [id number]     Name:  EMILIANA CALABRESE  MRN: [id number]  : 1990  Study Date: 08/10/2024 01:31 PM  Age: 34 yrs  Gender: Male  Patient Location: [facility]  Reason For Study: Atrial Fibrillation  Ordering Physician: SANCHEZ BAHENA  Referring Physician: Mayte Hill MD  Performed By: Lee Ann Shannon     BSA: 2.3 m2  Height: 72 in  Weight: 238 lb  HR: 73  BP: 140/84 mmHg  ______________________________________________________________________________  Procedure  Complete Portable Echo Adult. Optison (NDC #[id number]) given intravenously.  ______________________________________________________________________________  Interpretation Summary     Left ventricular systolic function is low normal.  The visual ejection fraction is 50-55%.  No regional wall motion abnormalities noted.  The right ventricular systolic function is borderline reduced.  The study was technically difficult. There is no comparison study available.  ______________________________________________________________________________  Left Ventricle  The left ventricle is normal in size. There is mild concentric left  ventricular hypertrophy. Left ventricular systolic function is low normal. The  visual ejection fraction is 50-55%. Left ventricular diastolic function is  indeterminate. No regional wall motion abnormalities noted.     Right Ventricle  The right ventricle is normal size. The right ventricular systolic function is  borderline reduced.     Atria  Normal left atrial size. Right atrial size is normal.     Mitral Valve  There is trace mitral regurgitation.     Tricuspid Valve  No tricuspid regurgitation. Right ventricular systolic pressure could not be  approximated due to inadequate tricuspid regurgitation. IVC diameter >2.1 cm  collapsing <50% with sniff suggests a high RA pressure estimated at 15 mmHg or  greater.     Aortic Valve  The aortic valve is trileaflet. No aortic regurgitation is present. No aortic  stenosis is present.     Vessels  The  aortic root is normal size.     Rhythm  Sinus rhythm was noted.     ______________________________________________________________________________  MMode/2D Measurements & Calculations  IVSd: 1.2 cm  LVIDd: 5.0 cm  LVIDs: 3.0 cm  LVPWd: 1.3 cm  FS: 39.7 %  LV mass(C)d: 247.6 grams  LV mass(C)dI: 107.9 grams/m2  Ao root diam: 3.7 cm  LA dimension: 3.7 cm  asc Aorta Diam: 3.6 cm  LA/Ao: 1.0  LVOT diam: 2.3 cm  LVOT area: 4.2 cm2     Ao root diam index Ht(cm/m): 2.0  Ao root diam index BSA (cm/m2): 1.6  Asc Ao diam index BSA (cm/m2): 1.6  Asc Ao diam index Ht(cm/m): 1.9  LA Volume (BP): 48.7 ml  LA Volume Index (BP): 21.3 ml/m2  RV Base: 3.0 cm  RWT: 0.52  TAPSE: 2.3 cm     Doppler Measurements & Calculations  MV E max gian: 73.7 cm/sec  MV A max gian: 63.4 cm/sec  MV E/A: 1.2  MV dec time: 0.23 sec  PA acc time: 0.12 sec  E/E' av.7  Lateral E/e': 8.2  Medial E/e': 7.1  RV S Gian: 13.5 cm/sec     ______________________________________________________________________________  Report approved by: Will Paul 08/10/2024 02:41 PM

## 2024-08-11 NOTE — PROGRESS NOTES
"2320:  Patient ripped out 2 Ivs, removed all monitoring equipment.  Required multiple doses of valium.  Little effect, haldol more effective but patient reporting visual hallucinations.    0230:  Patient reporting visions of rat ripping his friends into the .  Currently titrating up ordered dex.  Dosing valium when able.  Patient tremors constant, and is protecting airway.  Good strong cough, requiring FREQUENT redirection from monitoring equipment and Iv's.    0238: Patient now intermittently sleeping.  Still occasionally waking with visual hallucinations, reported auditory hallucinations.  Occasionally waking dreaming about \"the terminator coming to slice my throat.\"    Patient making numerous attempts during to tear off ECG electrodes, Ivs, seizure pads, and SCD's.  Consistently believes he is home upon awakening.     "

## 2024-08-12 LAB
ALBUMIN SERPL BCG-MCNC: 4.1 G/DL (ref 3.5–5.2)
ALP SERPL-CCNC: 81 U/L (ref 40–150)
ALT SERPL W P-5'-P-CCNC: 97 U/L (ref 0–70)
ANION GAP SERPL CALCULATED.3IONS-SCNC: 12 MMOL/L (ref 7–15)
AST SERPL W P-5'-P-CCNC: 66 U/L (ref 0–45)
BASOPHILS # BLD AUTO: 0.1 10E3/UL (ref 0–0.2)
BASOPHILS NFR BLD AUTO: 1 %
BILIRUB SERPL-MCNC: 1.7 MG/DL
BUN SERPL-MCNC: 4.6 MG/DL (ref 6–20)
CALCIUM SERPL-MCNC: 9.8 MG/DL (ref 8.8–10.4)
CHLORIDE SERPL-SCNC: 95 MMOL/L (ref 98–107)
CREAT SERPL-MCNC: 0.58 MG/DL (ref 0.67–1.17)
EGFRCR SERPLBLD CKD-EPI 2021: >90 ML/MIN/1.73M2
EOSINOPHIL # BLD AUTO: 0.2 10E3/UL (ref 0–0.7)
EOSINOPHIL NFR BLD AUTO: 3 %
ERYTHROCYTE [DISTWIDTH] IN BLOOD BY AUTOMATED COUNT: 12 % (ref 10–15)
GLUCOSE BLDC GLUCOMTR-MCNC: 144 MG/DL (ref 70–99)
GLUCOSE BLDC GLUCOMTR-MCNC: 148 MG/DL (ref 70–99)
GLUCOSE BLDC GLUCOMTR-MCNC: 152 MG/DL (ref 70–99)
GLUCOSE BLDC GLUCOMTR-MCNC: 163 MG/DL (ref 70–99)
GLUCOSE BLDC GLUCOMTR-MCNC: 164 MG/DL (ref 70–99)
GLUCOSE BLDC GLUCOMTR-MCNC: 177 MG/DL (ref 70–99)
GLUCOSE SERPL-MCNC: 135 MG/DL (ref 70–99)
HCO3 SERPL-SCNC: 28 MMOL/L (ref 22–29)
HCT VFR BLD AUTO: 47 % (ref 40–53)
HGB BLD-MCNC: 16.4 G/DL (ref 13.3–17.7)
IMM GRANULOCYTES # BLD: 0 10E3/UL
IMM GRANULOCYTES NFR BLD: 0 %
LYMPHOCYTES # BLD AUTO: 1.2 10E3/UL (ref 0.8–5.3)
LYMPHOCYTES NFR BLD AUTO: 19 %
MAGNESIUM SERPL-MCNC: 1.7 MG/DL (ref 1.7–2.3)
MCH RBC QN AUTO: 32.1 PG (ref 26.5–33)
MCHC RBC AUTO-ENTMCNC: 34.9 G/DL (ref 31.5–36.5)
MCV RBC AUTO: 92 FL (ref 78–100)
MONOCYTES # BLD AUTO: 0.5 10E3/UL (ref 0–1.3)
MONOCYTES NFR BLD AUTO: 8 %
NEUTROPHILS # BLD AUTO: 4.4 10E3/UL (ref 1.6–8.3)
NEUTROPHILS NFR BLD AUTO: 70 %
NRBC # BLD AUTO: 0 10E3/UL
NRBC BLD AUTO-RTO: 0 /100
PHOSPHATE SERPL-MCNC: 2.4 MG/DL (ref 2.5–4.5)
PLATELET # BLD AUTO: 90 10E3/UL (ref 150–450)
POTASSIUM SERPL-SCNC: 3.3 MMOL/L (ref 3.4–5.3)
POTASSIUM SERPL-SCNC: 3.4 MMOL/L (ref 3.4–5.3)
PROT SERPL-MCNC: 6.9 G/DL (ref 6.4–8.3)
RBC # BLD AUTO: 5.11 10E6/UL (ref 4.4–5.9)
SODIUM SERPL-SCNC: 135 MMOL/L (ref 135–145)
WBC # BLD AUTO: 6.3 10E3/UL (ref 4–11)

## 2024-08-12 PROCEDURE — 250N000013 HC RX MED GY IP 250 OP 250 PS 637: Performed by: INTERNAL MEDICINE

## 2024-08-12 PROCEDURE — 200N000001 HC R&B ICU

## 2024-08-12 PROCEDURE — 84100 ASSAY OF PHOSPHORUS: CPT | Performed by: INTERNAL MEDICINE

## 2024-08-12 PROCEDURE — 93005 ELECTROCARDIOGRAM TRACING: CPT

## 2024-08-12 PROCEDURE — 36415 COLL VENOUS BLD VENIPUNCTURE: CPT | Performed by: INTERNAL MEDICINE

## 2024-08-12 PROCEDURE — 85025 COMPLETE CBC W/AUTO DIFF WBC: CPT | Performed by: INTERNAL MEDICINE

## 2024-08-12 PROCEDURE — 250N000009 HC RX 250: Performed by: INTERNAL MEDICINE

## 2024-08-12 PROCEDURE — 99233 SBSQ HOSP IP/OBS HIGH 50: CPT | Performed by: INTERNAL MEDICINE

## 2024-08-12 PROCEDURE — 94640 AIRWAY INHALATION TREATMENT: CPT

## 2024-08-12 PROCEDURE — 999N000157 HC STATISTIC RCP TIME EA 10 MIN

## 2024-08-12 PROCEDURE — 250N000012 HC RX MED GY IP 250 OP 636 PS 637: Performed by: INTERNAL MEDICINE

## 2024-08-12 PROCEDURE — 250N000011 HC RX IP 250 OP 636: Performed by: INTERNAL MEDICINE

## 2024-08-12 PROCEDURE — 94640 AIRWAY INHALATION TREATMENT: CPT | Mod: 76

## 2024-08-12 PROCEDURE — 80053 COMPREHEN METABOLIC PANEL: CPT | Performed by: INTERNAL MEDICINE

## 2024-08-12 PROCEDURE — 84132 ASSAY OF SERUM POTASSIUM: CPT | Performed by: INTERNAL MEDICINE

## 2024-08-12 PROCEDURE — 83735 ASSAY OF MAGNESIUM: CPT | Performed by: INTERNAL MEDICINE

## 2024-08-12 PROCEDURE — 93010 ELECTROCARDIOGRAM REPORT: CPT | Performed by: INTERNAL MEDICINE

## 2024-08-12 RX ORDER — POTASSIUM CHLORIDE 1500 MG/1
40 TABLET, EXTENDED RELEASE ORAL ONCE
Status: COMPLETED | OUTPATIENT
Start: 2024-08-12 | End: 2024-08-12

## 2024-08-12 RX ORDER — HYDRALAZINE HYDROCHLORIDE 20 MG/ML
10 INJECTION INTRAMUSCULAR; INTRAVENOUS EVERY 6 HOURS PRN
Status: DISCONTINUED | OUTPATIENT
Start: 2024-08-12 | End: 2024-08-14 | Stop reason: HOSPADM

## 2024-08-12 RX ADMIN — FOLIC ACID 1 MG: 1 TABLET ORAL at 09:08

## 2024-08-12 RX ADMIN — INSULIN ASPART 1 UNITS: 100 INJECTION, SOLUTION INTRAVENOUS; SUBCUTANEOUS at 17:57

## 2024-08-12 RX ADMIN — OLANZAPINE 10 MG: 5 TABLET, FILM COATED ORAL at 09:06

## 2024-08-12 RX ADMIN — POTASSIUM & SODIUM PHOSPHATES POWDER PACK 280-160-250 MG 1 PACKET: 280-160-250 PACK at 06:46

## 2024-08-12 RX ADMIN — SERTRALINE HYDROCHLORIDE 50 MG: 50 TABLET ORAL at 09:08

## 2024-08-12 RX ADMIN — Medication 25 MCG: at 09:08

## 2024-08-12 RX ADMIN — TRAZODONE HYDROCHLORIDE 50 MG: 50 TABLET ORAL at 01:54

## 2024-08-12 RX ADMIN — INSULIN ASPART 1 UNITS: 100 INJECTION, SOLUTION INTRAVENOUS; SUBCUTANEOUS at 09:28

## 2024-08-12 RX ADMIN — IPRATROPIUM BROMIDE AND ALBUTEROL SULFATE 3 ML: .5; 3 SOLUTION RESPIRATORY (INHALATION) at 07:58

## 2024-08-12 RX ADMIN — DIAZEPAM 10 MG: 5 INJECTION INTRAMUSCULAR; INTRAVENOUS at 00:31

## 2024-08-12 RX ADMIN — INSULIN ASPART 1 UNITS: 100 INJECTION, SOLUTION INTRAVENOUS; SUBCUTANEOUS at 12:19

## 2024-08-12 RX ADMIN — DEXMEDETOMIDINE HYDROCHLORIDE 0.4 MCG/KG/HR: 400 INJECTION INTRAVENOUS at 13:26

## 2024-08-12 RX ADMIN — DEXMEDETOMIDINE HYDROCHLORIDE 0.8 MCG/KG/HR: 400 INJECTION INTRAVENOUS at 04:15

## 2024-08-12 RX ADMIN — THIAMINE HCL TAB 100 MG 100 MG: 100 TAB at 09:08

## 2024-08-12 RX ADMIN — TRAZODONE HYDROCHLORIDE 50 MG: 50 TABLET ORAL at 21:04

## 2024-08-12 RX ADMIN — ARIPIPRAZOLE 5 MG: 5 TABLET ORAL at 21:04

## 2024-08-12 RX ADMIN — POTASSIUM CHLORIDE 40 MEQ: 1500 TABLET, EXTENDED RELEASE ORAL at 06:19

## 2024-08-12 RX ADMIN — BENZTROPINE MESYLATE 1 MG: 1 TABLET ORAL at 21:04

## 2024-08-12 RX ADMIN — BENZTROPINE MESYLATE 1 MG: 1 TABLET ORAL at 09:08

## 2024-08-12 RX ADMIN — DIAZEPAM 10 MG: 5 INJECTION INTRAMUSCULAR; INTRAVENOUS at 01:54

## 2024-08-12 RX ADMIN — IPRATROPIUM BROMIDE AND ALBUTEROL SULFATE 3 ML: .5; 3 SOLUTION RESPIRATORY (INHALATION) at 11:57

## 2024-08-12 RX ADMIN — PANTOPRAZOLE SODIUM 40 MG: 40 TABLET, DELAYED RELEASE ORAL at 09:08

## 2024-08-12 RX ADMIN — DEXMEDETOMIDINE HYDROCHLORIDE 0.8 MCG/KG/HR: 400 INJECTION INTRAVENOUS at 09:09

## 2024-08-12 RX ADMIN — POTASSIUM & SODIUM PHOSPHATES POWDER PACK 280-160-250 MG 1 PACKET: 280-160-250 PACK at 12:19

## 2024-08-12 RX ADMIN — DEXMEDETOMIDINE HYDROCHLORIDE 0.8 MCG/KG/HR: 400 INJECTION INTRAVENOUS at 00:31

## 2024-08-12 RX ADMIN — POTASSIUM & SODIUM PHOSPHATES POWDER PACK 280-160-250 MG 1 PACKET: 280-160-250 PACK at 17:58

## 2024-08-12 RX ADMIN — Medication 5 MG: at 23:52

## 2024-08-12 RX ADMIN — Medication 1 TABLET: at 09:08

## 2024-08-12 ASSESSMENT — ACTIVITIES OF DAILY LIVING (ADL)
ADLS_ACUITY_SCORE: 26
ADLS_ACUITY_SCORE: 22
ADLS_ACUITY_SCORE: 30
ADLS_ACUITY_SCORE: 22
ADLS_ACUITY_SCORE: 30
ADLS_ACUITY_SCORE: 28
ADLS_ACUITY_SCORE: 30
ADLS_ACUITY_SCORE: 30
ADLS_ACUITY_SCORE: 22
ADLS_ACUITY_SCORE: 30
ADLS_ACUITY_SCORE: 26
ADLS_ACUITY_SCORE: 30
ADLS_ACUITY_SCORE: 22
ADLS_ACUITY_SCORE: 30

## 2024-08-12 NOTE — PROGRESS NOTES
[facility]    Medicine Progress Note - Hospitalist Service    Date of Admission:  8/9/2024    Assessment & Plan   Ravi Ahmadi is a 34 year old male admitted on 8/9/2024. He presents with seizure in the setting of EtOH withdrawal.      # Alcohol withdrawal seizure  # Hx Etoh seizures  # Hx hallucinations w/ withdrawal  # Alcohol use disorder, severe  - Pt states first (real) seizure but has had in past.   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   - Last Etoh evening prior.   - In ED tachy 170s, other VSS. Lactic 9.5->2.0. AG 25.   - CXR w/ LLL hazy opacities. CT head/C-spine w/o acute process.    - Neurology consulted, recommending management of withdrawal symptoms.  -Given high CIWA scores, patient initiated on Precedex gtt, continue at this time  -Evaluated by neurology, no indication for AEDs  - seizure precautions  - prn lorazepam for seizures  - prn valium withdrawal   - Continue MVI, thiamine, folic acid.  - declines CD at this time  - Bedside attendant  - D5wLR infusion until adequate p.o. intake  -Discussed with RN, plan to wean off Precedex gtt and then he can be transferred out of the ICU.   - Will not treat solely for hallucination since he has a hx of psychosis   - Psychiatry consult once cleared from withdrawal standpoint.   - Zyprexa held, consider resuming once off CIWA protocol     # Atrial fibrillation with RVR-resolved  - HR in ED to 170s.   - Given adenosine and appears to be afib.   - Spontaneously converted in ICU. [id number] 1 (DM).   -TTE obtained showed normal EF, no structural abnormality noted.  -Has remained in normal sinus rhythm with normal rates.  -Telemetry was still in active withdrawal phase.  -As needed metoprolol if returns back to A-fib.    # Abnormal chest x-ray  # Acute hypoxic respiratory failure - likely due to aspiration pneumonitis  # Wheeze on examination   Found to have sodium of  his opacities in left lower lobe.  Likely represents aspiration.  -Continue to monitor, low threshold to manage for possible aspiration pneumonia.  - Trial of duonebs at this time  -Wean supplemental O2 to keep SpO2 >94%     # Mild alcoholic hepatitis  AST//159. T bili 1.5 (baseline low normal)  - monitor LFTs     # Hypomagnesemia-replete per protocol  # Hypochloremia-continue to monitor  # Hypophosphatemia-replete per protocol  - replace per protocol  - check phos    # Anion gap metabolic acidosis-improving with ongoing IV fluids.  Resolved at this time.    # Hypokalemia-replete per protocol     # DM II  States takes 5 units of lantus daily at home. Most recent A1c 6.0% 12/2023  - sliding scale insulin for now  - TID and HS blood sugars     # Thrombocytopenia-stable  Mild, plts 123.  Likely in setting of marrow suppression from alcohol use  - monitor for now     # MDD vs Bipolar  # Psychosis  # Anxiety disorder  Per psychiatry notes 12/2023.   - resume meds with rec; this includes Abilify 5 mg nightly, benztropine 1 mg twice daily, Zyprexa 10 mg twice daily, sertraline 50 mg daily, trazodone 50 mg nightly as needed     # Asthma  - prn albuterol available     # Tobacco use disorder  Smokes 1 ppd  - declines NRT    # Prolonged QTc-slowly improving  -Reasonable to keep minimizing QT prolonging medications.          Diet: Advance Diet as Tolerated: Regular Diet Adult    DVT Prophylaxis: Pneumatic Compression Devices  Abernathy Catheter: Not present  Lines: None     Cardiac Monitoring: ACTIVE order. Indication: ICU  Code Status: Full Code      Clinically Significant Risk Factors        # Hypokalemia: Lowest K = 3.1 mmol/L in last 2 days, will replace as needed   # Hypocalcemia: Lowest Ca = 8.6 mg/dL in last 2 days, will monitor and replace as appropriate       # Thrombocytopenia: Lowest platelets = 75 in last 2 days, will monitor for bleeding   # Hypertension: Noted on problem list               #  Financial/Environmental Concerns:    # Asthma: noted on problem list              Disposition Plan     Medically Ready for Discharge: Anticipated in 2-4 Days    Also, patient can be transitioned to IMC if able to wean down oxygen and off Precedex gtt.            CRISTOBAL BAKER MD  Hospitalist Service  [facility]  Securely message with [employer] (more info)  Text page via [employer] Paging/Directory   ______________________________________________________________________    Interval History   -Has remained afebrile and hemodynamically stable  -Patient is still scoring on CIWA protocol  -Remains on precedex gtt  -Denies any acute complaints at this time  -Patient is open to discussing his hospital stay with his mother.  -He is currently now off oxygen.    Physical Exam   Vital Signs: Temp: 98.2  F (36.8  C) Temp src: Oral BP: 101/79 Pulse: 76   Resp: 23 SpO2: 96 % O2 Device: None (Room air) Oxygen Delivery: 2 LPM  Weight: 237 lbs 3.44 oz    General Appearance: Lying in bed, on supplemental oxygen.  HEENT: PERRL: EOMI; moist mucous membrane w/o lesions  Neck: No JVD  Pulmonary:  on room air, normal work of breathing, bibasilar crackles.  CVS: Regular rhythm, no murmurs, rubs or gallops  GI: BS (+), soft nontender, no rebound or guarding   Extremities: No LE edema; pulses strong and equal throughout   Skin: No rashes or lesions  Neurologic: A&O x3      Medical Decision Making       55 MINUTES SPENT BY ME on the date of service doing chart review, history, exam, documentation & further activities per the note.      Data   ------------------------- PAST 24 HR DATA REVIEWED -----------------------------------------------    I have personally reviewed the following data over the past 24 hrs:    6.3  \   16.4   / 90 (L)     135 95 (L) 4.6 (L) /  144 (H)   3.4 28 0.58 (L) \     ALT: 97 (H) AST: 66 (H) AP: 81 TBILI: 1.7 (H)   ALB: 4.1 TOT PROTEIN: 6.9 LIPASE: N/A       Imaging results reviewed over the past  24 hrs:   No results found for this or any previous visit (from the past 24 hour(s)).

## 2024-08-12 NOTE — PLAN OF CARE
"Neuro: The patient was drowsy and intermittently confuse when he was awake. He will answer LOC questions appropriately and drift into a hallucination that he was at home lying in the couch. The pt will also easily drift to sleep. Precedex gtt stopped at 1400. He became A/O x 4 after 1600, able to follow commands. CIWA <7 for the shift. No benzos given. Reports slight tremors on upper extremities and mild headache.  CV: NSR, SBP ,140  Resp: RA  GI: Regular diet, BG 140s-170s coverage given with SS  : Uses the urinal, noted good UOP  Skin: Dry , intact  Access: PIV x 1  Gtts: none  Plan: if no IV benzos needed tonight he might transfer out of ICU tomorrow    Problem: Adult Inpatient Plan of Care  Goal: Plan of Care Review  Description: The Plan of Care Review/Shift note should be completed every shift.  The Outcome Evaluation is a brief statement about your assessment that the patient is improving, declining, or no change.  This information will be displayed automatically on your shift  note.  Outcome: Progressing  Goal: Patient-Specific Goal (Individualized)  Description: You can add care plan individualizations to a care plan. Examples of Individualization might be:  \"Parent requests to be called daily at 9am for status\", \"I have a hard time hearing out of my right ear\", or \"Do not touch me to wake me up as it startles  me\".  Outcome: Progressing  Goal: Absence of Hospital-Acquired Illness or Injury  Outcome: Progressing  Intervention: Identify and Manage Fall Risk  Recent Flowsheet Documentation  Taken 8/12/2024 1600 by Didi Cuevas, RN  Safety Promotion/Fall Prevention:   activity supervised   assistive device/personal items within reach   bedside attendant   nonskid shoes/slippers when out of bed   lighting adjusted   room near nurse's station   supervised activity   treat underlying cause  Taken 8/12/2024 1200 by Didi Cuevas, RN  Safety Promotion/Fall Prevention:   activity supervised   assistive " device/personal items within reach   bedside attendant   nonskid shoes/slippers when out of bed   lighting adjusted   room near nurse's station   supervised activity   treat underlying cause  Taken 8/12/2024 0800 by Didi Cuevas RN  Safety Promotion/Fall Prevention:   activity supervised   assistive device/personal items within reach   bedside attendant   nonskid shoes/slippers when out of bed   lighting adjusted   room near nurse's station   supervised activity   treat underlying cause  Intervention: Prevent Skin Injury  Recent Flowsheet Documentation  Taken 8/12/2024 1800 by Didi Cuevas RN  Body Position: position changed independently  Taken 8/12/2024 1600 by Didi Cuevas RN  Body Position: position changed independently  Skin Protection:   adhesive use limited   protective footwear used   pulse oximeter probe site changed   silicone foam dressing in place   skin to skin areas padded   transparent dressing maintained  Device Skin Pressure Protection:   absorbent pad utilized/changed   skin-to-device areas padded   skin-to-skin areas padded   tubing/devices free from skin contact  Taken 8/12/2024 1400 by Didi Cuevas RN  Body Position: position changed independently  Taken 8/12/2024 1200 by Didi Cuevas RN  Body Position: position changed independently  Skin Protection:   adhesive use limited   protective footwear used   pulse oximeter probe site changed   silicone foam dressing in place   skin to skin areas padded   transparent dressing maintained  Device Skin Pressure Protection:   absorbent pad utilized/changed   skin-to-device areas padded   skin-to-skin areas padded   tubing/devices free from skin contact  Taken 8/12/2024 1001 by Didi Cuevas RN  Body Position: position changed independently  Taken 8/12/2024 0800 by Didi Cuevas RN  Body Position: position changed independently  Skin Protection:   adhesive use limited   protective footwear used   pulse  oximeter probe site changed   silicone foam dressing in place   skin to skin areas padded   transparent dressing maintained  Device Skin Pressure Protection:   absorbent pad utilized/changed   skin-to-device areas padded   skin-to-skin areas padded   tubing/devices free from skin contact  Intervention: Prevent and Manage VTE (Venous Thromboembolism) Risk  Recent Flowsheet Documentation  Taken 8/12/2024 1600 by Didi Cuevas RN  VTE Prevention/Management: (pt refused. Continuously meve in bed) SCDs off (sequential compression devices)  Taken 8/12/2024 1200 by Didi Cuevas RN  VTE Prevention/Management: (pt refused. Continuously meve in bed) SCDs off (sequential compression devices)  Taken 8/12/2024 0800 by Didi Cuevas RN  VTE Prevention/Management: (pt refused. Continuously meve in bed) SCDs off (sequential compression devices)  Intervention: Prevent Infection  Recent Flowsheet Documentation  Taken 8/12/2024 1600 by Didi Cuevas RN  Infection Prevention:   single patient room provided   environmental surveillance performed   equipment surfaces disinfected   hand hygiene promoted   personal protective equipment utilized  Taken 8/12/2024 1200 by Didi Cuevas RN  Infection Prevention:   single patient room provided   environmental surveillance performed   equipment surfaces disinfected   hand hygiene promoted   personal protective equipment utilized  Taken 8/12/2024 0800 by Didi Cuevas RN  Infection Prevention:   single patient room provided   environmental surveillance performed   equipment surfaces disinfected   hand hygiene promoted   personal protective equipment utilized  Goal: Optimal Comfort and Wellbeing  Outcome: Progressing  Intervention: Provide Person-Centered Care  Recent Flowsheet Documentation  Taken 8/12/2024 1600 by Didi Cuevas RN  Trust Relationship/Rapport:   care explained   choices provided   questions answered   empathic listening provided    reassurance provided   thoughts/feelings acknowledged  Taken 8/12/2024 1200 by Didi Cuevas RN  Trust Relationship/Rapport:   care explained   choices provided   questions answered   empathic listening provided   reassurance provided   thoughts/feelings acknowledged  Taken 8/12/2024 0800 by Didi Cuevas, RN  Trust Relationship/Rapport:   care explained   choices provided   questions answered   empathic listening provided   reassurance provided   thoughts/feelings acknowledged  Goal: Readiness for Transition of Care  Outcome: Progressing   Goal Outcome Evaluation:

## 2024-08-12 NOTE — PROGRESS NOTES
Hospitalist service cross-cover note:     Paged regarding loss of 1 IV access running IVF at 50 ml/hr. Eating/drinking well. Okay to discontinue fluids.     Geronimo Porter MD   Hospitalist

## 2024-08-12 NOTE — PLAN OF CARE
"ICU End of Shift Nursing Summary *For vital signs and complete assessments, please see documentation flowsheets      Neuro: Pt w/ETOH  withdrawal seizures. CIWA protocol, given valium x4. Hallucinating, says has \"vivid scary dreams\" and awakens constantly from sleep. Auditory hallucinations-- endorses the voices want him to hurt himself, says he drinks alcohol to drown these voices. Occasionally not redirectable, forgets he is in the hospital and awakens thinking he is either at the liquor store or at home.  Pain: denies pain  CV:  tele NSR, ST when up walking to BR rates 100-120s.   Pulm:LS clear, weaned to RA  GI/: Reg diet and tolerating. Gets up to void 1A with GB   Endocrine: BS checks stable  Skin: see flowsheet  Lines: L PIV  Drips: dex,  D5LR discontinued  Additional: Replaced K and phosph, recheck K for 1100    Plan (Upcoming Events): wean dex as able    Bedside Shift Report Completed : yes  Bedside Safety Check Completed: yes     Problem: Adult Inpatient Plan of Care  Goal: Plan of Care Review  Description: The Plan of Care Review/Shift note should be completed every shift.  The Outcome Evaluation is a brief statement about your assessment that the patient is improving, declining, or no change.  This information will be displayed automatically on your shift  note.  Flowsheets (Taken 8/12/2024 0329)  Plan of Care Reviewed With: patient  Overall Patient Progress: no change  Goal: Absence of Hospital-Acquired Illness or Injury  Intervention: Identify and Manage Fall Risk  Recent Flowsheet Documentation  Taken 8/12/2024 0200 by Gem Sanchez RN  Safety Promotion/Fall Prevention:   safety round/check completed   bedside attendant  Taken 8/12/2024 0000 by Gem Sanchez RN  Safety Promotion/Fall Prevention:   safety round/check completed   bedside attendant  Taken 8/11/2024 2200 by Gem Sanchez, RN  Safety Promotion/Fall Prevention:   safety round/check completed   bedside attendant  Taken " 8/11/2024 2000 by Gem Sanchez RN  Safety Promotion/Fall Prevention:   safety round/check completed   bedside attendant  Intervention: Prevent Skin Injury  Recent Flowsheet Documentation  Taken 8/12/2024 0200 by Gem Sanchez RN  Body Position: position changed independently  Taken 8/12/2024 0000 by Gem Sanchez RN  Body Position: position changed independently  Skin Protection:   adhesive use limited   incontinence pads utilized   silicone foam dressing in place  Device Skin Pressure Protection:   absorbent pad utilized/changed   adhesive use limited   pressure points protected   tubing/devices free from skin contact  Taken 8/11/2024 2200 by Gem Sanchez RN  Body Position: position changed independently  Taken 8/11/2024 2000 by Gem Sanchez RN  Body Position: position changed independently  Skin Protection:   adhesive use limited   incontinence pads utilized   silicone foam dressing in place  Device Skin Pressure Protection:   absorbent pad utilized/changed   adhesive use limited   pressure points protected   tubing/devices free from skin contact  Intervention: Prevent and Manage VTE (Venous Thromboembolism) Risk  Recent Flowsheet Documentation  Taken 8/12/2024 0000 by Gem Sanchez RN  VTE Prevention/Management: SCDs on (sequential compression devices)  Taken 8/11/2024 2000 by Gem Sanchez RN  VTE Prevention/Management: SCDs on (sequential compression devices)  Intervention: Prevent Infection  Recent Flowsheet Documentation  Taken 8/12/2024 0000 by Gem Sanchez RN  Infection Prevention:   rest/sleep promoted   single patient room provided   environmental surveillance performed   equipment surfaces disinfected   hand hygiene promoted   personal protective equipment utilized  Taken 8/11/2024 2000 by Gem Sanchez RN  Infection Prevention:   rest/sleep promoted   single patient room provided   environmental surveillance performed   equipment surfaces  disinfected   hand hygiene promoted   personal protective equipment utilized     Problem: Fall Injury Risk  Goal: Absence of Fall and Fall-Related Injury  Intervention: Identify and Manage Contributors  Recent Flowsheet Documentation  Taken 8/12/2024 0000 by Gem Sanchez RN  Medication Review/Management: medications reviewed  Taken 8/11/2024 2000 by Gem Sanchez RN  Medication Review/Management: medications reviewed  Intervention: Promote Injury-Free Environment  Recent Flowsheet Documentation  Taken 8/12/2024 0200 by Gem Sanchez RN  Safety Promotion/Fall Prevention:   safety round/check completed   bedside attendant  Taken 8/12/2024 0000 by Gem Sanchez RN  Safety Promotion/Fall Prevention:   safety round/check completed   bedside attendant  Taken 8/11/2024 2200 by Gem Sanchez RN  Safety Promotion/Fall Prevention:   safety round/check completed   bedside attendant  Taken 8/11/2024 2000 by Gem Sanchez RN  Safety Promotion/Fall Prevention:   safety round/check completed   bedside attendant     Problem: Alcohol Withdrawal  Goal: Alcohol Withdrawal Symptom Control  Intervention: Minimize or Manage Alcohol Withdrawal Symptoms  Recent Flowsheet Documentation  Taken 8/12/2024 0000 by Gem Sanchez RN  Sensory Stimulation Regulation:   auditory stimulation minimized   care clustered   quiet environment promoted  Aspiration Precautions: awake/alert before oral intake  Seizure Precautions:   side rails padded   activity supervised   clutter-free environment maintained  Taken 8/11/2024 2000 by Gem Sanchez RN  Sensory Stimulation Regulation:   auditory stimulation minimized   care clustered   quiet environment promoted  Aspiration Precautions: awake/alert before oral intake  Seizure Precautions:   side rails padded   activity supervised   clutter-free environment maintained  Goal: Optimal Neurologic Function  Intervention: Minimize or Manage Acute Neurologic  Symptoms  Recent Flowsheet Documentation  Taken 8/12/2024 0000 by Gem Sanchez RN  Sensory Stimulation Regulation:   auditory stimulation minimized   care clustered   quiet environment promoted  Airway/Ventilation Management: airway patency maintained  Cerebral Perfusion Promotion: blood pressure monitored  Taken 8/11/2024 2000 by Gem Sanchez RN  Sensory Stimulation Regulation:   auditory stimulation minimized   care clustered   quiet environment promoted  Airway/Ventilation Management: airway patency maintained  Cerebral Perfusion Promotion: blood pressure monitored  Intervention: Prevent Seizure-Related Injury  Recent Flowsheet Documentation  Taken 8/12/2024 0000 by Gem Sanchez RN  Seizure Precautions:   side rails padded   activity supervised   clutter-free environment maintained  Taken 8/11/2024 2000 by Gem Sanchez RN  Seizure Precautions:   side rails padded   activity supervised   clutter-free environment maintained     Problem: Seizure, Active Management  Goal: Absence of Seizure/Seizure-Related Injury  Intervention: Prevent Seizure-Related Injury  Recent Flowsheet Documentation  Taken 8/12/2024 0000 by Gem Sanchez RN  Seizure Precautions:   side rails padded   activity supervised   clutter-free environment maintained  Taken 8/11/2024 2000 by Gem Sanchez RN  Seizure Precautions:   side rails padded   activity supervised   clutter-free environment maintained   Goal Outcome Evaluation:      Plan of Care Reviewed With: patient    Overall Patient Progress: no changeOverall Patient Progress: no change

## 2024-08-12 NOTE — CONSULTS
Care Management Initial Consult    General Information  Assessment completed with: Parents, Mother Julia by phone  Type of CM/SW Visit: CM Role Introduction    Primary Care Provider verified and updated as needed: Yes   Readmission within the last 30 days: no previous admission in last 30 days      Reason for Consult: discharge planning  Advance Care Planning:            Communication Assessment  Patient's communication style: spoken language (English or Bilingual) (did not meet with the patinet having hallucinations)    Hearing Difficulty or Deaf: no   Wear Glasses or Blind: no    Cognitive  Cognitive/Neuro/Behavioral: .WDL except  Level of Consciousness: alert, other (see comments) (inttermittently confused)  Arousal Level: opens eyes spontaneously  Orientation: disoriented to, time, situation (situation at times. Pt goes in and out of hallucinations that he is at home)  Mood/Behavior: restless  Best Language: 0 - No aphasia  Speech: clear, illogical    Living Environment:   People in home: parent(s)  Julia and Julia's spouse  Current living Arrangements: apartment      Able to return to prior arrangements: other (see comments) (await plan of care)       Family/Social Support:  Care provided by: self  Provides care for: no one  Marital Status: Single  Parent(s)          Description of Support System: Supportive, Involved    Support Assessment: Adequate social supports    Current Resources:   Patient receiving home care services: No     Community Resources: County Programs, Financial/Insurance  Equipment currently used at home: none  Supplies currently used at home: None    Employment/Financial:  Employment Status: employed full-time (recently started working one month ago 14:00-22:00 [employer])        Financial Concerns: other (see comments) (unable to assess)   Referral to Financial Worker: No (may require referral later if needed)       Does the patient's insurance plan have a 3 day qualifying hospital stay  "waiver?  No    Lifestyle & Psychosocial Needs:  Social Determinants of Health     Food Insecurity: Not on file   Depression: At risk (2/21/2024)    PHQ-2     PHQ-2 Score: 3   Housing Stability: Not on file   Tobacco Use: High Risk (2/21/2024)    Patient History     Smoking Tobacco Use: Every Day     Smokeless Tobacco Use: Never     Passive Exposure: Not on file   Financial Resource Strain: Not on file   Alcohol Use: Alcohol Misuse (1/7/2021)    AUDIT-C     Frequency of Alcohol Consumption: 4 or more times a week     Average Number of Drinks: 1 or 2     Frequency of Binge Drinking: Monthly   Transportation Needs: Not on file   Physical Activity: Not on file   Interpersonal Safety: Not on file   Stress: Not on file   Social Connections: Not on file   Health Literacy: Not on file       Functional Status:  Prior to admission patient needed assistance: Per Patient's Mother Julia prior to this admission patient independent with all ADL's 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 Mother noted that the patient has been to treatment for CD x2 however he was supposed to go to a [facility] house and his Aunt \"came up with an excuse so he did not have to go.\"  Patients Mother noted she works early in the a.m. and \"I do not know what he is doing when I am not home.\" She noted \"He will pick his job over treatment.\"             Mental Health Status:          Chemical Dependency Status:     Chemical Dependency Management: Previous treatment, Other (see comment) (per mother treatment in the past x2 declined a [facility] house and is currently living with mother)          Values/Beliefs:  Spiritual, Cultural Beliefs, Mandaeism Practices, Values that affect care:                 Additional Information:  Writer was asked to call patient's Mother as she is requesting to speak with our staff. Writer called Mother Julia by phone. Introduced role and scope of discharge planning. Phone " "continued to cut out.  Julia mentioned that the patient will pick his job over treatment and \"I don't want him leaving!\". Writer explained that the patient is currently in the ICU not medically cleared for discharge. Writer explained that once the patient is more alert and able to work with our staff we would meet with him for discharge planning.  Julia provides examples that the patient has been to treatment in the past, he was supposed to go to a snf house, however the patient's Aunt \"made an excuse\" so he did not have to go.  Julia stated \"I will not kick him out!\" But again stated \"he needs to go to treatment.\"  Writer explained that we will update the rounding provider but that if the patient is decisional we cannot \"force\" someone to go to treatment.  Julia asked for a  to call her. She stated she is off work tomorrow and will be expecting a call. Julia will call bedside RN for a report in the a.m. as she noted \"He will try to leave if I come up there.\"  Care Transitions will continue to follow for further discharge planning needs.      Heather Whyte RN, BSN, ACM   Care Transitions Specialist  [facility]  Care Transitions Specialist  \[id number] [place] [address]  [email or web address]  Office: [phone]  Fax: [phone]  [facility]                "

## 2024-08-13 ENCOUNTER — APPOINTMENT (OUTPATIENT)
Dept: GENERAL RADIOLOGY | Facility: CLINIC | Age: 34
End: 2024-08-13
Attending: INTERNAL MEDICINE
Payer: COMMERCIAL

## 2024-08-13 PROBLEM — F43.12 CHRONIC POST-TRAUMATIC STRESS DISORDER (PTSD): Chronic | Status: ACTIVE | Noted: 2024-08-13

## 2024-08-13 PROBLEM — F33.2 SEVERE EPISODE OF RECURRENT MAJOR DEPRESSIVE DISORDER, WITHOUT PSYCHOTIC FEATURES (H): Status: ACTIVE | Noted: 2023-09-04

## 2024-08-13 PROBLEM — F31.5: Chronic | Status: ACTIVE | Noted: 2023-09-04

## 2024-08-13 LAB
ALBUMIN SERPL BCG-MCNC: 4.2 G/DL (ref 3.5–5.2)
ALP SERPL-CCNC: 90 U/L (ref 40–150)
ALT SERPL W P-5'-P-CCNC: 94 U/L (ref 0–70)
ANION GAP SERPL CALCULATED.3IONS-SCNC: 13 MMOL/L (ref 7–15)
AST SERPL W P-5'-P-CCNC: 59 U/L (ref 0–45)
ATRIAL RATE - MUSE: 79 BPM
BASOPHILS # BLD AUTO: 0.1 10E3/UL (ref 0–0.2)
BASOPHILS NFR BLD AUTO: 1 %
BILIRUB SERPL-MCNC: 1.6 MG/DL
BUN SERPL-MCNC: 8.1 MG/DL (ref 6–20)
CALCIUM SERPL-MCNC: 10 MG/DL (ref 8.8–10.4)
CHLORIDE SERPL-SCNC: 96 MMOL/L (ref 98–107)
CREAT SERPL-MCNC: 0.69 MG/DL (ref 0.67–1.17)
DIASTOLIC BLOOD PRESSURE - MUSE: NORMAL MMHG
EGFRCR SERPLBLD CKD-EPI 2021: >90 ML/MIN/1.73M2
EOSINOPHIL # BLD AUTO: 0.2 10E3/UL (ref 0–0.7)
EOSINOPHIL NFR BLD AUTO: 2 %
ERYTHROCYTE [DISTWIDTH] IN BLOOD BY AUTOMATED COUNT: 12.6 % (ref 10–15)
GLUCOSE BLDC GLUCOMTR-MCNC: 113 MG/DL (ref 70–99)
GLUCOSE BLDC GLUCOMTR-MCNC: 115 MG/DL (ref 70–99)
GLUCOSE BLDC GLUCOMTR-MCNC: 119 MG/DL (ref 70–99)
GLUCOSE BLDC GLUCOMTR-MCNC: 120 MG/DL (ref 70–99)
GLUCOSE BLDC GLUCOMTR-MCNC: 123 MG/DL (ref 70–99)
GLUCOSE BLDC GLUCOMTR-MCNC: 160 MG/DL (ref 70–99)
GLUCOSE SERPL-MCNC: 114 MG/DL (ref 70–99)
HCO3 SERPL-SCNC: 25 MMOL/L (ref 22–29)
HCT VFR BLD AUTO: 48.6 % (ref 40–53)
HGB BLD-MCNC: 17 G/DL (ref 13.3–17.7)
IMM GRANULOCYTES # BLD: 0 10E3/UL
IMM GRANULOCYTES NFR BLD: 0 %
INTERPRETATION ECG - MUSE: NORMAL
LYMPHOCYTES # BLD AUTO: 2.1 10E3/UL (ref 0.8–5.3)
LYMPHOCYTES NFR BLD AUTO: 28 %
MAGNESIUM SERPL-MCNC: 1.8 MG/DL (ref 1.7–2.3)
MCH RBC QN AUTO: 32.1 PG (ref 26.5–33)
MCHC RBC AUTO-ENTMCNC: 35 G/DL (ref 31.5–36.5)
MCV RBC AUTO: 92 FL (ref 78–100)
MONOCYTES # BLD AUTO: 1 10E3/UL (ref 0–1.3)
MONOCYTES NFR BLD AUTO: 13 %
NEUTROPHILS # BLD AUTO: 4.1 10E3/UL (ref 1.6–8.3)
NEUTROPHILS NFR BLD AUTO: 55 %
NRBC # BLD AUTO: 0 10E3/UL
NRBC BLD AUTO-RTO: 0 /100
P AXIS - MUSE: 47 DEGREES
PHOSPHATE SERPL-MCNC: 3 MG/DL (ref 2.5–4.5)
PLATELET # BLD AUTO: 128 10E3/UL (ref 150–450)
POTASSIUM SERPL-SCNC: 3.6 MMOL/L (ref 3.4–5.3)
PR INTERVAL - MUSE: 134 MS
PROT SERPL-MCNC: 7.5 G/DL (ref 6.4–8.3)
QRS DURATION - MUSE: 106 MS
QT - MUSE: 418 MS
QTC - MUSE: 479 MS
R AXIS - MUSE: 72 DEGREES
RBC # BLD AUTO: 5.29 10E6/UL (ref 4.4–5.9)
SODIUM SERPL-SCNC: 134 MMOL/L (ref 135–145)
SYSTOLIC BLOOD PRESSURE - MUSE: NORMAL MMHG
T AXIS - MUSE: 62 DEGREES
VENTRICULAR RATE- MUSE: 79 BPM
WBC # BLD AUTO: 7.5 10E3/UL (ref 4–11)

## 2024-08-13 PROCEDURE — 80053 COMPREHEN METABOLIC PANEL: CPT | Performed by: INTERNAL MEDICINE

## 2024-08-13 PROCEDURE — 71046 X-RAY EXAM CHEST 2 VIEWS: CPT

## 2024-08-13 PROCEDURE — 250N000013 HC RX MED GY IP 250 OP 250 PS 637: Performed by: INTERNAL MEDICINE

## 2024-08-13 PROCEDURE — 99254 IP/OBS CNSLTJ NEW/EST MOD 60: CPT | Performed by: PSYCHIATRY & NEUROLOGY

## 2024-08-13 PROCEDURE — 36415 COLL VENOUS BLD VENIPUNCTURE: CPT | Performed by: INTERNAL MEDICINE

## 2024-08-13 PROCEDURE — 250N000013 HC RX MED GY IP 250 OP 250 PS 637: Performed by: PSYCHIATRY & NEUROLOGY

## 2024-08-13 PROCEDURE — 120N000001 HC R&B MED SURG/OB

## 2024-08-13 PROCEDURE — 99232 SBSQ HOSP IP/OBS MODERATE 35: CPT | Performed by: INTERNAL MEDICINE

## 2024-08-13 PROCEDURE — 84100 ASSAY OF PHOSPHORUS: CPT | Performed by: INTERNAL MEDICINE

## 2024-08-13 PROCEDURE — 83735 ASSAY OF MAGNESIUM: CPT | Performed by: INTERNAL MEDICINE

## 2024-08-13 PROCEDURE — 85025 COMPLETE CBC W/AUTO DIFF WBC: CPT | Performed by: INTERNAL MEDICINE

## 2024-08-13 RX ORDER — ACAMPROSATE CALCIUM 333 MG/1
666 TABLET, DELAYED RELEASE ORAL 3 TIMES DAILY
Status: DISCONTINUED | OUTPATIENT
Start: 2024-08-13 | End: 2024-08-14 | Stop reason: HOSPADM

## 2024-08-13 RX ADMIN — ACAMPROSATE CALCIUM 666 MG: 333 TABLET, DELAYED RELEASE ORAL at 21:32

## 2024-08-13 RX ADMIN — Medication 5 MG: at 20:36

## 2024-08-13 RX ADMIN — BENZTROPINE MESYLATE 1 MG: 1 TABLET ORAL at 09:05

## 2024-08-13 RX ADMIN — THIAMINE HCL TAB 100 MG 100 MG: 100 TAB at 09:05

## 2024-08-13 RX ADMIN — BENZTROPINE MESYLATE 1 MG: 1 TABLET ORAL at 22:34

## 2024-08-13 RX ADMIN — PANTOPRAZOLE SODIUM 40 MG: 40 TABLET, DELAYED RELEASE ORAL at 09:05

## 2024-08-13 RX ADMIN — OLANZAPINE 10 MG: 5 TABLET, FILM COATED ORAL at 20:36

## 2024-08-13 RX ADMIN — INSULIN ASPART 1 UNITS: 100 INJECTION, SOLUTION INTRAVENOUS; SUBCUTANEOUS at 09:04

## 2024-08-13 RX ADMIN — FOLIC ACID 1 MG: 1 TABLET ORAL at 09:05

## 2024-08-13 RX ADMIN — ACAMPROSATE CALCIUM 666 MG: 333 TABLET, DELAYED RELEASE ORAL at 16:35

## 2024-08-13 RX ADMIN — Medication 25 MCG: at 09:05

## 2024-08-13 RX ADMIN — TRAZODONE HYDROCHLORIDE 50 MG: 50 TABLET ORAL at 21:32

## 2024-08-13 RX ADMIN — Medication 1 TABLET: at 09:05

## 2024-08-13 RX ADMIN — SERTRALINE HYDROCHLORIDE 50 MG: 50 TABLET ORAL at 09:05

## 2024-08-13 ASSESSMENT — ACTIVITIES OF DAILY LIVING (ADL)
ADLS_ACUITY_SCORE: 30
ADLS_ACUITY_SCORE: 23
ADLS_ACUITY_SCORE: 30

## 2024-08-13 NOTE — PROGRESS NOTES
[facility]    Medicine Progress Note - Hospitalist Service    Date of Admission:  8/9/2024    Assessment & Plan   Ravi Ahamdi is a 34 year old male admitted on 8/9/2024. He presents with seizure in the setting of EtOH withdrawal.      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   - Last Etoh evening prior.   - In ED tachy 170s, other VSS. Lactic 9.5->2.0. AG 25.   - CXR w/ LLL hazy opacities. CT head/C-spine w/o acute process.    - Neurology evaluated and recommended management of withdrawal symptoms.  No indication for AED at this time  -Treated with Precedex drip, has been off of Precedex drip since yesterday afternoon  - Continue MVI, thiamine, folic acid.  -Psychiatry consulted     # Atrial fibrillation with RVR-resolved  - HR in ED to 170s.   - Given adenosine and appears to be afib.   - Spontaneously converted in ICU. [id number] 1 (DM).   -TTE obtained showed normal EF, no structural abnormality noted.  -Has remained in normal sinus rhythm with normal rates.  -Continue to monitor entirely  -As needed metoprolol available     # Abnormal chest x-ray  # Acute hypoxic respiratory failure - likely due to aspiration pneumonitis  # Wheeze on examination   Found to have subtle hazy opacities in left lower lobe.  Likely represents aspiration.  -Continue to monitor, low threshold to manage for possible aspiration pneumonia.  -Doing well with on room air  -Recheck chest x-ray      # Mild alcoholic hepatitis  AST//159. T bili 1.5 (baseline low normal)  - monitor LFTs     # Hypomagnesemia-replete per protocol  # Hypochloremia-continue to monitor  # Hypophosphatemia-replete per protocol  - replace per protocol  - check phos     # Anion gap metabolic acidosis-  -resolved     # Hypokalemia-  -being replaced per  protocol     # DM II  States takes 5 units of lantus daily at home. Most recent A1c 6.0% 12/2023  - sliding scale insulin for now  - TID and HS blood sugars     # Thrombocytopenia-stable  Mild, plts 123.  Likely in setting of marrow suppression from alcohol use  - monitor for now     # MDD vs Bipolar  # Psychosis  # Anxiety disorder  Per psychiatry notes 12/2023.   -Continue PTA Abilify 5 mg nightly, benztropine 1 mg twice daily, Zyprexa 10 mg twice daily, sertraline 50 mg daily, trazodone 50 mg nightly as needed  -Psychiatry consulted as above     # Asthma  - prn albuterol available     # Tobacco use disorder  Smokes 1 ppd  - declines NRT     # Prolonged QTc-slowly improving  -Reasonable to keep minimizing QT prolonging medications.    Diet: Advance Diet as Tolerated: Regular Diet Adult    DVT Prophylaxis: Pneumatic Compression Devices  Abernathy Catheter: Not present  Lines: None     Cardiac Monitoring: ACTIVE order. Indication: ICU  Code Status: Full Code      Clinically Significant Risk Factors        # Hypokalemia: Lowest K = 3.3 mmol/L in last 2 days, will replace as needed         # Thrombocytopenia: Lowest platelets = 90 in last 2 days, will monitor for bleeding   # Hypertension: Noted on problem list               # Financial/Environmental Concerns: other (see comments) (unable to assess)  # Asthma: noted on problem list              Disposition Plan     Medically Ready for Discharge: Anticipated Tomorrow             Cristina Mcduffie MD  Hospitalist Service  [facility]  Securely message with [employer] (more info)  Text page via [facility] Paging/Directory   ______________________________________________________________________    Interval History   Care assumed, chart reviewed.  Patient denied any new complaints.  Doing well and has been off of Precedex drip since yesterday afternoon.  Still having intermittent hallucinations.  No new nursing concerns.    Physical Exam   Vital Signs: Temp: 98.8   F (37.1  C) Temp src: Oral BP: (!) 136/100 Pulse: 92   Resp: 20 SpO2: 95 % O2 Device: None (Room air)    Weight: 244 lbs 4.31 oz    Exam:  Constitutional: Awake, alert and no distress. Appears comfortable  Head: Normocephalic. No masses, lesions, tenderness or abnormalities  ENMT: ENT exam normal, no neck nodes or sinus tenderness  Cardiovascular: RRR.  no murmurs, no rubs or JVD  Respiratory:normal WOB,b/l equal air entry, no wheezes or crackles   Gastrointestinal: Abdomen soft, non-tender. BS normal. No masses, organomegaly  : Deferred  Extremities :no edema , no clubbing or cyanosis    Neurologic: Cranial nerves II-XII grossly intact , power symmetrical, Reflexes normal and symmetric. Sensation grossly WNL.     Medical Decision Making       40 MINUTES SPENT BY ME on the date of service doing chart review, history, exam, documentation & further activities per the note.      Data     I have personally reviewed the following data over the past 24 hrs:    7.5  \   17.0   / 128 (L)     134 (L) 96 (L) 8.1 /  113 (H)   3.6 25 0.69 \     ALT: 94 (H) AST: 59 (H) AP: 90 TBILI: 1.6 (H)   ALB: 4.2 TOT PROTEIN: 7.5 LIPASE: N/A       Imaging results reviewed over the past 24 hrs:   No results found for this or any previous visit (from the past 24 hour(s)).  Recent Labs   Lab 08/13/24  1144 08/13/24  0858 08/13/24  0519 08/12/24  1159 08/12/24  1048 08/12/24  0927 08/12/24  0525 08/11/24  0739 08/11/24  0545   WBC  --   --  7.5  --   --   --  6.3  --  5.4   HGB  --   --  17.0  --   --   --  16.4  --  14.6   MCV  --   --  92  --   --   --  92  --  94   PLT  --   --  128*  --   --   --  90*  --  75*   NA  --   --  134*  --   --   --  135  --  137   POTASSIUM  --   --  3.6  --  3.4  --  3.3*   < > 3.1*   CHLORIDE  --   --  96*  --   --   --  95*  --  96*   CO2  --   --  25  --   --   --  28  --  25   BUN  --   --  8.1  --   --   --  4.6*  --  3.8*   CR  --   --  0.69  --   --   --  0.58*  --  0.51*   ANIONGAP  --   --  13  --    --   --  12  --  16*   RAEANN  --   --  10.0  --   --   --  9.8  --  8.6*   * 160* 114*   < >  --    < > 135*   < > 99   ALBUMIN  --   --  4.2  --   --   --  4.1  --  3.8   PROTTOTAL  --   --  7.5  --   --   --  6.9  --  6.4   BILITOTAL  --   --  1.6*  --   --   --  1.7*  --  1.7*   ALKPHOS  --   --  90  --   --   --  81  --  71   ALT  --   --  94*  --   --   --  97*  --  109*   AST  --   --  59*  --   --   --  66*  --  111*    < > = values in this interval not displayed.

## 2024-08-13 NOTE — PLAN OF CARE
Goal Outcome Evaluation:      Problem: Adult Inpatient Plan of Care  Goal: Optimal Comfort and Wellbeing  Intervention: Provide Person-Centered Care  Recent Flowsheet Documentation  Taken 8/13/2024 1200 by Zayra Silva RN  Trust Relationship/Rapport:   care explained   choices provided   emotional support provided   questions answered   reassurance provided  Taken 8/13/2024 0800 by Zayra Silva, RN  Trust Relationship/Rapport:   care explained   choices provided   emotional support provided   questions answered   reassurance provided     Problem: Fall Injury Risk  Goal: Absence of Fall and Fall-Related Injury  Intervention: Promote Injury-Free Environment  Recent Flowsheet Documentation  Taken 8/13/2024 1200 by Zayra Silva, RN  Safety Promotion/Fall Prevention: activity supervised  Taken 8/13/2024 0800 by Zayra Silva RN  Safety Promotion/Fall Prevention: activity supervised    Pt alert, cooperative. CIWA <5. Pt up in room with SBA. Tolerating regular diet. VSS. Psychiatry here to see pt. Pt transferred to . Family updated at bedside.

## 2024-08-13 NOTE — CONSULTS
"      INITIAL PSYCHIATRY   CONSULT     DATE OF SERVICE   8/13/2024       IDENTIFICATION   Ravi Ahmadi  Age: 34 year old  MRN# [id number]   YOB: 1990   LOS:  4       CHIEF COMPLAINT   \"Better with medications.\"       CONSULT REQUEST BY   Kemal Thompson MD re: bipolar disorder and alcohol use.       HISTORY OF PRESENT ILLNESS   This is a 34 year old male with history of mood disorder with psychosis, alcohol use disorder, PTSD.  Patient has family history of prior psychiatric hospitalization, CD treatment, but does not have a history of commitment.  Follows with outpatient addiction and mental health provider at his last visit in February 2024.  Now, admitted secondary to witnessed seizure of new onset in the setting of alcohol use.  Consult placed to psychiatry for review of medication management for bipolar disorder.    In brief, patient was admitted to this facility by EMS on 8/9.  Secondary to report of a seizure event while at work.  There were no previous events similar.  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  Witnessed as a seizure management.  Presentation during the setting of alcohol use, pattern which was initially minimized.  Consult placed to neurology and placed on CIWA scores which scored high.  Due to social behaviors, initiated Precedex and precautions placed to include attendant.  Further medical issues identified at time of admission include atrial fibrillation with RVR.  Adenosine given and converted.  X-ray of chest was abnormal and likely due to aspiration pneumonia.  PTA psychiatric medications were continued at time of visit to include the following: Abilify, Zyprexa, Cogentin, Zoloft, trazodone.  PTA Campral and naltrexone were not restarted.    Upon interview, the patient is cooperative on approach.  Visit performed in patient's room on the intensive care unit.  Consent is given to evaluate.  " "Patient is voluntary.  Patient is made aware of plan to coordinate cares with treatment team.    Patient reports events consistent with admission.  Denies prior alcohol withdrawal seizure.  Does admit to alcohol use since last treatment.  Describes a pattern as a binge every 2 weeks with 1-2 drinks 4 times weekly.  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  Recommendation after her last treatment was to stay at a nursing home house, but did not proceed due to need for work.  Denies further stressors.    Patient aware of last visit with outpatient psychiatrist, Dr. Webb, in February 2024.  Describes treatment recommendations consistent with plan.  Patient was to discontinue Abilify and increase Zyprexa.  Continue with Zoloft at increased dose.  Continue naltrexone and Campral.  Patient admits to not taking MAT therapy, while drinking.  As a result, unable to describe benefit.  Overall, feels PTA psychiatric medications have been beneficial as described without side effect.    On psychiatric review of symptoms, mood is, \"better.\"  States improvement with psychiatric medications.  Acknowledges behavior of agitation in the setting of withdrawal.  Denies depressed mood or do.  Sleep improved with medications.  Denies issues of anhedonia.  No change of energy or appetite.  Concentration is decreased.  Denies negative thoughts of self or situation.  Able to describe appropriate coping mechanisms and denies issues of anxiety.  Suicide attempt or intoxicated, but did not proceed with overdose behavior in October 2023.  Denies suicidal or homicidal ideation.  Denies gun access.  Future-oriented thinking.  Able to state an appointment crisis plan.  No overt psychosis noted or reported.    Understands the need to continue efforts of medical stabilization.  Including, primary treatment team plan to transfer out of ICU to general medical unit to continue cares.  No new physical " issues reported.    Patient accepting of treatment plan to continue PTA medications as discussed.  Including, restart of PTA Campral.  Given LFT status, hold naltrexone.  Describes motivation for sobriety, but does not want [facility] treatment by evaluation.  Provides understanding to continue on precautions as clinically indicated by primary treatment team.  Treatment plan discussed with assigned nurse.    Review of external notes and/or information:  I personally reviewed notes from the patient's history and physical dated 8/9, outpatient psychiatry note dated 2/12/2024, and last psychiatric hospitalization in December 2023. This provided me with information regarding patient's recent clinical course.     I personally reviewed the patient's chart, including available medication list and available past medical history, past surgical history, family history, and social history.        CHEMICAL DEPENDENCY HISTORY   History   Drug Use Unknown     Social History    Substance and Sexual Activity      Alcohol use: Yes        Alcohol/week: 12.5 standard drinks of alcohol        Types: 15 Standard drinks or equivalent per week    History   Smoking Status    Every Day    Packs/day: 1.00    Years: 7.00    Types: Cigarettes   Smokeless Tobacco    Never     Treatment: History of AUD treatment x 2.  Detox: New onset alcohol withdrawal seizure.  Legal: None reported       PAST PSYCHIATRIC HISTORY   Psychiatrist: Dr. Geronimo Webb.    Last visit: 2/21/2024.  Case Management: None reported  Hospitalizations: Yes  Most recent: December 2023  History of Commitment: No  Past Medications:   Zyprexa, Abilify, Cogentin, trazodone, Zoloft, Campral, naltrexone  TMS/ECT/Ketamine:  No  Suicide Attempts/Gun Access: Suicidal behavior October 2023.  Denies gun access.       PAST MEDICAL HISTORY   Past Medical History:   Diagnosis Date    Abnormal LFTs     suspected secondary to alcohol use    Alcohol use disorder     with hx of hospitalizations for  withdrawal    Atopic dermatitis     Intermittent asthma     exercise    Tobacco use     Type 2 diabetes mellitus (H)     Diagnosed during hosptial stay in 8/2023 with A1c 8.4     Past Surgical History:   Procedure Laterality Date    CL AFF SURGICAL PATHOLOGY      gravel/foreign body removal in abdomen after accident     Primary Care Provider: Mayte Hill  Medications:   Current Facility-Administered Medications   Medication Dose Route Frequency Provider Last Rate Last Admin    ARIPiprazole (ABILIFY) tablet 5 mg  5 mg Oral or NG Tube At Bedtime Cristina Mcduffie MD   5 mg at 08/12/24 2104    benztropine (COGENTIN) tablet 1 mg  1 mg Oral or NG Tube BID Cristina Mcduffie MD   1 mg at 08/13/24 0905    folic acid (FOLVITE) tablet 1 mg  1 mg Oral Daily Cristina Mcduffie MD   1 mg at 08/13/24 0905    insulin aspart (NovoLOG) injection (RAPID ACTING)  1-7 Units Subcutaneous TID AC Cristina Mcduffie MD   1 Units at 08/13/24 0904    insulin aspart (NovoLOG) injection (RAPID ACTING)  1-5 Units Subcutaneous At Bedtime Cristina Mcduffie MD        multivitamin w/minerals (THERA-VIT-M) tablet 1 tablet  1 tablet Oral Daily Cristina Mcduffie MD   1 tablet at 08/13/24 0905    OLANZapine (zyPREXA) tablet 10 mg  10 mg Oral or NG Tube BID Cristina Mcduffie MD   10 mg at 08/12/24 0906    pantoprazole (PROTONIX) EC tablet 40 mg  40 mg Oral QAM AC Cristina Mcduffie MD   40 mg at 08/13/24 0905    sertraline (ZOLOFT) tablet 50 mg  50 mg Oral or NG Tube Daily Cristina Mcduffie MD   50 mg at 08/13/24 0905    thiamine (B-1) tablet 100 mg  100 mg Oral Daily Cristina Mcduffie MD   100 mg at 08/13/24 0905    Vitamin D3 (CHOLECALCIFEROL) tablet 25 mcg  25 mcg Oral Daily Cristina Mcduffie MD   25 mcg at 08/13/24 0905     Medications as needed:   Current Facility-Administered Medications   Medication Dose Route Frequency Provider Last Rate Last Admin    albuterol (PROVENTIL HFA/VENTOLIN HFA) inhaler  2 puff Inhalation Q4H PRN rCistina Mcduffie MD        glucose gel 15-30 g  15-30  g Oral Q15 Min PRN Cristina Mcduffie MD        Or    dextrose 50 % injection 25-50 mL  25-50 mL Intravenous Q15 Min PRN Cristina Mcduffie MD        Or    glucagon injection 1 mg  1 mg Subcutaneous Q15 Min PRN Cristina Mcduffie MD        flumazenil (ROMAZICON) injection 0.2 mg  0.2 mg Intravenous q1 min prn Cristina Mcduffie MD        OLANZapine zydis (zyPREXA) ODT tab 5-10 mg  5-10 mg Oral Q6H PRN Cristina Mcduffie MD        Or    haloperidol lactate (HALDOL) injection 2.5-5 mg  2.5-5 mg Intravenous Q6H PRN Cristina Mcduffie MD   5 mg at 08/11/24 0126    hydrALAZINE (APRESOLINE) injection 10 mg  10 mg Intravenous Q6H PRN Cristina Mcduffie MD        HYDROmorphone (PF) (DILAUDID) injection 0.3-0.5 mg  0.3-0.5 mg Intravenous Q2H PRN Cristina Mcduffie MD   0.5 mg at 08/11/24 0210    LORazepam (ATIVAN) injection 2 mg  2 mg Intravenous Q5 Min PRN Cristina Mcduffie MD        melatonin tablet 5 mg  5 mg Oral QPM PRN Cristina Mcduffie MD   5 mg at 08/12/24 0792    metoprolol (LOPRESSOR) injection 2.5 mg  2.5 mg Intravenous Q4H PRN Cristina Mcduffie MD        naloxone (NARCAN) injection 0.2 mg  0.2 mg Intravenous Q2 Min PRN Cristina Mcduffie MD        Or    naloxone (NARCAN) injection 0.4 mg  0.4 mg Intravenous Q2 Min PRN Cristina Mcduffie MD        Or    naloxone (NARCAN) injection 0.2 mg  0.2 mg Intramuscular Q2 Min PRN Cristina Mcduffie MD        Or    naloxone (NARCAN) injection 0.4 mg  0.4 mg Intramuscular Q2 Min PRN Cristina Mcduffie MD        traZODone (DESYREL) tablet 50 mg  50 mg Oral or NG Tube At Bedtime PRN Cristina Mcduffie MD   50 mg at 08/12/24 7944     ALLERGIES: Patient has no known allergies.       MEDICATIONS   Medications Prior to Admission   Medication Sig Dispense Refill Last Dose    acamprosate (CAMPRAL) 333 MG EC tablet Take 2 tablets (666 mg) by mouth 3 times daily (Patient taking differently: Take 0.5 tablets by mouth every evening) 180 tablet 1 Past Week    ARIPiprazole (ABILIFY) 5 MG tablet Take 1 tablet (5 mg) by mouth at bedtime 30 tablet 0  Unknown    benztropine (COGENTIN) 1 MG tablet Take 1 tablet (1 mg) by mouth 2 times daily 60 tablet 0 Unknown    folic acid (FOLVITE) 1 MG tablet Take 1 tablet (1 mg) by mouth daily 30 tablet 1 Unknown    glucose (BD GLUCOSE) 4 g chewable tablet Take 1 tablet by mouth every hour as needed for low blood sugar 30 tablet 0 Unknown at PRN    insulin glargine (LANTUS PEN) 100 UNIT/ML pen Inject 5 Units Subcutaneous at bedtime 1 mL 0 More than a month    lisinopril (ZESTRIL) 10 MG tablet Take 1 tablet (10 mg) by mouth daily 30 tablet 0 Past Month    multivitamin w/minerals (THERA-VIT-M) tablet Take 1 tablet by mouth daily 30 tablet 1 Unknown    OLANZapine (ZYPREXA) 10 MG tablet Take 1 tablet (10 mg) by mouth 2 times daily Take in morning and at bedtime 60 tablet 0 Unknown    sertraline (ZOLOFT) 50 MG tablet Take 1 tablet (50 mg) by mouth daily For seven days then increase to 100mg (Patient taking differently: Take 50 mg by mouth daily) 7 tablet 0 8/3/2024    thiamine (B-1) 100 MG tablet Take 1 tablet (100 mg) by mouth daily 30 tablet 1 Unknown    traZODone (DESYREL) 50 MG tablet Take 1 tablet (50 mg) by mouth nightly as needed for sleep 30 tablet 0 Past Week at PRN    Vitamin D3 (CHOLECALCIFEROL) 25 mcg (1000 units) tablet Take 1 tablet (25 mcg) by mouth daily 30 tablet 0 Unknown    nicotine (NICODERM CQ) 14 MG/24HR 24 hr patch Place 1 patch onto the skin daily (Patient not taking: Reported on 8/10/2024) 30 patch 0 Not Taking    OLANZapine (ZYPREXA) 5 MG tablet Take 1 tablet (5 mg) by mouth daily as needed (hallucinations) (Patient not taking: Reported on 8/10/2024) 30 tablet 1 Not Taking    pantoprazole (PROTONIX) 40 MG EC tablet Take 1 tablet (40 mg) by mouth every morning (before breakfast) (Patient not taking: Reported on 8/10/2024) 30 tablet 0 Not Taking    sertraline (ZOLOFT) 100 MG tablet Take 1 tablet (100 mg) by mouth daily (Patient not taking: Reported on 8/10/2024) 30 tablet 0 Not Taking      Medication  adherence issues: MS Med Adherence Y/N: Yes, not taking/naltrexone while drinking prior to admission.  Medication side effects: MEDICATION SIDE EFFECTS: no side effects reported  Benefit: Yes / No: Yes       ROS   The 10 point Review of Systems is negative other than noted in the HPI or here.       FAMILY HISTORY   Family History   Problem Relation Age of Onset    Asthma Mother     Diabetes Paternal Grandmother     Respiratory Maternal Grandfather     Family History Negative Sister       Psychiatric: Mother: Bipolar disorder  Chemical: Mother: Alcohol.  Father: Cocaine  Suicide: Cousin       SOCIAL HISTORY   Social History     Socioeconomic History    Marital status: Single     Spouse name: Not on file    Number of children: Not on file    Years of education: Not on file    Highest education level: Not on file   Occupational History    Not on file   Tobacco Use    Smoking status: Every Day     Current packs/day: 1.00     Average packs/day: 1 pack/day for 7.0 years (7.0 ttl pk-yrs)     Types: Cigarettes    Smokeless tobacco: Never   Vaping Use    Vaping status: Never Used   Substance and Sexual Activity    Alcohol use: Yes     Alcohol/week: 12.5 standard drinks of alcohol     Types: 15 Standard drinks or equivalent per week    Drug use: Not Currently     Types: Marijuana    Sexual activity: Yes     Partners: Female     Birth control/protection: Condom   Other Topics Concern    Parent/sibling w/ CABG, MI or angioplasty before 65F 55M? Not Asked   Social History Narrative    Not on file     Social Determinants of Health     Financial Resource Strain: Not on file   Food Insecurity: Not on file   Transportation Needs: Not on file   Physical Activity: Not on file   Stress: Not on file   Social Connections: Not on file   Interpersonal Safety: Not on file   Housing Stability: Not on file     Education: GED  Occupation: [employer] x 1 month  Marital Status: Single  Living Situation: Living arrangements - the patient lives  "with mother and her spouse.  ASSETS/STRENGTHS: Motivation for sobriety.  Able to seek help.       MENTAL STATUS EXAM   Appearance: Cooperative.    Mood:  {Mood: \"Better\"  Affect: appropriate  was congruent to speech  Suicidal Ideation: PRESENT / ABSENT: absent   Homicidal Ideation: PRESENT / ABSENT: absent   Thought process: unremarkable and no SHELIA.  Thought content: denies suicidal ideation, violent ideation, and psychosis .   Fund of Knowledge: Average  Attention/Concentration: Fair  Language ability:  Intact  Memory: Sufficient  Insight:  fair.  Judgement: fair  Orientation: Yes, x4  Psychomotor Behavior:  WNL     Muscle Strength and Tone: MuscleStrength: Normal  Gait and Station: Normal       PHYSICAL EXAM   Vitals: BP (!) 144/89   Pulse 85   Temp 98.9  F (37.2  C) (Oral)   Resp 15   Wt 110.8 kg (244 lb 4.3 oz)   SpO2 95%   BMI 33.13 kg/m    Weight:   244 lbs 4.31 oz    Body mass index is 33.13 kg/m .    Physical exam as per Hospitalist, Dr. Kemal Thompson MD. Dated 8/10/2024:    General Appearance:  Alert, very pleasant, in moderate withdrawal  Respiratory: CTA B  Cardiovascular: RRR, no murmur. No edema  GI: obese, soft, nt/nd  Skin: no rashes or lesions grossly    Other:  CN grossly intact, [place]. tremulous     I have reviewed the physical exam as documented by by the medical team and agree with findings and assessment and have no additional findings to add at this time.       LABS   personally reviewed.   Recent Results (from the past 48 hour(s))   Glucose by meter    Collection Time: 08/11/24  4:04 PM   Result Value Ref Range    GLUCOSE BY METER POCT 132 (H) 70 - 99 mg/dL   Glucose by meter    Collection Time: 08/11/24  8:04 PM   Result Value Ref Range    GLUCOSE BY METER POCT 148 (H) 70 - 99 mg/dL   Glucose by meter    Collection Time: 08/12/24 12:24 AM   Result Value Ref Range    GLUCOSE BY METER POCT 164 (H) 70 - 99 mg/dL   Glucose by meter    Collection Time: 08/12/24  3:36 AM   Result Value Ref " Range    GLUCOSE BY METER POCT 152 (H) 70 - 99 mg/dL   Magnesium    Collection Time: 08/12/24  5:25 AM   Result Value Ref Range    Magnesium 1.7 1.7 - 2.3 mg/dL   Phosphorus    Collection Time: 08/12/24  5:25 AM   Result Value Ref Range    Phosphorus 2.4 (L) 2.5 - 4.5 mg/dL   Comprehensive metabolic panel    Collection Time: 08/12/24  5:25 AM   Result Value Ref Range    Sodium 135 135 - 145 mmol/L    Potassium 3.3 (L) 3.4 - 5.3 mmol/L    Carbon Dioxide (CO2) 28 22 - 29 mmol/L    Anion Gap 12 7 - 15 mmol/L    Urea Nitrogen 4.6 (L) 6.0 - 20.0 mg/dL    Creatinine 0.58 (L) 0.67 - 1.17 mg/dL    GFR Estimate >90 >60 mL/min/1.73m2    Calcium 9.8 8.8 - 10.4 mg/dL    Chloride 95 (L) 98 - 107 mmol/L    Glucose 135 (H) 70 - 99 mg/dL    Alkaline Phosphatase 81 40 - 150 U/L    AST 66 (H) 0 - 45 U/L    ALT 97 (H) 0 - 70 U/L    Protein Total 6.9 6.4 - 8.3 g/dL    Albumin 4.1 3.5 - 5.2 g/dL    Bilirubin Total 1.7 (H) <=1.2 mg/dL   CBC with platelets and differential    Collection Time: 08/12/24  5:25 AM   Result Value Ref Range    WBC Count 6.3 4.0 - 11.0 10e3/uL    RBC Count 5.11 4.40 - 5.90 10e6/uL    Hemoglobin 16.4 13.3 - 17.7 g/dL    Hematocrit 47.0 40.0 - 53.0 %    MCV 92 78 - 100 fL    MCH 32.1 26.5 - 33.0 pg    MCHC 34.9 31.5 - 36.5 g/dL    RDW 12.0 10.0 - 15.0 %    Platelet Count 90 (L) 150 - 450 10e3/uL    % Neutrophils 70 %    % Lymphocytes 19 %    % Monocytes 8 %    % Eosinophils 3 %    % Basophils 1 %    % Immature Granulocytes 0 %    NRBCs per 100 WBC 0 <1 /100    Absolute Neutrophils 4.4 1.6 - 8.3 10e3/uL    Absolute Lymphocytes 1.2 0.8 - 5.3 10e3/uL    Absolute Monocytes 0.5 0.0 - 1.3 10e3/uL    Absolute Eosinophils 0.2 0.0 - 0.7 10e3/uL    Absolute Basophils 0.1 0.0 - 0.2 10e3/uL    Absolute Immature Granulocytes 0.0 <=0.4 10e3/uL    Absolute NRBCs 0.0 10e3/uL   Glucose by meter    Collection Time: 08/12/24  9:27 AM   Result Value Ref Range    GLUCOSE BY METER POCT 148 (H) 70 - 99 mg/dL   Potassium    Collection  Time: 08/12/24 10:48 AM   Result Value Ref Range    Potassium 3.4 3.4 - 5.3 mmol/L   Glucose by meter    Collection Time: 08/12/24 11:59 AM   Result Value Ref Range    GLUCOSE BY METER POCT 144 (H) 70 - 99 mg/dL   EKG 12-lead, tracing only    Collection Time: 08/12/24  3:52 PM   Result Value Ref Range    Systolic Blood Pressure  mmHg    Diastolic Blood Pressure  mmHg    Ventricular Rate 79 BPM    Atrial Rate 79 BPM    IL Interval 134 ms    QRS Duration 106 ms     ms    QTc 479 ms    P Axis 47 degrees    R AXIS 72 degrees    T Axis 62 degrees    Interpretation ECG       Sinus rhythm  Prolonged QT interval  Abnormal ECG  When compared with ECG of 09-Aug-2024 21:23,  Sinus rhythm has replaced Atrial fibrillation  Vent. rate has decreased by  94 bpm  ST no longer depressed in Inferior leads  ST no longer depressed in Anterolateral leads    Confirmed by MD OMER DEMOS ([id number]) on 8/13/2024 9:49:22 AM     Glucose by meter    Collection Time: 08/12/24  4:04 PM   Result Value Ref Range    GLUCOSE BY METER POCT 177 (H) 70 - 99 mg/dL   Glucose by meter    Collection Time: 08/12/24  8:58 PM   Result Value Ref Range    GLUCOSE BY METER POCT 163 (H) 70 - 99 mg/dL   Glucose by meter    Collection Time: 08/13/24  1:12 AM   Result Value Ref Range    GLUCOSE BY METER POCT 120 (H) 70 - 99 mg/dL   Glucose by meter    Collection Time: 08/13/24  4:51 AM   Result Value Ref Range    GLUCOSE BY METER POCT 115 (H) 70 - 99 mg/dL   Magnesium    Collection Time: 08/13/24  5:19 AM   Result Value Ref Range    Magnesium 1.8 1.7 - 2.3 mg/dL   Phosphorus    Collection Time: 08/13/24  5:19 AM   Result Value Ref Range    Phosphorus 3.0 2.5 - 4.5 mg/dL   Comprehensive metabolic panel    Collection Time: 08/13/24  5:19 AM   Result Value Ref Range    Sodium 134 (L) 135 - 145 mmol/L    Potassium 3.6 3.4 - 5.3 mmol/L    Carbon Dioxide (CO2) 25 22 - 29 mmol/L    Anion Gap 13 7 - 15 mmol/L    Urea Nitrogen 8.1 6.0 - 20.0 mg/dL    Creatinine 0.69 0.67  "- 1.17 mg/dL    GFR Estimate >90 >60 mL/min/1.73m2    Calcium 10.0 8.8 - 10.4 mg/dL    Chloride 96 (L) 98 - 107 mmol/L    Glucose 114 (H) 70 - 99 mg/dL    Alkaline Phosphatase 90 40 - 150 U/L    AST 59 (H) 0 - 45 U/L    ALT 94 (H) 0 - 70 U/L    Protein Total 7.5 6.4 - 8.3 g/dL    Albumin 4.2 3.5 - 5.2 g/dL    Bilirubin Total 1.6 (H) <=1.2 mg/dL   CBC with platelets and differential    Collection Time: 08/13/24  5:19 AM   Result Value Ref Range    WBC Count 7.5 4.0 - 11.0 10e3/uL    RBC Count 5.29 4.40 - 5.90 10e6/uL    Hemoglobin 17.0 13.3 - 17.7 g/dL    Hematocrit 48.6 40.0 - 53.0 %    MCV 92 78 - 100 fL    MCH 32.1 26.5 - 33.0 pg    MCHC 35.0 31.5 - 36.5 g/dL    RDW 12.6 10.0 - 15.0 %    Platelet Count 128 (L) 150 - 450 10e3/uL    % Neutrophils 55 %    % Lymphocytes 28 %    % Monocytes 13 %    % Eosinophils 2 %    % Basophils 1 %    % Immature Granulocytes 0 %    NRBCs per 100 WBC 0 <1 /100    Absolute Neutrophils 4.1 1.6 - 8.3 10e3/uL    Absolute Lymphocytes 2.1 0.8 - 5.3 10e3/uL    Absolute Monocytes 1.0 0.0 - 1.3 10e3/uL    Absolute Eosinophils 0.2 0.0 - 0.7 10e3/uL    Absolute Basophils 0.1 0.0 - 0.2 10e3/uL    Absolute Immature Granulocytes 0.0 <=0.4 10e3/uL    Absolute NRBCs 0.0 10e3/uL   Glucose by meter    Collection Time: 08/13/24  8:58 AM   Result Value Ref Range    GLUCOSE BY METER POCT 160 (H) 70 - 99 mg/dL   Glucose by meter    Collection Time: 08/13/24 11:44 AM   Result Value Ref Range    GLUCOSE BY METER POCT 113 (H) 70 - 99 mg/dL     No results found for: \"PHENYTOIN\", \"PHENOBARB\", \"VALPROATE\", \"CBMZ\"       ASSESSMENT   Consult placed to psychiatry to review psychiatric medications.  Occurring in the setting of new onset alcohol withdrawal seizure.  Consent given by patient to continue PTA psychiatric medication with adjustment and restart MAT therapy, except naltrexone, alcohol use disorder as he does not want CD treatment describes willingness to continue with established outpatient supports to " include addiction psychiatrist.  Detailed risk assessment performed.  Does not meet criteria for 72-hour hold.  At time of visit, does not demonstrate imminent risk to self/others.       DIAGNOSIS   Principal Problem:    Bipolar disord, crnt epsd depress, severe, w psych features (H)    Active Problem List:  Patient Active Problem List   Diagnosis    Atopic dermatitis    Intermittent asthma    Tobacco abuse    Morbid obesity (H)    Benign essential hypertension    Hypokalemia    Hyperglycemia    Alcohol withdrawal syndrome without complication (H)    Alcohol use disorder    Nausea and vomiting, unspecified vomiting type    Alcoholic intoxication with complication (H24)    Alcoholic hepatitis without ascites (H28)    Alcohol-induced acute pancreatitis    Lactic acidosis    JAY (acute kidney injury) (H24)    Diabetic ketoacidosis without coma associated with type 2 diabetes mellitus (H)    Bipolar disord, crnt epsd depress, severe, w psych features (H)    Alcohol abuse    Alcoholic ketoacidosis    Alcohol withdrawal syndrome with perceptual disturbance (H)    Alcohol withdrawal, with unspecified complication (H)    Chemical dependency (H)    Seizure (H)    Atrial fibrillation with RVR (H)    Closed head injury, initial encounter    Alcohol withdrawal seizure with complication (H)    Chronic post-traumatic stress disorder (PTSD)          RECOMMENDATIONS   Target psychiatric symptoms and interventions:  Education:  Risks, benefits, and alternatives discussed at length with patient.     Safety/Supervision:  Legal Status: voluntary  Precautions placed:  Seizure, CIWA  Monitor target symptoms.     Medication Recommendations:   Outpatient PTA psychiatry medications were continued with the exception of Campral and Naltrexone.  PTA Psychiatric Medications reviewed.  CIWA protocol.  Consider discontinuation, given lack of need of as needed medications and scoring.  Zyprexa: Continue PTA Zyprexa 10 mg twice daily for mood and  psychosis with 5 mg daily as needed for breakthrough psychosis.  Cogentin: Continue PTA Cogentin 1 mg twice daily for side effect management.  Sertraline: Continue PTA sertraline 50 mg daily.  Did not increase dose to 100 mg daily as recommended.  Trazodone: Continue PTA trazodone 50 mg as needed for sleep.  Acamprosate: Restart PTA acamprosate for MAT alcohol use disorder management 2 tab 3 times daily with patient consent.  Naltrexone: Consider restart PTA naltrexone for MAT therapy.  Patient admits to not taking Campral and naltrexone consistently on drinking prior to admission.  Abilify: Discontinue Abilify 5 mg at bedtime.  Recommendation to for neuroleptic monotherapy and consistent with outpatient psychiatry plan from 2/24.    Disposition Planning:  Risk Assessment: At time of visit, determined patient to not be an imminent danger to self and/or others.  Denies SI and SIB.  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  However, risk is mitigated by commitment to sobriety, family and history of seeking help when needed.  Based on available evidence including factors cited, she does not appear to be at imminent risk for self-harm, does not meet criteria for a 72-hour hold, and remains appropriate to resume outpatient level of care once medically able.  Additional steps taken to minimize risk include: medication optimization with monitoring/supervision, close psychiatric follow up, give him into sobriety, and provision of crisis resources.  Voluntary referral for outpatient psychiatry offered and was accepted.  Medications: ordered.   Follow-up Appointments: not scheduled; patient to schedule with Dr. Webb for next available after discharge.  Placement:  home with family when cleared.    Acute Medical Problems and Treatments:  History and Physical:  Reviewed and discussed H&P dated 8/10 with subsequent progress notes with patient.  Consults: [facility] consult refused.   Consents to MAT for AUD.    Behavioral/Psychological/Social:  Barriers to Discharge: Medical  Referrals: Patient to continue with established outpatient psychiatrist; patient reports ability to schedule on his own.    Care Coordination:  Further treatment planning by Primary Team and discussed/reviewed assigned RN.  Psychiatry to SIGN OFF.          Risk Assessment: [facility] RISK ASSESSMENT: Patient able to contract for safety and Patient on precautions    Total encounter time:  A total of  66  minutes spent related to chart review, history and exam, documentation   and further activities as noted above    This note was created with help of Dragon dictation system. Grammatical / typing errors are not intentional.        Thiago Harrison MD - 08/13/2024  - 2:43 PM  Consult/Liaison Psychiatry   [facility]

## 2024-08-13 NOTE — PLAN OF CARE
Neuro: A&Ox4. Able to follow commands. CIWA <7. Denied visual hallucinations but has intermittent auditory hallucinations.    CV: SR-Sinus tach  Resp: RA. Frequent dry coughs.   Endocrine:  - 160's. No sliding scale needed at bedtime.   GI/: Regular diet. Denied N/V.  Skin: Intact  Pain/Gtts: Denied pain. Off Dex.   POC: Transfer out of ICU  Other: PRN Melatonin and Trazodone given.       Goal Outcome Evaluation:      Plan of Care Reviewed With: patient    Overall Patient Progress: improvingOverall Patient Progress: improving    Problem: Alcohol Withdrawal  Goal: Alcohol Withdrawal Symptom Control  Outcome: Progressing  Intervention: Minimize or Manage Alcohol Withdrawal Symptoms  Recent Flowsheet Documentation  Taken 8/13/2024 0000 by Kaye Brown RN  Sensory Stimulation Regulation:   auditory stimulation minimized   care clustered   quiet environment promoted   visual stimulation minimized  Aspiration Precautions:   awake/alert before oral intake   distractions minimized during oral intake   upright posture maintained  Seizure Precautions:   activity supervised   clutter-free environment maintained   emergency equipment at bedside   side rails padded  Taken 8/12/2024 2000 by Kaye Brown RN  Sensory Stimulation Regulation:   auditory stimulation minimized   care clustered   quiet environment promoted   visual stimulation minimized  Aspiration Precautions:   awake/alert before oral intake   distractions minimized during oral intake   upright posture maintained  Seizure Precautions:   activity supervised   clutter-free environment maintained   emergency equipment at bedside   side rails padded

## 2024-08-13 NOTE — PROGRESS NOTES
Care Management Follow Up    Length of Stay (days): 4    Expected Discharge Date: 08/14/2024     Concerns to be Addressed: discharge planning     Patient plan of care discussed at interdisciplinary rounds: Yes    Anticipated Discharge Disposition: Other (Comments) (pending recommendations)     Anticipated Discharge Services: Other (see comment) (pending recommendations)  Anticipated Discharge DME: Other (see comment) (pending recommendations)    Patient/family educated on Medicare website which has current facility and service quality ratings:    Education Provided on the Discharge Plan: Yes  Patient/Family in Agreement with the Plan: unable to assess    Referrals Placed by CM/SW:    Private pay costs discussed: Not applicable    Additional Information:  Writer spoke with patient's mother regarding discharge planning explaining that patient is able to make his own decisions and cannot be forced into treatment without court intervention. Julia patient's mother stated she was understanding.     Brennen Moyer MSW, TLGSW  [facility]  Care Management

## 2024-08-14 VITALS
OXYGEN SATURATION: 96 % | WEIGHT: 244.27 LBS | TEMPERATURE: 98.4 F | HEART RATE: 98 BPM | RESPIRATION RATE: 18 BRPM | SYSTOLIC BLOOD PRESSURE: 143 MMHG | DIASTOLIC BLOOD PRESSURE: 102 MMHG | BODY MASS INDEX: 33.13 KG/M2

## 2024-08-14 LAB
GLUCOSE BLDC GLUCOMTR-MCNC: 117 MG/DL (ref 70–99)
GLUCOSE BLDC GLUCOMTR-MCNC: 142 MG/DL (ref 70–99)
GLUCOSE BLDC GLUCOMTR-MCNC: 98 MG/DL (ref 70–99)
HOLD SPECIMEN: NORMAL
MAGNESIUM SERPL-MCNC: 1.9 MG/DL (ref 1.7–2.3)
PHOSPHATE SERPL-MCNC: 4.5 MG/DL (ref 2.5–4.5)
POTASSIUM SERPL-SCNC: 3.3 MMOL/L (ref 3.4–5.3)
POTASSIUM SERPL-SCNC: 3.6 MMOL/L (ref 3.4–5.3)

## 2024-08-14 PROCEDURE — 250N000013 HC RX MED GY IP 250 OP 250 PS 637: Performed by: HOSPITALIST

## 2024-08-14 PROCEDURE — 99239 HOSP IP/OBS DSCHRG MGMT >30: CPT | Performed by: INTERNAL MEDICINE

## 2024-08-14 PROCEDURE — 250N000012 HC RX MED GY IP 250 OP 636 PS 637: Performed by: INTERNAL MEDICINE

## 2024-08-14 PROCEDURE — 250N000013 HC RX MED GY IP 250 OP 250 PS 637: Performed by: INTERNAL MEDICINE

## 2024-08-14 PROCEDURE — 84100 ASSAY OF PHOSPHORUS: CPT | Performed by: INTERNAL MEDICINE

## 2024-08-14 PROCEDURE — 83735 ASSAY OF MAGNESIUM: CPT | Performed by: INTERNAL MEDICINE

## 2024-08-14 PROCEDURE — 250N000013 HC RX MED GY IP 250 OP 250 PS 637: Performed by: PSYCHIATRY & NEUROLOGY

## 2024-08-14 PROCEDURE — 84132 ASSAY OF SERUM POTASSIUM: CPT | Performed by: INTERNAL MEDICINE

## 2024-08-14 PROCEDURE — 36415 COLL VENOUS BLD VENIPUNCTURE: CPT | Performed by: INTERNAL MEDICINE

## 2024-08-14 RX ORDER — OLANZAPINE 10 MG/1
10 TABLET ORAL 2 TIMES DAILY
Qty: 60 TABLET | Refills: 0 | Status: SHIPPED | OUTPATIENT
Start: 2024-08-14

## 2024-08-14 RX ORDER — BENZTROPINE MESYLATE 1 MG/1
1 TABLET ORAL 2 TIMES DAILY
Qty: 60 TABLET | Refills: 0 | Status: SHIPPED | OUTPATIENT
Start: 2024-08-14

## 2024-08-14 RX ORDER — FOLIC ACID 1 MG/1
1 TABLET ORAL DAILY
Qty: 30 TABLET | Refills: 1 | Status: SHIPPED | OUTPATIENT
Start: 2024-08-14

## 2024-08-14 RX ORDER — OLANZAPINE 5 MG/1
5 TABLET ORAL DAILY PRN
Qty: 30 TABLET | Refills: 1 | Status: SHIPPED | OUTPATIENT
Start: 2024-08-14

## 2024-08-14 RX ORDER — TRAZODONE HYDROCHLORIDE 50 MG/1
50 TABLET, FILM COATED ORAL
Qty: 30 TABLET | Refills: 0 | Status: SHIPPED | OUTPATIENT
Start: 2024-08-14

## 2024-08-14 RX ORDER — POTASSIUM CHLORIDE 1500 MG/1
40 TABLET, EXTENDED RELEASE ORAL ONCE
Status: COMPLETED | OUTPATIENT
Start: 2024-08-14 | End: 2024-08-14

## 2024-08-14 RX ORDER — ACAMPROSATE CALCIUM 333 MG/1
666 TABLET, DELAYED RELEASE ORAL 3 TIMES DAILY
Qty: 180 TABLET | Refills: 0 | Status: SHIPPED | OUTPATIENT
Start: 2024-08-14

## 2024-08-14 RX ORDER — NICOTINE 21 MG/24HR
1 PATCH, TRANSDERMAL 24 HOURS TRANSDERMAL DAILY
Status: DISCONTINUED | OUTPATIENT
Start: 2024-08-14 | End: 2024-08-14 | Stop reason: HOSPADM

## 2024-08-14 RX ORDER — ALBUTEROL SULFATE 90 UG/1
2 AEROSOL, METERED RESPIRATORY (INHALATION) EVERY 4 HOURS PRN
Qty: 18 G | Refills: 0 | Status: SHIPPED | OUTPATIENT
Start: 2024-08-14

## 2024-08-14 RX ORDER — NALTREXONE HYDROCHLORIDE 50 MG/1
50 TABLET, FILM COATED ORAL DAILY
Qty: 30 TABLET | Refills: 0 | Status: SHIPPED | OUTPATIENT
Start: 2024-08-14

## 2024-08-14 RX ADMIN — Medication 25 MCG: at 08:25

## 2024-08-14 RX ADMIN — INSULIN ASPART 1 UNITS: 100 INJECTION, SOLUTION INTRAVENOUS; SUBCUTANEOUS at 13:40

## 2024-08-14 RX ADMIN — FOLIC ACID 1 MG: 1 TABLET ORAL at 08:25

## 2024-08-14 RX ADMIN — POTASSIUM CHLORIDE 40 MEQ: 1500 TABLET, EXTENDED RELEASE ORAL at 08:27

## 2024-08-14 RX ADMIN — PANTOPRAZOLE SODIUM 40 MG: 40 TABLET, DELAYED RELEASE ORAL at 08:26

## 2024-08-14 RX ADMIN — ACAMPROSATE CALCIUM 666 MG: 333 TABLET, DELAYED RELEASE ORAL at 08:29

## 2024-08-14 RX ADMIN — OLANZAPINE 10 MG: 5 TABLET, ORALLY DISINTEGRATING ORAL at 02:57

## 2024-08-14 RX ADMIN — NICOTINE 1 PATCH: 14 PATCH, EXTENDED RELEASE TRANSDERMAL at 08:31

## 2024-08-14 RX ADMIN — BENZTROPINE MESYLATE 1 MG: 1 TABLET ORAL at 08:29

## 2024-08-14 RX ADMIN — Medication 1 TABLET: at 08:25

## 2024-08-14 RX ADMIN — OLANZAPINE 10 MG: 5 TABLET, FILM COATED ORAL at 08:25

## 2024-08-14 RX ADMIN — SERTRALINE HYDROCHLORIDE 50 MG: 50 TABLET ORAL at 08:26

## 2024-08-14 RX ADMIN — THIAMINE HCL TAB 100 MG 100 MG: 100 TAB at 08:25

## 2024-08-14 ASSESSMENT — ACTIVITIES OF DAILY LIVING (ADL)
ADLS_ACUITY_SCORE: 24
ADLS_ACUITY_SCORE: 23
ADLS_ACUITY_SCORE: 24
ADLS_ACUITY_SCORE: 23
ADLS_ACUITY_SCORE: 23
ADLS_ACUITY_SCORE: 24
ADLS_ACUITY_SCORE: 23
ADLS_ACUITY_SCORE: 24
ADLS_ACUITY_SCORE: 23
ADLS_ACUITY_SCORE: 23
ADLS_ACUITY_SCORE: 24
ADLS_ACUITY_SCORE: 23
ADLS_ACUITY_SCORE: 23

## 2024-08-14 NOTE — PROVIDER NOTIFICATION
MD Notification    Notified Person: MD    Notified Person Name: Dr. Galindo    Notification Date/Time: 8/14/24 0135    Notification Interaction: Paged    Purpose of Notification: Pt requesting a nicotine patch    Orders Received: Pending    Comments:

## 2024-08-14 NOTE — PLAN OF CARE
Summary: Witnessed seizure, Bipolar and Depression.   DATE & TIME: 8/14/24 [id number]   Cognitive Concerns/ Orientation : A&Ox4 with intermittent confusion, states he is still seeing and hearing things at times, nothing harmful.  BEHAVIOR & AGGRESSION TOOL COLOR: Green   CIWA SCORE: 4, 9 (hallucinations and mild anxiety), PRN Zyprexa given x1; pt states he hasn't slept for >24 hrs and his hallucinations get worse the longer he goes without sleep. All available sleep meds given earlier.  ABNL VS/O2: HTN on RA  MOBILITY: SBA d/t intermittent confusion, walked into another pt room earlier thinking it was his.   PAIN MANAGMENT: Denies  DIET: Regular   BOWEL/BLADDER: Continent  ABNL LAB/BG: LFTs elevated, BG 98  DRAIN/DEVICES: PIV L arm saline locked  TELEMETRY RHYTHM: NA  SKIN: Pale, scattered scabs and dry heels.   TESTS/PROCEDURES: None new  D/C DAY/GOALS/PLACE: Possible discharge today per MD notes.   OTHER IMPORTANT INFO: Seizure precautions maintained. Cigarettes and lighter placed in blue pod locker #3.

## 2024-08-14 NOTE — DISCHARGE SUMMARY
[facility]  Hospitalist Discharge Summary      Date of Admission:  8/9/2024  Date of Discharge:  8/14/2024  Discharging Provider: Cristina Mcduffie MD  Discharge Service: Hospitalist Service    Discharge Diagnoses   Alcohol withdrawal seizure  History of hallucinations with withdrawal  Alcohol use disorder, severe  Atrial fibrillation with RVR  Mild alcoholic hepatitis  Hypomagnesemia  Hypochloremia  Hypophosphatemia  Anion gap metabolic acidosis, resolved  Hypokalemia  Type 2 diabetes mellitus  Thrombocytopenia  MDD versus bipolar disorder  Psychosis  Anxiety disorder  Asthma  Tobacco use disorder      Clinically Significant Risk Factors          Follow-ups Needed After Discharge   Follow-up Appointments     Follow-up and recommended labs and tests       Follow up with primary care provider, Mayte Hill, within 7 days   for hospital follow- up.  The following labs/tests are recommended:   CBC/CMP.    Follow-up with Dr. Webb for next available after discharge.            Unresulted Labs Ordered in the Past 30 Days of this Admission       Date and Time Order Name Status Description    8/9/2024  9:25 PM Blood Culture Peripheral Blood Preliminary           Discharge Disposition   Discharged to home  Condition at discharge: Stable    Hospital Course   Ravi Ahmadi is a 34 year old male admitted on 8/9/2024. He presents with seizure in the setting of EtOH withdrawal.      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   - Last Etoh evening prior.   - In ED tachy 170s, other VSS. Lactic 9.5->2.0. AG 25.   - CXR w/ LLL hazy opacities. CT head/C-spine w/o acute process.    - Neurology evaluated and recommended management of withdrawal symptoms.  No indication for AED at this time  -Treated with Precedex drip, has been  off of Precedex drip for more than 48 hours prior to discharge  -- Continue MVI, thiamine, folic acid.  -Psychiatry consulted, did not recommend commitment or holding and patient not interested in CD counselor evaluation.  Restarted patient on his PTA naltrexone and Campral as per recommendation by psychiatrist     # Atrial fibrillation with RVR-resolved  - HR in ED to 170s.   - Given adenosine and appeared to be afib.   - Spontaneously converted in ICU. [id number] 1 (DM).   -TTE obtained showed normal EF, no structural abnormality noted.  -Has remained in normal sinus rhythm with normal rates.  His A-fib might be related to acute withdrawal  -He continued to remain on sinus rhythm on telemetry  -Patient being discharged on event monitor     # Abnormal chest x-ray  # Acute hypoxic respiratory failure - likely due to aspiration pneumonitis  # Wheeze on examination   Found to have subtle hazy opacities in left lower lobe.  Likely represents aspiration.  -Monitored off antibiotics and patient remained on room air  -Recheck x-ray showed improvement in haziness     # Mild alcoholic hepatitis  AST//159. T bili 1.5 (baseline low normal)  - monitor outpatient     # Hypomagnesemia-replete per protocol  # Hypochloremia-continue to monitor  # Hypophosphatemia-replete per protocol  -Replaced     # Anion gap metabolic acidosis-  -resolved     # Hypokalemia-  -being replaced per protocol     # DM II  States takes 5 units of lantus daily at home. Most recent A1c 6.0% 12/2023  -Continue PTA Lantus  -Outpatient follow-up with PCP     # Thrombocytopenia-stable  Mild, plts 128 on most recent lab.  Likely in setting of marrow suppression from alcohol use  -Outpatient follow-up     # MDD vs Bipolar  # Psychosis  # Anxiety disorder  Per psychiatry notes 12/2023.   -Continue PTA Abilify 5 mg nightly, benztropine 1 mg twice daily, Zyprexa 10 mg twice daily, sertraline 50 mg daily, trazodone 50 mg nightly as needed  -Psychiatry  evaluated the patient and recommended continuing all the medication except for Abilify and outpatient follow-up with his primary psychiatrist     # Asthma  - prn albuterol available     # Tobacco use disorder  Smokes 1 ppd  - declines NRT     # Prolonged QTc-slowly improving  -Reasonable to keep minimizing QT prolonging medications.  Consultations This Hospital Stay   PHARMACY TO DOSE VANCO  NEUROLOGY IP CONSULT  CARE MANAGEMENT / SOCIAL WORK IP CONSULT  PSYCHIATRY IP CONSULT  CHEMICAL DEPENDENCY IP CONSULT  CARE MANAGEMENT / SOCIAL WORK IP CONSULT    Code Status   Full Code    Time Spent on this Encounter   I, Cristina Mcduffie MD, personally saw the patient today and spent greater than 30 minutes discharging this patient.       Cristina Mcduffie MD  [place] [address] [facility]  [address] [place] [address]  Phone: [phone]  ______________________________________________________________________    Physical Exam   Vital Signs: Temp: 98.4  F (36.9  C) Temp src: Oral BP: (!) 143/102 Pulse: 98   Resp: 18 SpO2: 96 % O2 Device: None (Room air)    Weight: 244 lbs 4.31 oz  Exam:  Constitutional: Awake, alert and no distress. Appears comfortable  Head: Normocephalic. No masses, lesions, tenderness or abnormalities  ENMT: ENT exam normal, no neck nodes or sinus tenderness  Cardiovascular: RRR.  No murmurs, no rubs or JVD  Respiratory: Normal WOB,b/l equal air entry, no wheezes or crackles   Gastrointestinal: Abdomen soft, non-tender. BS normal. No masses, organomegaly  : Deferred  Extremities : No edema , no clubbing or cyanosis    Neurologic: Cranial nerves II-XII grossly intact , power symmetrical, Reflexes normal and symmetric. Sensation grossly WNL.        Primary Care Physician   Mayte Hill    Discharge Orders      Medication Therapy Management Referral      Reason for your hospital stay    Alcohol withdrawal seizure     Follow-up and recommended labs and tests     Follow up with  primary care provider, Mayte Hill, within 7 days for hospital follow- up.  The following labs/tests are recommended: CBC/CMP.    Follow-up with Dr. Webb for next available after discharge.     Activity    Your activity upon discharge: activity as tolerated     Discharge Instructions    Do not drink alcohol     Monitor and record    Blood pressure: daily and readings to PCP for any medication adjustment.     Adult Cardiac Event Monitor    Results to PCP     Diet    Follow this diet upon discharge: Orders Placed This Encounter      Advance Diet as Tolerated: Regular Diet Adult       Significant Results and Procedures   Results for orders placed or performed during the hospital encounter of 08/09/24   CT Head w/o Contrast    Narrative    EXAM: CT CERVICAL SPINE W/O CONTRAST, CT HEAD W/O CONTRAST  LOCATION: [facility]  DATE: 8/9/2024    INDICATION: Fall, trauma, alcohol withdrawal seizure  COMPARISON: None.  TECHNIQUE:   1) Routine CT Head without IV contrast. Multiplanar reformats. Dose reduction techniques were used.  2) Routine CT Cervical Spine without IV contrast. Multiplanar reformats. Dose reduction techniques were used.    FINDINGS:   HEAD CT:   INTRACRANIAL CONTENTS: No intracranial hemorrhage, extraaxial collection, or mass effect.  No CT evidence of acute infarct. Normal parenchymal attenuation. Normal ventricles and sulci.     VISUALIZED ORBITS/SINUSES/MASTOIDS: No intraorbital abnormality. Mild mucosal thickening scattered about the paranasal sinuses. No middle ear or mastoid effusion.    BONES/SOFT TISSUES: No acute abnormality.    CERVICAL SPINE CT:   VERTEBRA: Normal vertebral body heights and alignment. No fracture or posttraumatic subluxation.     CANAL/FORAMINA: No canal or neural foraminal stenosis.    PARASPINAL: No extraspinal abnormality. Visualized lung fields are clear.      Impression    IMPRESSION:  HEAD CT:  1.  No acute intracranial process.    CERVICAL  SPINE CT:  1.  No CT evidence for acute fracture or post traumatic subluxation.   CT Cervical Spine w/o Contrast    Narrative    EXAM: CT CERVICAL SPINE W/O CONTRAST, CT HEAD W/O CONTRAST  LOCATION: [facility]  DATE: 8/9/2024    INDICATION: Fall, trauma, alcohol withdrawal seizure  COMPARISON: None.  TECHNIQUE:   1) Routine CT Head without IV contrast. Multiplanar reformats. Dose reduction techniques were used.  2) Routine CT Cervical Spine without IV contrast. Multiplanar reformats. Dose reduction techniques were used.    FINDINGS:   HEAD CT:   INTRACRANIAL CONTENTS: No intracranial hemorrhage, extraaxial collection, or mass effect.  No CT evidence of acute infarct. Normal parenchymal attenuation. Normal ventricles and sulci.     VISUALIZED ORBITS/SINUSES/MASTOIDS: No intraorbital abnormality. Mild mucosal thickening scattered about the paranasal sinuses. No middle ear or mastoid effusion.    BONES/SOFT TISSUES: No acute abnormality.    CERVICAL SPINE CT:   VERTEBRA: Normal vertebral body heights and alignment. No fracture or posttraumatic subluxation.     CANAL/FORAMINA: No canal or neural foraminal stenosis.    PARASPINAL: No extraspinal abnormality. Visualized lung fields are clear.      Impression    IMPRESSION:  HEAD CT:  1.  No acute intracranial process.    CERVICAL SPINE CT:  1.  No CT evidence for acute fracture or post traumatic subluxation.   XR Chest Port 1 View    Narrative    EXAM: XR CHEST PORT 1 VIEW  LOCATION: [facility]  DATE: 8/9/2024    INDICATION: Cough  COMPARISON: Chest radiograph 8/17/2023      Impression    IMPRESSION: Stable size of cardiomediastinal silhouette. There are some subtle hazy opacities in the left lower lobe, infectious/inflammatory process is possible. Right lung is clear. No definite pleural effusion or pneumothorax. No acute bony   abnormality.   XR Chest 2 Views    Narrative    EXAM: XR CHEST 2 VIEWS  LOCATION: [facility]  DATE: 2024    INDICATION: hazy opacity follow up  COMPARISON: Chest radiograph 2024      Impression    IMPRESSION: Improved aeration of the lungs with decreased left lower lung hazy opacities. No new opacities. No pleural effusion or pneumothorax. Stable heart size and mediastinal contours.   Echocardiogram Complete     Value    LVEF  50-55%    Narrative    [id number]  [id number]  [id number]  [id number]^JOSEF^SANCHEZ^JAZMINE     [facility]  Echocardiography Laboratory  [address] [place] [id number]     Name: EMILIANA CALABRESE  MRN: [id number]  : 1990  Study Date: 08/10/2024 01:31 PM  Age: 34 yrs  Gender: Male  Patient Location: [facility]  Reason For Study: Atrial Fibrillation  Ordering Physician: SANCHEZ BAHENA  Referring Physician: Mayte Hill MD  Performed By: Lee Ann Shannon     BSA: 2.3 m2  Height: 72 in  Weight: 238 lb  HR: 73  BP: 140/84 mmHg  ______________________________________________________________________________  Procedure  Complete Portable Echo Adult. Optison (NDC #[id number]) given intravenously.  ______________________________________________________________________________  Interpretation Summary     Left ventricular systolic function is low normal.  The visual ejection fraction is 50-55%.  No regional wall motion abnormalities noted.  The right ventricular systolic function is borderline reduced.  The study was technically difficult. There is no comparison study available.  ______________________________________________________________________________  Left Ventricle  The left ventricle is normal in size. There is mild concentric left  ventricular hypertrophy. Left ventricular systolic function is low normal. The  visual ejection fraction is 50-55%. Left ventricular diastolic function is  indeterminate. No regional wall motion abnormalities noted.     Right Ventricle  The right ventricle is normal size. The right  ventricular systolic function is  borderline reduced.     Atria  Normal left atrial size. Right atrial size is normal.     Mitral Valve  There is trace mitral regurgitation.     Tricuspid Valve  No tricuspid regurgitation. Right ventricular systolic pressure could not be  approximated due to inadequate tricuspid regurgitation. IVC diameter >2.1 cm  collapsing <50% with sniff suggests a high RA pressure estimated at 15 mmHg or  greater.     Aortic Valve  The aortic valve is trileaflet. No aortic regurgitation is present. No aortic  stenosis is present.     Vessels  The aortic root is normal size.     Rhythm  Sinus rhythm was noted.     ______________________________________________________________________________  MMode/2D Measurements & Calculations  IVSd: 1.2 cm  LVIDd: 5.0 cm  LVIDs: 3.0 cm  LVPWd: 1.3 cm  FS: 39.7 %  LV mass(C)d: 247.6 grams  LV mass(C)dI: 107.9 grams/m2  Ao root diam: 3.7 cm  LA dimension: 3.7 cm  asc Aorta Diam: 3.6 cm  LA/Ao: 1.0  LVOT diam: 2.3 cm  LVOT area: 4.2 cm2     Ao root diam index Ht(cm/m): 2.0  Ao root diam index BSA (cm/m2): 1.6  Asc Ao diam index BSA (cm/m2): 1.6  Asc Ao diam index Ht(cm/m): 1.9  LA Volume (BP): 48.7 ml  LA Volume Index (BP): 21.3 ml/m2  RV Base: 3.0 cm  RWT: 0.52  TAPSE: 2.3 cm     Doppler Measurements & Calculations  MV E max gian: 73.7 cm/sec  MV A max gian: 63.4 cm/sec  MV E/A: 1.2  MV dec time: 0.23 sec  PA acc time: 0.12 sec  E/E' av.7  Lateral E/e': 8.2  Medial E/e': 7.1  RV S Gian: 13.5 cm/sec     ______________________________________________________________________________  Report approved by: Will Paul 08/10/2024 02:41 PM             Discharge Medications   Current Discharge Medication List        START taking these medications    Details   albuterol (PROAIR HFA/PROVENTIL HFA/VENTOLIN HFA) 108 (90 Base) MCG/ACT inhaler Inhale 2 puffs into the lungs every 4 hours as needed for wheezing  Qty: 18 g, Refills: 0    Comments: Pharmacy may dispense  brand covered by insurance (Proair, or proventil or ventolin or generic albuterol inhaler)  Associated Diagnoses: Mild intermittent asthma without complication      naltrexone (DEPADE/REVIA) 50 MG tablet Take 1 tablet (50 mg) by mouth daily  Qty: 30 tablet, Refills: 0    Comments: Future refills by PCP Dr. Mayte Hill with phone number [phone].  Associated Diagnoses: Alcohol use disorder           CONTINUE these medications which have CHANGED    Details   acamprosate (CAMPRAL) 333 MG EC tablet Take 2 tablets (666 mg) by mouth 3 times daily  Qty: 180 tablet, Refills: 0    Comments: Future refills by PCP Dr. Mayte Hill with phone number [phone].  Associated Diagnoses: Alcohol use disorder      benztropine (COGENTIN) 1 MG tablet Take 1 tablet (1 mg) by mouth 2 times daily  Qty: 60 tablet, Refills: 0    Comments: Future refills by PCP Dr. Mayte Hill with phone number [phone].  Associated Diagnoses: Abnormal involuntary movement      folic acid (FOLVITE) 1 MG tablet Take 1 tablet (1 mg) by mouth daily  Qty: 30 tablet, Refills: 1    Associated Diagnoses: Alcohol abuse      !! OLANZapine (ZYPREXA) 10 MG tablet Take 1 tablet (10 mg) by mouth 2 times daily Take in morning and at bedtime  Qty: 60 tablet, Refills: 0    Comments: Future refills by PCP Dr. Mayte Hill with phone number [phone].  Associated Diagnoses: Moderate episode of recurrent major depressive disorder (H); Hallucinations      !! OLANZapine (ZYPREXA) 5 MG tablet Take 1 tablet (5 mg) by mouth daily as needed (hallucinations)  Qty: 30 tablet, Refills: 1    Comments: Future refills by PCP Dr. Mayte Hill with phone number [phone].  Associated Diagnoses: Psychosis, unspecified psychosis type (H)      sertraline (ZOLOFT) 50 MG tablet Take 1 tablet (50 mg) by mouth daily  Qty: 30 tablet, Refills: 0    Comments: Future refills by PCP Dr. Mayte Hill with phone number [phone].  Associated Diagnoses:  Depression, unspecified depression type      traZODone (DESYREL) 50 MG tablet Take 1 tablet (50 mg) by mouth nightly as needed for sleep  Qty: 30 tablet, Refills: 0    Comments: Future refills by PCP Dr. Mayte Hill with phone number [phone].  Associated Diagnoses: Primary insomnia       !! - Potential duplicate medications found. Please discuss with provider.        CONTINUE these medications which have NOT CHANGED    Details   glucose (BD GLUCOSE) 4 g chewable tablet Take 1 tablet by mouth every hour as needed for low blood sugar  Qty: 30 tablet, Refills: 0    Associated Diagnoses: Diabetes mellitus due to underlying condition with ketoacidosis without coma, with long-term current use of insulin (H)      insulin glargine (LANTUS PEN) 100 UNIT/ML pen Inject 5 Units Subcutaneous at bedtime  Qty: 1 mL, Refills: 0    Comments: If Lantus is not covered by insurance, may substitute Basaglar or Semglee or other insulin glargine product per insurance preference at same dose and frequency.    Associated Diagnoses: Diabetes mellitus due to underlying condition with ketoacidosis without coma, with long-term current use of insulin (H)      lisinopril (ZESTRIL) 10 MG tablet Take 1 tablet (10 mg) by mouth daily  Qty: 30 tablet, Refills: 0    Associated Diagnoses: Benign essential hypertension      multivitamin w/minerals (THERA-VIT-M) tablet Take 1 tablet by mouth daily  Qty: 30 tablet, Refills: 1    Associated Diagnoses: Alcohol abuse      thiamine (B-1) 100 MG tablet Take 1 tablet (100 mg) by mouth daily  Qty: 30 tablet, Refills: 1    Associated Diagnoses: Alcohol abuse      Vitamin D3 (CHOLECALCIFEROL) 25 mcg (1000 units) tablet Take 1 tablet (25 mcg) by mouth daily  Qty: 30 tablet, Refills: 0    Comments: [facility] delivery for all meds  Associated Diagnoses: Mood disorder (H24)      nicotine (NICODERM CQ) 14 MG/24HR 24 hr patch Place 1 patch onto the skin daily  Qty: 30 patch, Refills: 0    Associated  Diagnoses: Tobacco abuse      pantoprazole (PROTONIX) 40 MG EC tablet Take 1 tablet (40 mg) by mouth every morning (before breakfast)  Qty: 30 tablet, Refills: 0    Associated Diagnoses: Gastritis without bleeding, unspecified chronicity, unspecified gastritis type           STOP taking these medications       ARIPiprazole (ABILIFY) 5 MG tablet Comments:   Reason for Stopping:             Allergies   No Known Allergies

## 2024-08-14 NOTE — PLAN OF CARE
Summary: Witnessed seizure, Bipolar and Depression.   DATE & TIME: 08/13/24 Evenings transfer from ICU.     Cognitive Concerns/ Orientation : A&Ox4. Mild anxiety, cooperative and appreciative of cares.    BEHAVIOR & AGGRESSION TOOL COLOR: Green   CIWA SCORE: 4, 4 for mild anxiety, some mild audial disturbances, and mild tremor.    ABNL VS/O2: VSS with BP in the 150's. On RA. Infreq dry cough.   MOBILITY: SBA, now independent. Walked in halls and room with steady gait. Asking for bed alarm to be turned off. Calls if needed.   PAIN MANAGMENT: Mild numbness in hands and feet.   DIET: Regular   BOWEL/BLADDER: WDL. Using bathroom.   ABNL LAB/BG: Na 134, ALT 94, AST 59, BILI 1.6, , 119. Plat 128.   DRAIN/DEVICES: PIV in LFA SL   TELEMETRY RHYTHM: NA  SKIN: Pale and clammy. Scattered scabs and dry heels.   TESTS/PROCEDURES: AM labs, chest XR completed this shift.   D/C DAY/GOALS/PLACE: Possible tomorrow per MD notes.   OTHER IMPORTANT INFO: Calls as needed.

## 2024-08-14 NOTE — PLAN OF CARE
Goal Outcome Evaluation:               Discharge    Patient discharged to home via friend  A&Ox4. VSS . No c/o pain. Tolerating diet. Discharging home. AVS reviewed and dischargemeds given         Listed belongings gathered and given to patient (including from security/pharmacy). Yes  Care Plan and Patient education resolved: Yes  Prescriptions if needed, hard copies sent with patient  Yes  Medication Bin checked and emptied on discharge Yes  SW/care coordinator/charge RN aware of discharge: Yes

## 2024-08-15 ENCOUNTER — PATIENT OUTREACH (OUTPATIENT)
Dept: INTERNAL MEDICINE | Facility: CLINIC | Age: 34
End: 2024-08-15
Payer: COMMERCIAL

## 2024-08-15 ENCOUNTER — TELEPHONE (OUTPATIENT)
Facility: CLINIC | Age: 34
End: 2024-08-15
Payer: COMMERCIAL

## 2024-08-15 LAB — BACTERIA BLD CULT: NO GROWTH

## 2024-08-15 NOTE — TELEPHONE ENCOUNTER
"PRIOR AUTHORIZATION DENIED    Medication: ACAMPROSATE CALCIUM 333 MG PO [facility]  Insurance Company: [employer] - Phone [phone] Fax [phone]  Denial Date:    Denial Reason(s): \"Patient refused to talk to CD counselor prior to admission.  Did not fill naltrexone or acamprosate in 2024.  It does not appear patient wants to stop using alcohol.\"  -[place] [employer]  Appeal Information:   Patient Notified:     Sola Tafoya  Pharmacy Technician/Liaison, Discharge Pharmacy   [phone] (voice or text)  [email or web address]  Available on Vocera and Teams      "

## 2024-08-16 ENCOUNTER — TELEPHONE (OUTPATIENT)
Dept: INTERNAL MEDICINE | Facility: CLINIC | Age: 34
End: 2024-08-16
Payer: COMMERCIAL

## 2024-08-16 NOTE — TELEPHONE ENCOUNTER
MTM referral from: Transitions of Care (recent hospital discharge or ED visit)    MT referral outreach attempt #2 on August 16, 2024 at 11:01 AM      Outcome: Spoke with patient declined scheduling appt    Use [employer] for the carrier/Plan on the flowsheet          MOUSTAPHA Fields   [phone]

## 2024-08-16 NOTE — TELEPHONE ENCOUNTER
Transitions of Care Outreach  Chief Complaint   Patient presents with    Hospital F/U       Most Recent Admission Date: 8/9/2024   Most Recent Admission Diagnosis: Seizure (H) - R56.9  Atrial fibrillation with RVR (H) - I48.91  Closed head injury, initial encounter - S09.90XA  Alcohol withdrawal seizure with complication (H) - F10.939, R56.9     Most Recent Discharge Date: 8/14/2024   Most Recent Discharge Diagnosis: Alcohol withdrawal seizure with complication (H) - F10.939, R56.9  Atrial fibrillation with RVR (H) - I48.91  Seizure (H) - R56.9  Closed head injury, initial encounter - S09.90XA  Alcohol use disorder - F10.90  Abnormal involuntary movement - R25.9  Alcohol abuse - F10.10  Moderate episode of recurrent major depressive disorder (H) - F33.1  Hallucinations - R44.3  Psychosis, unspecified psychosis type (H) - F29  Depression, unspecified depression type - F32.A  Primary insomnia - F51.01  Mild intermittent asthma without complication - J45.20     Transitions of Care Assessment    Discharge Assessment  How are you doing now that you are home?: overall better, using medications as ordered and following directions  How are your symptoms? (Red Flag symptoms escalate to triage hotline per guidelines): Improved  Do you know how to contact your clinic care team if you have future questions or changes to your health status? : Yes  Does the patient have their discharge instructions? : Yes  Does the patient have questions regarding their discharge instructions? : No  Were you started on any new medications or were there changes to any of your previous medications? : Yes  Does the patient have all of their medications?: Yes  Do you have questions regarding any of your medications? : No  Do you have all of your needed medical supplies or equipment (DME)?  (i.e. oxygen tank, CPAP, cane, etc.):  (n/a)    Follow up Plan     Discharge Follow-Up  Discharge follow up appointment scheduled in alignment with recommended  follow up timeframe or Transitions of Risk Category? (Low = within 30 days; Moderate= within 14 days; High= within 7 days): Yes  Discharge Follow Up Appointment Date: 08/28/24  Discharge Follow Up Appointment Scheduled with?: Primary Care Provider    Future Appointments   Date Time Provider Department Center   8/28/2024 10:00 AM Mayte Hill MD [facility]       Outpatient Plan as outlined on AVS reviewed with patient.    For any urgent concerns, please contact our 24 hour nurse triage line: [phone] ([phone])       Smitha Cueto RN

## 2024-08-27 PROBLEM — F10.90 ALCOHOL USE DISORDER: Status: RESOLVED | Noted: 2023-06-12 | Resolved: 2024-08-27

## 2024-08-27 PROBLEM — K70.10 ALCOHOLIC HEPATITIS WITHOUT ASCITES (H): Status: RESOLVED | Noted: 2023-06-12 | Resolved: 2024-08-27

## 2024-08-27 PROBLEM — F10.930 ALCOHOL WITHDRAWAL SYNDROME WITHOUT COMPLICATION (H): Status: RESOLVED | Noted: 2023-06-12 | Resolved: 2024-08-27

## 2024-08-27 PROBLEM — E66.01 MORBID OBESITY (H): Status: RESOLVED | Noted: 2021-01-07 | Resolved: 2024-08-27

## 2024-08-27 PROBLEM — R73.9 HYPERGLYCEMIA: Status: RESOLVED | Noted: 2023-06-12 | Resolved: 2024-08-27

## 2024-08-27 PROBLEM — E87.20 LACTIC ACIDOSIS: Status: RESOLVED | Noted: 2023-08-16 | Resolved: 2024-08-27

## 2024-08-27 PROBLEM — F10.929 ALCOHOLIC INTOXICATION WITH COMPLICATION (H): Status: RESOLVED | Noted: 2023-06-12 | Resolved: 2024-08-27

## 2024-08-27 PROBLEM — I10 BENIGN ESSENTIAL HYPERTENSION: Status: RESOLVED | Noted: 2021-01-07 | Resolved: 2024-08-27

## 2024-08-27 PROBLEM — I48.91 ATRIAL FIBRILLATION WITH RVR (H): Status: RESOLVED | Noted: 2024-08-09 | Resolved: 2024-08-27

## 2024-08-27 PROBLEM — E87.29 ALCOHOLIC KETOACIDOSIS: Status: RESOLVED | Noted: 2023-10-23 | Resolved: 2024-08-27

## 2024-08-27 PROBLEM — S09.90XA CLOSED HEAD INJURY, INITIAL ENCOUNTER: Status: RESOLVED | Noted: 2024-08-09 | Resolved: 2024-08-27

## 2024-08-27 PROBLEM — R56.9 SEIZURE (H): Status: RESOLVED | Noted: 2024-08-09 | Resolved: 2024-08-27

## 2024-08-27 PROBLEM — F31.5: Chronic | Status: RESOLVED | Noted: 2023-09-04 | Resolved: 2024-08-27

## 2024-08-27 PROBLEM — F10.932 ALCOHOL WITHDRAWAL SYNDROME WITH PERCEPTUAL DISTURBANCE (H): Status: RESOLVED | Noted: 2023-10-23 | Resolved: 2024-08-27

## 2024-08-27 PROBLEM — K85.20 ALCOHOL-INDUCED ACUTE PANCREATITIS: Status: RESOLVED | Noted: 2023-06-12 | Resolved: 2024-08-27

## 2024-08-27 PROBLEM — E11.10 DIABETIC KETOACIDOSIS WITHOUT COMA ASSOCIATED WITH TYPE 2 DIABETES MELLITUS (H): Status: RESOLVED | Noted: 2023-08-16 | Resolved: 2024-08-27

## 2024-08-27 PROBLEM — F10.10 ALCOHOL ABUSE: Status: RESOLVED | Noted: 2023-09-04 | Resolved: 2024-08-27

## 2024-08-27 PROBLEM — R11.2 NAUSEA AND VOMITING, UNSPECIFIED VOMITING TYPE: Status: RESOLVED | Noted: 2023-06-12 | Resolved: 2024-08-27

## 2024-08-27 PROBLEM — F19.20 CHEMICAL DEPENDENCY (H): Status: RESOLVED | Noted: 2023-12-13 | Resolved: 2024-08-27

## 2024-08-27 PROBLEM — E87.6 HYPOKALEMIA: Status: RESOLVED | Noted: 2023-06-12 | Resolved: 2024-08-27

## 2024-08-27 PROBLEM — N17.9 AKI (ACUTE KIDNEY INJURY) (H): Status: RESOLVED | Noted: 2023-08-16 | Resolved: 2024-08-27

## 2024-08-27 PROBLEM — F10.939 ALCOHOL WITHDRAWAL SEIZURE WITH COMPLICATION (H): Status: RESOLVED | Noted: 2024-08-09 | Resolved: 2024-08-27

## 2024-08-27 PROBLEM — F43.12 CHRONIC POST-TRAUMATIC STRESS DISORDER (PTSD): Chronic | Status: RESOLVED | Noted: 2024-08-13 | Resolved: 2024-08-27

## 2024-08-27 PROBLEM — R56.9 ALCOHOL WITHDRAWAL SEIZURE WITH COMPLICATION (H): Status: RESOLVED | Noted: 2024-08-09 | Resolved: 2024-08-27

## 2024-08-27 PROBLEM — F10.939 ALCOHOL WITHDRAWAL, WITH UNSPECIFIED COMPLICATION (H): Status: RESOLVED | Noted: 2023-12-07 | Resolved: 2024-08-27

## 2024-08-29 ENCOUNTER — HOSPITAL ENCOUNTER (EMERGENCY)
Facility: CLINIC | Age: 34
Discharge: HOME | End: 2024-08-29
Attending: EMERGENCY MEDICINE | Admitting: EMERGENCY MEDICINE
Payer: COMMERCIAL

## 2024-08-29 VITALS
HEART RATE: 92 BPM | SYSTOLIC BLOOD PRESSURE: 153 MMHG | HEIGHT: 72 IN | WEIGHT: 244 LBS | TEMPERATURE: 98.3 F | DIASTOLIC BLOOD PRESSURE: 109 MMHG | OXYGEN SATURATION: 92 % | RESPIRATION RATE: 20 BRPM | BODY MASS INDEX: 33.05 KG/M2

## 2024-08-29 DIAGNOSIS — F10.920 ALCOHOLIC INTOXICATION WITHOUT COMPLICATION (H): ICD-10-CM

## 2024-08-29 PROCEDURE — 99283 EMERGENCY DEPT VISIT LOW MDM: CPT

## 2024-08-29 ASSESSMENT — ACTIVITIES OF DAILY LIVING (ADL)
ADLS_ACUITY_SCORE: 37

## 2024-08-29 ASSESSMENT — COLUMBIA-SUICIDE SEVERITY RATING SCALE - C-SSRS
2. HAVE YOU ACTUALLY HAD ANY THOUGHTS OF KILLING YOURSELF IN THE PAST MONTH?: NO
1. IN THE PAST MONTH, HAVE YOU WISHED YOU WERE DEAD OR WISHED YOU COULD GO TO SLEEP AND NOT WAKE UP?: NO
6. HAVE YOU EVER DONE ANYTHING, STARTED TO DO ANYTHING, OR PREPARED TO DO ANYTHING TO END YOUR LIFE?: NO

## 2024-08-29 NOTE — ED TRIAGE NOTES
Per EMS, coming from home where he lives with his mother. PD initially called who called EMS. Unsure of original issue. Medics brought him in because he was too intoxicated?? Was able to walk, use his cell phone and smoke a cigarette. Patient did not want to come in. Hx of withdrawal seizure but last drink was within the hour.  hx DM. Patient is alert and oriented.

## 2024-08-29 NOTE — ED PROVIDER NOTES
Emergency Department Note      History of Present Illness     Chief Complaint   Alcohol Intoxication (/) and Hypertension      HPI   Ravi Ahmadi is a 34 year old male with a history of type 2 diabetes mellitus and alcohol withdrawal seizures presenting to the ED for evaluation of alcohol intoxication. EMS reports that PD called them because the patient was intoxicated and disoriented. They were able to get the patient to walk out of the house to smoke a cigarette. The patient endorses consuming whiskey today, adding that his last drink was in the last couple hours. He denies suicidal or homicidal ideation. No chest pain, abdominal pain, or other medical concerns. He does not want to go to detox at this time, and states that his mother is intoxicated as well, so she will not be able to pick him up.  EMS checked her blood sugar and found it to be normal at 150.    Independent Historian   EMS as detailed above.    Review of External Notes   None    Past Medical History     Medical History and Problem List   Past Medical History:   Diagnosis Date    Alcohol use disorder     Alcohol withdrawal hallucinosis (H)     Alcohol withdrawal seizure (H)     Bipolar disorder (H)     Obesity (BMI 30-39.9)     Type 2 diabetes mellitus (H)      Medications   acamprosate (CAMPRAL) 333 MG EC tablet  albuterol (PROAIR HFA/PROVENTIL HFA/VENTOLIN HFA) 108 (90 Base) MCG/ACT inhaler  benztropine (COGENTIN) 1 MG tablet  folic acid (FOLVITE) 1 MG tablet  glucose (BD GLUCOSE) 4 g chewable tablet  insulin glargine (LANTUS PEN) 100 UNIT/ML pen  lisinopril (ZESTRIL) 10 MG tablet  multivitamin w/minerals (THERA-VIT-M) tablet  naltrexone (DEPADE/REVIA) 50 MG tablet  OLANZapine (ZYPREXA) 10 MG tablet  OLANZapine (ZYPREXA) 5 MG tablet  sertraline (ZOLOFT) 50 MG tablet  thiamine (B-1) 100 MG tablet  traZODone (DESYREL) 50 MG tablet  Vitamin D3 (CHOLECALCIFEROL) 25 mcg (1000 units) tablet      Surgical History   No past surgical history on  file.    Physical Exam     Patient Vitals for the past 24 hrs:   BP Temp Temp src Pulse Resp SpO2 Height Weight   08/29/24 1558 -- 98.3  F (36.8  C) Oral -- 28 93 % -- --   08/29/24 1530 (!) 141/87 -- -- 101 -- (!) 81 % -- --   08/29/24 1510 135/89 -- -- 101 -- 94 % -- --   08/29/24 1504 -- -- -- -- -- -- 1.829 m (6') 110.7 kg (244 lb)     Physical Exam  General:  Sitting on bed.  Slurring words and intoxicated appearing, but pleasant and talkative.  HENT:  No obvious trauma to head  Right Ear:  External ear normal.   Left Ear:  External ear normal.   Nose:  Nose normal.   Eyes:  Conjunctivae and EOM are normal. Pupils are equal, round, and reactive.   Neck: Normal range of motion. Neck supple. No tracheal deviation present.   CV:  Normal heart sounds. No murmur heard.  Pulm/Chest: Effort normal and breath sounds normal.   Abd: Soft. No distension. There is no tenderness. There is no rigidity, no rebound and no guarding.   M/S: Normal range of motion.   Neuro: Appears intoxicated. Slurred speech. No suicidal or homicidal ideation.   Skin: Skin is warm and dry. No rash noted. Not diaphoretic.   Psych: Normal mood and affect. Behavior is normal.     Diagnostics     Lab Results   Labs Ordered and Resulted from Time of ED Arrival to Time of ED Departure - No data to display    Imaging   No orders to display     Independent Interpretation   None    ED Course      Medications Administered   Medications - No data to display    Procedures   Procedures     Discussion of Management   None    ED Course   ED Course as of 08/29/24 1623   Thu Aug 29, 2024   7214 I obtained history and examined the patient as noted above.       Additional Documentation  None    Medical Decision Making / Diagnosis     CMS Diagnoses: None    MIPS       None    MDM   Ravi Ahmadi is a very pleasant 34 year old male who presents with alcohol intoxication.  Police were called to the house.  Original reason for call was unclear, but patient was  intoxicated and felt to be unable to care for self so was transported to the ED.  Patient denies any other ingestion besides alcohol.  Patient is hemodynamically stable.  Patient does have a history of an alcohol withdrawal seizure.  I offered for the patient to go to detox, but he declined.  Patient has a history of paroxysmal A-fib RVR, but is not tachycardic at this time.  Patient declined any testing.  He desires to go back home.  Patient still unable to ambulate on his own safely.  Discussed my concern with him about the possibility of a withdrawal seizure again and recommended detox and treatment, but he declined.  Patient will be observed until time in which she is clinically sober to reassess his decision and informed of his risk of possible withdrawal seizure.  Patient signed out to my colleague, Dr. Maharaj.    Disposition   The patient will board in the emergency department pending bed placement. Care was signed out to Dr. Maharaj.     Diagnosis     ICD-10-CM    1. Alcoholic intoxication without complication (H24)  F10.920          Discharge Medications   New Prescriptions    No medications on file     Scribe Disclosure:  I, Loyda Joy, am serving as a scribe at 3:02 PM on 8/29/2024 to document services personally performed by Reza Wills DO based on my observations and the provider's statements to me.        Reza Wills DO  08/29/24 7349

## 2024-08-29 NOTE — ED NOTES
Bed: ED16  Expected date:   Expected time:   Means of arrival:   Comments:   34 M ETOH intoxicated hypertensive

## 2024-08-29 NOTE — ED PROVIDER NOTES
Sign Out Note    I took over care of this patient from Dr. Wills    Briefly, patient presented to the ED for: AMS 2/2 EtOH    Plan at time of sign out: discharge when clinically sober    Events during my shift: At 1730, I spoke with RN, patient doing well but not yet sober.  Plan to reassess frequently, discussed h/o EtOH w/d.    At 1810, nurse notified me that patient is improved, able to ambulate steadily, and is appropriate for discharge.  He was subsequent discharged, clinically sober, denying having any ongoing concerns.    MD Carol Ann Allen, Scott Fernandes MD  08/30/24 0252

## 2024-12-21 ENCOUNTER — HEALTH MAINTENANCE LETTER (OUTPATIENT)
Age: 34
End: 2024-12-21

## 2025-01-31 ENCOUNTER — HOSPITAL ENCOUNTER (INPATIENT)
Facility: CLINIC | Age: 35
LOS: 5 days | Discharge: OTHER INSTITUTION | End: 2025-02-05
Attending: EMERGENCY MEDICINE | Admitting: INTERNAL MEDICINE
Payer: COMMERCIAL

## 2025-01-31 DIAGNOSIS — F25.1 SCHIZOAFFECTIVE DISORDER, DEPRESSIVE TYPE (H): Primary | ICD-10-CM

## 2025-01-31 DIAGNOSIS — F10.10 ALCOHOL ABUSE: ICD-10-CM

## 2025-01-31 DIAGNOSIS — Z79.4 TYPE 2 DIABETES MELLITUS WITHOUT COMPLICATION, WITH LONG-TERM CURRENT USE OF INSULIN (H): ICD-10-CM

## 2025-01-31 DIAGNOSIS — F32.A DEPRESSION, UNSPECIFIED DEPRESSION TYPE: ICD-10-CM

## 2025-01-31 DIAGNOSIS — F10.939 ALCOHOL WITHDRAWAL SYNDROME, WITH UNSPECIFIED COMPLICATION (H): ICD-10-CM

## 2025-01-31 DIAGNOSIS — E87.29 ALCOHOLIC KETOACIDOSIS: ICD-10-CM

## 2025-01-31 DIAGNOSIS — Z72.0 TOBACCO ABUSE: ICD-10-CM

## 2025-01-31 DIAGNOSIS — J45.20 MILD INTERMITTENT ASTHMA WITHOUT COMPLICATION: ICD-10-CM

## 2025-01-31 DIAGNOSIS — Z79.4 DIABETES MELLITUS DUE TO UNDERLYING CONDITION WITH KETOACIDOSIS WITHOUT COMA, WITH LONG-TERM CURRENT USE OF INSULIN (H): ICD-10-CM

## 2025-01-31 DIAGNOSIS — E11.9 TYPE 2 DIABETES MELLITUS WITHOUT COMPLICATION, WITH LONG-TERM CURRENT USE OF INSULIN (H): ICD-10-CM

## 2025-01-31 DIAGNOSIS — R11.2 NAUSEA AND VOMITING, UNSPECIFIED VOMITING TYPE: ICD-10-CM

## 2025-01-31 DIAGNOSIS — I10 BENIGN ESSENTIAL HYPERTENSION: ICD-10-CM

## 2025-01-31 DIAGNOSIS — F51.01 PRIMARY INSOMNIA: ICD-10-CM

## 2025-01-31 DIAGNOSIS — F10.90 ALCOHOL USE DISORDER: ICD-10-CM

## 2025-01-31 DIAGNOSIS — E08.10 DIABETES MELLITUS DUE TO UNDERLYING CONDITION WITH KETOACIDOSIS WITHOUT COMA, WITH LONG-TERM CURRENT USE OF INSULIN (H): ICD-10-CM

## 2025-01-31 DIAGNOSIS — R25.9 ABNORMAL INVOLUNTARY MOVEMENT: ICD-10-CM

## 2025-01-31 DIAGNOSIS — K29.20 ALCOHOLIC GASTRITIS WITHOUT BLEEDING, UNSPECIFIED CHRONICITY: ICD-10-CM

## 2025-01-31 LAB
ALBUMIN SERPL BCG-MCNC: 4.7 G/DL (ref 3.5–5.2)
ALP SERPL-CCNC: 92 U/L (ref 40–150)
ALT SERPL W P-5'-P-CCNC: 58 U/L (ref 0–70)
ANION GAP SERPL CALCULATED.3IONS-SCNC: 30 MMOL/L (ref 7–15)
AST SERPL W P-5'-P-CCNC: 75 U/L (ref 0–45)
B-OH-BUTYR SERPL-SCNC: 3.32 MMOL/L
BASOPHILS # BLD AUTO: 0.1 10E3/UL (ref 0–0.2)
BASOPHILS NFR BLD AUTO: 1 %
BILIRUB SERPL-MCNC: 1.5 MG/DL
BUN SERPL-MCNC: 6.8 MG/DL (ref 6–20)
CALCIUM SERPL-MCNC: 9.9 MG/DL (ref 8.8–10.4)
CHLORIDE SERPL-SCNC: 84 MMOL/L (ref 98–107)
CREAT SERPL-MCNC: 0.74 MG/DL (ref 0.67–1.17)
EGFRCR SERPLBLD CKD-EPI 2021: >90 ML/MIN/1.73M2
EOSINOPHIL # BLD AUTO: 0 10E3/UL (ref 0–0.7)
EOSINOPHIL NFR BLD AUTO: 0 %
ERYTHROCYTE [DISTWIDTH] IN BLOOD BY AUTOMATED COUNT: 11.8 % (ref 10–15)
ETHANOL SERPL-MCNC: 0.02 G/DL
GLUCOSE SERPL-MCNC: 161 MG/DL (ref 70–99)
HCO3 SERPL-SCNC: 18 MMOL/L (ref 22–29)
HCT VFR BLD AUTO: 42.2 % (ref 40–53)
HGB BLD-MCNC: 14.8 G/DL (ref 13.3–17.7)
HOLD SPECIMEN: NORMAL
IMM GRANULOCYTES # BLD: 0 10E3/UL
IMM GRANULOCYTES NFR BLD: 0 %
LIPASE SERPL-CCNC: 62 U/L (ref 13–60)
LYMPHOCYTES # BLD AUTO: 0.8 10E3/UL (ref 0.8–5.3)
LYMPHOCYTES NFR BLD AUTO: 11 %
MCH RBC QN AUTO: 31.2 PG (ref 26.5–33)
MCHC RBC AUTO-ENTMCNC: 35.1 G/DL (ref 31.5–36.5)
MCV RBC AUTO: 89 FL (ref 78–100)
MONOCYTES # BLD AUTO: 0.5 10E3/UL (ref 0–1.3)
MONOCYTES NFR BLD AUTO: 7 %
NEUTROPHILS # BLD AUTO: 6 10E3/UL (ref 1.6–8.3)
NEUTROPHILS NFR BLD AUTO: 81 %
NRBC # BLD AUTO: 0 10E3/UL
NRBC BLD AUTO-RTO: 0 /100
PLATELET # BLD AUTO: 113 10E3/UL (ref 150–450)
POTASSIUM SERPL-SCNC: 3.3 MMOL/L (ref 3.4–5.3)
PROT SERPL-MCNC: 8.1 G/DL (ref 6.4–8.3)
RBC # BLD AUTO: 4.75 10E6/UL (ref 4.4–5.9)
SODIUM SERPL-SCNC: 132 MMOL/L (ref 135–145)
WBC # BLD AUTO: 7.4 10E3/UL (ref 4–11)

## 2025-01-31 PROCEDURE — 85014 HEMATOCRIT: CPT | Performed by: EMERGENCY MEDICINE

## 2025-01-31 PROCEDURE — 99223 1ST HOSP IP/OBS HIGH 75: CPT | Performed by: INTERNAL MEDICINE

## 2025-01-31 PROCEDURE — 82010 KETONE BODYS QUAN: CPT | Performed by: EMERGENCY MEDICINE

## 2025-01-31 PROCEDURE — 36415 COLL VENOUS BLD VENIPUNCTURE: CPT | Performed by: EMERGENCY MEDICINE

## 2025-01-31 PROCEDURE — HZ2ZZZZ DETOXIFICATION SERVICES FOR SUBSTANCE ABUSE TREATMENT: ICD-10-PCS | Performed by: STUDENT IN AN ORGANIZED HEALTH CARE EDUCATION/TRAINING PROGRAM

## 2025-01-31 PROCEDURE — 96375 TX/PRO/DX INJ NEW DRUG ADDON: CPT

## 2025-01-31 PROCEDURE — 99291 CRITICAL CARE FIRST HOUR: CPT | Mod: 25

## 2025-01-31 PROCEDURE — 83735 ASSAY OF MAGNESIUM: CPT | Performed by: INTERNAL MEDICINE

## 2025-01-31 PROCEDURE — 250N000011 HC RX IP 250 OP 636: Performed by: EMERGENCY MEDICINE

## 2025-01-31 PROCEDURE — 93005 ELECTROCARDIOGRAM TRACING: CPT

## 2025-01-31 PROCEDURE — 82077 ASSAY SPEC XCP UR&BREATH IA: CPT | Performed by: EMERGENCY MEDICINE

## 2025-01-31 PROCEDURE — 250N000013 HC RX MED GY IP 250 OP 250 PS 637: Performed by: EMERGENCY MEDICINE

## 2025-01-31 PROCEDURE — 120N000001 HC R&B MED SURG/OB

## 2025-01-31 PROCEDURE — 83690 ASSAY OF LIPASE: CPT | Performed by: EMERGENCY MEDICINE

## 2025-01-31 PROCEDURE — 84100 ASSAY OF PHOSPHORUS: CPT | Performed by: INTERNAL MEDICINE

## 2025-01-31 PROCEDURE — 96374 THER/PROPH/DIAG INJ IV PUSH: CPT

## 2025-01-31 PROCEDURE — 85004 AUTOMATED DIFF WBC COUNT: CPT | Performed by: EMERGENCY MEDICINE

## 2025-01-31 PROCEDURE — 80053 COMPREHEN METABOLIC PANEL: CPT | Performed by: EMERGENCY MEDICINE

## 2025-01-31 PROCEDURE — 250N000009 HC RX 250: Performed by: EMERGENCY MEDICINE

## 2025-01-31 PROCEDURE — 258N000003 HC RX IP 258 OP 636: Performed by: EMERGENCY MEDICINE

## 2025-01-31 RX ORDER — THIAMINE HYDROCHLORIDE 100 MG/ML
100 INJECTION, SOLUTION INTRAMUSCULAR; INTRAVENOUS ONCE
Status: COMPLETED | OUTPATIENT
Start: 2025-01-31 | End: 2025-01-31

## 2025-01-31 RX ORDER — BENZTROPINE MESYLATE 1 MG/1
1 TABLET ORAL 2 TIMES DAILY
Status: DISCONTINUED | OUTPATIENT
Start: 2025-02-01 | End: 2025-02-05 | Stop reason: HOSPADM

## 2025-01-31 RX ORDER — DIAZEPAM 10 MG/2ML
5-10 INJECTION, SOLUTION INTRAMUSCULAR; INTRAVENOUS EVERY 30 MIN PRN
Status: DISCONTINUED | OUTPATIENT
Start: 2025-01-31 | End: 2025-02-05 | Stop reason: HOSPADM

## 2025-01-31 RX ORDER — GABAPENTIN 300 MG/1
600 CAPSULE ORAL EVERY 8 HOURS
Status: DISCONTINUED | OUTPATIENT
Start: 2025-02-04 | End: 2025-02-05 | Stop reason: HOSPADM

## 2025-01-31 RX ORDER — ONDANSETRON 2 MG/ML
4 INJECTION INTRAMUSCULAR; INTRAVENOUS ONCE
Status: COMPLETED | OUTPATIENT
Start: 2025-01-31 | End: 2025-01-31

## 2025-01-31 RX ORDER — DEXTROSE MONOHYDRATE 25 G/50ML
25-50 INJECTION, SOLUTION INTRAVENOUS
Status: DISCONTINUED | OUTPATIENT
Start: 2025-01-31 | End: 2025-02-05 | Stop reason: HOSPADM

## 2025-01-31 RX ORDER — AMOXICILLIN 250 MG
1 CAPSULE ORAL 2 TIMES DAILY PRN
Status: DISCONTINUED | OUTPATIENT
Start: 2025-01-31 | End: 2025-02-05 | Stop reason: HOSPADM

## 2025-01-31 RX ORDER — THIAMINE HYDROCHLORIDE 100 MG/ML
500 INJECTION, SOLUTION INTRAMUSCULAR; INTRAVENOUS 3 TIMES DAILY
Status: COMPLETED | OUTPATIENT
Start: 2025-02-01 | End: 2025-02-02

## 2025-01-31 RX ORDER — THIAMINE HYDROCHLORIDE 100 MG/ML
250 INJECTION, SOLUTION INTRAMUSCULAR; INTRAVENOUS DAILY
Status: DISCONTINUED | OUTPATIENT
Start: 2025-02-03 | End: 2025-02-05 | Stop reason: HOSPADM

## 2025-01-31 RX ORDER — PROCHLORPERAZINE MALEATE 10 MG
10 TABLET ORAL EVERY 6 HOURS PRN
Status: DISCONTINUED | OUTPATIENT
Start: 2025-01-31 | End: 2025-02-05 | Stop reason: HOSPADM

## 2025-01-31 RX ORDER — GABAPENTIN 300 MG/1
300 CAPSULE ORAL EVERY 8 HOURS
Status: DISCONTINUED | OUTPATIENT
Start: 2025-02-06 | End: 2025-02-05 | Stop reason: HOSPADM

## 2025-01-31 RX ORDER — ACETAMINOPHEN 325 MG/1
650 TABLET ORAL EVERY 8 HOURS PRN
Status: DISCONTINUED | OUTPATIENT
Start: 2025-02-01 | End: 2025-02-05 | Stop reason: HOSPADM

## 2025-01-31 RX ORDER — GABAPENTIN 300 MG/1
900 CAPSULE ORAL EVERY 8 HOURS
Status: COMPLETED | OUTPATIENT
Start: 2025-02-01 | End: 2025-02-04

## 2025-01-31 RX ORDER — NALTREXONE HYDROCHLORIDE 50 MG/1
50 TABLET, FILM COATED ORAL DAILY
Status: DISCONTINUED | OUTPATIENT
Start: 2025-02-01 | End: 2025-02-05 | Stop reason: HOSPADM

## 2025-01-31 RX ORDER — GABAPENTIN 100 MG/1
100 CAPSULE ORAL EVERY 8 HOURS
Status: DISCONTINUED | OUTPATIENT
Start: 2025-02-08 | End: 2025-02-05 | Stop reason: HOSPADM

## 2025-01-31 RX ORDER — OLANZAPINE 5 MG/1
10 TABLET ORAL 2 TIMES DAILY
Status: DISCONTINUED | OUTPATIENT
Start: 2025-02-01 | End: 2025-02-04

## 2025-01-31 RX ORDER — FLUMAZENIL 0.1 MG/ML
0.2 INJECTION, SOLUTION INTRAVENOUS
Status: DISCONTINUED | OUTPATIENT
Start: 2025-01-31 | End: 2025-02-05 | Stop reason: HOSPADM

## 2025-01-31 RX ORDER — AMOXICILLIN 250 MG
2 CAPSULE ORAL 2 TIMES DAILY PRN
Status: DISCONTINUED | OUTPATIENT
Start: 2025-01-31 | End: 2025-02-05 | Stop reason: HOSPADM

## 2025-01-31 RX ORDER — OLANZAPINE 5 MG/1
5 TABLET ORAL DAILY PRN
Status: CANCELLED | OUTPATIENT
Start: 2025-01-31

## 2025-01-31 RX ORDER — SODIUM CHLORIDE AND POTASSIUM CHLORIDE 150; 900 MG/100ML; MG/100ML
INJECTION, SOLUTION INTRAVENOUS CONTINUOUS
Status: DISCONTINUED | OUTPATIENT
Start: 2025-01-31 | End: 2025-02-02

## 2025-01-31 RX ORDER — HALOPERIDOL 5 MG/ML
2.5-5 INJECTION INTRAMUSCULAR EVERY 6 HOURS PRN
Status: DISCONTINUED | OUTPATIENT
Start: 2025-01-31 | End: 2025-02-05 | Stop reason: HOSPADM

## 2025-01-31 RX ORDER — CALCIUM CARBONATE 500 MG/1
1000 TABLET, CHEWABLE ORAL 4 TIMES DAILY PRN
Status: DISCONTINUED | OUTPATIENT
Start: 2025-01-31 | End: 2025-02-05 | Stop reason: HOSPADM

## 2025-01-31 RX ORDER — MULTIPLE VITAMINS W/ MINERALS TAB 9MG-400MCG
1 TAB ORAL DAILY
Status: DISCONTINUED | OUTPATIENT
Start: 2025-01-31 | End: 2025-02-05 | Stop reason: HOSPADM

## 2025-01-31 RX ORDER — FLUMAZENIL 0.1 MG/ML
0.2 INJECTION, SOLUTION INTRAVENOUS
Status: DISCONTINUED | OUTPATIENT
Start: 2025-01-31 | End: 2025-02-01

## 2025-01-31 RX ORDER — LIDOCAINE 40 MG/G
CREAM TOPICAL
Status: DISCONTINUED | OUTPATIENT
Start: 2025-01-31 | End: 2025-02-05 | Stop reason: HOSPADM

## 2025-01-31 RX ORDER — GABAPENTIN 600 MG/1
1200 TABLET ORAL ONCE
Status: COMPLETED | OUTPATIENT
Start: 2025-02-01 | End: 2025-02-01

## 2025-01-31 RX ORDER — LISINOPRIL 10 MG/1
10 TABLET ORAL DAILY
Status: DISCONTINUED | OUTPATIENT
Start: 2025-02-01 | End: 2025-02-05 | Stop reason: HOSPADM

## 2025-01-31 RX ORDER — TRAZODONE HYDROCHLORIDE 50 MG/1
50 TABLET ORAL
Status: DISCONTINUED | OUTPATIENT
Start: 2025-01-31 | End: 2025-02-05 | Stop reason: HOSPADM

## 2025-01-31 RX ORDER — CLONIDINE HYDROCHLORIDE 0.1 MG/1
0.1 TABLET ORAL EVERY 8 HOURS
Status: DISCONTINUED | OUTPATIENT
Start: 2025-02-01 | End: 2025-02-03

## 2025-01-31 RX ORDER — POTASSIUM CHLORIDE 7.45 MG/ML
10 INJECTION INTRAVENOUS ONCE
Status: COMPLETED | OUTPATIENT
Start: 2025-01-31 | End: 2025-01-31

## 2025-01-31 RX ORDER — ACETAMINOPHEN 650 MG/1
650 SUPPOSITORY RECTAL EVERY 8 HOURS PRN
Status: DISCONTINUED | OUTPATIENT
Start: 2025-02-01 | End: 2025-02-05 | Stop reason: HOSPADM

## 2025-01-31 RX ORDER — OLANZAPINE 5 MG/1
5-10 TABLET, ORALLY DISINTEGRATING ORAL EVERY 6 HOURS PRN
Status: DISCONTINUED | OUTPATIENT
Start: 2025-01-31 | End: 2025-02-04

## 2025-01-31 RX ORDER — ACAMPROSATE CALCIUM 333 MG/1
666 TABLET, DELAYED RELEASE ORAL 3 TIMES DAILY
Status: DISCONTINUED | OUTPATIENT
Start: 2025-02-01 | End: 2025-02-05 | Stop reason: HOSPADM

## 2025-01-31 RX ORDER — NICOTINE POLACRILEX 4 MG
15-30 LOZENGE BUCCAL
Status: DISCONTINUED | OUTPATIENT
Start: 2025-01-31 | End: 2025-02-05 | Stop reason: HOSPADM

## 2025-01-31 RX ORDER — DIAZEPAM 5 MG/1
10 TABLET ORAL EVERY 30 MIN PRN
Status: DISCONTINUED | OUTPATIENT
Start: 2025-01-31 | End: 2025-02-01

## 2025-01-31 RX ORDER — DIAZEPAM 10 MG/2ML
10 INJECTION, SOLUTION INTRAMUSCULAR; INTRAVENOUS ONCE
Status: COMPLETED | OUTPATIENT
Start: 2025-01-31 | End: 2025-01-31

## 2025-01-31 RX ORDER — DIAZEPAM 10 MG/2ML
5-10 INJECTION, SOLUTION INTRAMUSCULAR; INTRAVENOUS EVERY 30 MIN PRN
Status: DISCONTINUED | OUTPATIENT
Start: 2025-01-31 | End: 2025-02-01

## 2025-01-31 RX ORDER — DIAZEPAM 5 MG/1
10 TABLET ORAL EVERY 30 MIN PRN
Status: DISCONTINUED | OUTPATIENT
Start: 2025-01-31 | End: 2025-02-05 | Stop reason: HOSPADM

## 2025-01-31 RX ORDER — FOLIC ACID 5 MG/ML
1 INJECTION, SOLUTION INTRAMUSCULAR; INTRAVENOUS; SUBCUTANEOUS ONCE
Status: COMPLETED | OUTPATIENT
Start: 2025-01-31 | End: 2025-02-01

## 2025-01-31 RX ORDER — PANTOPRAZOLE SODIUM 40 MG/1
40 TABLET, DELAYED RELEASE ORAL
Status: DISCONTINUED | OUTPATIENT
Start: 2025-02-01 | End: 2025-02-05 | Stop reason: HOSPADM

## 2025-01-31 RX ADMIN — THIAMINE HYDROCHLORIDE 100 MG: 100 INJECTION, SOLUTION INTRAMUSCULAR; INTRAVENOUS at 22:15

## 2025-01-31 RX ADMIN — DIAZEPAM 10 MG: 5 INJECTION INTRAMUSCULAR; INTRAVENOUS at 21:11

## 2025-01-31 RX ADMIN — DIAZEPAM 10 MG: 5 TABLET ORAL at 22:16

## 2025-01-31 RX ADMIN — ONDANSETRON 4 MG: 2 INJECTION, SOLUTION INTRAMUSCULAR; INTRAVENOUS at 22:14

## 2025-01-31 RX ADMIN — PANTOPRAZOLE SODIUM 80 MG: 40 INJECTION, POWDER, FOR SOLUTION INTRAVENOUS at 22:14

## 2025-01-31 RX ADMIN — Medication 1 TABLET: at 22:14

## 2025-01-31 RX ADMIN — POTASSIUM CHLORIDE 10 MEQ: 7.46 INJECTION, SOLUTION INTRAVENOUS at 22:31

## 2025-01-31 RX ADMIN — SODIUM CHLORIDE 2000 ML: 9 INJECTION, SOLUTION INTRAVENOUS at 22:23

## 2025-01-31 ASSESSMENT — LIFESTYLE VARIABLES
ORIENTATION AND CLOUDING OF SENSORIUM: CANNOT DO SERIAL ADDITIONS OR IS UNCERTAIN ABOUT DATE
TACTILE DISTURBANCES: VERY MILD ITCHING, PINS AND NEEDLES, BURNING OR NUMBNESS
AUDITORY DISTURBANCES: NOT PRESENT
NAUSEA AND VOMITING: 3
HEADACHE, FULLNESS IN HEAD: NOT PRESENT
NAUSEA AND VOMITING: 2
AUDITORY DISTURBANCES: NOT PRESENT
ANXIETY: 5
TOTAL SCORE: 26
ORIENTATION AND CLOUDING OF SENSORIUM: CANNOT DO SERIAL ADDITIONS OR IS UNCERTAIN ABOUT DATE
PAROXYSMAL SWEATS: BEADS OF SWEAT OBVIOUS ON FOREHEAD
TREMOR: SEVERE, EVEN WITH ARMS NOT EXTENDED
VISUAL DISTURBANCES: NOT PRESENT
TACTILE DISTURBANCES: MILD ITCHING, PINS AND NEEDLES, BURNING OR NUMBNESS
PAROXYSMAL SWEATS: BARELY PERCEPTIBLE SWEATING, PALMS MOIST
TREMOR: MODERATE, WITH PATIENT'S ARMS EXTENDED
HEADACHE, FULLNESS IN HEAD: NOT PRESENT
TOTAL SCORE: 13
AGITATION: SOMEWHAT MORE THAN NORMAL ACTIVITY
AGITATION: MODERATELY FIDGETY AND RESTLESS
ANXIETY: 3
VISUAL DISTURBANCES: NOT PRESENT

## 2025-01-31 ASSESSMENT — ACTIVITIES OF DAILY LIVING (ADL)
ADLS_ACUITY_SCORE: 58
ADLS_ACUITY_SCORE: 58

## 2025-02-01 ENCOUNTER — APPOINTMENT (OUTPATIENT)
Dept: GENERAL RADIOLOGY | Facility: CLINIC | Age: 35
End: 2025-02-01
Attending: INTERNAL MEDICINE
Payer: COMMERCIAL

## 2025-02-01 LAB
ALBUMIN SERPL BCG-MCNC: 4.1 G/DL (ref 3.5–5.2)
ALP SERPL-CCNC: 79 U/L (ref 40–150)
ALT SERPL W P-5'-P-CCNC: 51 U/L (ref 0–70)
ANION GAP SERPL CALCULATED.3IONS-SCNC: 17 MMOL/L (ref 7–15)
AST SERPL W P-5'-P-CCNC: 74 U/L (ref 0–45)
BILIRUB SERPL-MCNC: 1.5 MG/DL
BUN SERPL-MCNC: 8.1 MG/DL (ref 6–20)
CALCIUM SERPL-MCNC: 9 MG/DL (ref 8.8–10.4)
CHLORIDE SERPL-SCNC: 94 MMOL/L (ref 98–107)
CREAT SERPL-MCNC: 0.65 MG/DL (ref 0.67–1.17)
EGFRCR SERPLBLD CKD-EPI 2021: >90 ML/MIN/1.73M2
ERYTHROCYTE [DISTWIDTH] IN BLOOD BY AUTOMATED COUNT: 11.7 % (ref 10–15)
FLUAV RNA SPEC QL NAA+PROBE: NEGATIVE
FLUBV RNA RESP QL NAA+PROBE: NEGATIVE
GLUCOSE BLDC GLUCOMTR-MCNC: 110 MG/DL (ref 70–99)
GLUCOSE BLDC GLUCOMTR-MCNC: 110 MG/DL (ref 70–99)
GLUCOSE BLDC GLUCOMTR-MCNC: 119 MG/DL (ref 70–99)
GLUCOSE BLDC GLUCOMTR-MCNC: 130 MG/DL (ref 70–99)
GLUCOSE BLDC GLUCOMTR-MCNC: 157 MG/DL (ref 70–99)
GLUCOSE SERPL-MCNC: 109 MG/DL (ref 70–99)
HCO3 SERPL-SCNC: 26 MMOL/L (ref 22–29)
HCT VFR BLD AUTO: 37.3 % (ref 40–53)
HGB BLD-MCNC: 13 G/DL (ref 13.3–17.7)
MAGNESIUM SERPL-MCNC: 1.7 MG/DL (ref 1.7–2.3)
MAGNESIUM SERPL-MCNC: 1.7 MG/DL (ref 1.7–2.3)
MCH RBC QN AUTO: 31.2 PG (ref 26.5–33)
MCHC RBC AUTO-ENTMCNC: 34.9 G/DL (ref 31.5–36.5)
MCV RBC AUTO: 89 FL (ref 78–100)
PHOSPHATE SERPL-MCNC: 3.6 MG/DL (ref 2.5–4.5)
PHOSPHATE SERPL-MCNC: 3.6 MG/DL (ref 2.5–4.5)
PLATELET # BLD AUTO: 86 10E3/UL (ref 150–450)
POTASSIUM SERPL-SCNC: 3.9 MMOL/L (ref 3.4–5.3)
PROT SERPL-MCNC: 6.9 G/DL (ref 6.4–8.3)
RBC # BLD AUTO: 4.17 10E6/UL (ref 4.4–5.9)
RSV RNA SPEC NAA+PROBE: NEGATIVE
SARS-COV-2 RNA RESP QL NAA+PROBE: NEGATIVE
SODIUM SERPL-SCNC: 137 MMOL/L (ref 135–145)
WBC # BLD AUTO: 4.3 10E3/UL (ref 4–11)

## 2025-02-01 PROCEDURE — 36415 COLL VENOUS BLD VENIPUNCTURE: CPT | Performed by: INTERNAL MEDICINE

## 2025-02-01 PROCEDURE — 99232 SBSQ HOSP IP/OBS MODERATE 35: CPT | Performed by: INTERNAL MEDICINE

## 2025-02-01 PROCEDURE — 71046 X-RAY EXAM CHEST 2 VIEWS: CPT

## 2025-02-01 PROCEDURE — 36415 COLL VENOUS BLD VENIPUNCTURE: CPT | Performed by: EMERGENCY MEDICINE

## 2025-02-01 PROCEDURE — 84155 ASSAY OF PROTEIN SERUM: CPT | Performed by: INTERNAL MEDICINE

## 2025-02-01 PROCEDURE — 250N000011 HC RX IP 250 OP 636: Performed by: INTERNAL MEDICINE

## 2025-02-01 PROCEDURE — 250N000013 HC RX MED GY IP 250 OP 250 PS 637: Performed by: INTERNAL MEDICINE

## 2025-02-01 PROCEDURE — 84075 ASSAY ALKALINE PHOSPHATASE: CPT | Performed by: INTERNAL MEDICINE

## 2025-02-01 PROCEDURE — 85048 AUTOMATED LEUKOCYTE COUNT: CPT | Performed by: INTERNAL MEDICINE

## 2025-02-01 PROCEDURE — 80069 RENAL FUNCTION PANEL: CPT | Performed by: EMERGENCY MEDICINE

## 2025-02-01 PROCEDURE — 87637 SARSCOV2&INF A&B&RSV AMP PRB: CPT | Performed by: INTERNAL MEDICINE

## 2025-02-01 PROCEDURE — 250N000013 HC RX MED GY IP 250 OP 250 PS 637: Performed by: EMERGENCY MEDICINE

## 2025-02-01 PROCEDURE — 85014 HEMATOCRIT: CPT | Performed by: INTERNAL MEDICINE

## 2025-02-01 PROCEDURE — 250N000011 HC RX IP 250 OP 636: Performed by: EMERGENCY MEDICINE

## 2025-02-01 PROCEDURE — 83735 ASSAY OF MAGNESIUM: CPT | Performed by: EMERGENCY MEDICINE

## 2025-02-01 PROCEDURE — 120N000001 HC R&B MED SURG/OB

## 2025-02-01 RX ADMIN — BENZTROPINE MESYLATE 1 MG: 1 TABLET ORAL at 09:54

## 2025-02-01 RX ADMIN — GABAPENTIN 900 MG: 300 CAPSULE ORAL at 23:45

## 2025-02-01 RX ADMIN — POTASSIUM CHLORIDE AND SODIUM CHLORIDE: 900; 150 INJECTION, SOLUTION INTRAVENOUS at 00:17

## 2025-02-01 RX ADMIN — POTASSIUM CHLORIDE AND SODIUM CHLORIDE: 900; 150 INJECTION, SOLUTION INTRAVENOUS at 20:32

## 2025-02-01 RX ADMIN — CLONIDINE HYDROCHLORIDE 0.1 MG: 0.1 TABLET ORAL at 23:45

## 2025-02-01 RX ADMIN — POTASSIUM CHLORIDE AND SODIUM CHLORIDE: 900; 150 INJECTION, SOLUTION INTRAVENOUS at 09:43

## 2025-02-01 RX ADMIN — CLONIDINE HYDROCHLORIDE 0.1 MG: 0.1 TABLET ORAL at 01:28

## 2025-02-01 RX ADMIN — PANTOPRAZOLE SODIUM 40 MG: 40 TABLET, DELAYED RELEASE ORAL at 15:57

## 2025-02-01 RX ADMIN — LISINOPRIL 10 MG: 10 TABLET ORAL at 09:54

## 2025-02-01 RX ADMIN — GABAPENTIN 900 MG: 300 CAPSULE ORAL at 09:53

## 2025-02-01 RX ADMIN — GABAPENTIN 1200 MG: 600 TABLET, FILM COATED ORAL at 01:28

## 2025-02-01 RX ADMIN — CLONIDINE HYDROCHLORIDE 0.1 MG: 0.1 TABLET ORAL at 15:57

## 2025-02-01 RX ADMIN — SERTRALINE HYDROCHLORIDE 50 MG: 50 TABLET ORAL at 09:54

## 2025-02-01 RX ADMIN — OLANZAPINE 10 MG: 5 TABLET, FILM COATED ORAL at 01:28

## 2025-02-01 RX ADMIN — OLANZAPINE 10 MG: 5 TABLET, FILM COATED ORAL at 09:53

## 2025-02-01 RX ADMIN — GABAPENTIN 900 MG: 300 CAPSULE ORAL at 15:56

## 2025-02-01 RX ADMIN — FOLIC ACID 1 MG: 5 INJECTION, SOLUTION INTRAMUSCULAR; INTRAVENOUS; SUBCUTANEOUS at 01:26

## 2025-02-01 RX ADMIN — PANTOPRAZOLE SODIUM 40 MG: 40 TABLET, DELAYED RELEASE ORAL at 06:34

## 2025-02-01 RX ADMIN — CLONIDINE HYDROCHLORIDE 0.1 MG: 0.1 TABLET ORAL at 09:54

## 2025-02-01 RX ADMIN — BENZTROPINE MESYLATE 1 MG: 1 TABLET ORAL at 20:23

## 2025-02-01 RX ADMIN — NICOTINE 1 PATCH: 7 PATCH, EXTENDED RELEASE TRANSDERMAL at 09:52

## 2025-02-01 RX ADMIN — THIAMINE HYDROCHLORIDE 500 MG: 100 INJECTION, SOLUTION INTRAMUSCULAR; INTRAVENOUS at 20:23

## 2025-02-01 RX ADMIN — OLANZAPINE 10 MG: 5 TABLET, FILM COATED ORAL at 20:22

## 2025-02-01 RX ADMIN — Medication 1 TABLET: at 09:54

## 2025-02-01 RX ADMIN — THIAMINE HYDROCHLORIDE 500 MG: 100 INJECTION, SOLUTION INTRAMUSCULAR; INTRAVENOUS at 09:52

## 2025-02-01 RX ADMIN — THIAMINE HYDROCHLORIDE 500 MG: 100 INJECTION, SOLUTION INTRAMUSCULAR; INTRAVENOUS at 15:10

## 2025-02-01 ASSESSMENT — ACTIVITIES OF DAILY LIVING (ADL)
WALKING_OR_CLIMBING_STAIRS_DIFFICULTY: NO
NUMBER_OF_TIMES_PATIENT_HAS_FALLEN_WITHIN_LAST_SIX_MONTHS: 6
TOILETING_ISSUES: NO
HEARING_DIFFICULTY_OR_DEAF: NO
ADLS_ACUITY_SCORE: 32
WEAR_GLASSES_OR_BLIND: NO
ADLS_ACUITY_SCORE: 41
DOING_ERRANDS_INDEPENDENTLY_DIFFICULTY: NO
ADLS_ACUITY_SCORE: 32
DIFFICULTY_COMMUNICATING: NO
DIFFICULTY_EATING/SWALLOWING: NO
ADLS_ACUITY_SCORE: 41
CHANGE_IN_FUNCTIONAL_STATUS_SINCE_ONSET_OF_CURRENT_ILLNESS/INJURY: NO
ADLS_ACUITY_SCORE: 32
ADLS_ACUITY_SCORE: 41
DRESSING/BATHING_DIFFICULTY: NO
ADLS_ACUITY_SCORE: 41
ADLS_ACUITY_SCORE: 41
ADLS_ACUITY_SCORE: 43
CONCENTRATING,_REMEMBERING_OR_MAKING_DECISIONS_DIFFICULTY: NO
FALL_HISTORY_WITHIN_LAST_SIX_MONTHS: YES
ADLS_ACUITY_SCORE: 41
ADLS_ACUITY_SCORE: 43
ADLS_ACUITY_SCORE: 41
ADLS_ACUITY_SCORE: 41
ADLS_ACUITY_SCORE: 32

## 2025-02-01 NOTE — PROGRESS NOTES
[facility]    Medicine Progress Note - Hospitalist Service    Date of Admission:  1/31/2025    Assessment & Plan     Ravi Ahmadi is a 34 year old male with history of severe alcohol use disorder, alcohol withdrawal with previous withdrawal seizures and hallucinations, alcoholic hepatitis, DM2, bipolar disorder depression, anxiety, PTSD, asthma, tobacco use disorder who lives at home with his parents and presented on 1/31/2025 due to alcohol withdrawal, nausea and vomiting.     Previously hospitalized 8/2024.  Had alcohol withdrawal seizure at that time and required Precedex infusion.  Psychiatry did not recommend commitment.  PTA naltrexone and Campral were resumed.       Drinks about 700-800 mL of whiskey/day.  He has been through treatment program in 1/2024 but relapsed right after he came off the program.      In ER, hypertensive with blood pressure 167/115, tachycardic with heart rate up to 115.     Labs showed sodium 132, potassium 3.3, creatinine 0.74, anion gap 30, bicarb 18, serum ketones 3.3 to, glucose 161     He was administered 10 mg IV diazepam + 10 mg po diazepam, IV Zofran, IV Protonix, 10 mEq IV KCl, thiamine, folate and 2 L NS bolus in ER.     Acute alcohol withdrawal syndrome with hallucinations  History of alcohol withdrawal seizures and hallucinations  Severe alcohol use disorder  -Last alcoholic drink was on 1/30.  He did not drink on day of admission as he was feeling unwell, had nausea, vomiting and wanted to quit alcohol.  He then developed withdrawal symptoms.  Reports he became so shaky that he could not walk.  He had nausea and vomiting.  Reports chronic hallucinations which tell him to overdose on medications. These have been stable  -Serum alcohol level 0.02 mg/dl on admission.  In active alcohol withdrawal on admission  -Continue alcohol withdrawal protocol.  Diazepam of withdrawal symptoms.  Improved.  Received diazepam in ER  -Continue clonidine and  gabapentin taper  -Hold naltrexone and Campral  -Consult chemical dependency  -Continue high-dose thiamine, folate, multivitamin      Hypovolemic hyponatremia  -Sodium 132 on admission.  Has received 2 L normal saline bolus in ER and was on NS with 20 mEq KCl at 100 mL/h overnight.  Sodium now in normal range at 137.  Monitor     Hypokalemia  -Potassium 3.3 on admission.  Replaced per protocol     Ketosis-likely alcoholic ketosis  Anion gap metabolic acidosis-secondary to ketosis  -Serum ketones 3.3, Anion gap 30, bicarb 18 on admission -due to alcoholic ketosis  -Blood sugar 161.  Do not suspect DKA  -Improved with IV fluids.  Acidosis has resolved.  Continue IV fluids     Alcohol induced gastritis  -Has nausea and vomiting likely due to alcohol induced gastritis. Lipase 62.  -Improved.  Advance diet.  Continue IV fluids.  Continue Protonix      Alcoholic hepatitis  -Has elevated AST and total bilirubin. Alk phos 92, ALT 58, AST 75, total bilirubin 1.5  -Remained stable     Chronic thrombocytopenia  -Platelets 113k on admission, 86k on 2/1. Thrombocytopenia secondary to alcoholic bone marrow suppression  -Monitor     Episode of atrial fibrillation with RVR 8/2024-felt to be due to alcohol withdrawal  -Noted     Diabetes mellitus type 2, with long-term use of insulin  PTA-on Lantus 1 unit daily  -Patient reports he takes 1 unit of Lantus daily.  Last took it a week before admission  -Continue sliding scale insulin only.  Blood sugars well-controlled        Bipolar disorder with psychotic features   Major depressive disorder  PTSD  Generalized anxiety disorder  -Has history of previous psychiatric hospitalizations and history of commitment  -Continue sertraline 50 mg daily, trazodone 50 mg at bedtime as needed, olanzapine 10 mg twice daily and as needed, benztropine  -Reports chronic auditory hallucinations which are stable     Essential hypertension  -Continue lisinopril 10 mg daily     Asthma  -No acute  exacerbation     Tobacco use disorder  -Continue nicotine patch    Cough  -Reports significant cough on 2/1.  Otherwise afebrile.  No shortness of breath  -Check COVID-19/influenza/RSV.  Obtain chest x-ray        Diet: Combination Diet Clear Liquid    DVT Prophylaxis: Pneumatic Compression Devices  Abernathy Catheter: Not present  Lines: None     Cardiac Monitoring: None  Code Status: Full Code      Clinically Significant Risk Factors Present on Admission        # Hypokalemia: Lowest K = 3.3 mmol/L in last 2 days, will replace as needed  # Hyponatremia: Lowest Na = 132 mmol/L in last 2 days, will monitor as appropriate  # Hypochloremia: Lowest Cl = 84 mmol/L in last 2 days, will monitor as appropriate     # Anion Gap Metabolic Acidosis: Highest Anion Gap = 30 mmol/L in last 2 days, will monitor and treat as appropriate    # Thrombocytopenia: Lowest platelets = 86 in last 2 days, will monitor for bleeding   # Hypertension: Home medication list includes antihypertensive(s)               # Financial/Environmental Concerns:           Social Drivers of Health    Depression: At risk (2/21/2024)    PHQ-2     PHQ-2 Score: 3   Tobacco Use: High Risk (1/31/2025)    Patient History     Smoking Tobacco Use: Every Day     Smokeless Tobacco Use: Never   Alcohol Use: Alcohol Misuse (1/7/2021)    AUDIT-C     Frequency of Alcohol Consumption: 4 or more times a week     Average Number of Drinks: 1 or 2     Frequency of Binge Drinking: Monthly          Disposition Plan         Medically Ready for Discharge: Anticipated in 2-4 Days             Sadie Jean MD  Hospitalist Service  [facility]  Securely message with [email or web address] (more info)  Text page via [employer] Paging/Directory   ______________________________________________________________________    Interval History     Patient resting in bed.  Reports he feels better than before.  Tremulousness has improved  Has not required any diazepam after initial administration  in the ER  Reports significant cough  No chest pain or shortness of breath.  Afebrile    Physical Exam   Vital Signs: Temp: 97.9  F (36.6  C) Temp src: Oral BP: (!) 147/92 Pulse: 67   Resp: 18 SpO2: 97 % O2 Device: None (Room air)    Weight: 203 lbs 4.23 oz    Constitutional - resting in bed, in no acute distress  Cardiovascular - regular rate and rhythm, no murmurs, no edema  Pulmonary - lungs are clear to auscultation bilaterally, no wheezing or rhonchi  Neurological - awake, alert and oriented x3.  Moving all 4 extremities, normal speech, no focal deficits    Medical Decision Making       45 MINUTES SPENT BY ME on the date of service doing chart review, history, exam, documentation & further activities per the note.      Data     I have personally reviewed the following data over the past 24 hrs:    4.3  \   13.0 (L)   / 86 (L)     137 94 (L) 8.1 /  119 (H)   3.9 26 0.65 (L) \     ALT: 51 AST: 74 (H) AP: 79 TBILI: 1.5 (H)   ALB: 4.1 TOT PROTEIN: 6.9 LIPASE: 62 (H)       Imaging results reviewed over the past 24 hrs:   No results found for this or any previous visit (from the past 24 hours).

## 2025-02-01 NOTE — ED TRIAGE NOTES
Family member called 911 because patients last drink was about 12 hours ago and appears to be going through withdrawals. BGL was 150. Patient has nausea but no vomiting.      Triage Assessment (Adult)       Row Name 01/31/25 5745          Triage Assessment    Airway WDL WDL        Respiratory WDL    Respiratory WDL WDL        Skin Circulation/Temperature WDL    Skin Circulation/Temperature WDL WDL        Cardiac WDL    Cardiac WDL WDL        Cognitive/Neuro/Behavioral WDL    Cognitive/Neuro/Behavioral WDL WDL

## 2025-02-01 NOTE — ED NOTES
Bed: [facility]  Expected date:   Expected time:   Means of arrival:   Comments:  545 34m ETOH

## 2025-02-01 NOTE — PROGRESS NOTES
RECEIVING UNIT ED HANDOFF REVIEW    ED Nurse Handoff Report was reviewed by: Camilla Nath RN on January 31, 2025 at 11:33 PM

## 2025-02-01 NOTE — ED NOTES
[facility]  ED Nurse Handoff Report    ED Chief complaint: Alcohol Intoxication      ED Diagnosis:   Final diagnoses:   Alcohol withdrawal syndrome, with unspecified complication (H)   Nausea and vomiting, unspecified vomiting type   Alcoholic ketoacidosis       Code Status: see admitting MD    Allergies: No Known Allergies    Patient Story: patient quit drinking alcohol yesterday and went into withdrawals about 12 hours afterwards and family called 911.   Focused Assessment:  patient's initial CiWA score was 26    Treatments and/or interventions provided: labs and see MAR for mediations  Patient's response to treatments and/or interventions: improving, last CiWA score was 13    To be done/followed up on inpatient unit:  see orders    Does this patient have any cognitive concerns?:  none    Activity level - Baseline/Home:  Independent  Activity Level - Current:   Independent    Patient's Preferred language: English   Needed?: No    Isolation: None  Infection: Not Applicable  Patient tested for COVID 19 prior to admission: NO  Bariatric?: No    Vital Signs:   Vitals:    01/31/25 2130 01/31/25 2155 01/31/25 2157 01/31/25 2215   BP:  (!) 167/115 (!) 167/115    Pulse: 115 103  97   Resp: (!) 32 (!) 9  21   SpO2: (!) 91% 94%  93%       Cardiac Rhythm:     Was the PSS-3 completed:   Yes  What interventions are required if any?               Family Comments: none present  OBS brochure/video discussed/provided to patient/family: No              Name of person given brochure if not patient: na              Relationship to patient: na    For the majority of the shift this patient's behavior was Green.   Behavioral interventions performed were none.    ED NURSE PHONE NUMBER: [phone]

## 2025-02-01 NOTE — H&P
[facility]    History and Physical - Hospitalist Service       Date of Admission:  1/31/2025    Assessment & Plan        Ravi Ahmadi is a 34 year old male with history of severe alcohol use disorder, alcohol withdrawal with previous withdrawal seizures and hallucinations, alcoholic hepatitis, DM2, bipolar disorder depression, anxiety, PTSD, asthma, tobacco use disorder who lives at home with his parents and presents on 1/31/2025 due to alcohol withdrawal, nausea and vomiting.    Previously hospitalized 8/2024.  Had alcohol withdrawal seizure at that time and required Precedex infusion.  Psychiatry did not recommend commitment.  PTA naltrexone and Campral were resumed.      Drinks about 700-800 mL of whiskey every day.  He has been through treatment program in 1/2024 but relapsed right after he came off the program.     In ER, hypertensive with blood pressure 167/115, tachycardic with heart rate up to 115.    Labs showed sodium 132, potassium 3.3, creatinine 0.74, anion gap 30, bicarb 18, serum ketones 3.3 to, glucose 161    He was administered 10 mg IV diazepam + 10 mg po diazepam, IV Zofran, IV Protonix, 10 mEq IV KCl, thiamine, folate and 2 L NS bolus in ER.    Acute alcohol withdrawal syndrome with hallucinations  History of alcohol withdrawal seizures and hallucinations  Severe alcohol use disorder  -Last alcoholic drink was on 1/30.  He did not drink on 1/31 as he was feeling unwell, had nausea, vomiting and wanted to quit.  He then developed withdrawal symptoms.  Reports he became so shaky that he could not walk.  He had nausea and vomiting.  Reports chronic hallucinations which tell him to overdose on medications.  Reports these have been stable  -Serum alcohol level 0.02 mg/dl on admission  -And alcohol withdrawal on admission.  Tachycardic, hypertensive, tremulous  -Start on alcohol withdrawal protocol.  Diazepam of withdrawal symptoms  -Start on gabapentin  taper  -Clonidine  -Hold naltrexone and Campral  -Consult chemical dependency consult  -Administer high-dose thiamine, folate, multivitamin      Hypovolemic hyponatremia  -Sodium 132.  Has received 2 L normal saline bolus in ER  -Continue IV fluids-NS with 20 mEq KCl at 100 mL/h  -Monitor sodium    Hypokalemia  -Potassium 3.3  -Replace potassium per protocol  -Check magnesium and phosphorous.  Replace as needed per protocol    Ketosis-likely alcoholic ketosis  Anion gap metabolic acidosis-secondary to ketosis  -Serum ketones 3.3.  Glucose 161.  Most likely has alcoholic ketosis  -IV fluids as above.  Monitor    Alcohol induced gastritis  -Has nausea and vomiting likely due to alcohol induced gastritis  -Lipase 62.  -Administer Protonix twice daily.  Clear liquid diet.  IV fluids    Alcoholic hepatitis  -Has elevated AST and total bilirubin. Alk phos 92, ALT 58, AST 75, total bilirubin 1.5    Chronic thrombocytopenia  -Platelets 113k.  This is his baseline.  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  Last took it a week ago  -Placed on sliding scale insulin only for now      Bipolar disorder with psychotic features   Major depressive disorder  PTSD  Generalized anxiety disorder  -Has history of previous psychiatric hospitalizations and history of commitment  -Continue sertraline 50 mg daily, trazodone 50 mg at bedtime as needed, olanzapine 10 mg twice daily and as needed, benztropine  -Reports chronic auditory hallucinations which are stable    Essential hypertension  -Continue lisinopril 10 mg daily    Asthma  -No acute exacerbation    Tobacco use disorder          Diet:  Clear liquid diet  DVT Prophylaxis: Pneumatic Compression Devices  Abernathy Catheter:  Not present  Lines: None     Cardiac Monitoring: None  Code Status:  Full code    Clinically Significant Risk Factors Present on Admission        # Hypokalemia: Lowest K = 3.3 mmol/L in last 2 days, will replace as needed  # Hyponatremia: Lowest Na = 132 mmol/L in last 2 days, will monitor as appropriate  # Hypochloremia: Lowest Cl = 84 mmol/L in last 2 days, will monitor as appropriate     # Anion Gap Metabolic Acidosis: Highest Anion Gap = 30 mmol/L in last 2 days, will monitor and treat as appropriate    # Thrombocytopenia: Lowest platelets = 113 in last 2 days, will monitor for bleeding   # Hypertension: Home medication list includes antihypertensive(s)               # Financial/Environmental Concerns:           Disposition Plan     Medically Ready for Discharge:            Sadie Jean MD  Hospitalist Service  [facility]  Securely message with [employer] (more info)  Text page via [employer] Paging/Directory     ______________________________________________________________________    Chief Complaint     Alcohol withdrawal    History is obtained from the patient    History of Present Illness     Ravi Ahmadi is a 34 year old male with history of severe alcohol use disorder, alcohol withdrawal with previous withdrawal seizures and hallucinations, alcoholic hepatitis, DM2, bipolar disorder depression, anxiety, PTSD, asthma, tobacco use disorder who lives at home with his parents and presents on 1/31/2025 due to alcohol withdrawal nausea and vomiting.    Patient reports that he drinks about 700-800 mL of whiskey every day.  He has been through treatment program in 1/2024 but relapsed right after he came off the program.    His last alcoholic drink was on 1/30.  He did not drink on 1/31 as he was feeling unwell, had nausea, vomiting and also because he wanted to quit.  He then developed withdrawal symptoms.  Reports he became so shaky that he could not work.  He had nausea and vomiting.  He  does presented to ER for evaluation.    In ER, hypertensive with blood pressure 167/115, tachycardic with heart rate up to 115.    Labs showed sodium 132, potassium 3.3, creatinine 0.74, anion gap 30, bicarb 18, serum ketones 3.3 to, glucose 161    LFTs-alk phos 92, ALT 58, AST 75, total bilirubin 1.5    CBC-hemoglobin 14.8, WBC 7.4, platelets 113    He was administered 10 mg IV diazepam + 10 mg po diazepam, IV Zofran, IV Protonix, 10 mEq IV KCl, thiamine, folate and 2 L NS bolus in ER.      Past Medical History    Past Medical History:   Diagnosis Date    Alcohol use disorder     Alcohol withdrawal hallucinosis (H)     Alcohol withdrawal seizure (H)     Bipolar disorder (H)     Obesity (BMI 30-39.9)     Type 2 diabetes mellitus (H)        Past Surgical History   History reviewed. No pertinent surgical history.    Prior to Admission Medications   Prior to Admission Medications   Prescriptions Last Dose Informant Patient Reported? Taking?   OLANZapine (ZYPREXA) 10 MG tablet Past Month  No Yes   Sig: Take 1 tablet (10 mg) by mouth 2 times daily Take in morning and at bedtime   OLANZapine (ZYPREXA) 5 MG tablet   No Yes   Sig: Take 1 tablet (5 mg) by mouth daily as needed (hallucinations)   Vitamin D3 (CHOLECALCIFEROL) 25 mcg (1000 units) tablet Past Month  No Yes   Sig: Take 1 tablet (25 mcg) by mouth daily   acamprosate (CAMPRAL) 333 MG EC tablet Past Month  No Yes   Sig: Take 2 tablets (666 mg) by mouth 3 times daily   albuterol (PROAIR HFA/PROVENTIL HFA/VENTOLIN HFA) 108 (90 Base) MCG/ACT inhaler   No Yes   Sig: Inhale 2 puffs into the lungs every 4 hours as needed for wheezing   benztropine (COGENTIN) 1 MG tablet Past Month  No Yes   Sig: Take 1 tablet (1 mg) by mouth 2 times daily   folic acid (FOLVITE) 1 MG tablet Past Month  No Yes   Sig: Take 1 tablet (1 mg) by mouth daily   glucose (BD GLUCOSE) 4 g chewable tablet   No No   Sig: Take 1 tablet by mouth every hour as needed for low blood sugar   insulin glargine  (LANTUS PEN) 100 UNIT/ML pen Past Month  No Yes   Sig: Inject 5 Units Subcutaneous at bedtime   Patient taking differently: Inject 1 Units subcutaneously at bedtime.   lisinopril (ZESTRIL) 10 MG tablet Past Month  No Yes   Sig: Take 1 tablet (10 mg) by mouth daily   multivitamin w/minerals (THERA-VIT-M) tablet Past Month  No Yes   Sig: Take 1 tablet by mouth daily   naltrexone (DEPADE/REVIA) 50 MG tablet Past Month  No Yes   Sig: Take 1 tablet (50 mg) by mouth daily   sertraline (ZOLOFT) 50 MG tablet Past Month  No Yes   Sig: Take 1 tablet (50 mg) by mouth daily   thiamine (B-1) 100 MG tablet Past Month  No Yes   Sig: Take 1 tablet (100 mg) by mouth daily   traZODone (DESYREL) 50 MG tablet   No Yes   Sig: Take 1 tablet (50 mg) by mouth nightly as needed for sleep      Facility-Administered Medications: None           Physical Exam   Vital Signs:     BP: (!) 167/115 Pulse: 87   Resp: 23 SpO2: 92 %      Weight: 0 lbs 0 oz    Constitutional - alert, resting in bed, appears comfortable  Head - normocephalic, atraumatic  ENT - normal eye lids and lashes, no conjunctival hyperemia, no icterus, extraocular movements are normal, normal nose, normal external ear  Neck - no thyromegaly or lymphadenopathy.   CV - regular rhythm, tachycardia, no murmurs, no edema  Pulmonary - lungs are clear to auscultation bilaterally, no wheezing or rhonchi  GI - abdomen is soft, non distended, non tender  Neurological - alert and oriented, normal speech, no focal deficits.  Severe bilateral tremors  Musculoskeletal - no joint erythema or swelling, ROM is ok          Medical Decision Making       75 MINUTES SPENT BY ME on the date of service doing chart review, history, exam, documentation & further activities per the note.      Data     I have personally reviewed the following data over the past 24 hrs:    7.4  \   14.8   / 113 (L)     132 (L) 84 (L) 6.8 /  161 (H)   3.3 (L) 18 (L) 0.74 \     ALT: 58 AST: 75 (H) AP: 92 TBILI: 1.5 (H)   ALB:  4.7 TOT PROTEIN: 8.1 LIPASE: 62 (H)       Imaging results reviewed over the past 24 hrs:   No results found for this or any previous visit (from the past 24 hours).

## 2025-02-01 NOTE — PHARMACY-ADMISSION MEDICATION HISTORY
Pharmacist Admission Medication History    Admission medication history is complete. The information provided in this note is only as accurate as the sources available at the time of the update.    Information Source(s): Patient and CareEverywhere/SureScripts via in-person    Pertinent Information: patient reported he last took his medications about 2 weeks ago, although last Surescripts fill I could see was from August. He did seem to know his medications reasonably well, being able to list the names of most of them without any prompting.   Patient reports he is only taking 1 unit of Lantus.    Changes made to PTA medication list:  Added: None  Deleted: None  Changed: lantus 5 to 1 unit    Allergies reviewed with patient and updates made in EHR: yes    Medication History Completed By: Gissell Fleming [facility] 1/31/2025 10:34 PM    PTA Med List   Medication Sig Last Dose/Taking    acamprosate (CAMPRAL) 333 MG EC tablet Take 2 tablets (666 mg) by mouth 3 times daily Past Month    albuterol (PROAIR HFA/PROVENTIL HFA/VENTOLIN HFA) 108 (90 Base) MCG/ACT inhaler Inhale 2 puffs into the lungs every 4 hours as needed for wheezing Taking As Needed    benztropine (COGENTIN) 1 MG tablet Take 1 tablet (1 mg) by mouth 2 times daily Past Month    folic acid (FOLVITE) 1 MG tablet Take 1 tablet (1 mg) by mouth daily Past Month    insulin glargine (LANTUS PEN) 100 UNIT/ML pen Inject 5 Units Subcutaneous at bedtime (Patient taking differently: Inject 1 Units subcutaneously at bedtime.) Past Month    lisinopril (ZESTRIL) 10 MG tablet Take 1 tablet (10 mg) by mouth daily Past Month    multivitamin w/minerals (THERA-VIT-M) tablet Take 1 tablet by mouth daily Past Month    naltrexone (DEPADE/REVIA) 50 MG tablet Take 1 tablet (50 mg) by mouth daily Past Month    OLANZapine (ZYPREXA) 10 MG tablet Take 1 tablet (10 mg) by mouth 2 times daily Take in morning and at bedtime Past Month    OLANZapine (ZYPREXA) 5 MG tablet Take 1 tablet (5 mg) by mouth  daily as needed (hallucinations) Taking As Needed    sertraline (ZOLOFT) 50 MG tablet Take 1 tablet (50 mg) by mouth daily Past Month    thiamine (B-1) 100 MG tablet Take 1 tablet (100 mg) by mouth daily Past Month    traZODone (DESYREL) 50 MG tablet Take 1 tablet (50 mg) by mouth nightly as needed for sleep Taking As Needed    Vitamin D3 (CHOLECALCIFEROL) 25 mcg (1000 units) tablet Take 1 tablet (25 mcg) by mouth daily Past Month

## 2025-02-01 NOTE — PLAN OF CARE
Goal Outcome Evaluation:         Summary: Alcohol Withdrawal    DATE & TIME: 1/31/25-2/1/25 8339-9903  Cognitive Concerns/ Orientation : A&Ox4    BEHAVIOR & AGGRESSION TOOL COLOR: GREEN  CIWA SCORE: 4, 0   ABNL VS/O2: VSS RA   MOBILITY: Ax1 GB   PAIN MANAGMENT: Denies  DIET: Clears   BOWEL/BLADDER: Incontinent B/B at times-using urinal at bedside   ABNL LAB/BG:   DRAIN/DEVICES: PIV infusing 0.9% NS + KCL 20 @125   TELEMETRY RHYTHM: NA  SKIN: scattered scrapes, scabs.   TESTS/PROCEDURES: NA  D/C DAY/GOALS/PLACE: Pending withdrawal symptoms   OTHER IMPORTANT INFO: Seizure precautions in place

## 2025-02-01 NOTE — PLAN OF CARE
Goal Outcome Evaluation:         Summary: Alcohol Withdrawal    DATE & TIME: 2/1/25 9316-8619  Cognitive Concerns/ Orientation : A&Ox4, sleeping throughout most of shift     BEHAVIOR & AGGRESSION TOOL COLOR: GREEN  CIWA SCORE: 3, 0, 1   ABNL VS/O2: VSS RA   MOBILITY: Ax1 GB - ambulated to bathroom   PAIN MANAGMENT: Denies  DIET: Regular- no appetite   BOWEL/BLADDER: Incontinent B/B at times-used bathroom  ABNL LAB/BG: , 119, 130, AST 74, Bili 1.5, Hemoglobin 13  DRAIN/DEVICES: PIV infusing 0.9% NS + KCL 20 @ 75/hr    TELEMETRY RHYTHM: NA  SKIN: scattered scrapes, scabs.   TESTS/PROCEDURES: Chest xray done today  D/C DAY/GOALS/PLACE: Pending withdrawal symptoms   OTHER IMPORTANT INFO: CD consult pending, Seizure pads in place.  Pt coughing- expiratory wheezes (Covid, Influenza, RSV all negative)

## 2025-02-01 NOTE — ED PROVIDER NOTES
Emergency Department Note      History of Present Illness     Chief Complaint   Altered mental status    HPI   Ravi Ahmadi is a 34 year old male who arrives via EMS due to altered mental status and alcohol withdrawal.  The patient's mother called 911 because she became concerned that the patient is in alcohol withdrawal since he is very shaky and tremulous today and he started having periods this evening where he was not responding normally.  She did not describe any seizure activity, although he has a past history of alcohol withdrawal seizure.  The patient reports that his last alcohol drink was last night.  He states he has been drinking half of a 1.75 L bottle of whiskey a day lately.  He has been vomiting all day today, however he denies any abdominal pain or bloody emesis.  He states he uses insulin for type 2 diabetes mellitus but has not used his insulin for the past week.  No recent head injury or trauma.  He denies chest pain or shortness of breath.    Independent Historian   EMS as detailed above.    Review of External Notes   none    Past Medical History     Medical History and Problem List   Past Medical History:   Diagnosis Date    Alcohol use disorder     Alcohol withdrawal hallucinosis (H)     Alcohol withdrawal seizure (H)     Bipolar disorder (H)     Obesity (BMI 30-39.9)     Type 2 diabetes mellitus (H)        Medications   acamprosate (CAMPRAL) 333 MG EC tablet  albuterol (PROAIR HFA/PROVENTIL HFA/VENTOLIN HFA) 108 (90 Base) MCG/ACT inhaler  benztropine (COGENTIN) 1 MG tablet  folic acid (FOLVITE) 1 MG tablet  insulin glargine (LANTUS PEN) 100 UNIT/ML pen  lisinopril (ZESTRIL) 10 MG tablet  multivitamin w/minerals (THERA-VIT-M) tablet  naltrexone (DEPADE/REVIA) 50 MG tablet  OLANZapine (ZYPREXA) 10 MG tablet  OLANZapine (ZYPREXA) 5 MG tablet  sertraline (ZOLOFT) 50 MG tablet  thiamine (B-1) 100 MG tablet  traZODone (DESYREL) 50 MG tablet  Vitamin D3 (CHOLECALCIFEROL) 25 mcg (1000 units)  tablet  glucose (BD GLUCOSE) 4 g chewable tablet        Surgical History   History reviewed. No pertinent surgical history.    Physical Exam     Patient Vitals for the past 24 hrs:   BP Pulse Resp SpO2   01/31/25 2245 -- 87 23 92 %   01/31/25 2230 -- 89 15 93 %   01/31/25 2215 -- 97 21 93 %   01/31/25 2200 (!) 167/115 99 16 94 %   01/31/25 2157 (!) 167/115 -- -- --   01/31/25 2155 (!) 167/115 103 (!) 9 94 %   01/31/25 2130 -- 115 (!) 32 (!) 91 %   01/31/25 2118 (!) 188/116 -- -- --   01/31/25 2115 -- 114 13 94 %     Physical Exam  Nursing note and vitals reviewed.  Constitutional:  Appears well-developed and well-nourished.  He appears disheveled.  Dry heaving.  HENT:   Head:    Atraumatic.   Mouth/Throat:   Oropharynx is clear and moist. No oropharyngeal exudate.   Eyes:    Pupils are equal, round, and reactive to light.   Neck:    Normal range of motion. Neck supple.      No tracheal deviation present. No thyromegaly present.   Cardiovascular:  Tachycardic rate, regular rhythm, no murmur   Pulmonary/Chest: Breath sounds are clear and equal without wheezes or crackles.  Abdominal:   Soft. Bowel sounds are normal. Exhibits no distension and      no mass. There is no tenderness.      There is no rebound and no guarding.   Musculoskeletal:  Exhibits no edema.   Lymphadenopathy:  No cervical adenopathy.   Neurological:   Alert and oriented to person, place, and time.  He is tremulous.  Moves all extremities.  Skin:    Skin is warm and dry. No rash noted. No pallor.       Diagnostics     Lab Results   Labs Ordered and Resulted from Time of ED Arrival to Time of ED Departure   COMPREHENSIVE METABOLIC PANEL - Abnormal       Result Value    Sodium 132 (*)     Potassium 3.3 (*)     Carbon Dioxide (CO2) 18 (*)     Anion Gap 30 (*)     Urea Nitrogen 6.8      Creatinine 0.74      GFR Estimate >90      Calcium 9.9      Chloride 84 (*)     Glucose 161 (*)     Alkaline Phosphatase 92      AST 75 (*)     ALT 58      Protein Total  8.1      Albumin 4.7      Bilirubin Total 1.5 (*)    LIPASE - Abnormal    Lipase 62 (*)    KETONE BETA-HYDROXYBUTYRATE QUANTITATIVE, RAPID - Abnormal    Ketone (Beta-Hydroxybutyrate) Quantitative 3.32 (*)    CBC WITH PLATELETS AND DIFFERENTIAL - Abnormal    WBC Count 7.4      RBC Count 4.75      Hemoglobin 14.8      Hematocrit 42.2      MCV 89      MCH 31.2      MCHC 35.1      RDW 11.8      Platelet Count 113 (*)     % Neutrophils 81      % Lymphocytes 11      % Monocytes 7      % Eosinophils 0      % Basophils 1      % Immature Granulocytes 0      NRBCs per 100 WBC 0      Absolute Neutrophils 6.0      Absolute Lymphocytes 0.8      Absolute Monocytes 0.5      Absolute Eosinophils 0.0      Absolute Basophils 0.1      Absolute Immature Granulocytes 0.0      Absolute NRBCs 0.0     ETHYL ALCOHOL LEVEL - Abnormal    Alcohol ethyl 0.02 (*)    MAGNESIUM   PHOSPHORUS       Imaging   No orders to display     Independent Interpretation   None    ED Course      Medications Administered   Medications   flumazenil (ROMAZICON) injection 0.2 mg (has no administration in time range)   flumazenil (ROMAZICON) injection 0.2 mg (has no administration in time range)   diazepam (VALIUM) tablet 10 mg (10 mg Oral $Given 1/31/25 2216)     Or   diazepam (VALIUM) injection 5-10 mg ( Intravenous See Alternative 1/31/25 2216)   multivitamin w/minerals (THERA-VIT-M) tablet 1 tablet (1 tablet Oral $Given 1/31/25 2214)   folic acid injection 1 mg (has no administration in time range)   sodium chloride 0.9% BOLUS 2,000 mL (2,000 mLs Intravenous $New Bag 1/31/25 2223)   potassium chloride 10 mEq in 100 mL sterile water infusion (10 mEq Intravenous $New Bag 1/31/25 2231)   nicotine (NICODERM CQ) 7 MG/24HR 24 hr patch 1 patch (has no administration in time range)   diazepam (VALIUM) injection 10 mg (10 mg Intravenous $Given 1/31/25 2111)   thiamine (B-1) injection 100 mg (100 mg Intravenous $Given 1/31/25 2215)   pantoprazole (PROTONIX) IV push  injection 80 mg (80 mg Intravenous $Given 1/31/25 2214)   ondansetron (ZOFRAN) injection 4 mg (4 mg Intravenous $Given 1/31/25 2214)       Procedures   Procedures     Discussion of Management   Admitting Hospitalist, Dr. Jean    ED Course        Additional Documentation  None    Medical Decision Making / Diagnosis     CMS Diagnoses: None    MIPS       None    [facility]   Ravi Ahmadi is a 34 year old male with a history of alcoholism, alcohol withdrawal and alcohol withdrawal seizures who arrives to the emergency department due to acute alcohol withdrawal with associated vomiting and alcoholic ketoacidosis.  Although he has not used his insulin for the past week and his ketones are elevated, I feel that he has ketosis due to alcoholic ketoacidosis and I did not feel that this patient was having DKA.  He was given IV vitamins, IV fluid hydration and Valium for treatment of his alcohol withdrawal and admitted to the care of the hospitalist service for further evaluation treatment.  There was no sign of GI bleeding or pancreatitis or perforated ulcer at this time.  He has a benign abdominal exam.    Disposition   The patient was admitted to the hospital.     Diagnosis     ICD-10-CM    1. Alcohol withdrawal syndrome, with unspecified complication (H)  F10.939       2. Nausea and vomiting, unspecified vomiting type  R11.2       3. Alcoholic ketoacidosis  E87.29            Discharge Medications   New Prescriptions    No medications on file         MD Stuart Edwards Cheri Lee, MD  01/31/25 [id number]

## 2025-02-02 LAB
GLUCOSE BLDC GLUCOMTR-MCNC: 145 MG/DL (ref 70–99)
GLUCOSE BLDC GLUCOMTR-MCNC: 154 MG/DL (ref 70–99)
GLUCOSE BLDC GLUCOMTR-MCNC: 165 MG/DL (ref 70–99)
MAGNESIUM SERPL-MCNC: 1.9 MG/DL (ref 1.7–2.3)
PHOSPHATE SERPL-MCNC: 2.8 MG/DL (ref 2.5–4.5)
POTASSIUM SERPL-SCNC: 4.1 MMOL/L (ref 3.4–5.3)

## 2025-02-02 PROCEDURE — 84100 ASSAY OF PHOSPHORUS: CPT | Performed by: INTERNAL MEDICINE

## 2025-02-02 PROCEDURE — 83735 ASSAY OF MAGNESIUM: CPT | Performed by: INTERNAL MEDICINE

## 2025-02-02 PROCEDURE — 36415 COLL VENOUS BLD VENIPUNCTURE: CPT | Performed by: INTERNAL MEDICINE

## 2025-02-02 PROCEDURE — 250N000012 HC RX MED GY IP 250 OP 636 PS 637: Performed by: INTERNAL MEDICINE

## 2025-02-02 PROCEDURE — 250N000013 HC RX MED GY IP 250 OP 250 PS 637: Performed by: EMERGENCY MEDICINE

## 2025-02-02 PROCEDURE — 250N000013 HC RX MED GY IP 250 OP 250 PS 637: Performed by: INTERNAL MEDICINE

## 2025-02-02 PROCEDURE — 99232 SBSQ HOSP IP/OBS MODERATE 35: CPT | Performed by: INTERNAL MEDICINE

## 2025-02-02 PROCEDURE — 250N000011 HC RX IP 250 OP 636: Performed by: INTERNAL MEDICINE

## 2025-02-02 PROCEDURE — 120N000001 HC R&B MED SURG/OB

## 2025-02-02 PROCEDURE — 84132 ASSAY OF SERUM POTASSIUM: CPT | Performed by: INTERNAL MEDICINE

## 2025-02-02 RX ADMIN — Medication 1 TABLET: at 08:29

## 2025-02-02 RX ADMIN — THIAMINE HYDROCHLORIDE 500 MG: 100 INJECTION, SOLUTION INTRAMUSCULAR; INTRAVENOUS at 20:06

## 2025-02-02 RX ADMIN — CLONIDINE HYDROCHLORIDE 0.1 MG: 0.1 TABLET ORAL at 15:33

## 2025-02-02 RX ADMIN — PANTOPRAZOLE SODIUM 40 MG: 40 TABLET, DELAYED RELEASE ORAL at 06:32

## 2025-02-02 RX ADMIN — THIAMINE HYDROCHLORIDE 500 MG: 100 INJECTION, SOLUTION INTRAMUSCULAR; INTRAVENOUS at 15:33

## 2025-02-02 RX ADMIN — SERTRALINE HYDROCHLORIDE 50 MG: 50 TABLET ORAL at 08:28

## 2025-02-02 RX ADMIN — PANTOPRAZOLE SODIUM 40 MG: 40 TABLET, DELAYED RELEASE ORAL at 15:33

## 2025-02-02 RX ADMIN — OLANZAPINE 10 MG: 5 TABLET, FILM COATED ORAL at 20:10

## 2025-02-02 RX ADMIN — BENZTROPINE MESYLATE 1 MG: 1 TABLET ORAL at 20:10

## 2025-02-02 RX ADMIN — GABAPENTIN 900 MG: 300 CAPSULE ORAL at 08:27

## 2025-02-02 RX ADMIN — OLANZAPINE 10 MG: 5 TABLET, FILM COATED ORAL at 08:27

## 2025-02-02 RX ADMIN — NICOTINE 1 PATCH: 7 PATCH, EXTENDED RELEASE TRANSDERMAL at 08:26

## 2025-02-02 RX ADMIN — GABAPENTIN 900 MG: 300 CAPSULE ORAL at 15:33

## 2025-02-02 RX ADMIN — BENZTROPINE MESYLATE 1 MG: 1 TABLET ORAL at 08:27

## 2025-02-02 RX ADMIN — INSULIN ASPART 1 UNITS: 100 INJECTION, SOLUTION INTRAVENOUS; SUBCUTANEOUS at 12:47

## 2025-02-02 RX ADMIN — INSULIN ASPART 1 UNITS: 100 INJECTION, SOLUTION INTRAVENOUS; SUBCUTANEOUS at 17:48

## 2025-02-02 RX ADMIN — CLONIDINE HYDROCHLORIDE 0.1 MG: 0.1 TABLET ORAL at 08:28

## 2025-02-02 RX ADMIN — LISINOPRIL 10 MG: 10 TABLET ORAL at 08:27

## 2025-02-02 RX ADMIN — POTASSIUM CHLORIDE AND SODIUM CHLORIDE: 900; 150 INJECTION, SOLUTION INTRAVENOUS at 08:24

## 2025-02-02 RX ADMIN — THIAMINE HYDROCHLORIDE 500 MG: 100 INJECTION, SOLUTION INTRAMUSCULAR; INTRAVENOUS at 08:28

## 2025-02-02 ASSESSMENT — ACTIVITIES OF DAILY LIVING (ADL)
ADLS_ACUITY_SCORE: 41

## 2025-02-02 NOTE — CONSULTS
BRIEF NUTRITION ASSESSMENT    REASON FOR ASSESSMENT:  Ravi Ahmadi is a 34 year old male seen by Registered Dietitian for +RUST    NUTRITION HISTORY:  Pt seen in room.   He reports weight loss from 350 lb --> 200 lb in about 1 year. This is largely intentional - as a result of being mindful of portion sizing, and watching carbs.   He mentions two separate 30-day stays for [facility]. During a stay at [place], he was on a CHO restriction which he continues to be mindful of.     CURRENT DIET AND INTAKE:  Diet: Regular diet            Appetite is good. No issues.     ANTHROPOMETRICS:  Height: 6'  Weight:  203 lbs 4.23 oz (92.2 kg)   Body mass index is 27.57 kg/m .   Weight Status: Overweight BMI 25-29.9  IBW:  80.9 kg   %IBW: 114%  Weight History: 42% wt loss reported, though trends indicate loss took place in >1 year - actually looks closer to 4-5 years. Much of wt loss was intentional.   Wt Readings from Last 10 Encounters:   01/31/25 92.2 kg (203 lb 4.2 oz)   08/29/24 110.7 kg (244 lb)   08/13/24 110.8 kg (244 lb 4.3 oz)   12/07/23 106.6 kg (235 lb)   10/23/23 113.4 kg (250 lb)   09/05/23 113.4 kg (250 lb)   08/20/23 114.2 kg (251 lb 12.3 oz)   06/26/23 124.9 kg (275 lb 4.8 oz)   06/12/23 122.5 kg (270 lb)   01/07/21 (!) 154.8 kg (341 lb 4.8 oz)       LABS:  Reviewed    MALNUTRITION:  Visual Nutrition Focused Physical Assessment (NFPA) completed.   Patient does not meet two of the following criteria necessary for diagnosing malnutrition.     % Weight Loss:  Weight loss does not meet criteria for malnutrition   % Intake:  Decreased intake does not meet criteria for malnutrition   Subcutaneous Fat Loss:  None observed  Muscle Loss:  None observed  Fluid Retention:  None noted    NUTRITION INTERVENTION:  Nutrition Diagnosis:  No nutrition diagnosis at this time.    Implementation:  Nutrition Education:  Pt denies needs. Encouraged ongoing mindfulness to consistent carb eating pattern.     FOLLOW UP/MONITORING:   Will  re-evaluate in 7 - 10 days, or sooner, if re-consulted.

## 2025-02-02 NOTE — PLAN OF CARE
Goal Outcome Evaluation:      Plan of Care Reviewed With: patient    Overall Patient Progress: improvingOverall Patient Progress: improving       Shift: [id number]    Admitting DX: Alcohol Withdrawal    Cognitive Concerns/ Orientation : A&Ox4,    BEHAVIOR & AGGRESSION TOOL COLOR: Green  CIWA SCORE:2,3,2   ABNL VS/O2: VSS RA   MOBILITY: Ax1 GB   PAIN MANAGMENT: Denies  DIET: Reg   BOWEL/BLADDER:Cont. Incont of bladder at times.   ABNL LAB/BG: see chart  DRAIN/DEVICES: PIV infusing 0.9% NS + KCL 20 @ 75/hr    SKIN: scattered scrapes, scabs.   TESTS/PROCEDURES:n/a  D/C DAY/GOALS/PLACE: Pending   OTHER IMPORTANT INFO: CD consult pending, Seizure pads in place.

## 2025-02-02 NOTE — PROGRESS NOTES
[facility]    Medicine Progress Note - Hospitalist Service    Date of Admission:  1/31/2025    Assessment & Plan     Ravi Ahmadi is a 34 year old male with history of severe alcohol use disorder, alcohol withdrawal with previous withdrawal seizures and hallucinations, alcoholic hepatitis, DM2, bipolar disorder depression, anxiety, PTSD, asthma, tobacco use disorder who lives at home with his parents and presented on 1/31/2025 due to alcohol withdrawal, nausea and vomiting.     Previously hospitalized 8/2024.  Had alcohol withdrawal seizure at that time and required Precedex infusion.  Psychiatry did not recommend commitment.  PTA naltrexone and Campral were resumed.       Drinks about 700-800 mL of whiskey/day.  He has been through treatment program in 1/2024 but relapsed right after he came off the program.      In ER, hypertensive with blood pressure 167/115, tachycardic with heart rate up to 115.     Labs showed sodium 132, potassium 3.3, creatinine 0.74, anion gap 30, bicarb 18, serum ketones 3.3 to, glucose 161     He was administered 10 mg IV diazepam + 10 mg po diazepam, IV Zofran, IV Protonix, 10 mEq IV KCl, thiamine, folate and 2 L NS bolus in ER.     Acute alcohol withdrawal syndrome with hallucinations  History of alcohol withdrawal seizures and hallucinations  Severe alcohol use disorder  -Last alcoholic drink was on 1/30.  He did not drink on day of admission as he was feeling unwell, had nausea, vomiting and wanted to quit alcohol.  He then developed withdrawal symptoms.  Reports he became so shaky that he could not walk.  He had nausea and vomiting.  Reports chronic hallucinations which tell him to overdose on medications. These have been stable  -Serum alcohol level 0.02 mg/dl on admission.  In active alcohol withdrawal on admission  -Continue alcohol withdrawal protocol.  Diazepam for withdrawal symptoms.  Improved.  Received diazepam in ER.  No requirements since  then.  -Continue clonidine and gabapentin taper  -Hold naltrexone and Campral.  Resume at discharge  -Consult chemical dependency  -Continue high-dose thiamine, folate, multivitamin      Hypovolemic hyponatremia  -Sodium 132 on admission.  Has received 2 L normal saline bolus in ER and was on NS with 20 mEq KCl at 100 mL/h overnight.  Sodium now in normal range at 137.  Monitor     Hypokalemia  -Potassium 3.3 on admission.  Replaced per protocol     Ketosis-likely alcoholic ketosis  Anion gap metabolic acidosis-secondary to ketosis  -Serum ketones 3.3, Anion gap 30, bicarb 18 on admission -due to alcoholic ketosis  -Blood sugar 161.  Do not suspect DKA  -Improved with IV fluids.  Acidosis has resolved.  Discontinue IV fluids on 2/2      Alcohol induced gastritis  -Has nausea and vomiting likely due to alcohol induced gastritis. Lipase 62.  -Nausea has improved but not resolved.  Continue diet.    -Continue Protonix      Alcoholic hepatitis  -Has elevated AST and total bilirubin. Alk phos 92, ALT 58, AST 75, total bilirubin 1.5  -Remained stable     Chronic thrombocytopenia  -Platelets 113k on admission, 86k on 2/1. Thrombocytopenia secondary to alcoholic bone marrow suppression  -Monitor     Episode of atrial fibrillation with RVR 8/2024-felt to be due to alcohol withdrawal  -Noted     Diabetes mellitus type 2, with long-term use of insulin  PTA-on Lantus 1 unit daily  -Patient reports he takes 1 unit of Lantus daily.  Last took it a week before admission  -Continue sliding scale insulin only.  Blood sugars well-controlled        Bipolar disorder with psychotic features   Major depressive disorder  PTSD  Generalized anxiety disorder  -Has history of previous psychiatric hospitalizations and history of commitment  -Continue sertraline 50 mg daily, trazodone 50 mg at bedtime as needed, olanzapine 10 mg twice daily and as needed, benztropine  -Reports chronic auditory hallucinations which are stable     Essential  hypertension  -Continue lisinopril 10 mg daily     Asthma  -No acute exacerbation     Tobacco use disorder  -Continue nicotine patch    Cough-likely due to tobacco use  -Reported significant cough on 2/1.  Otherwise afebrile.  No shortness of breath  -Negative COVID-19/influenza/RSV.  Chest x-ray clear.      Unstable gait  Peripheral neuropathy  -Patient still unsteady while ambulating.  Has known peripheral neuropathy likely due to alcohol  -Consult PT        Diet: Regular Diet Adult    DVT Prophylaxis: Pneumatic Compression Devices  Abernathy Catheter: Not present  Lines: None     Cardiac Monitoring: None  Code Status: Full Code      Clinically Significant Risk Factors        # Hypokalemia: Lowest K = 3.3 mmol/L in last 2 days, will replace as needed  # Hyponatremia: Lowest Na = 132 mmol/L in last 2 days, will monitor as appropriate  # Hypochloremia: Lowest Cl = 84 mmol/L in last 2 days, will monitor as appropriate     # Anion Gap Metabolic Acidosis: Highest Anion Gap = 30 mmol/L in last 2 days, will monitor and treat as appropriate    # Thrombocytopenia: Lowest platelets = 86 in last 2 days, will monitor for bleeding                  # Financial/Environmental Concerns:           Social Drivers of Health    Depression: At risk (2/21/2024)    PHQ-2     PHQ-2 Score: 3   Tobacco Use: High Risk (1/31/2025)    Patient History     Smoking Tobacco Use: Every Day     Smokeless Tobacco Use: Never   Alcohol Use: Alcohol Misuse (1/7/2021)    AUDIT-C     Frequency of Alcohol Consumption: 4 or more times a week     Average Number of Drinks: 1 or 2     Frequency of Binge Drinking: Monthly          Disposition Plan         Medically Ready for Discharge: Anticipated Tomorrow -anticipate discharge to home on 2/3 once assessed by chemical dependency and PT             Sadie Jean MD  Hospitalist Service  [facility]  Securely message with [employer] (more info)  Text page via [employer] Paging/Directory    ______________________________________________________________________    Interval History     Patient resting in bed.  Reports he feels better than before.  No withdrawal symptoms.    Still has nausea but no vomiting   Has been unsteady while ambulating.    Has not required any diazepam since initial administration in ER   No chest pain or shortness of breath.  Afebrile    Physical Exam   Vital Signs: Temp: 98.6  F (37  C) Temp src: Oral BP: 138/84 Pulse: 79   Resp: 18 SpO2: 96 % O2 Device: None (Room air)    Weight: 203 lbs 4.23 oz    Constitutional - resting in bed, in no acute distress  Cardiovascular - regular rate and rhythm, no murmurs, no edema  Pulmonary - lungs are clear to auscultation bilaterally, no wheezing or rhonchi  Neurological - awake, alert and oriented x3.  Moving all 4 extremities, normal speech, no focal deficits    Medical Decision Making       45 MINUTES SPENT BY ME on the date of service doing chart review, history, exam, documentation & further activities per the note.      Data     I have personally reviewed the following data over the past 24 hrs:    N/A  \   N/A   / N/A     N/A N/A N/A /  145 (H)   4.1 N/A N/A \       Imaging results reviewed over the past 24 hrs:   Recent Results (from the past 24 hours)   XR Chest 2 Views    Narrative    EXAM: XR CHEST 2 VIEWS  LOCATION: [facility]  DATE: 2/1/2025    INDICATION: Persistent cough  COMPARISON: Chest radiograph 8/15/2024      Impression    IMPRESSION: Posterior lower lung opacity is appreciated on the lateral view could reflect infectious/inflammatory process versus atelectasis. No pleural effusion or pneumothorax. Similar cardiomediastinal silhouette.

## 2025-02-02 NOTE — PLAN OF CARE
Goal Outcome Evaluation:       Shift: 2/2/25 [id number]    Admitting DX: Alcohol Withdrawal    Cognitive Concerns/ Orientation : A&Ox4  BEHAVIOR & AGGRESSION TOOL COLOR: Green  CIWA SCORE:1, 1, 1   ABNL VS/O2: VSS RA   MOBILITY: Ax1 GB, ambulating to bathroom, PT consult pending    PAIN MANAGMENT: Denies  DIET: Regular- appetite good  BOWEL/BLADDER:Cont. Incont at times, had BM today   ABNL LAB/BG: K 4.1, Mg 1.9, Phosphorus 2.8  DRAIN/DEVICES: PIV SL  SKIN: scattered scrapes, scabs.   TESTS/PROCEDURES:n/a  D/C DAY/GOALS/PLACE: Pending plan- pt stated he wanted to go to [place] for treatment and has been there before   OTHER IMPORTANT INFO: CD consult pending, Seizure pads in place.

## 2025-02-03 LAB
GLUCOSE BLDC GLUCOMTR-MCNC: 121 MG/DL (ref 70–99)
GLUCOSE BLDC GLUCOMTR-MCNC: 133 MG/DL (ref 70–99)
GLUCOSE BLDC GLUCOMTR-MCNC: 138 MG/DL (ref 70–99)
GLUCOSE BLDC GLUCOMTR-MCNC: 179 MG/DL (ref 70–99)
MAGNESIUM SERPL-MCNC: 1.8 MG/DL (ref 1.7–2.3)
PHOSPHATE SERPL-MCNC: 2.8 MG/DL (ref 2.5–4.5)
POTASSIUM SERPL-SCNC: 4 MMOL/L (ref 3.4–5.3)

## 2025-02-03 PROCEDURE — 97530 THERAPEUTIC ACTIVITIES: CPT | Mod: GP | Performed by: PHYSICAL THERAPIST

## 2025-02-03 PROCEDURE — 83735 ASSAY OF MAGNESIUM: CPT | Performed by: INTERNAL MEDICINE

## 2025-02-03 PROCEDURE — 120N000001 HC R&B MED SURG/OB

## 2025-02-03 PROCEDURE — 97161 PT EVAL LOW COMPLEX 20 MIN: CPT | Mod: GP | Performed by: PHYSICAL THERAPIST

## 2025-02-03 PROCEDURE — 84100 ASSAY OF PHOSPHORUS: CPT | Performed by: INTERNAL MEDICINE

## 2025-02-03 PROCEDURE — 250N000013 HC RX MED GY IP 250 OP 250 PS 637: Performed by: INTERNAL MEDICINE

## 2025-02-03 PROCEDURE — 84132 ASSAY OF SERUM POTASSIUM: CPT | Performed by: INTERNAL MEDICINE

## 2025-02-03 PROCEDURE — 250N000013 HC RX MED GY IP 250 OP 250 PS 637: Performed by: EMERGENCY MEDICINE

## 2025-02-03 PROCEDURE — 36415 COLL VENOUS BLD VENIPUNCTURE: CPT | Performed by: INTERNAL MEDICINE

## 2025-02-03 PROCEDURE — 99232 SBSQ HOSP IP/OBS MODERATE 35: CPT | Performed by: STUDENT IN AN ORGANIZED HEALTH CARE EDUCATION/TRAINING PROGRAM

## 2025-02-03 PROCEDURE — 250N000013 HC RX MED GY IP 250 OP 250 PS 637: Performed by: STUDENT IN AN ORGANIZED HEALTH CARE EDUCATION/TRAINING PROGRAM

## 2025-02-03 PROCEDURE — 250N000011 HC RX IP 250 OP 636: Performed by: INTERNAL MEDICINE

## 2025-02-03 RX ORDER — FOLIC ACID 1 MG/1
1 TABLET ORAL DAILY
Status: DISCONTINUED | OUTPATIENT
Start: 2025-02-03 | End: 2025-02-05 | Stop reason: HOSPADM

## 2025-02-03 RX ADMIN — NICOTINE 1 PATCH: 7 PATCH, EXTENDED RELEASE TRANSDERMAL at 08:13

## 2025-02-03 RX ADMIN — BENZTROPINE MESYLATE 1 MG: 1 TABLET ORAL at 08:12

## 2025-02-03 RX ADMIN — OLANZAPINE 10 MG: 5 TABLET, FILM COATED ORAL at 08:12

## 2025-02-03 RX ADMIN — PANTOPRAZOLE SODIUM 40 MG: 40 TABLET, DELAYED RELEASE ORAL at 06:46

## 2025-02-03 RX ADMIN — SERTRALINE HYDROCHLORIDE 50 MG: 50 TABLET ORAL at 08:12

## 2025-02-03 RX ADMIN — GABAPENTIN 900 MG: 300 CAPSULE ORAL at 08:12

## 2025-02-03 RX ADMIN — BENZTROPINE MESYLATE 1 MG: 1 TABLET ORAL at 21:26

## 2025-02-03 RX ADMIN — OLANZAPINE 10 MG: 5 TABLET, FILM COATED ORAL at 21:26

## 2025-02-03 RX ADMIN — CLONIDINE HYDROCHLORIDE 0.1 MG: 0.1 TABLET ORAL at 08:12

## 2025-02-03 RX ADMIN — GABAPENTIN 900 MG: 300 CAPSULE ORAL at 16:36

## 2025-02-03 RX ADMIN — PANTOPRAZOLE SODIUM 40 MG: 40 TABLET, DELAYED RELEASE ORAL at 16:36

## 2025-02-03 RX ADMIN — CLONIDINE HYDROCHLORIDE 0.1 MG: 0.1 TABLET ORAL at 01:03

## 2025-02-03 RX ADMIN — DIAZEPAM 10 MG: 5 TABLET ORAL at 01:03

## 2025-02-03 RX ADMIN — THIAMINE HYDROCHLORIDE 250 MG: 100 INJECTION, SOLUTION INTRAMUSCULAR; INTRAVENOUS at 08:12

## 2025-02-03 RX ADMIN — FOLIC ACID 1 MG: 1 TABLET ORAL at 16:36

## 2025-02-03 RX ADMIN — LISINOPRIL 10 MG: 10 TABLET ORAL at 08:12

## 2025-02-03 RX ADMIN — Medication 1 TABLET: at 08:12

## 2025-02-03 RX ADMIN — GABAPENTIN 900 MG: 300 CAPSULE ORAL at 01:02

## 2025-02-03 ASSESSMENT — ACTIVITIES OF DAILY LIVING (ADL)
ADLS_ACUITY_SCORE: 43
ADLS_ACUITY_SCORE: 41
ADLS_ACUITY_SCORE: 41
ADLS_ACUITY_SCORE: 43
ADLS_ACUITY_SCORE: 41
ADLS_ACUITY_SCORE: 43
ADLS_ACUITY_SCORE: 43
ADLS_ACUITY_SCORE: 41
ADLS_ACUITY_SCORE: 43
ADLS_ACUITY_SCORE: 41
ADLS_ACUITY_SCORE: 43
ADLS_ACUITY_SCORE: 41
ADLS_ACUITY_SCORE: 41
ADLS_ACUITY_SCORE: 43
ADLS_ACUITY_SCORE: 41
DEPENDENT_IADLS:: INDEPENDENT
ADLS_ACUITY_SCORE: 43

## 2025-02-03 NOTE — PLAN OF CARE
Goal Outcome Evaluation:        2/2/25 [id number]    Admitting DX: Alcohol Withdrawal    Cognitive Concerns/ Orientation : A&Ox4  BEHAVIOR & AGGRESSION TOOL COLOR: Green  CIWA SCORE:1   ABNL VS/O2: VSS RA   MOBILITY: Ax1 GB, ambulating to bathroom, PT consult pending    PAIN MANAGMENT: Denies  DIET: Regular- appetite good  BOWEL/BLADDER:Cont. Incont at times, had BM today   ABNL LAB/BG : K 4.1, Mg 1.9, Phosphorus 2.8  DRAIN/DEVICES: PIV SL  SKIN: scattered scrapes, scabs.   TESTS/PROCEDURES:n/a  D/C DAY/GOALS/PLACE: Pending plan- pt stated he wanted to go to [place] for treatment and has been there before   OTHER IMPORTANT INFO: CD consult pending, Seizure pads in place.

## 2025-02-03 NOTE — PROGRESS NOTES
[facility]    Medicine Progress Note - Hospitalist Service    Date of Admission:  1/31/2025    Assessment & Plan     Ravi Ahmadi is a 34 year old male with history of severe alcohol use disorder, alcohol withdrawal with previous withdrawal seizures and hallucinations, alcoholic hepatitis, DM2, bipolar disorder depression, anxiety, PTSD, asthma, tobacco use disorder who lives at home with his parents and presented on 1/31/2025 due to alcohol withdrawal, nausea and vomiting.     Previously hospitalized 8/2024.  Had alcohol withdrawal seizure at that time and required Precedex infusion.  Psychiatry did not recommend commitment.  PTA naltrexone and Campral were resumed.       Drinks about 700-800 mL of whiskey/day.  He has been through treatment program in 1/2024 but relapsed right after he came off the program.      In ER, hypertensive with blood pressure 167/115, tachycardic with heart rate up to 115.     Labs showed sodium 132, potassium 3.3, creatinine 0.74, anion gap 30, bicarb 18, serum ketones 3.3 to, glucose 161     He was administered 10 mg IV diazepam + 10 mg po diazepam, IV Zofran, IV Protonix, 10 mEq IV KCl, thiamine, folate and 2 L NS bolus in ER.     Acute alcohol withdrawal syndrome with hallucinations, improving   History of alcohol withdrawal seizures and hallucinations  Severe alcohol use disorder  *Last alcoholic drink was on 1/30.  He did not drink on day of admission as he was feeling unwell, had nausea, vomiting and wanted to quit alcohol.  He then developed withdrawal symptoms.  Reports he became so shaky that he could not walk.  He had nausea and vomiting.  Reports chronic hallucinations which tell him to overdose on medications. These have been stable  * Serum alcohol level 0.02 mg/dl on admission.  In active alcohol withdrawal on admission. Now improving.   * pt met with chem dep and wants to get into an inpatient JHON program at [facility].  -Continue alcohol  withdrawal protocol.  Diazepam for withdrawal symptoms.  Improved.   -Continue gabapentin taper  -Discontinue clonidine on 2/3  -Hold naltrexone and Campral.  Resume at discharge  -Consult chemical dependency  -Continue high-dose thiamine, folate, multivitamin  -Awaiting acceptance to IP JHON program      Hypovolemic hyponatremia, resolved  -Sodium 132 on admission.  Has received 2 L normal saline bolus in ER and was on NS with 20 mEq KCl at 100 mL/h overnight.  Sodium now in normal range at 137.  Monitor     Hypokalemia, resolved  -Potassium 3.3 on admission.  Replaced per protocol     Anion gap metabolic acidosis-secondary to alcoholic/starvation ketoacidosis, resolved  -Serum ketones 3.3, Anion gap 30, bicarb 18 on admission -due to alcoholic ketosis  -Improved with IV fluids.  Acidosis has resolved.  Discontinued IV fluids     Alcohol induced gastritis  -Has nausea and vomiting likely due to alcohol induced gastritis. Lipase 62.  -Nausea has improved but not resolved.  Continue diet.    -Continue Protonix      Alcoholic hepatitis  -Has elevated AST and total bilirubin. Alk phos 92, ALT 58, AST 75, total bilirubin 1.5  -Remained stable, monitor      Chronic thrombocytopenia  -Platelets 113k on admission, 86k on 2/1. Thrombocytopenia secondary to alcoholic bone marrow suppression  -Monitor     Episode of atrial fibrillation with RVR 8/2024-felt to be due to alcohol withdrawal  -Noted     Diabetes mellitus type 2, with long-term use of insulin  PTA-on Lantus 1 unit daily  -Patient reports he takes 1 unit of Lantus daily.  Last took it a week before admission  -Continue sliding scale insulin only.  Blood sugars well-controlled     Bipolar disorder with psychotic features   Major depressive disorder  PTSD  Generalized anxiety disorder  -Has history of previous psychiatric hospitalizations and history of commitment. Reports chronic auditory hallucinations which are stable  -Continue sertraline 50 mg daily, trazodone 50  mg at bedtime as needed, olanzapine 10 mg twice daily and as needed, benztropine     Essential hypertension  -Continue lisinopril 10 mg daily     Asthma  -No acute exacerbation     Tobacco use disorder  -Continue nicotine patch    Cough-likely due to tobacco use  -Reported significant cough on 2/1.  Otherwise afebrile.  No shortness of breath  -Negative COVID-19/influenza/RSV.  Chest x-ray clear.      Unstable gait  Peripheral neuropathy  -Patient still unsteady while ambulating.  Has known peripheral neuropathy likely due to alcohol  -Consult PT        Diet: Regular Diet Adult    DVT Prophylaxis: Pneumatic Compression Devices  Abernathy Catheter: Not present  Lines: None     Cardiac Monitoring: None  Code Status: Full Code      Clinically Significant Risk Factors                                  # Financial/Environmental Concerns:           Social Drivers of Health    Depression: At risk (2/21/2024)    PHQ-2     PHQ-2 Score: 3   Tobacco Use: High Risk (1/31/2025)    Patient History     Smoking Tobacco Use: Every Day     Smokeless Tobacco Use: Never   Alcohol Use: Alcohol Misuse (1/7/2021)    AUDIT-C     Frequency of Alcohol Consumption: 4 or more times a week     Average Number of Drinks: 1 or 2     Frequency of Binge Drinking: Monthly          Disposition Plan         Medically Ready for Discharge: Anticipated Tomorrow -anticipate discharge to home on 2/3 once assessed by chemical dependency and PT             Sheridan Millan MD  Hospitalist Service  [facility]  Securely message with [employer] (more info)  Text page via [employer] Paging/Directory   ______________________________________________________________________    Interval History     Patient resting in bed.  Reports he feels better than before. Improved from withdrawal standpoint. Feels tired. Reports nausea but eating ok. Wants to go to IP JHON program.     Physical Exam   Vital Signs: Temp: 97.9  F (36.6  C) Temp src: Oral BP: (!)  152/83 Pulse: 73   Resp: 18 SpO2: 97 % O2 Device: None (Room air)    Weight: 203 lbs 4.23 oz    Constitutional - resting in bed, in no acute distress  Cardiovascular - regular rate and rhythm, no murmurs, no edema  Pulmonary - lungs are clear to auscultation bilaterally, no wheezing or rhonchi  Neurological - awake, alert and oriented x3.  Moving all 4 extremities, normal speech, no focal deficits, no tremor    Medical Decision Making       45 MINUTES SPENT BY ME on the date of service doing chart review, history, exam, documentation & further activities per the note.      Data     I have personally reviewed the following data over the past 24 hrs:    N/A  \   N/A   / N/A     N/A N/A N/A /  138 (H)   4.0 N/A N/A \       Imaging results reviewed over the past 24 hrs:   No results found for this or any previous visit (from the past 24 hours).

## 2025-02-03 NOTE — PLAN OF CARE
Here for ETOH withdrawal. A&O x4. Neuros intact x baseline neuropathy. CIWA score <5 during shift. VSS on RA. Up w/ SBA. Tolerating regular diet, no insulin needed. Takes pills whole. Alarms on. Plan for discharge to inpatient facility.

## 2025-02-03 NOTE — CONSULTS
2/3/2025  Pt completed his JHON CA today. He would like to attend IP JHON treatment at [facility]. The YOSEF for [facility] was emailed to the unit SW for pt to sign. His JHON CA was sent to [facility] today.    ELLEN Service Initiation Date ID: [id number]    Recommendations:   1)  Complete a residential based or similar treatment program.  2)  Abstain from all mood-altering chemicals unless prescribed by a licensed provider.   3)  Attend, at minimum, 2 weekly support group meetings, such as 12 step based (AA/NA), SMART Recovery, Health Realizations, and/or Refuge Recovery meetings.     4)  Actively work with a male mentor/sponsor on a weekly basis.   5)  Follow all the recommendations of your treatment/medical providers.    Clinical Substantiation:    Pt has a long history of alcohol use. He reports his longest sobriety was 60 days. Thirty of those days were while he was in treatment. Pt lacks sober coping skills.    Referrals/ Alternatives:  [facility]  [facility] [place] [address]  Phone: [phone]  Fax: [phone]  Email: [email or web address]  [email or web address]     JHON consult completed by: Sagrario Leonard [facility] [place].  Phone Number: [phone]  E-mail Address: [email or web address]  [address] [place] [address]     *Due to regulation of Title 42 of the Code of Federal Regulations (CFR) Part 2: Confidentiality laws apply to this note and the information wherein.  Thus, this note cannot be copy and pasted into any other health care staff's note nor can it be included in general medical records sent to ANY outside agency without the patient's written consent.
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
## 2025-02-03 NOTE — CONSULTS
Care Management Initial Consult    General Information  Assessment completed with: Patient, Patient  Type of CM/SW Visit: Initial Assessment    Primary Care Provider verified and updated as needed: Yes   Readmission within the last 30 days:        Reason for Consult: discharge planning  Advance Care Planning: Advance Care Planning Reviewed: no concerns identified          Communication Assessment  Patient's communication style: spoken language (English or Bilingual)    Hearing Difficulty or Deaf: no   Wear Glasses or Blind: no    Cognitive  Cognitive/Neuro/Behavioral: .WDL except  Level of Consciousness: intermittent confusion  Arousal Level: opens eyes spontaneously  Orientation: oriented x 4        Speech: clear    Living Environment:   People in home: parent(s)  Julia  Current living Arrangements: apartment      Able to return to prior arrangements: yes       Family/Social Support:  Care provided by: self  Provides care for: no one  Marital Status: Single  Support system:            Description of Support System: Supportive, Involved    Support Assessment: Adequate family and caregiver support, Adequate social supports    Current Resources:   Patient receiving home care services: No        Community Resources: [facility], Financial/Insurance  Equipment currently used at home: none  Supplies currently used at home: None    Employment/Financial:  Employment Status: unemployed        Financial Concerns: none   Referral to Financial Worker: No       Does the patient's insurance plan have a 3 day qualifying hospital stay waiver?  No    Lifestyle & Psychosocial Needs:  Social Drivers of Health     Food Insecurity: Not on file   Depression: At risk (2/21/2024)    PHQ-2     PHQ-2 Score: 3   Housing Stability: Not on file   Tobacco Use: High Risk (1/31/2025)    Patient History     Smoking Tobacco Use: Every Day     Smokeless Tobacco Use: Never     Passive Exposure: Not on file   Financial Resource Strain: Not on file    Alcohol Use: Alcohol Misuse (1/7/2021)    AUDIT-C     Frequency of Alcohol Consumption: 4 or more times a week     Average Number of Drinks: 1 or 2     Frequency of Binge Drinking: Monthly   Transportation Needs: Not on file   Physical Activity: Not on file   Interpersonal Safety: Low Risk  (2/1/2025)    Interpersonal Safety     Do you feel physically and emotionally safe where you currently live?: Yes     Within the past 12 months, have you been hit, slapped, kicked or otherwise physically hurt by someone?: No     Within the past 12 months, have you been humiliated or emotionally abused in other ways by your partner or ex-partner?: No   Stress: Not on file   Social Connections: Not on file   Health Literacy: Not on file       Functional Status:  Prior to admission patient needed assistance:   Dependent ADLs:: Independent  Dependent IADLs:: Independent       Mental Health Status:  Mental Health Status: No Current Concerns       Chemical Dependency Status:  Chemical Dependency Status: Current Concern             Values/Beliefs:  Spiritual, Cultural Beliefs, Taoism Practices, Values that affect care: no               Discussed  Partnership in Safe Discharge Planning  document with patient/family: No    Additional Information:  Per care management/social work consult for discharge planning patient admitted on 01/31 due to alcohol withdrawal. FLAVIA reviewed chart and spoke with patient.    Per patient report he resides in an apartment with his parents and is independent with all ADL's/IADL's at baseline. SW discussed referral to [facility] and patient is indicating that is still the discharge plan he is hoping for.     FLAVIA received call from Aidan from [facility] ([phone]) indicating they are reviewing patient and if they do accept they will not be able to accept patient on valium. They will also need a 30 day supply of medication filled and sent with.        Next Steps: Secure placement    Paresh  ALISHA Madrigal    [facility]

## 2025-02-03 NOTE — PROGRESS NOTES
"   02/03/25 7113   Appointment Info   Signing Clinician's Name / Credentials (PT) Corrine Genao, PT   Rehab Comments (PT) Likely will need just one more IP visit.   Living Environment   People in Home parent(s)   Current Living Arrangements apartment   Home Accessibility no concerns   Self-Care   Usual Activity Tolerance good   Current Activity Tolerance moderate   Equipment Currently Used at Home none   Fall history within last six months yes   Number of times patient has fallen within last six months 6   Activity/Exercise/Self-Care Comment Patient confirmed that all of his falls are either when he is drunk or withdrawing; does report \"waddling\" at baseline d/t peripheral neuropathy   General Information   Onset of Illness/Injury or Date of Surgery 01/31/25   Referring Physician Sadie Jean MD   Patient/Family Therapy Goals Statement (PT) Patient hopes to go to IP chem dep rehab   Pertinent History of Current Problem (include personal factors and/or comorbidities that impact the POC) Ravi Ahmadi is a 34 year old male with history of severe alcohol use disorder, alcohol withdrawal with previous withdrawal seizures and hallucinations, alcoholic hepatitis, DM2, bipolar disorder depression, anxiety, PTSD, asthma, tobacco use disorder who lives at home with his parents and presented on 1/31/2025 due to alcohol withdrawal, nausea and vomiting.   Existing Precautions/Restrictions fall;seizures   General Observations Lying in bed; on seizure precautions   Cognition   Affect/Mental Status (Cognition) WFL   Orientation Status (Cognition) oriented x 4   Follows Commands (Cognition) WFL   Pain Assessment   Patient Currently in Pain No   Integumentary/Edema   Integumentary/Edema no deficits were identifed   Posture    Posture Forward head position;Protracted shoulders   Range of Motion (ROM)   Range of Motion ROM is WFL   Strength (Manual Muscle Testing)   Strength (Manual Muscle Testing) strength is WFL   Bed Mobility "   Comment, (Bed Mobility) I bed mobility including coming to EOB   Transfers   Comment, (Transfers) STS with SBA   Gait/Stairs (Locomotion)   Comment, (Gait/Stairs) Ambulated 20' for evaluation with no AD and CGA   Balance   Balance Comments widened SUDHA and increased lateral sway but no gross LOB   Sensory Examination   Sensory Perception Comments pt. reports sig. PN at baseline in B feet   Coordination   Coordination no deficits were identified   Muscle Tone   Muscle Tone no deficits were identified   Clinical Impression   Criteria for Skilled Therapeutic Intervention Yes, treatment indicated   PT Diagnosis (PT) Impaired functional mobility   Influenced by the following impairments deconditioning, weakness, fatigue, PN   Functional limitations due to impairments decreased endurance, decreased stability in gait   Clinical Presentation (PT Evaluation Complexity) stable   Clinical Presentation Rationale per clinical judgement   Clinical Decision Making (Complexity) low complexity   Planned Therapy Interventions (PT) balance training;gait training;motor coordination training;patient/family education;postural re-education;strengthening;transfer training;progressive activity/exercise;risk factor education   Risk & Benefits of therapy have been explained evaluation/treatment results reviewed;care plan/treatment goals reviewed;risks/benefits reviewed;participants voiced agreement with care plan;current/potential barriers reviewed;participants included;patient;mother   PT Total Evaluation Time   PT Eval, Low Complexity Minutes (80045) 10   Physical Therapy Goals   PT Frequency Daily  (likely only need 1-2 more IP visits)   PT Predicted Duration/Target Date for Goal Attainment 02/07/25   PT: Transfers Independent;Sit to/from stand   PT: Gait Independent;Greater than 200 feet   PT: Stairs Modified independent;8 stairs;Rail on right   Therapeutic Activity   Therapeutic Activities: dynamic activities to improve functional  performance Minutes (12677) 20   Symptoms Noted During/After Treatment Fatigue   Treatment Detail/Skilled Intervention Supine to sit I. STS initially with CGA, progressed to SBA. Gait for therapeutic activity only today but may benefit from gait training using SEC (delcined today). Focused on endurance and balance challenges. Ambulated approx. 350' without AD with widened SUDHA and increased lateral sway; CGA progressing to SBA; no gross LOB. Performed forward marching during gait and backwards steps 15' x 2; CGA required. Sidestepping with CGA. Braiding against rail in hallway B with hold on rail; more difficult. Discussed benefits of SEC if needed but pt. declining. Mother present at end of session and told PT she has SEC if pt. needs; may want to revisit. Reported feeling fatigue afterwards. 's/70's; HR 70 and O2 sats 99% on RA after.session. Back to bed I'lly. Reported being fatigued.  Reported feeling near baseline but weaker than normal.   PT Discharge Planning   PT Plan assess again for need for SEC, high level endurance/balance activities   PT Discharge Recommendation (DC Rec) other (see comments)  (patient plans to d/c to IP chem dep treatment. From a physical standpoint appears safe to d/c to home with parents providing supervision. Pt. currently declining using SEC. May benefit at times for PN but yet to be determined.)   PT Rationale for DC Rec patient plans to d/c to IP chem dep treatment. From a physical standpoint appears safe to d/c to home with parents providing supervision. Pt. currently declining using SEC. May benefit at times for PN but yet to be determined.   PT Brief overview of current status Pt. at fall risk given ETOH and PN complications increasing his risk for falling at baseline. Is feeling slightly weaker than baseline. Recommend CGA of 1; no AD. Recommend walks with staff in halls. Goals of therapy will be to address safe mobility and make recs for d/c to next level of care. Pt and  RN will continue to follow all falls risk precautions as documented by RN staff while hospitalized   PT Total Distance Amb During Session (feet) 350   Physical Therapy Time and Intention   Timed Code Treatment Minutes 20   Total Session Time (sum of timed and untimed services) 30

## 2025-02-03 NOTE — PROGRESS NOTES
"   02/03/25 9596   Appointment Info   Signing Clinician's Name / Credentials (PT) Corrine Genao, PT   Rehab Comments (PT) Likely will need just one more IP visit.   Living Environment   People in Home parent(s)   Current Living Arrangements apartment   Home Accessibility no concerns   Self-Care   Usual Activity Tolerance good   Current Activity Tolerance moderate   Equipment Currently Used at Home none   Fall history within last six months yes   Number of times patient has fallen within last six months 6   Activity/Exercise/Self-Care Comment Patient confirmed that all of his falls are either when he is drunk or withdrawing; does report \"waddling\" at baseline d/t peripheral neuropathy   General Information   Onset of Illness/Injury or Date of Surgery 01/31/25   Referring Physician Sadie Jean MD   Patient/Family Therapy Goals Statement (PT) Patient hopes to go to IP chem dep rehab   Pertinent History of Current Problem (include personal factors and/or comorbidities that impact the POC) Ravi Ahmadi is a 34 year old male with history of severe alcohol use disorder, alcohol withdrawal with previous withdrawal seizures and hallucinations, alcoholic hepatitis, DM2, bipolar disorder depression, anxiety, PTSD, asthma, tobacco use disorder who lives at home with his parents and presented on 1/31/2025 due to alcohol withdrawal, nausea and vomiting.   Existing Precautions/Restrictions fall;seizures   General Observations Lying in bed; on seizure precautions   Cognition   Affect/Mental Status (Cognition) WFL   Orientation Status (Cognition) oriented x 4   Follows Commands (Cognition) WFL   Pain Assessment   Patient Currently in Pain No   Integumentary/Edema   Integumentary/Edema no deficits were identifed   Posture    Posture Forward head position;Protracted shoulders   Range of Motion (ROM)   Range of Motion ROM is WFL   Strength (Manual Muscle Testing)   Strength (Manual Muscle Testing) strength is WFL   Bed Mobility "   Comment, (Bed Mobility) I bed mobility including coming to EOB   Transfers   Comment, (Transfers) STS with SBA   Gait/Stairs (Locomotion)   Comment, (Gait/Stairs) Ambulated 20' for evaluation with no AD and CGA   Balance   Balance Comments widened SUDHA and increased lateral sway but no gross LOB   Sensory Examination   Sensory Perception Comments pt. reports sig. PN at baseline in B feet   Coordination   Coordination no deficits were identified   Muscle Tone   Muscle Tone no deficits were identified   Clinical Impression   Criteria for Skilled Therapeutic Intervention Yes, treatment indicated   PT Diagnosis (PT) Impaired functional mobility   Influenced by the following impairments deconditioning, weakness, fatigue, PN   Functional limitations due to impairments decreased endurance, decreased stability in gait   Clinical Presentation (PT Evaluation Complexity) stable   Clinical Presentation Rationale per clinical judgement   Clinical Decision Making (Complexity) low complexity   Planned Therapy Interventions (PT) balance training;gait training;motor coordination training;patient/family education;postural re-education;strengthening;transfer training;progressive activity/exercise;risk factor education   Risk & Benefits of therapy have been explained evaluation/treatment results reviewed;care plan/treatment goals reviewed;risks/benefits reviewed;participants voiced agreement with care plan;current/potential barriers reviewed;participants included;patient;mother   PT Total Evaluation Time   PT Eval, Low Complexity Minutes (88188) 10   Physical Therapy Goals   PT Frequency 3x/week   PT Predicted Duration/Target Date for Goal Attainment 02/07/25   PT: Transfers Independent;Sit to/from stand   PT: Gait Independent;Greater than 200 feet   PT: Stairs Modified independent;8 stairs;Rail on right   Therapeutic Activity   Therapeutic Activities: dynamic activities to improve functional performance Minutes (67039) 20   Symptoms  Noted During/After Treatment Fatigue   Treatment Detail/Skilled Intervention Supine to sit I. STS initially with CGA, progressed to SBA. Gait for therapeutic activity only today but may benefit from gait training using SEC (delcined today). Focused on endurance and balance challenges. Ambulated approx. 350' without AD with widened SUDHA and increased lateral sway; CGA progressing to SBA; no gross LOB. Performed forward marching during gait and backwards steps 15' x 2; CGA required. Sidestepping with CGA. Braiding against rail in hallway B with hold on rail; more difficult. Discussed benefits of SEC if needed but pt. declining. Mother present at end of session and told PT she has SEC if pt. needs; may want to revisit. Reported feeling fatigue afterwards. 's/70's; HR 70 and O2 sats 99% on RA after.session. Back to bed I'lly. Reported being fatigued.  Reported feeling near baseline but weaker than normal.   PT Discharge Planning   PT Plan assess again for need for SEC, high level endurance/balance activities   PT Discharge Recommendation (DC Rec) other (see comments)  (patient plans to d/c to IP chem dep treatment. From a physical standpoint appears safe to d/c to home with parents providing supervision. Pt. currently declining using SEC. May benefit at times for PN but yet to be determined.)   PT Rationale for DC Rec patient plans to d/c to IP chem dep treatment. From a physical standpoint appears safe to d/c to home with parents providing supervision. Pt. currently declining using SEC. May benefit at times for PN but yet to be determined.   PT Brief overview of current status Pt. at fall risk given ETOH and PN complications increasing his risk for falling at baseline. Is feeling slightly weaker than baseline. Recommend CGA of 1; no AD. Recommend walks with staff in halls. Goals of therapy will be to address safe mobility and make recs for d/c to next level of care. Pt and RN will continue to follow all falls risk  precautions as documented by RN staff while hospitalized   PT Total Distance Amb During Session (feet) 350   Physical Therapy Time and Intention   Timed Code Treatment Minutes 20   Total Session Time (sum of timed and untimed services) 30

## 2025-02-03 NOTE — PLAN OF CARE
"Goal Outcome Evaluation:         Shift: 2/2-3/25 [id number]  Admitting DX: Alcohol Withdrawal, n/v    Cognitive Concerns/ Orientation : A&Ox4  BEHAVIOR & AGGRESSION TOOL COLOR: Green  CIWA SCORE:11, 4, 0- valium given x 1  ABNL VS/O2: VSS RA   MOBILITY: Ax1 GB, ambulating to bathroom, PT consult pending    PAIN MANAGMENT: Denies  DIET: Regular- appetite good  BOWEL/BLADDER:Cont. Incont at times but not this shift, had BM 2-3   ABNL LAB/BG , mag/pot/phos protocols WDL K 4.1, Mg 1.9, Phosphorus 2.8  DRAIN/DEVICES: PIV SL  SKIN: scattered scrapes, scabs.   TESTS/PROCEDURES:n/a  D/C DAY/GOALS/PLACE: Pending plan- pt stated he wanted to go to [place] for treatment and has been there before   OTHER IMPORTANT INFO: CD consult pending, Seizure pads in place. No nausea. No SOB. Dyspnea, and lung crackles. Infreq dry cough. Patient stated he was having auditory hallucinations telling him negative things such as \"you are worthless\". Baseline neuropathy feet. Nicotine patch R arm. CTM.               "

## 2025-02-04 LAB
ALBUMIN SERPL BCG-MCNC: 4.1 G/DL (ref 3.5–5.2)
ALP SERPL-CCNC: 83 U/L (ref 40–150)
ALT SERPL W P-5'-P-CCNC: 68 U/L (ref 0–70)
ANION GAP SERPL CALCULATED.3IONS-SCNC: 11 MMOL/L (ref 7–15)
AST SERPL W P-5'-P-CCNC: 75 U/L (ref 0–45)
BILIRUB SERPL-MCNC: 0.8 MG/DL
BUN SERPL-MCNC: 7.6 MG/DL (ref 6–20)
CALCIUM SERPL-MCNC: 9.6 MG/DL (ref 8.8–10.4)
CHLORIDE SERPL-SCNC: 103 MMOL/L (ref 98–107)
CREAT SERPL-MCNC: 0.65 MG/DL (ref 0.67–1.17)
EGFRCR SERPLBLD CKD-EPI 2021: >90 ML/MIN/1.73M2
ERYTHROCYTE [DISTWIDTH] IN BLOOD BY AUTOMATED COUNT: 11.8 % (ref 10–15)
GLUCOSE BLDC GLUCOMTR-MCNC: 121 MG/DL (ref 70–99)
GLUCOSE BLDC GLUCOMTR-MCNC: 135 MG/DL (ref 70–99)
GLUCOSE BLDC GLUCOMTR-MCNC: 140 MG/DL (ref 70–99)
GLUCOSE BLDC GLUCOMTR-MCNC: 161 MG/DL (ref 70–99)
GLUCOSE BLDC GLUCOMTR-MCNC: 191 MG/DL (ref 70–99)
GLUCOSE SERPL-MCNC: 129 MG/DL (ref 70–99)
HCO3 SERPL-SCNC: 24 MMOL/L (ref 22–29)
HCT VFR BLD AUTO: 41.4 % (ref 40–53)
HGB BLD-MCNC: 14.1 G/DL (ref 13.3–17.7)
MAGNESIUM SERPL-MCNC: 1.9 MG/DL (ref 1.7–2.3)
MCH RBC QN AUTO: 31.1 PG (ref 26.5–33)
MCHC RBC AUTO-ENTMCNC: 34.1 G/DL (ref 31.5–36.5)
MCV RBC AUTO: 91 FL (ref 78–100)
PHOSPHATE SERPL-MCNC: 3.4 MG/DL (ref 2.5–4.5)
PLATELET # BLD AUTO: 88 10E3/UL (ref 150–450)
POTASSIUM SERPL-SCNC: 4.3 MMOL/L (ref 3.4–5.3)
PROT SERPL-MCNC: 7.1 G/DL (ref 6.4–8.3)
RBC # BLD AUTO: 4.54 10E6/UL (ref 4.4–5.9)
SODIUM SERPL-SCNC: 138 MMOL/L (ref 135–145)
WBC # BLD AUTO: 4.9 10E3/UL (ref 4–11)

## 2025-02-04 PROCEDURE — 99255 IP/OBS CONSLTJ NEW/EST HI 80: CPT | Performed by: PHYSICIAN ASSISTANT

## 2025-02-04 PROCEDURE — 120N000001 HC R&B MED SURG/OB

## 2025-02-04 PROCEDURE — 250N000013 HC RX MED GY IP 250 OP 250 PS 637: Performed by: PHYSICIAN ASSISTANT

## 2025-02-04 PROCEDURE — 97116 GAIT TRAINING THERAPY: CPT | Mod: GP

## 2025-02-04 PROCEDURE — 84132 ASSAY OF SERUM POTASSIUM: CPT | Performed by: STUDENT IN AN ORGANIZED HEALTH CARE EDUCATION/TRAINING PROGRAM

## 2025-02-04 PROCEDURE — 97530 THERAPEUTIC ACTIVITIES: CPT | Mod: GP

## 2025-02-04 PROCEDURE — 99232 SBSQ HOSP IP/OBS MODERATE 35: CPT | Performed by: STUDENT IN AN ORGANIZED HEALTH CARE EDUCATION/TRAINING PROGRAM

## 2025-02-04 PROCEDURE — 36415 COLL VENOUS BLD VENIPUNCTURE: CPT | Performed by: STUDENT IN AN ORGANIZED HEALTH CARE EDUCATION/TRAINING PROGRAM

## 2025-02-04 PROCEDURE — 83735 ASSAY OF MAGNESIUM: CPT | Performed by: STUDENT IN AN ORGANIZED HEALTH CARE EDUCATION/TRAINING PROGRAM

## 2025-02-04 PROCEDURE — 250N000011 HC RX IP 250 OP 636: Performed by: INTERNAL MEDICINE

## 2025-02-04 PROCEDURE — 84100 ASSAY OF PHOSPHORUS: CPT | Performed by: STUDENT IN AN ORGANIZED HEALTH CARE EDUCATION/TRAINING PROGRAM

## 2025-02-04 PROCEDURE — 250N000013 HC RX MED GY IP 250 OP 250 PS 637: Performed by: EMERGENCY MEDICINE

## 2025-02-04 PROCEDURE — 85027 COMPLETE CBC AUTOMATED: CPT | Performed by: STUDENT IN AN ORGANIZED HEALTH CARE EDUCATION/TRAINING PROGRAM

## 2025-02-04 PROCEDURE — 82565 ASSAY OF CREATININE: CPT | Performed by: STUDENT IN AN ORGANIZED HEALTH CARE EDUCATION/TRAINING PROGRAM

## 2025-02-04 PROCEDURE — 250N000013 HC RX MED GY IP 250 OP 250 PS 637: Performed by: INTERNAL MEDICINE

## 2025-02-04 PROCEDURE — 250N000013 HC RX MED GY IP 250 OP 250 PS 637: Performed by: STUDENT IN AN ORGANIZED HEALTH CARE EDUCATION/TRAINING PROGRAM

## 2025-02-04 RX ORDER — OLANZAPINE 5 MG/1
5 TABLET ORAL DAILY PRN
Status: DISCONTINUED | OUTPATIENT
Start: 2025-02-04 | End: 2025-02-04

## 2025-02-04 RX ORDER — OLANZAPINE 5 MG/1
20 TABLET ORAL AT BEDTIME
Status: DISCONTINUED | OUTPATIENT
Start: 2025-02-04 | End: 2025-02-05 | Stop reason: HOSPADM

## 2025-02-04 RX ORDER — OLANZAPINE 5 MG/1
10 TABLET ORAL EVERY MORNING
Status: DISCONTINUED | OUTPATIENT
Start: 2025-02-05 | End: 2025-02-05 | Stop reason: HOSPADM

## 2025-02-04 RX ADMIN — PANTOPRAZOLE SODIUM 40 MG: 40 TABLET, DELAYED RELEASE ORAL at 17:01

## 2025-02-04 RX ADMIN — ACETAMINOPHEN 650 MG: 325 TABLET, FILM COATED ORAL at 19:59

## 2025-02-04 RX ADMIN — GABAPENTIN 600 MG: 300 CAPSULE ORAL at 09:56

## 2025-02-04 RX ADMIN — GABAPENTIN 900 MG: 300 CAPSULE ORAL at 01:36

## 2025-02-04 RX ADMIN — OLANZAPINE 20 MG: 5 TABLET, FILM COATED ORAL at 21:41

## 2025-02-04 RX ADMIN — PANTOPRAZOLE SODIUM 40 MG: 40 TABLET, DELAYED RELEASE ORAL at 10:20

## 2025-02-04 RX ADMIN — OLANZAPINE 10 MG: 5 TABLET, FILM COATED ORAL at 09:55

## 2025-02-04 RX ADMIN — THIAMINE HYDROCHLORIDE 250 MG: 100 INJECTION, SOLUTION INTRAMUSCULAR; INTRAVENOUS at 10:00

## 2025-02-04 RX ADMIN — BENZTROPINE MESYLATE 1 MG: 1 TABLET ORAL at 19:59

## 2025-02-04 RX ADMIN — BENZTROPINE MESYLATE 1 MG: 1 TABLET ORAL at 09:55

## 2025-02-04 RX ADMIN — Medication 1 TABLET: at 09:55

## 2025-02-04 RX ADMIN — LISINOPRIL 10 MG: 10 TABLET ORAL at 09:56

## 2025-02-04 RX ADMIN — FOLIC ACID 1 MG: 1 TABLET ORAL at 09:55

## 2025-02-04 RX ADMIN — NICOTINE 1 PATCH: 7 PATCH, EXTENDED RELEASE TRANSDERMAL at 09:55

## 2025-02-04 RX ADMIN — INSULIN ASPART 3 UNITS: 100 INJECTION, SOLUTION INTRAVENOUS; SUBCUTANEOUS at 18:20

## 2025-02-04 RX ADMIN — GABAPENTIN 600 MG: 300 CAPSULE ORAL at 17:01

## 2025-02-04 RX ADMIN — SERTRALINE HYDROCHLORIDE 50 MG: 50 TABLET ORAL at 09:56

## 2025-02-04 ASSESSMENT — ACTIVITIES OF DAILY LIVING (ADL)
ADLS_ACUITY_SCORE: 42
ADLS_ACUITY_SCORE: 43
ADLS_ACUITY_SCORE: 42
ADLS_ACUITY_SCORE: 43
ADLS_ACUITY_SCORE: 42
ADLS_ACUITY_SCORE: 43
ADLS_ACUITY_SCORE: 42
ADLS_ACUITY_SCORE: 43
ADLS_ACUITY_SCORE: 42

## 2025-02-04 NOTE — PROGRESS NOTES
Care Management Follow Up    Length of Stay (days): 4    Expected Discharge Date:       Concerns to be Addressed:       Patient plan of care discussed at interdisciplinary rounds: Yes    Anticipated Discharge Disposition: Inpatient [facility]              Anticipated Discharge Services: [facility]  Anticipated Discharge DME: None    Patient/family educated on Medicare website which has current facility and service quality ratings:    Education Provided on the Discharge Plan:    Patient/Family in Agreement with the Plan: yes    Referrals Placed by CM/SW:    Private pay costs discussed: Not applicable    Discussed  Partnership in Safe Discharge Planning  document with patient/family:      Handoff Completed:     Additional Information:  Yesterday afternoon, Aidan from [facility] called back and said the program can accept patient as long as he is off valium.  It appears he has not had valium since 0103 on 2/3.  Based on MD note of 2/3, it appears patient is stable for discharge.      Next Steps: Social Work staff will update Aidan at [facility] at [phone] and ask when they can admit patient.   Will determine transportation.   Patient will need to arrive with a 30 day supply of medications.    If he doesn't have these medications we request the hospitalist have the medications filled here.    TODD OlsenSW

## 2025-02-04 NOTE — PLAN OF CARE
Shift: 2/3-02/04/25 Night shift  Admitting DX: Alcohol Withdrawal, n/v    Cognitive Concerns/ Orientation : A&Ox4  BEHAVIOR & AGGRESSION TOOL COLOR: Green  CIWA SCORE: 0 and 0  ABNL VS/O2: VSS RA   MOBILITY: Ind  PAIN MANAGMENT: Denies  DIET: Regular- appetite good  BOWEL/BLADDER:Continent  ABNL LAB/BG:   DRAIN/DEVICES: PIV SL  SKIN: scattered scrapes, scabs.   TESTS/PROCEDURES: None  D/C DAY/GOALS/PLACE: Pending inpatient CD plan-   OTHER IMPORTANT INFO: CD consult completed; slept well all shift    Goal Outcome Evaluation:

## 2025-02-04 NOTE — CONSULTS
Initial Psychiatric Consult   Consult date: February 4, 2025         Reason for Consult, requesting source:      Requesting source: Sadie Jean    Labs and imaging reviewed. Patient seen and evaluated by Ricardo Cleary PA-C          HPI:   Ravi Ahmadi is a 34 year old male with history of severe alcohol use disorder, alcohol withdrawal with previous withdrawal seizures and hallucinations, alcoholic hepatitis, DM2, bipolar disorder depression, anxiety, PTSD, asthma, tobacco use disorder who lives at home with his parents and presented on 1/31/2025 due to alcohol withdrawal, nausea and vomiting.     On approach patient endorses experiencing hallucinations for at least the last year. States he is taking medication regularly. Refill data shows last fill of zyprexa was 8/24. Patient states he stopped the medication one week ago. He states voices have been very present and distressing. He is agreeable to increase of zyprexa today. He is aware he was admitted to the hospital due to the concern for ETOH withdrawal due to his ongoing ETOH use. He states that sometimes the alcohol helps quite the voices, and states this is his motivation for drinking currently.     On chart review he previously only endorsed experiencing hallucinations in the context of active ETOH withdrawal, and was diagnosed with alcohol withdrawal hallucinosis. Appears that he was followed by Geronimo Webb MD through 7/24. Last availible note from 2/21/24:  Patient first developed hallucinations in the setting of alcohol withdrawal in the summer of 2023.  These lasted for a short period and resolved with treatment of his withdrawal.  Hallucinations were both auditory and visual at that time.  Later the same year he had recurrence of hallucinations in the setting of alcohol withdrawal 2 additional times, both times did not last longer than a week and resolved with treatment.  During one of these episodes he heard commands to try and steal a car  which he did follow through on.  In October 2023 he was started on Abilify in residential treatment at [facility] for hallucinations, they did resolve but unclear if it was due to Abilify or treatment of withdrawal. After most recent episode of withdrawal (last drink December 4, 2023) he once again had hallucinations but they have not remitted since that episode.  He has been restarted on Abilify which did not cause the hallucinations to remit.  He later started Zyprexa 7.5 mg at bedtime and did not notice a change in hallucinations, no effect with increase to 10mg at bedtime, did finally notice some improvement with total daily dose of 20 mg though still symptomatic.   Cause of patient's hallucinations remains unclear.  The persistence of hallucinations for 1+ month after last drink is difficult to fully attribute to alcohol withdrawal.  He did have some signs of withdrawal delirium though again it would be very unusual for this to last this duration.  The persistence of his hallucinations and the difficulty in achieving control raises concern for a primary psychotic disorder, current differential includes depressive disorder with psychotic features, schizophrenia, psychosis secondary to medical condition (diabetes?), substance-induced psychosis, schizophreniform d/o, or other undifferentiated cause.   Patient has begun to notice some improvement in hallucinations with higher olanzapine dosing.  Currently hallucinations are improved but still present aqt times, however suicidal command is much reduced.  Will continue Zyprexa at 10 mg in the morning, 10 mg at bedtime, and an additional 5 mg as needed during the day.  Patient has begun to notice some symptoms of possible involuntary movement which may be medication related, unable to assess in detail over video conference today - encouraged in-person visit as soon as patient is able. Given his diabetes and the possibility of parkinsonian side-effects, would be best to  work toward reducing total neuroleptic burden, will trial discontinuing Abilify as he has not noticed much benefit from this. Discussed that if hallucinations worsen after stopping can call clinic and restart Abilify if needed. If he has severe hallucinations or suicidal command recommended taking an additional 5mg Zyprexa and to call clinic as soon as able.  Continue Cogentin 1 mg twice daily.   Given strong possibility of primary psychotic d/o (vs secondary to mood symptoms), it would be prudent to obtain first-episode psychosis workup. Patient is amenable to completing MRI and labs, ordered MRI brain and TSH, A1c, HIV, Syphilis, B12/Folate, and ESR/CRP. If symptoms are ongoing and JHON is stable would consider transfer to psychosis clinic for additional resources. Recent CMP and CBC did not show significant abnormalities to suggest a cause for his symptoms.  Patient will continue to need close follow-up, requested follow-up appointment in 2 weeks.  Ideally would complete this in person to further assess possible involuntary movements, however if his treatment center will not allow him to leave could complete virtually.        Past Psychiatric History:           Substance Use and History:           Past Medical History:   PAST MEDICAL HISTORY:   Past Medical History:   Diagnosis Date    Alcohol use disorder     Alcohol withdrawal hallucinosis (H)     Alcohol withdrawal seizure (H)     Bipolar disorder (H)     Obesity (BMI 30-39.9)     Type 2 diabetes mellitus (H)        PAST SURGICAL HISTORY: History reviewed. No pertinent surgical history.          Family History:   FAMILY HISTORY: History reviewed. No pertinent family history.    Family Psychiatric History:         Social History:   SOCIAL HISTORY:   Social History     Tobacco Use    Smoking status: Every Day     Types: Cigarettes    Smokeless tobacco: Never   Substance Use Topics    Alcohol use: Not Currently                Physical ROS:   The 10 point Review of  Systems is negative other than noted in the HPI or here.           Medications:     Current Facility-Administered Medications   Medication Dose Route Frequency Provider Last Rate Last Admin    [Held by provider] acamprosate (CAMPRAL) EC tablet 666 mg  666 mg Oral TID Sadie Jean MD        benztropine (COGENTIN) tablet 1 mg  1 mg Oral BID Sadie Jean MD   1 mg at 02/04/25 0955    folic acid (FOLVITE) tablet 1 mg  1 mg Oral Daily Sheridan Millan MD   1 mg at 02/04/25 0955    [START ON 2/8/2025] gabapentin (NEURONTIN) capsule 100 mg  100 mg Oral Q8H Sadie Jean MD        [START ON 2/6/2025] gabapentin (NEURONTIN) capsule 300 mg  300 mg Oral Q8H Sadie Jean MD        gabapentin (NEURONTIN) capsule 600 mg  600 mg Oral Q8H Sadie Jean MD   600 mg at 02/04/25 0956    insulin aspart (NovoLOG) injection (RAPID ACTING)  1-10 Units Subcutaneous TID AC Sadie Jean MD   1 Units at 02/02/25 1748    insulin aspart (NovoLOG) injection (RAPID ACTING)  1-7 Units Subcutaneous At Bedtime Sadie Jean MD        lisinopril (ZESTRIL) tablet 10 mg  10 mg Oral Daily Sadie Jean MD   10 mg at 02/04/25 0956    multivitamin w/minerals (THERA-VIT-M) tablet 1 tablet  1 tablet Oral Daily Adela Davidson MD   1 tablet at 02/04/25 0955    [Held by provider] naltrexone (DEPADE/REVIA) tablet 50 mg  50 mg Oral Daily Sadie Jean MD        nicotine (NICODERM CQ) 7 MG/24HR 24 hr patch 1 patch  1 patch Transdermal Daily Adela Davidson MD   1 patch at 02/04/25 0955    [START ON 2/5/2025] OLANZapine (zyPREXA) tablet 10 mg  10 mg Oral QAM Ricardo Cleary PA-C        And    OLANZapine (zyPREXA) tablet 20 mg  20 mg Oral At Bedtime Ricardo Cleary PA-C        pantoprazole (PROTONIX) EC tablet 40 mg  40 mg Oral BID AC Sadie Jean MD   40 mg at 02/04/25 1020    sertraline (ZOLOFT) tablet 50 mg  50 mg Oral Daily Sadie Jean MD   50 mg at 02/04/25 0956    sodium chloride (PF) 0.9% PF flush 3 mL  3 mL Intracatheter  Q8H Sadie Jean MD   3 mL at 02/04/25 1007    thiamine (B-1) injection 250 mg  250 mg Intravenous Daily Sadie Jean MD   250 mg at 02/04/25 1000    Followed by    [START ON 2/8/2025] thiamine (B-1) tablet 100 mg  100 mg Oral Daily Sadie Jean MD                  Allergies:   No Known Allergies       Labs:     Recent Results (from the past 48 hours)   Glucose by meter    Collection Time: 02/02/25  4:15 PM   Result Value Ref Range    GLUCOSE BY METER POCT 154 (H) 70 - 99 mg/dL   Glucose by meter    Collection Time: 02/02/25  9:44 PM   Result Value Ref Range    GLUCOSE BY METER POCT 165 (H) 70 - 99 mg/dL   Glucose by meter    Collection Time: 02/03/25  2:17 AM   Result Value Ref Range    GLUCOSE BY METER POCT 138 (H) 70 - 99 mg/dL   Potassium    Collection Time: 02/03/25  8:17 AM   Result Value Ref Range    Potassium 4.0 3.4 - 5.3 mmol/L   Magnesium    Collection Time: 02/03/25  8:17 AM   Result Value Ref Range    Magnesium 1.8 1.7 - 2.3 mg/dL   Phosphorus    Collection Time: 02/03/25  8:17 AM   Result Value Ref Range    Phosphorus 2.8 2.5 - 4.5 mg/dL   Glucose by meter    Collection Time: 02/03/25 11:39 AM   Result Value Ref Range    GLUCOSE BY METER POCT 133 (H) 70 - 99 mg/dL   Glucose by meter    Collection Time: 02/03/25  5:14 PM   Result Value Ref Range    GLUCOSE BY METER POCT 121 (H) 70 - 99 mg/dL   Glucose by meter    Collection Time: 02/03/25  9:13 PM   Result Value Ref Range    GLUCOSE BY METER POCT 179 (H) 70 - 99 mg/dL   Glucose by meter    Collection Time: 02/04/25  1:38 AM   Result Value Ref Range    GLUCOSE BY METER POCT 161 (H) 70 - 99 mg/dL   Glucose by meter    Collection Time: 02/04/25  6:50 AM   Result Value Ref Range    GLUCOSE BY METER POCT 121 (H) 70 - 99 mg/dL   Magnesium    Collection Time: 02/04/25  7:19 AM   Result Value Ref Range    Magnesium 1.9 1.7 - 2.3 mg/dL   Phosphorus    Collection Time: 02/04/25  7:19 AM   Result Value Ref Range    Phosphorus 3.4 2.5 - 4.5 mg/dL    Comprehensive metabolic panel    Collection Time: 02/04/25  7:19 AM   Result Value Ref Range    Sodium 138 135 - 145 mmol/L    Potassium 4.3 3.4 - 5.3 mmol/L    Carbon Dioxide (CO2) 24 22 - 29 mmol/L    Anion Gap 11 7 - 15 mmol/L    Urea Nitrogen 7.6 6.0 - 20.0 mg/dL    Creatinine 0.65 (L) 0.67 - 1.17 mg/dL    GFR Estimate >90 >60 mL/min/1.73m2    Calcium 9.6 8.8 - 10.4 mg/dL    Chloride 103 98 - 107 mmol/L    Glucose 129 (H) 70 - 99 mg/dL    Alkaline Phosphatase 83 40 - 150 U/L    AST 75 (H) 0 - 45 U/L    ALT 68 0 - 70 U/L    Protein Total 7.1 6.4 - 8.3 g/dL    Albumin 4.1 3.5 - 5.2 g/dL    Bilirubin Total 0.8 <=1.2 mg/dL   CBC with platelets    Collection Time: 02/04/25  7:19 AM   Result Value Ref Range    WBC Count 4.9 4.0 - 11.0 10e3/uL    RBC Count 4.54 4.40 - 5.90 10e6/uL    Hemoglobin 14.1 13.3 - 17.7 g/dL    Hematocrit 41.4 40.0 - 53.0 %    MCV 91 78 - 100 fL    MCH 31.1 26.5 - 33.0 pg    MCHC 34.1 31.5 - 36.5 g/dL    RDW 11.8 10.0 - 15.0 %    Platelet Count 88 (L) 150 - 450 10e3/uL   Glucose by meter    Collection Time: 02/04/25  1:59 PM   Result Value Ref Range    GLUCOSE BY METER POCT 135 (H) 70 - 99 mg/dL          Physical and Psychiatric Examination:     BP (!) 136/92 (BP Location: Left arm)   Pulse 81   Temp 97.7  F (36.5  C) (Oral)   Resp 17   Wt 92.2 kg (203 lb 4.2 oz)   SpO2 96%   BMI 27.57 kg/m    Weight is 203 lbs 4.23 oz  Body mass index is 27.57 kg/m .    Physical Exam:  I have reviewed the physical exam as documented by by the medical team and agree with findings and assessment and have no additional findings to add at this time.    Mental Status Exam:    Appearance: awake, alert  Attitude:  cooperative  Eye Contact:  fair  Mood:  anxious  Affect:  appropriate and in normal range  Speech:  clear, coherent and decreased prosody  Language: Fluent in english   Psychomotor Behavior:  no evidence of tardive dyskinesia, dystonia, or tics  Thought Process:  logical  Associations:  no loose  associations  Thought Content:  auditory hallucinations present  Insight:  fair  Judgement:  limited  Oriented to:  time, person, and place  Attention Span and Concentration:  limited  Recent and Remote Memory:  fair  Fund of Knowledge: Appropriate   Gait and Station:                DSM-5 Diagnosis:   Hallucinations, Schizoaffective disorder          Assessment:   Patient endorses hallucinations for the past year that have been bothersome. Intermittently command, but not currently. Denies current thoughts of SI. Initially presented in context of acute alcohol withdrawal. ETOH dependence predates his experience of hallucinations. Report of hallucinations has remained consistently present through out the chart for the past year. Some benefit from zyprexa when his is taking it, but has not had complete resolution of symptoms.     Today he is agreeable to maximizing the dose of zyprexa to target these symptoms, and remains agreeable to CD treatment that has been arranged.   He is agreeable to an intake appointment with and outpatient psych provider for ongoing medication management.            Summary of Recommendations:   Arrange outpatient intake appointment  Increase zyprexa to 10mg QAM and 20mg at bedtime.  Advisded to participate in CD treatement.       Ricardo Cleary PA-C

## 2025-02-04 NOTE — PLAN OF CARE
Goal Outcome Evaluation:  Shift: 2/4/2025 [id number]  Admitting DX: Alcohol Withdrawal, n/v    Cognitive Concerns/ Orientation : A&Ox4  BEHAVIOR & AGGRESSION TOOL COLOR: Green  CIWA SCORE: 4, 5, 5  ABNL VS/O2: VSS on Rm Air  MOBILITY: IND  PAIN MANAGMENT: Denies  DIET: Regular- appetite good  BOWEL/BLADDER: Continent; loose BM x1  ABNL LAB/BG: , 129, 135, 191  DRAIN/DEVICES: PIV SL to R forearm  SKIN: scattered scrapes, scabs.   TESTS/PROCEDURES: psych consulted d/t concerns of long-standing auditory hallucinations per patient, hx of voices encouraging patient to harm self, drinks to drown out voices that taunt him  D/C DAY/GOALS/PLACE: Pending inpatient CD plan & psych eval  OTHER IMPORTANT INFO: discharge on hold d/t new psych consult; patient informed writer that hears multiple voices in head, taunting him, voices have hx of encouraging patient to self harm, denies voices are encouraging patient to self harm at this time.

## 2025-02-04 NOTE — PLAN OF CARE
Physical Therapy Discharge Summary    Reason for therapy discharge:    All goals and outcomes met, no further needs identified.    Progress towards therapy goal(s). See goals on Care Plan in [facility] electronic health record for goal details.  Goals met    Therapy recommendation(s):    No further therapy is recommended.

## 2025-02-04 NOTE — DISCHARGE INSTRUCTIONS
MENTAL HEALTH RESOURCES & SERVICES:   Behavioral Healthcare Providers Scheduling  During your hospitalization, you were seen by a licensed mental health professional [facility]  for a brief mental health assessment and/or psychotherapy at [facility].  It is recommended that you follow up with your established providers (psychiatrist, mental health therapist, and/or primary care doctor - as relevant) as soon as possible. Coordinators from [facility] will be calling you in the next 24-48 hours to ensure that you have the resources you need.  You can also contact [facility] coordinators directly at [phone].    You have been scheduled for the following mental health appointments:     Date: Wednesday, 2/12/2025  Time: 10:00 am - 11:00 am  Provider: Tanja RODRIGUEZ  CNP,RN  Location: [facility], [address] [place] [address]  Phone: [phone]  Appointment Type: Medication Mgmt - Initial (In-Person)    Patient instructions  Accepts patients ages 15-72 yrs. Primary Scope of practice is Mental Health Medication management, not therapy. - [facility] providers do not specialize in treating Dementia/Alzheimers patients. Please refrain from scheduling patients with these diagnoses.       [facility] maintains an extensive network of licensed behavioral health providers to connect patients with the services they need.  We do not charge providers a fee to participate in our referral network.  We match patients with providers based on a patient s specific needs, insurance coverage, and location.  Our first effort will be to refer you to a provider within your care system, and will utilize providers outside your care system as needed.

## 2025-02-04 NOTE — PROGRESS NOTES
Care Management Follow Up    Length of Stay (days): 4    Expected Discharge Date:       Concerns to be Addressed:       Patient plan of care discussed at interdisciplinary rounds: Yes    Anticipated Discharge Disposition: Inpatient Chemical Dependency              Anticipated Discharge Services: Chemical Dependency Resources  Anticipated Discharge DME: None    Patient/family educated on Medicare website which has current facility and service quality ratings:    Education Provided on the Discharge Plan:    Patient/Family in Agreement with the Plan: yes    Referrals Placed by CM/SW:    Private pay costs discussed: Not applicable    Discussed  Partnership in Safe Discharge Planning  document with patient/family: No     Handoff Completed: No, handoff not indicated or clinically appropriate    Additional Information:  Per bedside RN patient is having suicidal thoughts.  Dr Tsai will hold off on discharge and order psychiatry consult.  Writer has updated [facility]       Next Steps:   Follow psychiatry consult recommendations.  Keep Aidan at [facility] updated at [phone].    TODD OlsenSW

## 2025-02-04 NOTE — PROGRESS NOTES
[facility]    Medicine Progress Note - Hospitalist Service    Date of Admission:  1/31/2025    Assessment & Plan        Ravi Ahmadi is a 34 year old male with history of severe alcohol use disorder, alcohol withdrawal with previous withdrawal seizures and hallucinations, alcoholic hepatitis, DM2, bipolar disorder depression, anxiety, PTSD, asthma, tobacco use disorder who lives at home with his parents and presented on 1/31/2025 due to alcohol withdrawal, nausea and vomiting.     Previously hospitalized 8/2024.  Had alcohol withdrawal seizure at that time and required Precedex infusion.  Psychiatry did not recommend commitment.  PTA naltrexone and Campral were resumed.       Drinks about 700-800 mL of whiskey/day.  He has been through treatment program in 1/2024 but relapsed right after he came off the program.      In ER, hypertensive with blood pressure 167/115, tachycardic with heart rate up to 115.     Labs showed sodium 132, potassium 3.3, creatinine 0.74, anion gap 30, bicarb 18, serum ketones 3.3 to, glucose 161     He was administered 10 mg IV diazepam + 10 mg po diazepam, IV Zofran, IV Protonix, 10 mEq IV KCl, thiamine, folate and 2 L NS bolus in ER.     Acute alcohol withdrawal syndrome with hallucinations, improving   History of alcohol withdrawal seizures and hallucinations  Severe alcohol use disorder  *Last alcoholic drink was on 1/30.  He did not drink on day of admission as he was feeling unwell, had nausea, vomiting and wanted to quit alcohol.  He then developed withdrawal symptoms.  Reports he became so shaky that he could not walk.  He had nausea and vomiting.  Reports chronic hallucinations which tell him to overdose on medications. These have been stable  * Serum alcohol level 0.02 mg/dl on admission.  In active alcohol withdrawal on admission. Now improving.   * pt met with chem dep and wants to get into an inpatient JH[facility] program at [facility].  -Continue alcohol  withdrawal protocol.  Diazepam for withdrawal symptoms.  Improved.   -Continue gabapentin taper  -Discontinue clonidine on 2/3  -Hold naltrexone and Campral.  Resume at discharge  -Consult chemical dependency  -Continue high-dose thiamine, folate, multivitamin  -Awaiting acceptance to IP JHON program      Hypovolemic hyponatremia, resolved  -Sodium 132 on admission.  Has received 2 L normal saline bolus in ER and was on NS with 20 mEq KCl at 100 mL/h overnight.  Sodium now in normal range at 137.  Monitor     Hypokalemia, resolved  -Potassium 3.3 on admission.  Replaced per protocol     Anion gap metabolic acidosis-secondary to alcoholic/starvation ketoacidosis, resolved  -Serum ketones 3.3, Anion gap 30, bicarb 18 on admission -due to alcoholic ketosis  -Improved with IV fluids.  Acidosis has resolved.  Discontinued IV fluids     Alcohol induced gastritis  -Has nausea and vomiting likely due to alcohol induced gastritis. Lipase 62.  -Nausea has improved but not resolved.  Continue diet.    -Continue Protonix      Alcoholic hepatitis  -Has elevated AST and total bilirubin. Alk phos 92, ALT 58, AST 75, total bilirubin 1.5  -Remained stable, monitor      Chronic thrombocytopenia  -Platelets 113k on admission, 86k on 2/1. Thrombocytopenia secondary to alcoholic bone marrow suppression  -Monitor     Episode of atrial fibrillation with RVR 8/2024-felt to be due to alcohol withdrawal  -Noted     Diabetes mellitus type 2, with long-term use of insulin  PTA-on Lantus 1 unit daily  -Patient reports he takes 1 unit of Lantus daily.  Last took it a week before admission  -Continue sliding scale insulin only.  Blood sugars well-controlled     Bipolar disorder with psychotic features   Major depressive disorder  PTSD  Generalized anxiety disorder  -Has history of previous psychiatric hospitalizations and history of commitment. Reports  chronic auditory hallucinations which are stable  -Continue sertraline 50 mg daily, trazodone  50 mg at bedtime as needed, olanzapine 10 mg twice daily and as needed, benztropine  -Psyciatry consulted      Essential hypertension  -Continue lisinopril 10 mg daily     Asthma  -No acute exacerbation     Tobacco use disorder  -Continue nicotine patch     Cough-likely due to tobacco use  -Reported significant cough on 2/1.  Otherwise afebrile.  No shortness of breath  -Negative COVID-19/influenza/RSV.  Chest x-ray clear.       Unstable gait  Peripheral neuropathy  -Patient still unsteady while ambulating.  Has known peripheral neuropathy likely due to alcohol  -Consult PT  Diet: Regular Diet Adult    DVT Prophylaxis: Pneumatic Compression Devices  Abernathy Catheter: Not present  Lines: None     Cardiac Monitoring: None  Code Status: Full Code       Diet: Regular Diet Adult    DVT Prophylaxis: Low Risk/Ambulatory with no VTE prophylaxis indicated  Abernathy Catheter: Not present  Lines: None     Cardiac Monitoring: None  Code Status: Full Code      Clinically Significant Risk Factors                 # Thrombocytopenia: Lowest platelets = 88 in last 2 days, will monitor for bleeding                  # Financial/Environmental Concerns: none         Social Drivers of Health    Depression: At risk (2/21/2024)    PHQ-2     PHQ-2 Score: 3   Tobacco Use: High Risk (1/31/2025)    Patient History     Smoking Tobacco Use: Every Day     Smokeless Tobacco Use: Never   Alcohol Use: Alcohol Misuse (1/7/2021)    AUDIT-C     Frequency of Alcohol Consumption: 4 or more times a week     Average Number of Drinks: 1 or 2     Frequency of Binge Drinking: Monthly          Disposition Plan     Medically Ready for Discharge: Anticipated Tomorrow             Claudia Tsia MD  Hospitalist Service  [facility]  Securely message with [employer] (more info)  Text page via [employer] Paging/Directory   ______________________________________________________________________    Interval History   No acute events overnight  "    Denies suicidal ideation    Has been having auditory hallucinations since 1 year.  Hallucinations have been bothersome for the patient.  He states that he hears a man's and woman twice \"constantly criticizing him\"    Physical Exam   Vital Signs: Temp: 97.7  F (36.5  C) Temp src: Oral BP: (!) 136/92 Pulse: 81   Resp: 17 SpO2: 96 % O2 Device: None (Room air)    Weight: 203 lbs 4.23 oz         Medical Decision Making       45 MINUTES SPENT BY ME on the date of service doing chart review, history, exam, documentation & further activities per the note.      Data     I have personally reviewed the following data over the past 24 hrs:    4.9  \   14.1   / 88 (L)     138 103 7.6 /  129 (H)   4.3 24 0.65 (L) \     ALT: 68 AST: 75 (H) AP: 83 TBILI: 0.8   ALB: 4.1 TOT PROTEIN: 7.1 LIPASE: N/A       Imaging results reviewed over the past 24 hrs:   No results found for this or any previous visit (from the past 24 hours).  "

## 2025-02-05 VITALS
DIASTOLIC BLOOD PRESSURE: 104 MMHG | SYSTOLIC BLOOD PRESSURE: 150 MMHG | OXYGEN SATURATION: 96 % | TEMPERATURE: 97.3 F | BODY MASS INDEX: 27.57 KG/M2 | RESPIRATION RATE: 18 BRPM | HEART RATE: 93 BPM | WEIGHT: 203.26 LBS

## 2025-02-05 LAB
GLUCOSE BLDC GLUCOMTR-MCNC: 151 MG/DL (ref 70–99)
GLUCOSE BLDC GLUCOMTR-MCNC: 157 MG/DL (ref 70–99)
GLUCOSE BLDC GLUCOMTR-MCNC: 247 MG/DL (ref 70–99)
MAGNESIUM SERPL-MCNC: 1.7 MG/DL (ref 1.7–2.3)
PHOSPHATE SERPL-MCNC: 3.6 MG/DL (ref 2.5–4.5)
POTASSIUM SERPL-SCNC: 4 MMOL/L (ref 3.4–5.3)

## 2025-02-05 PROCEDURE — 84132 ASSAY OF SERUM POTASSIUM: CPT | Performed by: STUDENT IN AN ORGANIZED HEALTH CARE EDUCATION/TRAINING PROGRAM

## 2025-02-05 PROCEDURE — 250N000013 HC RX MED GY IP 250 OP 250 PS 637: Performed by: INTERNAL MEDICINE

## 2025-02-05 PROCEDURE — 250N000013 HC RX MED GY IP 250 OP 250 PS 637: Performed by: EMERGENCY MEDICINE

## 2025-02-05 PROCEDURE — 99239 HOSP IP/OBS DSCHRG MGMT >30: CPT | Performed by: STUDENT IN AN ORGANIZED HEALTH CARE EDUCATION/TRAINING PROGRAM

## 2025-02-05 PROCEDURE — 84100 ASSAY OF PHOSPHORUS: CPT | Performed by: STUDENT IN AN ORGANIZED HEALTH CARE EDUCATION/TRAINING PROGRAM

## 2025-02-05 PROCEDURE — 250N000013 HC RX MED GY IP 250 OP 250 PS 637: Performed by: PHYSICIAN ASSISTANT

## 2025-02-05 PROCEDURE — 83735 ASSAY OF MAGNESIUM: CPT | Performed by: STUDENT IN AN ORGANIZED HEALTH CARE EDUCATION/TRAINING PROGRAM

## 2025-02-05 PROCEDURE — 250N000013 HC RX MED GY IP 250 OP 250 PS 637: Performed by: STUDENT IN AN ORGANIZED HEALTH CARE EDUCATION/TRAINING PROGRAM

## 2025-02-05 PROCEDURE — 250N000011 HC RX IP 250 OP 636: Performed by: INTERNAL MEDICINE

## 2025-02-05 PROCEDURE — 36415 COLL VENOUS BLD VENIPUNCTURE: CPT | Performed by: STUDENT IN AN ORGANIZED HEALTH CARE EDUCATION/TRAINING PROGRAM

## 2025-02-05 RX ORDER — TRAZODONE HYDROCHLORIDE 50 MG/1
50 TABLET ORAL
Qty: 30 TABLET | Refills: 0 | Status: SHIPPED | OUTPATIENT
Start: 2025-02-05 | End: 2025-03-07

## 2025-02-05 RX ORDER — MULTIPLE VITAMINS W/ MINERALS TAB 9MG-400MCG
1 TAB ORAL DAILY
Qty: 30 TABLET | Refills: 0 | Status: SHIPPED | OUTPATIENT
Start: 2025-02-05 | End: 2025-03-07

## 2025-02-05 RX ORDER — NALTREXONE HYDROCHLORIDE 50 MG/1
50 TABLET, FILM COATED ORAL DAILY
Qty: 30 TABLET | Refills: 0 | Status: SHIPPED | OUTPATIENT
Start: 2025-02-05 | End: 2025-03-07

## 2025-02-05 RX ORDER — PANTOPRAZOLE SODIUM 40 MG/1
40 TABLET, DELAYED RELEASE ORAL DAILY
Qty: 30 TABLET | Refills: 0 | Status: SHIPPED | OUTPATIENT
Start: 2025-02-05 | End: 2025-03-07

## 2025-02-05 RX ORDER — LANOLIN ALCOHOL/MO/W.PET/CERES
100 CREAM (GRAM) TOPICAL DAILY
Qty: 30 TABLET | Refills: 0 | Status: SHIPPED | OUTPATIENT
Start: 2025-02-05 | End: 2025-03-07

## 2025-02-05 RX ORDER — ALBUTEROL SULFATE 90 UG/1
2 INHALANT RESPIRATORY (INHALATION) EVERY 4 HOURS PRN
Qty: 18 G | Refills: 0 | Status: CANCELLED | OUTPATIENT
Start: 2025-02-05

## 2025-02-05 RX ORDER — OLANZAPINE 10 MG/1
TABLET ORAL
Qty: 90 TABLET | Refills: 0 | Status: SHIPPED | OUTPATIENT
Start: 2025-02-05

## 2025-02-05 RX ORDER — LANCETS
EACH MISCELLANEOUS
Qty: 100 EACH | Refills: 0 | Status: SHIPPED | OUTPATIENT
Start: 2025-02-05

## 2025-02-05 RX ORDER — FOLIC ACID 1 MG/1
1 TABLET ORAL DAILY
Qty: 30 TABLET | Refills: 0 | Status: SHIPPED | OUTPATIENT
Start: 2025-02-05 | End: 2025-03-07

## 2025-02-05 RX ORDER — ACAMPROSATE CALCIUM 333 MG/1
666 TABLET, DELAYED RELEASE ORAL 3 TIMES DAILY
Qty: 180 TABLET | Refills: 0 | Status: SHIPPED | OUTPATIENT
Start: 2025-02-05 | End: 2025-03-07

## 2025-02-05 RX ORDER — LISINOPRIL 10 MG/1
10 TABLET ORAL DAILY
Qty: 30 TABLET | Refills: 0 | Status: SHIPPED | OUTPATIENT
Start: 2025-02-05 | End: 2025-03-07

## 2025-02-05 RX ORDER — BENZTROPINE MESYLATE 1 MG/1
1 TABLET ORAL 2 TIMES DAILY
Qty: 60 TABLET | Refills: 0 | Status: SHIPPED | OUTPATIENT
Start: 2025-02-05 | End: 2025-03-07

## 2025-02-05 RX ADMIN — FOLIC ACID 1 MG: 1 TABLET ORAL at 08:01

## 2025-02-05 RX ADMIN — PANTOPRAZOLE SODIUM 40 MG: 40 TABLET, DELAYED RELEASE ORAL at 07:07

## 2025-02-05 RX ADMIN — Medication 1 TABLET: at 07:55

## 2025-02-05 RX ADMIN — THIAMINE HYDROCHLORIDE 250 MG: 100 INJECTION, SOLUTION INTRAMUSCULAR; INTRAVENOUS at 07:55

## 2025-02-05 RX ADMIN — SERTRALINE HYDROCHLORIDE 50 MG: 50 TABLET ORAL at 07:56

## 2025-02-05 RX ADMIN — LISINOPRIL 10 MG: 10 TABLET ORAL at 07:55

## 2025-02-05 RX ADMIN — OLANZAPINE 10 MG: 5 TABLET, FILM COATED ORAL at 08:01

## 2025-02-05 RX ADMIN — NICOTINE 1 PATCH: 7 PATCH, EXTENDED RELEASE TRANSDERMAL at 07:56

## 2025-02-05 RX ADMIN — BENZTROPINE MESYLATE 1 MG: 1 TABLET ORAL at 07:55

## 2025-02-05 RX ADMIN — GABAPENTIN 600 MG: 300 CAPSULE ORAL at 00:12

## 2025-02-05 RX ADMIN — GABAPENTIN 600 MG: 300 CAPSULE ORAL at 07:54

## 2025-02-05 RX ADMIN — INSULIN ASPART 1 UNITS: 100 INJECTION, SOLUTION INTRAVENOUS; SUBCUTANEOUS at 11:45

## 2025-02-05 ASSESSMENT — ACTIVITIES OF DAILY LIVING (ADL)
ADLS_ACUITY_SCORE: 42

## 2025-02-05 NOTE — PLAN OF CARE
Goal Outcome Evaluation:  Summary: ETOH,   Shift: 02/05/25 6303-1713  Cognitive Concerns/ Orientation: A&Ox4  BEHAVIOR & AGGRESSION TOOL COLOR: Green  CIWA SCORE: 3, 3  ABNL VS/O2: VSS on RA ex HTN  MOBILITY: Independent  PAIN MANAGMENT: Denies  DIET: Regular  BOWEL/BLADDER: Continent  ABNL LAB/BG: , 151  DRAIN/DEVICES: IV removed  SKIN: Scattered bruising, abrasions - pt states he scratches himself in his sleep  TESTS/PROCEDURES: No new  D/C DAY/GOALS/PLACE: Today to inpatient [facility]  OTHER IMPORTANT INFO:     Discharge    Patient discharged to [facility] via public transportation. Paperwork sent to facility. AVS gone over and given to patient. All belongings remained with patient. All questions answered.    Listed belongings gathered and given to patient (including from security/pharmacy). Yes  Care Plan and Patient education resolved: Yes  Prescriptions if needed, hard copies sent with patient  NA  Medication Bin checked and emptied on discharge Yes  SW/care coordinator/charge RN aware of discharge: Yes

## 2025-02-05 NOTE — PROGRESS NOTES
Care Management Discharge Note    Discharge Date: 02/05/2025       Discharge Disposition:  ([facility] , [address] [place] [address]  [phone])    Discharge Services: [facility]    Discharge DME: None    Discharge Transportation: other (see comments) ([facility] staff providing transport)    Private pay costs discussed: Not applicable    Does the patient's insurance plan have a 3 day qualifying hospital stay waiver?  No    PAS Confirmation Code:    Patient/family educated on Medicare website which has current facility and service quality ratings:      Education Provided on the Discharge Plan:    Persons Notified of Discharge Plans:     Patient/Family in Agreement with the Plan: yes    Handoff Referral Completed: No, handoff not indicated or clinically appropriate    Additional Information:  Patient discharging today to [facility] in [place].   [facility] staff will transport patient, picking patient up at 1315 to 1330 at door #6.  Orders, AVS and recent psy note faxed to [phone], attn: [facility] admissions.  Patient's medications will not be ready by this time so the nursing unit will arrange for the medications be delivered. This was approved by Charge RN day shift.   Writer met with patient to ask if he has any questions and he stated no.     TODD OlsenSW

## 2025-02-05 NOTE — PLAN OF CARE
Admitting DX: Alcohol Withdrawal, n/v      Shift: 2/4/2025 5007-0587  Cognitive Concerns/ Orientation: Alert/Oriented x 4, unkempt  BEHAVIOR & AGGRESSION TOOL COLOR: Green  CIWA SCORE: 3 (slight tremor, hallucinations that have been persistent and may have other origin than withdrawal)  ABNL VS/O2: /92 otherwise VSS, room air  MOBILITY: IND  PAIN MANAGMENT: Denies  DIET: Regular  BOWEL/BLADDER: Continent  ABNL LAB/BG: Creat 0.65; AST 75; Platelets 88;   DRAIN/DEVICES: R PIV Saline locked  SKIN: Scattered scrapes/scabs  TESTS/PROCEDURES: Psych consulted d/t concerns of long-standing auditory hallucinations per patient, hx of voices encouraging patient to harm self, drinks to drown out voices that taunt him  D/C DAY/GOALS/PLACE: Pending inpatient Chem Dep plan & psych eval  OTHER IMPORTANT INFO: discharge on hold d/t new psych consult; Per even RN, patient states he hears multiple voices in head, taunting him, voices have hx of encouraging patient to self harm; Denies thoughts of self harm overnight

## 2025-02-06 ENCOUNTER — PATIENT OUTREACH (OUTPATIENT)
Dept: INTERNAL MEDICINE | Facility: CLINIC | Age: 35
End: 2025-02-06
Payer: COMMERCIAL

## 2025-02-07 NOTE — TELEPHONE ENCOUNTER
Patient Contact    Attempt # 1    Was call answered?  No.  Left message on voicemail with information to call me back.     Hermila Nielson RN

## 2025-02-10 NOTE — DISCHARGE SUMMARY
[facility]  Hospitalist Discharge Summary      Date of Admission:  1/31/2025  Date of Discharge:  2/5/2025  2:30 PM  Discharging Provider: Claudia Tsai MD  Discharge Service: Hospitalist Service    Discharge Diagnoses     Acute alcohol withdrawal syndrome with hallucinations, improving   History of alcohol withdrawal seizures and hallucinations  Severe alcohol use disorder    Clinically Significant Risk Factors          Follow-ups Needed After Discharge   Follow-up Appointments       Hospital Follow-up with Existing Primary Care Provider (PCP)      Please see details below         Schedule Primary Care visit within: 14 Days       Follow Up      Follow up with 14 DAYS , within . to evaluate medication change.  Outpatient Psychiatry              Unresulted Labs Ordered in the Past 30 Days of this Admission       No orders found from 1/1/2025 to 2/1/2025.        These results will be followed up by     Discharge Disposition   Discharged to Inpatient Detox  Condition at discharge: Stable    Hospital Course   Ravi Ahmadi is a 34 year old male with history of severe alcohol use disorder, alcohol withdrawal with previous withdrawal seizures and hallucinations, alcoholic hepatitis, DM2, bipolar disorder depression, anxiety, PTSD, asthma, tobacco use disorder who lives at home with his parents and presented on 1/31/2025 due to alcohol withdrawal, nausea and vomiting.     Previously hospitalized 8/2024.  Had alcohol withdrawal seizure at that time and required Precedex infusion.  Psychiatry did not recommend commitment.  PTA naltrexone and Campral were resumed.       Drinks about 700-800 mL of whiskey/day.  He has been through treatment program in 1/2024 but relapsed right after he came off the program.      In ER, hypertensive with blood pressure 167/115, tachycardic with heart rate up to 115.     Labs showed sodium 132, potassium 3.3, creatinine 0.74, anion gap 30, bicarb 18,  serum ketones 3.3 to, glucose 161     He was administered 10 mg IV diazepam + 10 mg po diazepam, IV Zofran, IV Protonix, 10 mEq IV KCl, thiamine, folate and 2 L NS bolus in ER.     Acute alcohol withdrawal syndrome with hallucinations, improving   History of alcohol withdrawal seizures and hallucinations  Severe alcohol use disorder  *Last alcoholic drink was on 1/30.  He did not drink on day of admission as he was feeling unwell, had nausea, vomiting and wanted to quit alcohol.  He then developed withdrawal symptoms.  Reports he became so shaky that he could not walk.  He had nausea and vomiting.  Reports chronic hallucinations which tell him to overdose on medications. These have been stable  * Serum alcohol level 0.02 mg/dl on admission.  In active alcohol withdrawal on admission. Now improving.   * pt met with chem dep and wants to get into an inpatient [facility] program at [facility].  Patient was treated for alcohol withdrawal with gabapentin, clonidine and Ativan.  Patient currently not withdrawing.     I resumed his PTA Campral and naltrexone.  Called by pharmacy that insurance not covering Campral and needs to naltrexone      Naltrexone prescribed.     Patient discharged to inpatient detox program           Hypovolemic hyponatremia, resolved  -Sodium 132 on admission.  Has received 2 L normal saline bolus in ER and was on NS with 20 mEq KCl at 100 mL/h overnight.  Sodium now in normal range at 137.  Monitor     Hypokalemia, resolved  -Potassium 3.3 on admission.  Replaced per protocol     Anion gap metabolic acidosis-secondary to alcoholic/starvation ketoacidosis, resolved  -Serum ketones 3.3, Anion gap 30, bicarb 18 on admission -due to alcoholic ketosis  -Improved with IV fluids.  Acidosis has resolved.  Discontinued IV fluids     Alcohol induced gastritis  -.  Continue diet.    -Continue Protonix      Alcoholic hepatitis  -Has elevated AST and total bilirubin. Alk phos 92, ALT 58, AST 75, total bilirubin  1.5  -Remained stable, monitor      Chronic thrombocytopenia  -Platelets 113k on admission, 86k on 2/1. Thrombocytopenia secondary to alcoholic bone marrow suppression  -Monitor     Episode of atrial fibrillation with RVR 8/2024-felt to be due to alcohol withdrawal  -Noted     Diabetes mellitus type 2, with long-term use of insulin  PTA-on Lantus 1 unit daily  -Patient reports he takes 1 unit of Lantus daily.  Last took it a week before admission  -.  Blood sugars well-controlled  -Glucometer and insulin prescribed     Bipolar disorder with psychotic features   Major depressive disorder  PTSD  Generalized anxiety disorder  -Has history of previous psychiatric hospitalizations and history of commitment. Reports  chronic auditory hallucinations which are stable  -Continue sertraline 50 mg daily, trazodone 50 mg at bedtime as needed, olanzapine 10 mg twice daily and as needed, benztropine  -Psyciatry consulted and olanzapine increased to 20 mg at bedtime and will continue 10 mg in the morning  -Psychiatry will arrange outpatient follow-up  -Psychiatry okay for patient to discharge to inpatient detox therapy     Essential hypertension  -Continue lisinopril 10 mg daily     Asthma  -No acute exacerbation     Tobacco use disorder  -Continue nicotine patch     Cough-likely due to tobacco use  -Reported significant cough on 2/1.  Otherwise afebrile.  No shortness of breath  -Negative COVID-19/influenza/RSV.  Chest x-ray clear.       Unstable gait  Peripheral neuropathy  -Patient still unsteady while ambulating.  Has known peripheral neuropathy likely due to alcohol  - Seen by PT and cleared     Consultations This Hospital Stay   CARE MANAGEMENT / SOCIAL WORK IP CONSULT  CHEMICAL DEPENDENCY IP CONSULT  PHYSICAL THERAPY ADULT IP CONSULT  PSYCHIATRY IP CONSULT    Code Status   Prior    Time Spent on this Encounter   I, Claudia Tsai MD, personally saw the patient today and spent greater than 30 minutes discharging  this patient.       Claudia Tsai MD  [place] [address] [facility]  [address] [place] [address]  Phone: [phone]  ______________________________________________________________________    Physical Exam   Vital Signs:                    Weight: 203 lbs 4.23 oz  Physical Exam  Cardiovascular:      Rate and Rhythm: Normal rate and regular rhythm.      Heart sounds: Normal heart sounds.   Pulmonary:      Effort: Pulmonary effort is normal. No respiratory distress.      Breath sounds: Normal breath sounds.   Abdominal:      General: There is no distension.      Palpations: Abdomen is soft.      Tenderness: There is no abdominal tenderness.   Neurological:      Mental Status: He is alert.             Primary Care Physician   Mayte Hill    Discharge Orders      Reason for your hospital stay    Alcohol Withdrawal     Activity    Your activity upon discharge: activity as tolerated     Follow Up    Follow up with 14 DAYS , within . to evaluate medication change.  Outpatient Psychiatry     Diet    Follow this diet upon discharge: Current Diet:Orders Placed This Encounter      Regular Diet Adult     Hospital Follow-up with Existing Primary Care Provider (PCP)    Please see details below            Significant Results and Procedures   Most Recent 3 CBC's:  Recent Labs   Lab Test 02/04/25  0719 02/01/25  0558 01/31/25 2113   WBC 4.9 4.3 7.4   HGB 14.1 13.0* 14.8   MCV 91 89 89   PLT 88* 86* 113*     Most Recent 3 BMP's:  Recent Labs   Lab Test 02/05/25  1140 02/05/25  0821 02/05/25  0819 02/05/25  0204 02/04/25  1359 02/04/25  0719 02/03/25  1139 02/03/25  0817 02/01/25  0958 02/01/25  0558 02/01/25  0130 01/31/25  2113   NA  --   --   --   --   --  138  --   --   --  137  --  132*   POTASSIUM  --  4.0  --   --   --  4.3  --  4.0   < > 3.9  --  3.3*   CHLORIDE  --   --   --   --   --  103  --   --   --  94*  --  84*   CO2  --   --   --   --   --  24  --   --   --  26   --  18*   BUN  --   --   --   --   --  7.6  --   --   --  8.1  --  6.8   CR  --   --   --   --   --  0.65*  --   --   --  0.65*  --  0.74   ANIONGAP  --   --   --   --   --  11  --   --   --  17*  --  30*   RAEANN  --   --   --   --   --  9.6  --   --   --  9.0  --  9.9   *  --  247* 157*   < > 129*   < >  --    < > 109*   < > 161*    < > = values in this interval not displayed.   ,   Results for orders placed or performed during the hospital encounter of 01/31/25   XR Chest 2 Views    Narrative    EXAM: XR CHEST 2 VIEWS  LOCATION: [facility]  DATE: 2/1/2025    INDICATION: Persistent cough  COMPARISON: Chest radiograph 8/15/2024      Impression    IMPRESSION: Posterior lower lung opacity is appreciated on the lateral view could reflect infectious/inflammatory process versus atelectasis. No pleural effusion or pneumothorax. Similar cardiomediastinal silhouette.       Discharge Medications   Discharge Medication List as of 2/5/2025  1:14 PM        START taking these medications    Details   blood glucose (NO BRAND SPECIFIED) test strip Use to test blood sugar 4 times daily or as directed., Disp-100 strip, R-6, E-PrescribeTo accompany: glucometer per insurance.      blood glucose monitoring (NO BRAND SPECIFIED) meter device kit Use to test blood sugar 4 times daily or as directed.Disp-1 kit, [id number] blood glucose meter OR supplies to accompany: glucometer per insurance      insulin pen needle (31G X 8 MM) 31G X 8 MM miscellaneous 1 Box of 100 insulin pen needles to be dispensed with every insulin pen prescriptionDisp-100 each, [id number]      nicotine (NICODERM CQ) 7 MG/24HR 24 hr patch Place 1 patch over 24 hours onto the skin every 24 hours.Disp-30 patch, [id number]      pantoprazole (PROTONIX) 40 MG EC tablet Take 1 tablet (40 mg) by mouth daily., Disp-30 tablet, R-0, E-Prescribe      thin (NO BRAND SPECIFIED) lancets Use with lanceting device., Disp-100 each,  R-0, E-PrescribeTo accompany: per insurance.           CONTINUE these medications which have CHANGED    Details   acamprosate (CAMPRAL) 333 MG EC tablet Take 2 tablets (666 mg) by mouth 3 times daily., Disp-180 tablet, R-0, E-Prescribe      benztropine (COGENTIN) 1 MG tablet Take 1 tablet (1 mg) by mouth 2 times daily., Disp-60 tablet, R-0, E-Prescribe      folic acid (FOLVITE) 1 MG tablet Take 1 tablet (1 mg) by mouth daily., Disp-30 tablet, R-0, E-Prescribe      insulin glargine (LANTUS PEN) 100 UNIT/ML pen Inject 1 Units subcutaneously at bedtime., Disp-0.3 mL, R-0, E-PrescribeIf Lantus is not covered by insurance, may substitute Basaglar or Semglee or other insulin glargine product per insurance preference at same dose and frequency.        lisinopril (ZESTRIL) 10 MG tablet Take 1 tablet (10 mg) by mouth daily., Disp-30 tablet, R-0, E-Prescribe      multivitamin w/minerals (THERA-VIT-M) tablet Take 1 tablet by mouth daily., Disp-30 tablet, R-0, E-Prescribe      naltrexone (DEPADE/REVIA) 50 MG tablet Take 1 tablet (50 mg) by mouth daily., Disp-30 tablet, R-0, E-Prescribe      !! OLANZapine (ZYPREXA) 10 MG tablet Take 1 tablet (10 mg) by mouth every morning AND 2 tablets (20 mg) at bedtime., Disp-90 tablet, R-0, E-Prescribe      sertraline (ZOLOFT) 50 MG tablet Take 1 tablet (50 mg) by mouth daily., Disp-30 tablet, R-0, E-Prescribe      thiamine (B-1) 100 MG tablet Take 1 tablet (100 mg) by mouth daily., Disp-30 tablet, R-0, E-Prescribe      traZODone (DESYREL) 50 MG tablet Take 1 tablet (50 mg) by mouth nightly as needed for sleep., Disp-30 tablet, R-0, E-Prescribe       !! - Potential duplicate medications found. Please discuss with provider.        CONTINUE these medications which have NOT CHANGED    Details   albuterol (PROAIR HFA/PROVENTIL HFA/VENTOLIN HFA) 108 (90 Base) MCG/ACT inhaler Inhale 2 puffs into the lungs every 4 hours as needed for wheezing, Disp-18 g, R-0, E-PrescribePharmacy may dispense brand  covered by insurance (Proair, or proventil or ventolin or generic albuterol inhaler)      !! OLANZapine (ZYPREXA) 5 MG tablet Take 1 tablet (5 mg) by mouth daily as needed (hallucinations), Disp-30 tablet, R-1, E-PrescribeFuture refills by PCP Dr. Mayte Hill with phone number [phone].      Vitamin D3 (CHOLECALCIFEROL) 25 mcg (1000 units) tablet Take 1 tablet (25 mcg) by mouth daily, Disp-30 tablet, R-0, E-PrescribeLodging Plus delivery for all meds      glucose (BD GLUCOSE) 4 g chewable tablet Take 1 tablet by mouth every hour as needed for low blood sugar, Disp-30 tablet, R-0, E-Prescribe       !! - Potential duplicate medications found. Please discuss with provider.        Allergies   No Known Allergies

## 2025-02-10 NOTE — TELEPHONE ENCOUNTER
Since this is attempt #2 at reaching pt but unsuccessful, routing to PCP to advise if further action is needed.     Patient Contact    Attempt # 2    Was call answered?  No.  Left message on voicemail with information to call me back.

## 2025-02-10 NOTE — DISCHARGE SUMMARY
[facility]  Hospitalist Discharge Summary      Date of Admission:  1/31/2025  Date of Discharge:  2/5/2025  2:30 PM  Discharging Provider: Claudia Tsai MD  Discharge Service: Hospitalist Service    Discharge Diagnoses   Acute alcohol withdrawal syndrome with hallucinations, improving   History of alcohol withdrawal seizures and hallucinations  Severe alcohol use disorder    Clinically Significant Risk Factors          Follow-ups Needed After Discharge   Follow-up Appointments       Hospital Follow-up with Existing Primary Care Provider (PCP)      Please see details below         Schedule Primary Care visit within: 14 Days       Follow Up      Follow up with 14 DAYS , within . to evaluate medication change.  Outpatient Psychiatry            {  Unresulted Labs Ordered in the Past 30 Days of this Admission       No orders found from 1/1/2025 to 2/1/2025.        T  Discharge Disposition   Discharged to Inpatient Detox  Condition at discharge: Stable    Hospital Course    Ravi Ahmadi is a 34 year old male with history of severe alcohol use disorder, alcohol withdrawal with previous withdrawal seizures and hallucinations, alcoholic hepatitis, DM2, bipolar disorder depression, anxiety, PTSD, asthma, tobacco use disorder who lives at home with his parents and presented on 1/31/2025 due to alcohol withdrawal, nausea and vomiting.     Previously hospitalized 8/2024.  Had alcohol withdrawal seizure at that time and required Precedex infusion.  Psychiatry did not recommend commitment.  PTA naltrexone and Campral were resumed.       Drinks about 700-800 mL of whiskey/day.  He has been through treatment program in 1/2024 but relapsed right after he came off the program.      In ER, hypertensive with blood pressure 167/115, tachycardic with heart rate up to 115.     Labs showed sodium 132, potassium 3.3, creatinine 0.74, anion gap 30, bicarb 18, serum ketones 3.3 to, glucose 161     He  was administered 10 mg IV diazepam + 10 mg po diazepam, IV Zofran, IV Protonix, 10 mEq IV KCl, thiamine, folate and 2 L NS bolus in ER.     Acute alcohol withdrawal syndrome with hallucinations, improving   History of alcohol withdrawal seizures and hallucinations  Severe alcohol use disorder  *Last alcoholic drink was on 1/30.  He did not drink on day of admission as he was feeling unwell, had nausea, vomiting and wanted to quit alcohol.  He then developed withdrawal symptoms.  Reports he became so shaky that he could not walk.  He had nausea and vomiting.  Reports chronic hallucinations which tell him to overdose on medications. These have been stable  * Serum alcohol level 0.02 mg/dl on admission.  In active alcohol withdrawal on admission. Now improving.   * pt met with chem dep and wants to get into an inpatient [facility] program at [facility].  Patient was treated for alcohol withdrawal with gabapentin, clonidine and Ativan.  Patient currently not withdrawing.    I resumed his PTA Campral and naltrexone.  Called by pharmacy that insurance not covering Campral and needs to naltrexone     Naltrexone prescribed.    Patient discharged to inpatient detox program        Hypovolemic hyponatremia, resolved  -Sodium 132 on admission.  Has received 2 L normal saline bolus in ER and was on NS with 20 mEq KCl at 100 mL/h overnight.  Sodium now in normal range at 137.  Monitor     Hypokalemia, resolved  -Potassium 3.3 on admission.  Replaced per protocol     Anion gap metabolic acidosis-secondary to alcoholic/starvation ketoacidosis, resolved  -Serum ketones 3.3, Anion gap 30, bicarb 18 on admission -due to alcoholic ketosis  -Improved with IV fluids.  Acidosis has resolved.  Discontinued IV fluids     Alcohol induced gastritis  -.  Continue diet.    -Continue Protonix      Alcoholic hepatitis  -Has elevated AST and total bilirubin. Alk phos 92, ALT 58, AST 75, total bilirubin 1.5  -Remained stable, monitor      Chronic  thrombocytopenia  -Platelets 113k on admission, 86k on 2/1. Thrombocytopenia secondary to alcoholic bone marrow suppression  -Monitor     Episode of atrial fibrillation with RVR 8/2024-felt to be due to alcohol withdrawal  -Noted     Diabetes mellitus type 2, with long-term use of insulin  PTA-on Lantus 1 unit daily  -Patient reports he takes 1 unit of Lantus daily.  Last took it a week before admission  -.  Blood sugars well-controlled  -Glucometer and insulin prescribed     Bipolar disorder with psychotic features   Major depressive disorder  PTSD  Generalized anxiety disorder  -Has history of previous psychiatric hospitalizations and history of commitment. Reports  chronic auditory hallucinations which are stable  -Continue sertraline 50 mg daily, trazodone 50 mg at bedtime as needed, olanzapine 10 mg twice daily and as needed, benztropine  -Psyciatry consulted and olanzapine increased to 20 mg at bedtime and will continue 10 mg in the morning  -Psychiatry will arrange outpatient follow-up  -Psychiatry okay for patient to discharge to inpatient detox therapy     Essential hypertension  -Continue lisinopril 10 mg daily     Asthma  -No acute exacerbation     Tobacco use disorder  -Continue nicotine patch     Cough-likely due to tobacco use  -Reported significant cough on 2/1.  Otherwise afebrile.  No shortness of breath  -Negative COVID-19/influenza/RSV.  Chest x-ray clear.       Unstable gait  Peripheral neuropathy  -Patient still unsteady while ambulating.  Has known peripheral neuropathy likely due to alcohol  -Consult PT    Consultations This Hospital Stay   CARE MANAGEMENT / SOCIAL WORK IP CONSULT  CHEMICAL DEPENDENCY IP CONSULT  PHYSICAL THERAPY ADULT IP CONSULT  PSYCHIATRY IP CONSULT    Code Status   Prior    Time Spent on this Encounter   I, Claudia Tsai MD, personally saw the patient today and spent greater than 30 minutes discharging this patient.       MD TANIA Andrews  [place] [address] [facility]  [address] [place] [address]  Phone: [phone]  ______________________________________________________________________    Physical Exam   Vital Signs:                    Weight: 203 lbs 4.23 oz  Physical Exam        Primary Care Physician   Mayte Hill    Discharge Orders      Reason for your hospital stay    Alcohol Withdrawal     Activity    Your activity upon discharge: activity as tolerated     Follow Up    Follow up with 14 DAYS , within . to evaluate medication change.  Outpatient Psychiatry     Diet    Follow this diet upon discharge: Current Diet:Orders Placed This Encounter      Regular Diet Adult     Hospital Follow-up with Existing Primary Care Provider (PCP)    Please see details below            Significant Results and Procedures   Most Recent 3 CBC's:  Recent Labs   Lab Test 02/04/25  0719 02/01/25  0558 01/31/25  2113   WBC 4.9 4.3 7.4   HGB 14.1 13.0* 14.8   MCV 91 89 89   PLT 88* 86* 113*     Most Recent 3 BMP's:  Recent Labs   Lab Test 02/05/25  1140 02/05/25  0821 02/05/25  0819 02/05/25  0204 02/04/25  1359 02/04/25  0719 02/03/25  1139 02/03/25  0817 02/01/25  0958 02/01/25  0558 02/01/25  0130 01/31/25  2113   NA  --   --   --   --   --  138  --   --   --  137  --  132*   POTASSIUM  --  4.0  --   --   --  4.3  --  4.0   < > 3.9  --  3.3*   CHLORIDE  --   --   --   --   --  103  --   --   --  94*  --  84*   CO2  --   --   --   --   --  24  --   --   --  26  --  18*   BUN  --   --   --   --   --  7.6  --   --   --  8.1  --  6.8   CR  --   --   --   --   --  0.65*  --   --   --  0.65*  --  0.74   ANIONGAP  --   --   --   --   --  11  --   --   --  17*  --  30*   RAEANN  --   --   --   --   --  9.6  --   --   --  9.0  --  9.9   *  --  247* 157*   < > 129*   < >  --    < > 109*   < > 161*    < > = values in this interval not displayed.   ,   Results for orders placed or performed during the hospital encounter of 01/31/25    XR Chest 2 Views    Narrative    EXAM: XR CHEST 2 VIEWS  LOCATION: [facility]  DATE: 2/1/2025    INDICATION: Persistent cough  COMPARISON: Chest radiograph 8/15/2024      Impression    IMPRESSION: Posterior lower lung opacity is appreciated on the lateral view could reflect infectious/inflammatory process versus atelectasis. No pleural effusion or pneumothorax. Similar cardiomediastinal silhouette.       Discharge Medications   Discharge Medication List as of 2/5/2025  1:14 PM        START taking these medications    Details   blood glucose (NO BRAND SPECIFIED) test strip Use to test blood sugar 4 times daily or as directed., Disp-100 strip, R-6, E-PrescribeTo accompany: glucometer per insurance.      blood glucose monitoring (NO BRAND SPECIFIED) meter device kit Use to test blood sugar 4 times daily or as directed.Disp-1 kit, [id number] blood glucose meter OR supplies to accompany: glucometer per insurance      insulin pen needle (31G X 8 MM) 31G X 8 MM miscellaneous 1 Box of 100 insulin pen needles to be dispensed with every insulin pen prescriptionDisp-100 each, [id number]      nicotine (NICODERM CQ) 7 MG/24HR 24 hr patch Place 1 patch over 24 hours onto the skin every 24 hours.Disp-30 patch, [id number]      pantoprazole (PROTONIX) 40 MG EC tablet Take 1 tablet (40 mg) by mouth daily., Disp-30 tablet, R-0, E-Prescribe      thin (NO BRAND SPECIFIED) lancets Use with lanceting device., Disp-100 each, R-0, E-PrescribeTo accompany: per insurance.           CONTINUE these medications which have CHANGED    Details   acamprosate (CAMPRAL) 333 MG EC tablet Take 2 tablets (666 mg) by mouth 3 times daily., Disp-180 tablet, R-0, E-Prescribe      benztropine (COGENTIN) 1 MG tablet Take 1 tablet (1 mg) by mouth 2 times daily., Disp-60 tablet, R-0, E-Prescribe      folic acid (FOLVITE) 1 MG tablet Take 1 tablet (1 mg) by mouth daily., Disp-30 tablet, R-0, E-Prescribe       insulin glargine (LANTUS PEN) 100 UNIT/ML pen Inject 1 Units subcutaneously at bedtime., Disp-0.3 mL, R-0, E-PrescribeIf Lantus is not covered by insurance, may substitute Basaglar or Semglee or other insulin glargine product per insurance preference at same dose and frequency.        lisinopril (ZESTRIL) 10 MG tablet Take 1 tablet (10 mg) by mouth daily., Disp-30 tablet, R-0, E-Prescribe      multivitamin w/minerals (THERA-VIT-M) tablet Take 1 tablet by mouth daily., Disp-30 tablet, R-0, E-Prescribe      naltrexone (DEPADE/REVIA) 50 MG tablet Take 1 tablet (50 mg) by mouth daily., Disp-30 tablet, R-0, E-Prescribe      !! OLANZapine (ZYPREXA) 10 MG tablet Take 1 tablet (10 mg) by mouth every morning AND 2 tablets (20 mg) at bedtime., Disp-90 tablet, R-0, E-Prescribe      sertraline (ZOLOFT) 50 MG tablet Take 1 tablet (50 mg) by mouth daily., Disp-30 tablet, R-0, E-Prescribe      thiamine (B-1) 100 MG tablet Take 1 tablet (100 mg) by mouth daily., Disp-30 tablet, R-0, E-Prescribe      traZODone (DESYREL) 50 MG tablet Take 1 tablet (50 mg) by mouth nightly as needed for sleep., Disp-30 tablet, R-0, E-Prescribe       !! - Potential duplicate medications found. Please discuss with provider.        CONTINUE these medications which have NOT CHANGED    Details   albuterol (PROAIR HFA/PROVENTIL HFA/VENTOLIN HFA) 108 (90 Base) MCG/ACT inhaler Inhale 2 puffs into the lungs every 4 hours as needed for wheezing, Disp-18 g, R-0, E-PrescribePharmacy may dispense brand covered by insurance (Proair, or proventil or ventolin or generic albuterol inhaler)      !! OLANZapine (ZYPREXA) 5 MG tablet Take 1 tablet (5 mg) by mouth daily as needed (hallucinations), Disp-30 tablet, R-1, E-PrescribeFuture refills by PCP Dr. Mayte Hill with phone number [phone].      Vitamin D3 (CHOLECALCIFEROL) 25 mcg (1000 units) tablet Take 1 tablet (25 mcg) by mouth daily, Disp-30 tablet, R-0, E-PrescribeLodging Plus delivery for all meds       glucose (BD GLUCOSE) 4 g chewable tablet Take 1 tablet by mouth every hour as needed for low blood sugar, Disp-30 tablet, R-0, E-Prescribe       !! - Potential duplicate medications found. Please discuss with provider.        Allergies   No Known Allergies

## 2025-02-11 PROBLEM — F10.932 ALCOHOL WITHDRAWAL HALLUCINOSIS (H): Status: ACTIVE | Noted: 2025-02-11

## 2025-02-11 PROBLEM — E87.29 ALCOHOLIC KETOACIDOSIS: Status: RESOLVED | Noted: 2025-01-31 | Resolved: 2025-02-11

## 2025-02-11 PROBLEM — E66.9 OBESITY (BMI 30-39.9): Status: ACTIVE | Noted: 2025-02-11

## 2025-02-11 PROBLEM — F10.939 ALCOHOL WITHDRAWAL SYNDROME, WITH UNSPECIFIED COMPLICATION (H): Status: RESOLVED | Noted: 2025-01-31 | Resolved: 2025-02-11

## 2025-02-11 PROBLEM — R11.2 NAUSEA AND VOMITING, UNSPECIFIED VOMITING TYPE: Status: RESOLVED | Noted: 2025-01-31 | Resolved: 2025-02-11

## 2025-02-12 ENCOUNTER — VIRTUAL VISIT (OUTPATIENT)
Dept: INTERNAL MEDICINE | Facility: CLINIC | Age: 35
End: 2025-02-12
Attending: STUDENT IN AN ORGANIZED HEALTH CARE EDUCATION/TRAINING PROGRAM
Payer: COMMERCIAL

## 2025-02-12 DIAGNOSIS — Z53.9 NO SHOW: Primary | ICD-10-CM

## 2025-02-26 NOTE — ED NOTES
10 mg of IV valium was give to prevent withdrawal seizures. Scanning to medication was missed due to the urgency of the situation.

## 2025-02-26 NOTE — ED NOTES
Per Dr Davidson, 10 additional mg of Valium was given to patient to prevent a alcohol withdrawal seizure from occurring. Due to the urgency of the situation the valium was not scanned before giving and scanning afterwards did not occur due to the urgency of additional care until patient was stabilized.  Then scanning was overlooked.

## 2025-03-22 ENCOUNTER — HEALTH MAINTENANCE LETTER (OUTPATIENT)
Age: 35
End: 2025-03-22

## 2025-04-30 ENCOUNTER — HOSPITAL ENCOUNTER (EMERGENCY)
Facility: CLINIC | Age: 35
Discharge: HOME | End: 2025-04-30
Attending: EMERGENCY MEDICINE | Admitting: EMERGENCY MEDICINE
Payer: COMMERCIAL

## 2025-04-30 VITALS
DIASTOLIC BLOOD PRESSURE: 89 MMHG | OXYGEN SATURATION: 98 % | HEART RATE: 109 BPM | RESPIRATION RATE: 18 BRPM | TEMPERATURE: 97.7 F | SYSTOLIC BLOOD PRESSURE: 141 MMHG

## 2025-04-30 DIAGNOSIS — F10.920 ALCOHOLIC INTOXICATION WITHOUT COMPLICATION: ICD-10-CM

## 2025-04-30 PROBLEM — R45.851 SUICIDAL IDEATION: Status: ACTIVE | Noted: 2025-04-30

## 2025-04-30 PROCEDURE — 250N000011 HC RX IP 250 OP 636: Performed by: EMERGENCY MEDICINE

## 2025-04-30 PROCEDURE — 99285 EMERGENCY DEPT VISIT HI MDM: CPT

## 2025-04-30 RX ORDER — TRAZODONE HYDROCHLORIDE 50 MG/1
50 TABLET ORAL
Status: DISCONTINUED | OUTPATIENT
Start: 2025-04-30 | End: 2025-05-01 | Stop reason: HOSPADM

## 2025-04-30 RX ORDER — LISINOPRIL 10 MG/1
10 TABLET ORAL DAILY
Status: DISCONTINUED | OUTPATIENT
Start: 2025-05-01 | End: 2025-05-01 | Stop reason: HOSPADM

## 2025-04-30 RX ORDER — OLANZAPINE 5 MG/1
10 TABLET, FILM COATED ORAL EVERY MORNING
Status: DISCONTINUED | OUTPATIENT
Start: 2025-05-01 | End: 2025-05-01 | Stop reason: HOSPADM

## 2025-04-30 RX ORDER — OLANZAPINE 10 MG/1
20 TABLET, ORALLY DISINTEGRATING ORAL EVERY EVENING
Status: DISCONTINUED | OUTPATIENT
Start: 2025-04-30 | End: 2025-05-01 | Stop reason: HOSPADM

## 2025-04-30 RX ORDER — ONDANSETRON 4 MG/1
4 TABLET, ORALLY DISINTEGRATING ORAL ONCE
Status: COMPLETED | OUTPATIENT
Start: 2025-04-30 | End: 2025-04-30

## 2025-04-30 RX ADMIN — ONDANSETRON 4 MG: 4 TABLET, ORALLY DISINTEGRATING ORAL at 22:17

## 2025-04-30 ASSESSMENT — ACTIVITIES OF DAILY LIVING (ADL)
ADLS_ACUITY_SCORE: 58

## 2025-04-30 ASSESSMENT — COLUMBIA-SUICIDE SEVERITY RATING SCALE - C-SSRS
1. IN THE PAST MONTH, HAVE YOU WISHED YOU WERE DEAD OR WISHED YOU COULD GO TO SLEEP AND NOT WAKE UP?: NO
6. HAVE YOU EVER DONE ANYTHING, STARTED TO DO ANYTHING, OR PREPARED TO DO ANYTHING TO END YOUR LIFE?: NO
2. HAVE YOU ACTUALLY HAD ANY THOUGHTS OF KILLING YOURSELF IN THE PAST MONTH?: NO

## 2025-04-30 NOTE — ED NOTES
Bed: [facility]  Expected date:   Expected time:   Means of arrival:   Comments:  511 35M ETOH SI

## 2025-04-30 NOTE — ED TRIAGE NOTES
Pt comes in from home via [facility] today after an altercation with his parents. PD was involved in the situation, pt had apparently told his parents during this verbal altercation that he would overdose on some pills to kill self. PD had pt blow PBT of 0.223, has been drinking whiskey since 0800 yesterday. Hx of HTN and DM     Triage Assessment (Adult)       Row Name 04/30/25 7735          Respiratory WDL    Respiratory WDL WDL        Cardiac WDL    Cardiac WDL WDL        Cognitive/Neuro/Behavioral WDL    Cognitive/Neuro/Behavioral WDL WDL

## 2025-04-30 NOTE — ED PROVIDER NOTES
"  Emergency Department Note      History of Present Illness     Chief Complaint   Alcohol Intoxication      HPI   Ravi Ahmadi is a 35 year old male who presents to the emergency department with alcohol intoxication after he was involved in a verbal altercation with his mother who called 911.  EMS reports that his breathalyzer prior to arrival was 0.223.  The patient states that he made a suicidal statement about wanting to kill himself in order to \"stand my ground\" and in response to the verbal altercation with his mother but he states that he was not actually thinking about going through with doing anything to harm himself.  He states it was mostly reactive in response to the situation.  He denies any ongoing suicidal ideation at this time.  He admits to drinking alcohol today and drinks alcohol on most days.  He denies any tremors, seizures or other withdrawal symptoms at this time.  He denies any abdominal pain nausea or vomiting.  He denies any trauma or injuries.  He denies any other mental health concerns at this time.  He denies any other substance use.    Independent Historian   None    Review of External Notes   None    Past Medical History     Medical History and Problem List   Alcohol use disorder   Alcohol withdrawal seizures   Bipolar disorder   Obesity   Type 2 diabetes mellitus     Medications   Albuterol inhaler  Benztropine   insulin glargine  Lisinopril   Naltrexone    Olanzapine   Sertraline   Physical Exam     Patient Vitals for the past 24 hrs:   BP Temp Temp src Pulse Resp SpO2   04/30/25 2132 (!) 141/89 -- -- -- 18 98 %   04/30/25 1829 135/83 97.7  F (36.5  C) Oral 109 18 95 %     Physical Exam  General: Well appearing, nontoxic.  Resting comfortably  Head:  Scalp, face, and head appear normal  Eyes:  Pupils are equal, round, reactive to light     Conjunctivae non-injected and sclerae white  ENT:    The external nose is normal    Pinnae are normal  Neck:  Normal range of motion    There " is no rigidity noted    Trachea is in the midline  CV:  Regular rate and rhythm     Normal S1/S2, no S3/S4    No murmur or rub. Radial pulses 2+ bilaterally.  Resp:  Lungs are equal bilaterally  There is no tachypnea    No increased work of breathing    Scant end expiratory wheeze. No rales, or rhonchi  GI:  Abdomen is soft, no rigidity or guarding    No distension, or mass    No tenderness or rebound tenderness   MS:  Normal muscular tone    Symmetric motor strength    No lower extremity edema  Skin:  No rash or acute skin lesions noted  Neuro:  Awake and alert, oriented x2. Appears intoxicated. Speech mildly slurred.  No facial droop.  Strength 5 out of 5 and intact throughout.  Moves all extremities spontaneously  Psych: Appears intoxicated.  Denies current SI, HI or any hallucinations.  No psychomotor agitation.  He endorses depressed mood.  No evidence of any response to internal stimuli.   Appropriate interactions.      Diagnostics     Lab Results   Labs Ordered and Resulted from Time of ED Arrival to Time of ED Departure - No data to display    Imaging   No orders to display     Independent Interpretation   None    ED Course      Medications Administered   Medications   OLANZapine zydis (zyPREXA) ODT tab 20 mg (has no administration in time range)   OLANZapine (zyPREXA) tablet 10 mg (has no administration in time range)   sertraline (ZOLOFT) tablet 50 mg (has no administration in time range)   traZODone (DESYREL) tablet 50 mg (has no administration in time range)   lisinopril (ZESTRIL) tablet 10 mg (has no administration in time range)   ondansetron (ZOFRAN ODT) ODT tab 4 mg (4 mg Oral $Given 4/30/25 2217)       Procedures   Procedures     Discussion of Management   None    ED Course   ED Course as of 04/30/25 2310 Wed Apr 30, 2025 1925 Patient seen and evaluated    2250 Case discussed with Lorrie from the DEC.       Additional Documentation  None    Medical Decision Making / Diagnosis     CMS Diagnoses:  None    [facility]       None    [facility]   Ravi Ahmadi is a 35 year old male who presents to the emergency department for evaluation of alcohol tox occasion in the setting of alcohol use disorder.  He also expressed suicidal statements while intoxicated at home during an altercation with his mother.  He now recants any suicidal ideation, and states that it was simply in response to the situation of being in an argument with his mother.  He denies any physical health concerns or trauma.  On my evaluation he is calm cooperative and interacting appropriately.  He appears mildly intoxicated.  No evidence of any trauma on exam.  His exam is otherwise benign.  He denies any other mental health concerns at this time.  Patient was monitored in the emergency department until he was more sober.  He was then evaluated by mental health team/DEC. No evidence of any acute alcohol withdrawal syndrome or other complication at this time.  Patient was set up for outpatient mental health resources including psychiatry follow-up.  He was encouraged to limit alcohol intake is much as possible.  He was discharged home in stable condition when he was clinically sober and able to care for self.  He was able to contract for safety.  No indication for psychiatric hospitalization at this time.    Disposition   The patient was discharged.     Diagnosis     ICD-10-CM    1. Alcoholic intoxication without complication  F10.920            Discharge Medications   New Prescriptions    No medications on file         Scribe Disclosure:  Karen MOORE, am serving as a scribe at 6:54 PM on 4/30/2025 to document services personally performed by Silas Contreras MD based on my observations and the provider's statements to me.        Silas Contreras MD  04/30/25 1855

## 2025-05-01 NOTE — PLAN OF CARE
"Ravi \"Goran\" Daiana  April 30, 2025  Plan of Care Hand-off Note     Patient Recommended Care Path: discharge    Clinical Substantiation:  Pt presents to the ED via EMS for evaluation of suicidal ideation and alcohol use after getting into an argument with his intoxicated mother. In the ED, pt presents as alert, calm, and cooperative. He denies SI/HI. Pt endorses hearing voices daily at his baseline, he denies they are command in nature and takes mediation to control them. Pt has been working with PCP for his medication management and is interested in establishing an outpatient psychiatry provider. Pt recently relapsed on alcohol after completing tx for his drinking, pt declines supports for alcohol use and states his use has decreased significantly in the last month. Pt appears to be appropriate for outpatient, community levels of care and does not need to remain in the Emergency Department or be hospitalized. Pt was able to safety plan with [facility] and agrees to return to the hospital if his symptoms worsen. Pt was provided appointments for outpatient individual therapy and psychiatry, he will follow up with those providers within the next week,    Goals for crisis stabilization:  Stabilization, safety planning, referral for outpatient mental health providers    Next steps for Care Team:  Follow up with new outpatient mental health providers.    Treatment Objectives Addressed:  rapport building, orienting the patient to therapy, identifying and practicing coping strategies, processing feelings, safety planning, identifying an appropriate aftercare plan, assessing safety, identifying treatment goals, identifying additional supports    Therapeutic Interventions:  Engaged in safety planning, Reviewed healthy living that supports positive mental health, including looking at sleep hygiene, regular movement, nutrition, and regular socialization.    Has a specific means been identified for suicidal.homicide actions: No (Pt " denies SI at time of the assessment, he told his mom he would overdose on pills earlier today.)  If yes, describe:    Explain action steps toward mitigation:    Document completion of mitigation action:    The follow up action still needed prior to discharge:      Patient coping skills attempted to reduce the crisis:  Pt is interested in referrals for individual therapy and psychiatry.                          Collateral contact information:  [place] [phone]    Legal Status: Voluntary/Patient has signed consent for treatment                                                                                                                                 Reviewed court records: yes     Psychiatry Consult: N/A referred for outpatient psychiatry.    ROBERT Bolanos

## 2025-05-01 NOTE — CONSULTS
"Diagnostic Evaluation Consultation  Crisis Assessment    Patient Name: Ravi \"Goran\" Daiana  Age:  35 year old  Legal Sex: male  Gender Identity: male  Pronouns: he/him/his     Race: White  Ethnicity: Not  or   Language: English      Patient was assessed: In person   Crisis Assessment Start Date: 04/30/25  Crisis Assessment Start Time: 2140  Crisis Assessment Stop Time: 2200  Patient location: [facility]                                 Referral Data and Chief Complaint  Ravi Ahmadi presents to the ED via EMS. Patient is presenting to the ED for the following concerns: Suicidal ideation, Substance use, Intoxication. Factors that make the mental health crisis life threatening or complex are: Patient presents to the emergency department via EMS after police were called to his family's home due to verbal altercation between patient and his parents. Per notes, police had patient blow PBT of 0.223 and report he has drinking whiskey since yesterday at 8 am. In the ED, patient presents as alert, oriented, oriented, and intoxicated. He tells [facility] he got into an argument with his mom because she was \"wasted\" and he was not, he says he became upset when his mom threatened to call the police, he then  threatened to kill himself. Patient says when police arrived, they gave him the option of the hospital \"or the cooler\" which is [facility].  Patient initially told [facility] he had nothing to drink today and then reported he had one shot of whiskey this morning.  At the time of the assessment, patient denies suicidal ideation and regrets making the statements to his mom and step dad. Patient hasn't been working and living with his parents since January 2025 when he was last hospitalized and went to treatment. Patient reports a recent relapse with alcohol but denies any substance use concerns or the need to return to treatment at this time. Patient has been to CD tx at least 3 x in the last " 2 years. Patient endorses hearing voices at his baseline, he denies they are command in nature or causing him distress. Patient is prescribed medication for auditory hallucinations and alcohol use by his primary care provider. Patient is interested in establishing an outpatient psychiatry provider. Patient declines referrals for individual therapy or substance use assessment. Patient is able to safety plan and is requesting discharge from the hospital..      Informed Consent and Assessment Methods  Explained the crisis assessment process, including applicable information disclosures and limits to confidentiality, assessed understanding of the process, and obtained consent to proceed with the assessment.  Assessment methods included conducting a formal interview with patient, review of medical records, collaboration with medical staff, and obtaining relevant collateral information from family and community providers when available.  : done     History of the Crisis   Pt is a 35 year old man w/ a hx of alcohol use disorder, substance induced psychosis, and auditory hallucinations. Pt has a hx if IP  hospitalizations, the last was in January 2025 at [facility]. Pt reports he has been to CD tx 3 x in the last 2 years. He reports a recent alcohol relapse but declines any supports. Pt is interested in establishing    Brief Psychosocial History  Family:  Single, Children no  Support System:  Sibling(s), Parent(s)  Employment Status:  unemployed  Source of Income:  unemployment  Financial Environmental Concerns:  unable to afford rent/mortgage, unemployed  Current Hobbies:  social media/computer activities, television/movies/videos  Barriers in Personal Life:  mental health concerns, behavioral concerns    Significant Clinical History  Current Anxiety Symptoms:  excessive worry (Pt expresses worry about not working and living w/ his mom and step dad since January 2025.)  Current Depression/Trauma:  irritable, excessive  guilt (Pt expresses guilt for threatening to harm himself.)  Current Somatic Symptoms:  excessive worry (Pt expresses worry about not working and living w/ his mom and step dad since January 2025.)  Current Psychosis/Thought Disturbance:  forgetful, impulsive, displaces blame, auditory hallucinations  Current Eating Symptoms:   (Pt denies appetite or eating concerns.)  Chemical Use History:  Alcohol: Daily  Last Use:: 04/29/25  Benzodiazepines: None  Opiates: None  Cocaine: None  Marijuana: None  Other Use: None  Withdrawal Symptoms: Hallucinations, Seizures, Nausea   Past diagnosis:  Anxiety Disorder, Depression, Bipolar Disorder, Substance Use Disorder  Family history:  Substance Use Disorder  Past treatment:  Individual therapy, Primary Care, Psychiatric Medication Management, Inpatient Hospitalization  Details of most recent treatment:  Pt receives medication management from his PCP.  Other relevant history:  Pt last hospitalized in January 2025.    Have there been any medication changes in the past two weeks:  no       Is the patient compliant with medications:  yes        Collateral Information  Is there collateral information: Yes     Collateral information name, relationship, phone number:  Sister/Radha [phone]    What happened today: Pt's sister talked to mom earlier, so she was aware pt was in the ED. Both mom and pt were drinking earlier when mom called the police.     What is different about patient's functioning: Pt has a hx of drinking and hearing voices, pt hasn't been able to keep a job, he isolates himself in the room and plays video games. Sister thinks pt should return home to mom's house, he has no where else to go and he's been living there for several months.     What do you think the patient needs:      Has patient made comments about wanting to kill themselves/others: yes (Pt has tried to drink himself to death in the past)    If d/c is recommended, can they take part in safety/aftercare  planning:  yes (Pt can't come to her house because she has kids but she is a support to him and mom.)    Additional collateral information:        Risk Assessment  [place] Suicide Severity Rating Scale Full Clinical Version:  Suicidal Ideation  Q6 Suicide Behavior (Lifetime): no     Suicidal Behavior (Lifetime)  Actual Attempt (Lifetime): No    [place] Suicide Severity Rating Scale Recent:   Suicidal Ideation (Recent)  Q1 Wished to be Dead (Past Month): no  Q2 Suicidal Thoughts (Past Month): no  Level of Risk per Screen: no risks indicated     Suicidal Behavior (Recent)  Actual Attempt (Past 3 Months): No  Has subject engaged in non-suicidal self-injurious behavior? (Past 3 Months): No  Interrupted Attempts (Past 3 Months): No  Aborted or Self-Interrupted Attempt (Past 3 Months): No  Preparatory Acts or Behavior (Past 3 Months): No    Environmental or Psychosocial Events: challenging interpersonal relationships, work or task failure, impulsivity/recklessness, unemployment/underemployment, other life stressors, ongoing abuse of substances, neither working nor attending school, social isolation  Protective Factors: Protective Factors: strong bond to family unit, community support, or employment, lives in a responsibly safe and stable environment, supportive ongoing medical and mental health care relationships, constructive use of leisure time, enjoyable activities, resilience, reality testing ability    Does the patient have thoughts of harming others? Feels Like Hurting Others: no  Previous Attempt to Hurt Others: no  Current presentation:  (Pt presents as calm, oriented, and cooperative.)  Violence Threats in Past 6 Months: N/A  Current Violence Plan or Thoughts: N/A  Is the patient engaging in sexually inappropriate behavior?: no  Duty to warn initiated: no  Duty to warn details: N/A  Does Patient have a known history of aggressive behavior: No  Has aggression occurred as a result of MH concerns/diagnosis:  N/A  Does patient have history of aggression in hospital: N/A    Is the patient engaging in sexually inappropriate behavior?  no        Mental Status Exam   Affect: Appropriate, Flat  Appearance: Disheveled (dirty nails and clothes)  Attention Span/Concentration: Attentive  Eye Contact: Variable    Fund of Knowledge: Appropriate   Language /Speech Content: Fluent  Language /Speech Volume: Normal  Language /Speech Rate/Productions: Normal  Recent Memory: Intact  Remote Memory: Variable  Mood: Normal, Anxious  Orientation to Person: Yes   Orientation to Place: Yes  Orientation to Time of Day: Yes  Orientation to Date: Yes     Situation (Do they understand why they are here?): Yes  Psychomotor Behavior: Normal  Thought Content: Clear  Thought Form: Intact     Mini-Cog Assessment  Number of Words Recalled:    Clock-Drawing Test:     Three Item Recall:    Mini-Cog Total Score:       Medication  Psychotropic medications:   Medication Orders - Psychiatric (From admission, onward)      Start     Dose/Rate Route Frequency Ordered Stop    05/01/25 0800  OLANZapine (zyPREXA) tablet 10 mg         10 mg Oral EVERY MORNING 04/30/25 1836 05/01/25 0800  sertraline (ZOLOFT) tablet 50 mg         50 mg Oral DAILY 04/30/25 1836 04/30/25 1840  OLANZapine zydis (zyPREXA) ODT tab 20 mg         20 mg Oral EVERY EVENING 04/30/25 1836      04/30/25 1836  traZODone (DESYREL) tablet 50 mg         50 mg Oral AT BEDTIME PRN 04/30/25 1836               Current Care Team  Patient Care Team:  Mayte Hill MD as PCP - General (Internal Medicine)  Mayte Hill MD as Assigned PCP  Mayte Vickers as Diabetes Educator (Dietitian, Registered)  Billie Tejada APRN CNP as Assigned Behavioral Health Provider    Diagnosis  Patient Active Problem List   Diagnosis Code    Alcohol withdrawal seizure (H) F10.939, R56.9    Type 2 diabetes mellitus (H) E11.9    Alcohol use disorder F10.90    Bipolar disorder (H) F31.9    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 In the ED, pt presents as alert, calm, and cooperative. He denies SI/HI. Pt endorses hearing voices daily at his baseline, he denies they are command in nature and takes mediation to control them. Pt has been working with PCP for his medication management and is interested in establishing an outpatient psychiatry provider. Pt recently relapsed on alcohol after completing tx for his drinking, pt declines supports for alcohol use and states his use has decreased significantly in the last month. Pt appears to be appropriate for outpatient, community levels of care and does not need to remain in the Emergency Department or be hospitalized. Pt was able to safety plan with [facility] and agrees to return to the hospital if his symptoms worsen. Pt was provided appointments for outpatient individual therapy and psychiatry, he will follow up with those providers within the next week,    Goals for crisis stabilization:  Stabilization, safety planning, referral for outpatient mental health providers    Next steps for Care Team:  Follow up with new outpatient mental health providers.    Treatment Objectives Addressed:  rapport building, orienting the patient to therapy, identifying and practicing coping strategies, processing feelings, safety planning, identifying an appropriate aftercare plan, assessing safety, identifying treatment goals, identifying additional supports    Therapeutic Interventions:  Engaged in safety planning, Reviewed healthy living that supports positive  mental health, including looking at sleep hygiene, regular movement, nutrition, and regular socialization.    Has a specific means been identified for suicidal/homicide actions: No (Pt denies SI at time of the assessment, he told his mom he would overdose on pills earlier today.)    If yes, describe:       Explain action steps toward mitigation:       Document completion of mitigation actions:       The follow up action still needed prior to discharge:       Patient coping skills attempted to reduce the crisis:  Pt is interested in referrals for individual therapy and psychiatry.    Disposition  Recommended referrals: Individual Therapy, Medication Management, Programmatic Care, [facility] Comprehensive Assessment        Reviewed case and recommendations with attending provider. Attending Name: Yes, Silas Contreras MD       Attending concurs with disposition: yes       Patient and/or validated legal guardian concurs with disposition:   yes       Final disposition:  discharge                            Legal status: Voluntary/Patient has signed consent for treatment                                                                                                                                 Reviewed court records: yes       Assessment Details   Total duration spent with the patient: 20 min     CPT code(s) utilized: 83620 - Psychotherapy (with patient) - 30 (16-37*) min    ROBERT Bolanos, Psychotherapist  [facility] - Triage & Transition Services  Callback: [phone]

## 2025-05-01 NOTE — DISCHARGE INSTRUCTIONS
Aftercare Plan    Follow up with established providers and supports as scheduled. Continue taking medications as prescribed. Abstain from drugs and alcohol. Utilize your [facility] crisis team as needed. They are available 24/7. Contact information is listed below.     The following appointment(s) and/or referral(s) were made on your behalf. If you need to make changes or cancel please contact the clinic/provider directly.     Your Upcoming Appointments:    Type: Teletherapy  Date: Friday, 5/2/2025    Time: 1:00 pm - 2:00 pm  Provider: Jason Brady  PhD  LP  Location: [facility], [address] [place] [address], [address] [address]  Phone: [phone]  Patient instructions: Hello! We look forward to meeting you, and Dr. Brady is great to work with. Please note that your appt must be confirmed by CDS staff to hold your appt. All intake e-paperwork must be completed before your appt. Instructions w/a link to access your e-paperwork, portal, and any virtual appts will be emailed after you provide all necessary info (e.g., insurance/payment) to CDS staff. Contact [facility] at [phone] ext [phone] or [email or web address]    ----------------------------------    Type: Telepsychiatry  Date: Monday, 5/5/2025    Time: 2:00 pm - 3:00 pm  Provider: Marietta RODRIGUEZ  CNP,PMHNP,RN  Location: [facility], [address] [place] [address], [address], [place] [address]  Phone: [phone]  Patient instructions: Thank you for choosing [facility]! *Due to high demand, your appt date & time is only guaranteed after you speak with [facility]. Our intake team will attempt to contact you. If you do not hear from them within 24 hours, please call them at [phone] and tell them you are a [facility] referral. If you do not speak with our Intake Department and complete the necessary paperwork they send you, we cannot see you at your scheduled appointment time. Thank you for helping  us to help you!    Remember: give the referrals 3 sessions prior to calling it quits. Do you trust them? Do you feel understood? Do you think they can help? Check in with yourself after each session    If I am feeling unsafe or I am in a crisis, I will:   Contact my established care providers   Call the National Suicide Prevention Lifeline: 301.790.2983   Go to the nearest emergency room   Call 911     Warning signs that I or other people might notice when a crisis is developing for me: changes to sleep, appetite or mood, increased anger, agitation or irritability, feeling depressed or hopeless, spending more time alone or talking less, increased crying, decreased productivity, seeing or hearing things that aren't there, thoughts of not wanting to live anymore or of actually killing myself, thoughts of hurting others    Things I am able to do on my own to cope or help me feel better: watching a favorite tv show or movie, listening to music I enjoy, going outside and breathing fresh air, going for a walk or exercising, taking a shower or bath, a cold or hot beverage, a healthy snack, drawing/coloring/painting, journaling, singing or dancing, deep breathing     I can try practicing square breathing when I begin to feel anxious - inhale through the nose for the count of 4 and the first line on the square. Exhale through the mouth for the count of 4 for the second line of the square. Repeat to complete the square. Repeat the square as many times as needed.    I can also use my five senses to practice mindfulness and grounding. What are five things I can see, four things I can hear, three things I can feel, two things I can smell, and one thing I can taste.     Things that I am able to do with others to cope or help me feel better: sometimes just talking or spending time with someone else, sharing a meal or having coffee, watching a movie or playing a game, going for a walk or exercising    I can also use community  "resources including mental health hotlines, [facility] crisis teams, or apps.     Things I can use or do for distraction: movies/tv, music, reading, games, drawing/coloring/painting or other art, essential oils, exercise, cleaning/organizing, puzzles, crossword puzzles, word search, Sudoku       I can also download a meditation or relaxation fernando, like Calm, Headspace, or Insight Timer (all three offer a free version)    Changes I can make to support my mental health and wellness: Attend scheduled mental health therapy and psychiatric appointments. Take my medications as prescribed. Maintain a daily schedule/routine. Abstain from all mood altering substances, including drugs, alcohol, or medications not currently prescribed to me. Implement a self-care routine.      People in my life that I can ask for help: friends or family, trusted teachers/staff/colleagues, trusted members of my community or place of Muslim, mental health crisis lines, or 911    Your [facility] has a mental health crisis team you can call 24/7: [facility], [phone]    Other things that are important when I m in crisis: to remember that the feelings I am having right now are temporary, and it won't feel like this forever, and that it is okay and important to ask for help    Crisis Lines  Crisis Text Line  Text 008443  You will be connected with a trained live crisis counselor to provide support.    Por espanol, texto  CALI a 673976 o texto a 442-AYUDAME en WhatsApp    [facility] Hope Line  1.800.SUICIDE [0210101]      Community Resources  Fast Tracker  Linking people to mental health and substance use disorder resources  fasttrackermn.org     [place] Mental Health Warm Line  Peer to peer support  Monday thru Saturday, 12 pm to 10 pm  980.826.3992 or 7.608.989.3556  Text \"Support\" to 42141    National [place] on Mental Illness (JEREMIAH)  558.436.5670 or 1.888.JEREMIAH.HELPS      Mental Health Apps  My3  " https://my"[employer]pp.org/    VirtualHopeBox  https://SimpliVT/apps/virtual-hope-box/      Additional Information  Today you were seen by a licensed mental health professional through Triage and Transition services, Behavioral Healthcare Providers ([facility])  for a crisis assessment in the Emergency Department at [facility].  It is recommended that you follow up with your established providers (psychiatrist, mental health therapist, and/or primary care doctor - as relevant) as soon as possible. Coordinators from [facility] will be calling you in the next 24-48 hours to ensure that you have the resources you need.  You can also contact [facility] coordinators directly at [phone]. You may have been scheduled for or offered an appointment with a mental health provider. [facility] maintains an extensive network of licensed behavioral health providers to connect patients with the services they need.  We do not charge providers a fee to participate in our referral network.  We match patients with providers based on a patient's specific needs, insurance coverage, and location.  Our first effort will be to refer you to a provider within your care system, and will utilize providers outside your care system as needed.        Discharge Instructions  Alcohol Intoxication    You have been seen today with alcohol intoxication. This means that you have enough alcohol in your system to impair your ability to mentally and physically function, perhaps to the extent that you were unable to care for yourself.    Generally, every Emergency Department visit should have a follow-up clinic visit with either a primary or a specialty clinic/provider. Please follow-up as instructed by your emergency provider today.    You may have come to the Emergency Department because of your intoxication, or for another reason, such as because of an injury. No matter what the case is, this visit is a  red flag  regarding alcohol use, and you should consider whether  your drinking pattern is a problem for you.     You may be at risk for alcohol-related problems if:    Men: you drink more than 14 drinks per week, or more than 4 drinks per occasion.    Women: you drink more than 7 drinks per week or more than 3 drinks per occasion.    You have black-outs.  You do things you regret while drinking.  You have legal problems because of drinking.  You have job problems because of drinking (you call in sick to work because of drinking).    CAGE Questions  Have you ever felt you should cut down on your drinking?  Have people annoyed you by criticizing your drinking?  Have you ever felt bad or guilty about your drinking?  Have you ever had a drink first thing in the morning to steady your nerves or get rid of a hangover (eye opener)?    If you answer yes to any of the CAGE questions, you may have a problem with alcohol.      Return to the Emergency Department if:  You become shaky or tremble when you try to stop drinking.   You have severe abdominal pain (belly pain).   You have a seizure or pass out.    You vomit (throw up) blood or have blood in your stool. This may be bright red or it may look like black coffee grounds.  You become lightheaded or faint.      For further help, contact:   Your caregiver.    Alcoholics Anonymous (AA).    [facility] Inter[facility]: [phone]  [facility] Central Office: [phone]   A drug or alcohol rehabilitation program.    You can get information on alcohol resources and groups by calling the number 211 or 1-821.626.9750 on any phone.     Seek medical care if:  You have persistent vomiting.   You have persistent pain in any part of your body.    You do not feel better after a few days.    If you were given a prescription for medicine here today, be sure to read all of the information (including the package insert) that comes with your prescription.  This will include important information about the medicine, its side effects,  and any warnings that you need to know about.  The pharmacist who fills the prescription can provide more information and answer questions you may have about the medicine.  If you have questions or concerns that the pharmacist cannot address, please call or return to the Emergency Department.   Remember that you can always come back to the Emergency Department if you are not able to see your regular doctor in the amount of time listed above, if you get any new symptoms, or if there is anything that worries you.

## 2025-06-14 ENCOUNTER — HEALTH MAINTENANCE LETTER (OUTPATIENT)
Age: 35
End: 2025-06-14

## 2025-07-05 ENCOUNTER — HEALTH MAINTENANCE LETTER (OUTPATIENT)
Age: 35
End: 2025-07-05

## 2025-07-18 ENCOUNTER — APPOINTMENT (OUTPATIENT)
Dept: CT IMAGING | Facility: CLINIC | Age: 35
End: 2025-07-18
Attending: EMERGENCY MEDICINE
Payer: COMMERCIAL

## 2025-07-18 ENCOUNTER — HOSPITAL ENCOUNTER (OUTPATIENT)
Facility: CLINIC | Age: 35
Setting detail: OBSERVATION
Discharge: HOME | End: 2025-07-20
Attending: EMERGENCY MEDICINE | Admitting: EMERGENCY MEDICINE
Payer: COMMERCIAL

## 2025-07-18 DIAGNOSIS — F51.01 PRIMARY INSOMNIA: ICD-10-CM

## 2025-07-18 DIAGNOSIS — F25.1 SCHIZOAFFECTIVE DISORDER, DEPRESSIVE TYPE (H): ICD-10-CM

## 2025-07-18 DIAGNOSIS — R44.0 AUDITORY HALLUCINATIONS: ICD-10-CM

## 2025-07-18 DIAGNOSIS — F25.0 SCHIZOAFFECTIVE DISORDER, BIPOLAR TYPE (H): ICD-10-CM

## 2025-07-18 DIAGNOSIS — F10.90 ALCOHOL USE DISORDER: ICD-10-CM

## 2025-07-18 PROBLEM — F10.20 ALCOHOL USE DISORDER, SEVERE, DEPENDENCE (H): Status: ACTIVE | Noted: 2025-07-18

## 2025-07-18 PROBLEM — F29 PSYCHOSIS, UNSPECIFIED PSYCHOSIS TYPE (H): Status: ACTIVE | Noted: 2025-07-18

## 2025-07-18 LAB
ALBUMIN SERPL BCG-MCNC: 4.7 G/DL (ref 3.5–5.2)
ALP SERPL-CCNC: 86 U/L (ref 40–150)
ALT SERPL W P-5'-P-CCNC: 15 U/L (ref 0–70)
ANION GAP SERPL CALCULATED.3IONS-SCNC: 21 MMOL/L (ref 7–15)
AST SERPL W P-5'-P-CCNC: 29 U/L (ref 0–45)
BASOPHILS # BLD AUTO: 0.1 10E3/UL (ref 0–0.2)
BASOPHILS NFR BLD AUTO: 1 %
BILIRUB SERPL-MCNC: 0.7 MG/DL
BUN SERPL-MCNC: 6.6 MG/DL (ref 6–20)
CALCIUM SERPL-MCNC: 9.1 MG/DL (ref 8.8–10.4)
CHLORIDE SERPL-SCNC: 96 MMOL/L (ref 98–107)
CREAT SERPL-MCNC: 0.74 MG/DL (ref 0.67–1.17)
EGFRCR SERPLBLD CKD-EPI 2021: >90 ML/MIN/1.73M2
EOSINOPHIL # BLD AUTO: 0 10E3/UL (ref 0–0.7)
EOSINOPHIL NFR BLD AUTO: 0 %
ERYTHROCYTE [DISTWIDTH] IN BLOOD BY AUTOMATED COUNT: 13 % (ref 10–15)
ETHANOL SERPL-MCNC: 0.38 G/DL
GLUCOSE SERPL-MCNC: 223 MG/DL (ref 70–99)
HCO3 SERPL-SCNC: 22 MMOL/L (ref 22–29)
HCT VFR BLD AUTO: 47.1 % (ref 40–53)
HGB BLD-MCNC: 16.6 G/DL (ref 13.3–17.7)
IMM GRANULOCYTES # BLD: 0 10E3/UL
IMM GRANULOCYTES NFR BLD: 0 %
LYMPHOCYTES # BLD AUTO: 3.5 10E3/UL (ref 0.8–5.3)
LYMPHOCYTES NFR BLD AUTO: 43 %
MAGNESIUM SERPL-MCNC: 2.3 MG/DL (ref 1.7–2.3)
MCH RBC QN AUTO: 31.7 PG (ref 26.5–33)
MCHC RBC AUTO-ENTMCNC: 35.2 G/DL (ref 31.5–36.5)
MCV RBC AUTO: 90 FL (ref 78–100)
MONOCYTES # BLD AUTO: 0.6 10E3/UL (ref 0–1.3)
MONOCYTES NFR BLD AUTO: 7 %
NEUTROPHILS # BLD AUTO: 3.9 10E3/UL (ref 1.6–8.3)
NEUTROPHILS NFR BLD AUTO: 49 %
NRBC # BLD AUTO: 0 10E3/UL
NRBC BLD AUTO-RTO: 0 /100
PLATELET # BLD AUTO: 207 10E3/UL (ref 150–450)
POTASSIUM SERPL-SCNC: 3.5 MMOL/L (ref 3.4–5.3)
PROT SERPL-MCNC: 8 G/DL (ref 6.4–8.3)
RBC # BLD AUTO: 5.23 10E6/UL (ref 4.4–5.9)
SODIUM SERPL-SCNC: 139 MMOL/L (ref 135–145)
WBC # BLD AUTO: 8 10E3/UL (ref 4–11)

## 2025-07-18 PROCEDURE — 85004 AUTOMATED DIFF WBC COUNT: CPT | Performed by: EMERGENCY MEDICINE

## 2025-07-18 PROCEDURE — 70450 CT HEAD/BRAIN W/O DYE: CPT

## 2025-07-18 PROCEDURE — 36415 COLL VENOUS BLD VENIPUNCTURE: CPT | Performed by: EMERGENCY MEDICINE

## 2025-07-18 PROCEDURE — 99285 EMERGENCY DEPT VISIT HI MDM: CPT | Mod: 25 | Performed by: EMERGENCY MEDICINE

## 2025-07-18 PROCEDURE — 82077 ASSAY SPEC XCP UR&BREATH IA: CPT | Performed by: EMERGENCY MEDICINE

## 2025-07-18 PROCEDURE — 82310 ASSAY OF CALCIUM: CPT | Performed by: EMERGENCY MEDICINE

## 2025-07-18 PROCEDURE — 83735 ASSAY OF MAGNESIUM: CPT | Performed by: EMERGENCY MEDICINE

## 2025-07-18 RX ORDER — PANTOPRAZOLE SODIUM 40 MG/1
40 TABLET, DELAYED RELEASE ORAL DAILY
COMMUNITY

## 2025-07-18 RX ORDER — HYDROXYZINE HYDROCHLORIDE 25 MG/1
25 TABLET, FILM COATED ORAL
Status: DISCONTINUED | OUTPATIENT
Start: 2025-07-18 | End: 2025-07-20 | Stop reason: HOSPADM

## 2025-07-18 RX ORDER — SERTRALINE HYDROCHLORIDE 100 MG/1
100 TABLET, FILM COATED ORAL DAILY
COMMUNITY
End: 2025-07-20

## 2025-07-18 RX ORDER — OLANZAPINE 10 MG/1
10 TABLET, FILM COATED ORAL 2 TIMES DAILY
Status: DISCONTINUED | OUTPATIENT
Start: 2025-07-18 | End: 2025-07-20 | Stop reason: HOSPADM

## 2025-07-18 RX ORDER — METFORMIN HYDROCHLORIDE 500 MG/1
2000 TABLET, EXTENDED RELEASE ORAL
COMMUNITY

## 2025-07-18 RX ORDER — QUETIAPINE FUMARATE 25 MG/1
25 TABLET, FILM COATED ORAL 4 TIMES DAILY PRN
COMMUNITY

## 2025-07-18 ASSESSMENT — COLUMBIA-SUICIDE SEVERITY RATING SCALE - C-SSRS
2. HAVE YOU ACTUALLY HAD ANY THOUGHTS OF KILLING YOURSELF IN THE PAST MONTH?: YES
4. HAVE YOU HAD THESE THOUGHTS AND HAD SOME INTENTION OF ACTING ON THEM?: NO
5. HAVE YOU STARTED TO WORK OUT OR WORKED OUT THE DETAILS OF HOW TO KILL YOURSELF? DO YOU INTEND TO CARRY OUT THIS PLAN?: YES
1. IN THE PAST MONTH, HAVE YOU WISHED YOU WERE DEAD OR WISHED YOU COULD GO TO SLEEP AND NOT WAKE UP?: YES
3. HAVE YOU BEEN THINKING ABOUT HOW YOU MIGHT KILL YOURSELF?: YES
6. HAVE YOU EVER DONE ANYTHING, STARTED TO DO ANYTHING, OR PREPARED TO DO ANYTHING TO END YOUR LIFE?: NO

## 2025-07-18 ASSESSMENT — ACTIVITIES OF DAILY LIVING (ADL)
ADLS_ACUITY_SCORE: 58

## 2025-07-19 ENCOUNTER — APPOINTMENT (OUTPATIENT)
Dept: CT IMAGING | Facility: CLINIC | Age: 35
End: 2025-07-19
Attending: EMERGENCY MEDICINE
Payer: COMMERCIAL

## 2025-07-19 PROBLEM — F33.2 MAJOR DEPRESSIVE DISORDER, RECURRENT SEVERE WITHOUT PSYCHOTIC FEATURES (H): Status: ACTIVE | Noted: 2025-07-19

## 2025-07-19 LAB
GLUCOSE BLDC GLUCOMTR-MCNC: 146 MG/DL (ref 70–99)
GLUCOSE BLDC GLUCOMTR-MCNC: 157 MG/DL (ref 70–99)
GLUCOSE BLDC GLUCOMTR-MCNC: 186 MG/DL (ref 70–99)
GLUCOSE BLDC GLUCOMTR-MCNC: 193 MG/DL (ref 70–99)

## 2025-07-19 PROCEDURE — 250N000013 HC RX MED GY IP 250 OP 250 PS 637: Performed by: EMERGENCY MEDICINE

## 2025-07-19 PROCEDURE — 258N000003 HC RX IP 258 OP 636: Performed by: EMERGENCY MEDICINE

## 2025-07-19 PROCEDURE — 96360 HYDRATION IV INFUSION INIT: CPT

## 2025-07-19 PROCEDURE — 72125 CT NECK SPINE W/O DYE: CPT

## 2025-07-19 PROCEDURE — 250N000013 HC RX MED GY IP 250 OP 250 PS 637: Performed by: PSYCHIATRY & NEUROLOGY

## 2025-07-19 PROCEDURE — 82962 GLUCOSE BLOOD TEST: CPT

## 2025-07-19 PROCEDURE — G0378 HOSPITAL OBSERVATION PER HR: HCPCS

## 2025-07-19 PROCEDURE — 96361 HYDRATE IV INFUSION ADD-ON: CPT

## 2025-07-19 PROCEDURE — 250N000013 HC RX MED GY IP 250 OP 250 PS 637

## 2025-07-19 RX ORDER — NICOTINE POLACRILEX 4 MG
15-30 LOZENGE BUCCAL
Status: DISCONTINUED | OUTPATIENT
Start: 2025-07-19 | End: 2025-07-20 | Stop reason: HOSPADM

## 2025-07-19 RX ORDER — CLONIDINE HYDROCHLORIDE 0.1 MG/1
0.1 TABLET ORAL EVERY 8 HOURS
Status: DISCONTINUED | OUTPATIENT
Start: 2025-07-19 | End: 2025-07-20 | Stop reason: HOSPADM

## 2025-07-19 RX ORDER — OLANZAPINE 10 MG/2ML
10 INJECTION, POWDER, FOR SOLUTION INTRAMUSCULAR 3 TIMES DAILY PRN
Status: DISCONTINUED | OUTPATIENT
Start: 2025-07-19 | End: 2025-07-19

## 2025-07-19 RX ORDER — DIAZEPAM 5 MG/1
10 TABLET ORAL EVERY 30 MIN PRN
Status: DISCONTINUED | OUTPATIENT
Start: 2025-07-19 | End: 2025-07-20 | Stop reason: HOSPADM

## 2025-07-19 RX ORDER — DEXTROSE MONOHYDRATE 25 G/50ML
25-50 INJECTION, SOLUTION INTRAVENOUS
Status: DISCONTINUED | OUTPATIENT
Start: 2025-07-19 | End: 2025-07-20 | Stop reason: HOSPADM

## 2025-07-19 RX ORDER — ONDANSETRON 4 MG/1
4 TABLET, ORALLY DISINTEGRATING ORAL EVERY 6 HOURS PRN
Status: DISCONTINUED | OUTPATIENT
Start: 2025-07-19 | End: 2025-07-20 | Stop reason: HOSPADM

## 2025-07-19 RX ORDER — FLUMAZENIL 0.1 MG/ML
0.2 INJECTION, SOLUTION INTRAVENOUS
Status: DISCONTINUED | OUTPATIENT
Start: 2025-07-19 | End: 2025-07-20 | Stop reason: HOSPADM

## 2025-07-19 RX ORDER — TRAZODONE HYDROCHLORIDE 50 MG/1
50 TABLET ORAL
Status: DISCONTINUED | OUTPATIENT
Start: 2025-07-19 | End: 2025-07-20 | Stop reason: HOSPADM

## 2025-07-19 RX ORDER — OLANZAPINE 10 MG/1
10 TABLET, ORALLY DISINTEGRATING ORAL 3 TIMES DAILY PRN
Status: DISCONTINUED | OUTPATIENT
Start: 2025-07-19 | End: 2025-07-19

## 2025-07-19 RX ORDER — ACETAMINOPHEN 325 MG/1
650 TABLET ORAL EVERY 4 HOURS PRN
Status: DISCONTINUED | OUTPATIENT
Start: 2025-07-19 | End: 2025-07-20 | Stop reason: HOSPADM

## 2025-07-19 RX ORDER — METFORMIN HYDROCHLORIDE 500 MG/1
1000 TABLET, EXTENDED RELEASE ORAL
Status: DISCONTINUED | OUTPATIENT
Start: 2025-07-19 | End: 2025-07-20 | Stop reason: HOSPADM

## 2025-07-19 RX ORDER — HYDROXYZINE HYDROCHLORIDE 25 MG/1
25 TABLET, FILM COATED ORAL EVERY 4 HOURS PRN
Status: DISCONTINUED | OUTPATIENT
Start: 2025-07-19 | End: 2025-07-19

## 2025-07-19 RX ORDER — DIAZEPAM 10 MG/2ML
5-10 INJECTION, SOLUTION INTRAMUSCULAR; INTRAVENOUS EVERY 30 MIN PRN
Status: DISCONTINUED | OUTPATIENT
Start: 2025-07-19 | End: 2025-07-20 | Stop reason: HOSPADM

## 2025-07-19 RX ORDER — NICOTINE 21 MG/24HR
1 PATCH, TRANSDERMAL 24 HOURS TRANSDERMAL DAILY
Status: DISCONTINUED | OUTPATIENT
Start: 2025-07-19 | End: 2025-07-20 | Stop reason: HOSPADM

## 2025-07-19 RX ORDER — MULTIPLE VITAMINS W/ MINERALS TAB 9MG-400MCG
1 TAB ORAL DAILY
Status: DISCONTINUED | OUTPATIENT
Start: 2025-07-19 | End: 2025-07-20 | Stop reason: HOSPADM

## 2025-07-19 RX ORDER — BENZTROPINE MESYLATE 1 MG/1
1 TABLET ORAL 2 TIMES DAILY
Status: DISCONTINUED | OUTPATIENT
Start: 2025-07-19 | End: 2025-07-20 | Stop reason: HOSPADM

## 2025-07-19 RX ORDER — ALBUTEROL SULFATE 90 UG/1
2 INHALANT RESPIRATORY (INHALATION) EVERY 6 HOURS PRN
Status: DISCONTINUED | OUTPATIENT
Start: 2025-07-19 | End: 2025-07-20 | Stop reason: HOSPADM

## 2025-07-19 RX ORDER — HALOPERIDOL 5 MG/ML
2.5-5 INJECTION INTRAMUSCULAR EVERY 6 HOURS PRN
Status: DISCONTINUED | OUTPATIENT
Start: 2025-07-19 | End: 2025-07-20 | Stop reason: HOSPADM

## 2025-07-19 RX ORDER — DEXTROSE MONOHYDRATE 25 G/50ML
25-50 INJECTION, SOLUTION INTRAVENOUS
Status: DISCONTINUED | OUTPATIENT
Start: 2025-07-19 | End: 2025-07-19

## 2025-07-19 RX ORDER — OLANZAPINE 5 MG/1
5-10 TABLET, ORALLY DISINTEGRATING ORAL EVERY 6 HOURS PRN
Status: DISCONTINUED | OUTPATIENT
Start: 2025-07-19 | End: 2025-07-20 | Stop reason: HOSPADM

## 2025-07-19 RX ORDER — NICOTINE POLACRILEX 4 MG
15-30 LOZENGE BUCCAL
Status: DISCONTINUED | OUTPATIENT
Start: 2025-07-19 | End: 2025-07-19

## 2025-07-19 RX ORDER — FOLIC ACID 1 MG/1
1 TABLET ORAL DAILY
Status: DISCONTINUED | OUTPATIENT
Start: 2025-07-19 | End: 2025-07-20 | Stop reason: HOSPADM

## 2025-07-19 RX ADMIN — SODIUM CHLORIDE 1000 ML: 9 INJECTION, SOLUTION INTRAVENOUS at 01:37

## 2025-07-19 RX ADMIN — Medication 1 TABLET: at 09:37

## 2025-07-19 RX ADMIN — CLONIDINE HYDROCHLORIDE 0.1 MG: 0.1 TABLET ORAL at 19:49

## 2025-07-19 RX ADMIN — CLONIDINE HYDROCHLORIDE 0.1 MG: 0.1 TABLET ORAL at 12:06

## 2025-07-19 RX ADMIN — FOLIC ACID 1 MG: 1 TABLET ORAL at 09:37

## 2025-07-19 RX ADMIN — METFORMIN ER 500 MG 1000 MG: 500 TABLET ORAL at 17:37

## 2025-07-19 RX ADMIN — THIAMINE HCL TAB 100 MG 100 MG: 100 TAB at 09:37

## 2025-07-19 RX ADMIN — NICOTINE 1 PATCH: 21 PATCH, EXTENDED RELEASE TRANSDERMAL at 09:37

## 2025-07-19 RX ADMIN — DIAZEPAM 10 MG: 5 TABLET ORAL at 12:06

## 2025-07-19 RX ADMIN — OLANZAPINE 10 MG: 10 TABLET, FILM COATED ORAL at 19:49

## 2025-07-19 RX ADMIN — OLANZAPINE 10 MG: 10 TABLET, FILM COATED ORAL at 09:37

## 2025-07-19 RX ADMIN — BENZTROPINE MESYLATE 1 MG: 1 TABLET ORAL at 19:49

## 2025-07-19 RX ADMIN — CLONIDINE HYDROCHLORIDE 0.1 MG: 0.1 TABLET ORAL at 04:35

## 2025-07-19 ASSESSMENT — LIFESTYLE VARIABLES
PAROXYSMAL SWEATS: 2
ANXIETY: NO ANXIETY, AT EASE
TACTILE DISTURBANCES: VERY MILD ITCHING, PINS AND NEEDLES, BURNING OR NUMBNESS
AUDITORY DISTURBANCES: NOT PRESENT
AGITATION: NORMAL ACTIVITY
ORIENTATION AND CLOUDING OF SENSORIUM: ORIENTED AND CAN DO SERIAL ADDITIONS
ANXIETY: 2
NAUSEA AND VOMITING: NO NAUSEA AND NO VOMITING
AUDITORY DISTURBANCES: NOT PRESENT
TREMOR: NOT VISIBLE, BUT CAN BE FELT FINGERTIP TO FINGERTIP
AGITATION: NORMAL ACTIVITY
TREMOR: 3
PAROXYSMAL SWEATS: NO SWEAT VISIBLE
HEADACHE, FULLNESS IN HEAD: NOT PRESENT
VISUAL DISTURBANCES: NOT PRESENT
ORIENTATION AND CLOUDING OF SENSORIUM: ORIENTED AND CAN DO SERIAL ADDITIONS
HEADACHE, FULLNESS IN HEAD: NOT PRESENT
TOTAL SCORE: 8
VISUAL DISTURBANCES: NOT PRESENT
TOTAL SCORE: 1
NAUSEA AND VOMITING: NO NAUSEA AND NO VOMITING

## 2025-07-19 ASSESSMENT — ACTIVITIES OF DAILY LIVING (ADL)
ADLS_ACUITY_SCORE: 58

## 2025-07-19 NOTE — CONSULTS
Ravi spend the majority of the day sleeping, he feels much better and the hallucinations are less.  He said he was in a sober treatment and then he went home and he started drinking because the voices were so loud.  His medications did not get refill since May, because  he couldn't see his Md because he does not drive and his  Practices in [place].  He also lost his job at the [employer] and he lives with his parents.

## 2025-07-19 NOTE — ED PROVIDER NOTES
[facility]  [facility] Emergency Department  Observation Initiation Date: Jul 18, 2025    Ravi Ahmadi MRN: [id number]   Age: 35 year old YOB: 1990     History     Chief Complaint   Patient presents with    Hallucinations     HPI  Ravi Ahmadi is a 35 year old male with a past history notable for schizoaffective disorder, anxiety, and alcohol use disorder who presented to the ED with concerns for worsening auditory hallucinations. Presentation further exacerbated by recent return to alcohol use and noncompliance with medication regimen. Ethanol blood level collected on 7/18/25 at 2105 noted to be 0.38. Record review indicates hx of withdrawal seizures.  In the emergency department, this patient was determined to be medically stable and transferred to the EmPATH unit for psychiatric assessment.  They have currently been in the emergency department for 24 hours.     On approach, patient was resting in a recliner. He was agreeable to accompany me to a consult room for assessment. He reports that he feels a bit better since transferring to the unit.  He notes a decrease in intensity of auditory hallucinations and that the voices seem \"less loud.\"  He reports he can no longer discern what these voices are saying.  Prior to coming to the emergency department he describes the voices as \"negative life narrations\" and that they are often derogatory and insult him.  He denies SI/HI and denies any visual hallucinations.  He reports his mood has been down with lack of motivation.  He has not been able to take his medications for at least 2 months given that he was not able to follow-up with his previous provider for refills.  He notes significant life stressors including the recent loss of his job last Thursday.  This triggered a return to alcohol use.  He estimates he has been consuming at least a half of a 1.75 of liquor daily.  Currently, he reports slight tremors, " fatigue, and nausea.  He does report a history of withdrawal seizures.  When taking his medications and sober, he perceives his medication regimen to be beneficial.  Most recently he was stabilized on a combination of olanzapine, sertraline, trazodone, Cogentin, and as needed Seroquel.  He is goal oriented and would like to resume medications and obtain additional substance use and outpatient mental health supports.  Discussed remaining under observation allowing for additional monitoring of withdrawal while resuming medications, he is agreeable to this.  He denies other substance use aside from alcohol and tobacco.         Past Medical History  Past Medical History:   Diagnosis Date    Alcohol use disorder     Alcohol withdrawal hallucinosis (H)     Alcohol withdrawal seizure (H)     Bipolar disorder (H)     Obesity (BMI 30-39.9)     Type 2 diabetes mellitus (H)      No past surgical history on file.  albuterol (PROAIR HFA/PROVENTIL HFA/VENTOLIN HFA) 108 (90 Base) MCG/ACT inhaler  glucose (BD GLUCOSE) 4 g chewable tablet  insulin glargine (LANTUS PEN) 100 UNIT/ML pen  lisinopril (ZESTRIL) 10 MG tablet  naltrexone (DEPADE/REVIA) 50 MG tablet  OLANZapine (ZYPREXA) 10 MG tablet  QUEtiapine (SEROQUEL) 25 MG tablet  sertraline (ZOLOFT) 100 MG tablet  traZODone (DESYREL) 50 MG tablet  Vitamin D3 (CHOLECALCIFEROL) 25 mcg (1000 units) tablet  blood glucose (NO BRAND SPECIFIED) test strip  blood glucose monitoring (NO BRAND SPECIFIED) meter device kit  insulin pen needle (31G X 8 MM) 31G X 8 MM miscellaneous  metFORMIN (GLUCOPHAGE XR) 500 MG 24 hr tablet  pantoprazole (PROTONIX) 40 MG EC tablet  thin (NO BRAND SPECIFIED) lancets      No Known Allergies  Family History  No family history on file.  Social History   Social History     Tobacco Use    Smoking status: Every Day     Types: Cigarettes    Smokeless tobacco: Never   Vaping Use    Vaping status: Never Used   Substance Use Topics    Alcohol use: Not Currently    Drug  use: Not Currently     Types: Marijuana          Review of Systems  A medically appropriate review of systems was performed with pertinent positives and negatives noted in the HPI, and all other systems negative.    Physical Examination   BP: (!) 129/94  Pulse: 111  Temp: 98.3  F (36.8  C)  Resp: 18  Height: 182.9 cm (6')  Weight: 99.8 kg (220 lb)  SpO2: 95 %    Physical Exam  General: Appears stated age.   Neuro: Alert and fully oriented. Extremities appear to demonstrate normal strength on visual inspection.   Integumentary/Skin: no rash visualized, normal color    Psychiatric Examination   Appearance: awake, alert, adequately groomed, appeared as age stated, and casually dressed  Attitude:  cooperative  Eye Contact:  fair  Mood:  depressed  Affect:  mood congruent and intensity is flat  Speech:  clear, coherent and normal prosody  Psychomotor Behavior:  no evidence of tardive dyskinesia, dystonia, or tics, intact station, gait and muscle tone, and slightlly tremulous   Thought Process:  linear  Associations:  no loose associations  Thought Content:  no evidence of suicidal ideation or homicidal ideation, auditory hallucinations present, and no visual hallucinations present  Insight:  fair  Judgement:  fair  Oriented to:  time, person, and place  Attention Span and Concentration:  fair  Recent and Remote Memory:  fair  Language: able to name/identify objects without impairment  Fund of Knowledge: intact with awareness of current and past events    ED Course     ED Course as of 07/19/25 1847 Fri Jul 18, 2025 1945 I obtained history and examined the patient as noted above.        Labs Ordered and Resulted from Time of ED Arrival to Time of ED Departure   COMPREHENSIVE METABOLIC PANEL - Abnormal       Result Value    Sodium 139      Potassium 3.5      Carbon Dioxide (CO2) 22      Anion Gap 21 (*)     Urea Nitrogen 6.6      Creatinine 0.74      GFR Estimate >90      Calcium 9.1      Chloride 96 (*)     Glucose 223  (*)     Alkaline Phosphatase 86      AST 29      ALT 15      Protein Total 8.0      Albumin 4.7      Bilirubin Total 0.7     ETHANOL LEVEL BLOOD - Abnormal    Ethanol Level Blood 0.38 (*)    GLUCOSE BY METER - Abnormal    GLUCOSE BY METER POCT 193 (*)    GLUCOSE BY METER - Abnormal    GLUCOSE BY METER POCT 157 (*)    GLUCOSE BY METER - Abnormal    GLUCOSE BY METER POCT 186 (*)    MAGNESIUM - Normal    Magnesium 2.3     CBC WITH PLATELETS AND DIFFERENTIAL    WBC Count 8.0      RBC Count 5.23      Hemoglobin 16.6      Hematocrit 47.1      MCV 90      MCH 31.7      MCHC 35.2      RDW 13.0      Platelet Count 207      % Neutrophils 49      % Lymphocytes 43      % Monocytes 7      % Eosinophils 0      % Basophils 1      % Immature Granulocytes 0      NRBCs per 100 WBC 0      Absolute Neutrophils 3.9      Absolute Lymphocytes 3.5      Absolute Monocytes 0.6      Absolute Eosinophils 0.0      Absolute Basophils 0.1      Absolute Immature Granulocytes 0.0      Absolute NRBCs 0.0     GLUCOSE MONITOR NURSING POCT   GLUCOSE MONITOR NURSING POCT   GLUCOSE MONITOR NURSING POCT   GLUCOSE MONITOR NURSING POCT   GLUCOSE MONITOR NURSING POCT   GLUCOSE MONITOR NURSING POCT   GLUCOSE MONITOR NURSING POCT   GLUCOSE MONITOR NURSING POCT   GLUCOSE MONITOR NURSING POCT       Assessments & Plan (with Medical Decision Making)   Patient presenting with decompensated state of schizoaffective bipolar disorder further exacerbated by recent return to alcohol use and noncompliance with medication regimen x 2 months. Patient reports he has been consuming a half of a 1.75L of liquor daily for the past week with return to use starting several weeks prior to this. Patient does report hx of withdrawal seizures. CIWA currently ordered with symptom triggered valium to further mitigate sx of withdrawal. Treatment plan focused on resumption of medications targeting symptoms of psychosis and depression while monitoring for withdrawal. Patient would benefit  from additional J[facility]ON supports and referrals for OP therapy and psychiatric medication management upon discharge.   Nursing notes reviewed noting no acute issues.     I have reviewed the assessment completed by the [facility].     During the observation period, the patient did not require medications for agitation, and did not require restraints/seclusion for patient and/or provider safety.     The patient was found to have a psychiatric condition that would benefit from an observation stay in the emergency department for further psychiatric stabilization and/or coordination of a safe disposition. The observation plan includes serial assessments of psychiatric condition, potential administration of medications if indicated, further disposition pending the patient's psychiatric course during the monitoring period.     Preliminary diagnosis:    ICD-10-CM    1. Auditory hallucinations  R44.0       2. Alcohol use disorder  F10.90       3. Schizoaffective disorder, bipolar type (H)  F25.0            Treatment Plan:  -Continue olanzapine 10mg twice daily targeting affect stabilization and psychosis, patient has previously tolerated 10mg every morning and 20mg at bedtime. Consider additional dose optimization pending tolerability   -Resume cogentin 1mg twice daily for antipsychotic side effect management   -Resume sertraline 50mg daily targeting depressive symptoms  -Trazodone 50mg at bedtime as needed for sleep   -EmPATH standing order medications available   -CIWA ordered with sx triggered valium to mitigate for symptoms of alcohol withdrawal, patient does report hx of withdrawal seizures   -Nicotine replacement ordered   -Labs reviewed, ethanol blood level on 7/18/25 0.38  -Recommendation to abstain from substance use, JHON assessment ordered per patient request   -Patient will need referral for psychiatric medication management and therapy upon discharge  -Problem focused supportive therapy and education provided today related  to patient's current and acute stressors, symptoms, and diagnoses.  -Remain at Em[facility] under observation with reassessment tomorrow      --  MAKENNA Medrano CNP   [facility] EMERGENCY DEPT  EmPATH Unit       Frannie Alcala APRN CNP  07/19/25 0063

## 2025-07-19 NOTE — ED PROVIDER NOTES
Emergency Department Note      History of Present Illness     Chief Complaint   Hallucinations      HPI   Ravi Ahmadi is a 35 year old male with history of alcohol abuse who presents to the ED with hallucinations. Patient reports after drinking today he started having auditory hallucinations telling him to hurt himself. He has not done anything to hurt himself. His last drink was earlier today. He was sober for 6 months and began drinking again about a month ago. He has a history of withdrawal seizures. He reports he is not taking his psych meds and has not taken them in 2 months because they have not been available. He denies recent illness or injuries.       Independent Historian   None    Review of External Notes     I reviewed the nurse triage note.  Patient was reported to be hitting head on the wall to stop the voices.  Stated that he was drinking to stop the voices.      I reviewed the discharge summary from hospitalization January 31, 2025 through February 5, 2025.  Patient was discharged with a diagnosis of acute alcohol withdrawal syndrome with hallucinations.  Carries a diagnosis of bipolar disorder with psychotic features.  History of diabetes.    Past Medical History     Medical History and Problem List   Alcohol use disorder   Alcohol withdrawal seizures   Bipolar disorder   Obesity   Type 2 diabetes mellitus      Medications   Albuterol inhaler  Benztropine   insulin glargine  Lisinopril   Naltrexone    Olanzapine   Sertraline     Physical Exam     Patient Vitals for the past 24 hrs:   BP Temp Temp src Pulse Resp SpO2 Height Weight   07/18/25 2245 -- -- -- -- -- 92 % -- --   07/18/25 2230 (!) 142/106 -- -- 98 18 94 % -- --   07/18/25 2225 -- -- -- -- -- 93 % -- --   07/18/25 2215 (!) 148/107 -- -- 98 20 (!) 91 % -- --   07/18/25 2200 (!) 140/94 -- -- 93 20 (!) 90 % -- --   07/18/25 2145 (!) 142/99 -- -- 95 -- (!) 90 % -- --   07/18/25 2130 (!) 140/98 -- -- 95 -- 93 % -- --   07/18/25 2045 (!)  132/121 -- -- 103 -- 96 % -- --   07/18/25 2037 -- -- -- -- -- 97 % -- --   07/18/25 2030 (!) 143/96 -- -- 99 -- (!) 85 % -- --   07/18/25 1931 (!) 129/94 98.3  F (36.8  C) Temporal 111 18 95 % 1.829 m (6') 99.8 kg (220 lb)     Physical Exam  General: Well-nourished  Eyes: PERRL, conjunctivae pink no scleral icterus or conjunctival injection  ENT:  Moist mucus membranes, posterior oropharynx clear without erythema or exudates  Respiratory:  Lungs clear to auscultation bilaterally, no crackles/rubs/wheezes.  Good air movement  CV: Normal rate and rhythm, no murmurs/rubs/gallops  GI:  Abdomen soft and non-distended.  Normoactive BS.  No tenderness, guarding or rebound  Skin: Warm, dry.  No rashes or petechiae  Musculoskeletal: No peripheral edema or calf tenderness  Neuro: Alert and oriented to person/place/time  Psychiatric: Mildly intoxicated affect, anxious. Calm and cooperative    Diagnostics     Lab Results   Labs Ordered and Resulted from Time of ED Arrival to Time of ED Departure   COMPREHENSIVE METABOLIC PANEL - Abnormal       Result Value    Sodium 139      Potassium 3.5      Carbon Dioxide (CO2) 22      Anion Gap 21 (*)     Urea Nitrogen 6.6      Creatinine 0.74      GFR Estimate >90      Calcium 9.1      Chloride 96 (*)     Glucose 223 (*)     Alkaline Phosphatase 86      AST 29      ALT 15      Protein Total 8.0      Albumin 4.7      Bilirubin Total 0.7     ETHANOL LEVEL BLOOD - Abnormal    Ethanol Level Blood 0.38 (*)    MAGNESIUM - Normal    Magnesium 2.3     CBC WITH PLATELETS AND DIFFERENTIAL    WBC Count 8.0      RBC Count 5.23      Hemoglobin 16.6      Hematocrit 47.1      MCV 90      MCH 31.7      MCHC 35.2      RDW 13.0      Platelet Count 207      % Neutrophils 49      % Lymphocytes 43      % Monocytes 7      % Eosinophils 0      % Basophils 1      % Immature Granulocytes 0      NRBCs per 100 WBC 0      Absolute Neutrophils 3.9      Absolute Lymphocytes 3.5      Absolute Monocytes 0.6       Absolute Eosinophils 0.0      Absolute Basophils 0.1      Absolute Immature Granulocytes 0.0      Absolute NRBCs 0.0     GLUCOSE MONITOR NURSING POCT   GLUCOSE MONITOR NURSING POCT   GLUCOSE MONITOR NURSING POCT   GLUCOSE MONITOR NURSING POCT       Imaging   CT Cervical Spine w/o Contrast   Final Result   IMPRESSION:   HEAD CT:   1.  No acute intracranial process.      CERVICAL SPINE CT:   1.  No CT evidence for acute fracture or post traumatic subluxation.      Head CT w/o contrast   Final Result   IMPRESSION:   HEAD CT:   1.  No acute intracranial process.      CERVICAL SPINE CT:   1.  No CT evidence for acute fracture or post traumatic subluxation.          Independent Interpretation   CT Head: No intracranial hemorrhage.    ED Course      Medications Administered   Medications   OLANZapine (zyPREXA) tablet 10 mg (10 mg Oral Not Given 7/18/25 2000)   hydrOXYzine HCl (ATARAX) tablet 25 mg (has no administration in time range)   melatonin tablet 3 mg (has no administration in time range)   sodium chloride 0.9% BOLUS 1,000 mL (has no administration in time range)   glucose gel 15-30 g (has no administration in time range)     Or   dextrose 50 % injection 25-50 mL (has no administration in time range)     Or   glucagon injection 1 mg (has no administration in time range)       Procedures   Procedures     Discussion of Management   DEC  Maude    ED Course   ED Course as of 07/19/25 0119   Fri Jul 18, 2025 1945 I obtained history and examined the patient as noted above.    Consulted with Maude of the DEC  who recommended empath.  Patient was voluntary at this time.    Additional Documentation  Social Determinants of Health: Healthcare Access/Compliance  and Stress/Adjustment Disorders     Medical Decision Making / Diagnosis     DONTAE   Ravi ROSIE Daiana is a 35 year old male who has a history of alcohol use disorder history of bipolar disorder with psychosis as well as diabetes who comes today with  concern of auditory hallucinations, relapse of his drinking alcohol as well as to his head from being up against a wall.  He is clinically intoxicated and intoxicated by blood work as well.  Blood sugar is mildly elevated but no signs of DKA.  Remainder of laboratory studies are reassuring.  Head CT and CT of the cervical spine were indicated and obtained unfortunately show evidence of intracranial hemorrhage, skull fracture or cervical spine fracture.  Sliding-scale insulin was written for as well as vitamin repletion.  Patient has no signs of alcohol withdrawal but will need to be monitored for this.  The main concern at this point is with auditory hallucinations and his decompensated mental health.  The DEC  recommended transfer to [facility] the patient is medically cleared at this time for transfer and awaits bed availability.  Patient has not been taking any of his medications after medication reconciliation and will need to be restarted on these.  I wrote for his metformin to start at half the previous dose.  He will board in the emergency department until availability in the empath unit or for further psychiatric consultation.    Disposition   The patient was cleared for transfer to [facility].  He boards in the emergency department at this time awaiting transfer.    Diagnosis     ICD-10-CM    1. Auditory hallucinations  R44.0       2. Alcohol use disorder  F10.90            Discharge Medications   New Prescriptions    No medications on file         Scribe Disclosure:  Amadou MOORE, am serving as a scribe at 7:39 PM on 7/18/2025 to document services personally performed by Regina Farah MD based on my observations and the provider's statements to me.        Regina Farah MD  07/19/25 0128

## 2025-07-19 NOTE — CONSULTS
"Diagnostic Evaluation Consultation  Crisis Assessment    Patient Name: Ravi Ahmadi  Age:  35 year old  Legal Sex: male  Gender Identity: male  Pronouns: he/him/his     Race: White  Ethnicity: Not  or   Language: English      Patient was assessed: In person   Crisis Assessment Start Date: 07/18/25  Crisis Assessment Start Time: 2030  Crisis Assessment Stop Time: 2050  Patient location: [facility] Emergency Dept                             ED19    Referral Data and Chief Complaint  Ravi Ahmadi presents to the ED via EMS. Patient is presenting to the ED for the following concerns: Suicidal ideation, Substance use, Intoxication (auditory hallucinations). Factors that make the mental health crisis life threatening or complex are:     Patient presented to the emergency room due to auditory hallucinations further complicated by substance use. Pt mother called EMS after pt was reportedly banging his head for over twenty minutes to drown the voices. Pt stated that he had \"a lot\" to drink today. His TC was 0.38 at 21:05. Pt stated that he drinks to drown the voices that are negative and commanding in nature, often telling him to kill himself by taking pills. Pt stated he hears the voices constantly, all day and everyday, even when he is sober. Pt expressed passive suicidal thinking currently, wishing he was dead. He denied having a plan or intent to act on these thoughts..      Informed Consent and Assessment Methods  Explained the crisis assessment process, including applicable information disclosures and limits to confidentiality, assessed understanding of the process, and obtained consent to proceed with the assessment.  Assessment methods included conducting a formal interview with patient, review of medical records, collaboration with medical staff, and obtaining relevant collateral information from family and community providers when available.  : done     History of the Crisis "   Patient has previous mental health diagnoses of alcohol use disorder,  alcohol withdrawal with previous withdrawal seizures and hallucinations, depression, and anxiety. Pt stated that he started having auditory hallucinations two years ago. Per chart review, pt developed both AH and VH in the summer of 2023 in the context of alcohol withdrawal. Pt stated that he was in CD treatment in February of this year, but discharged himself in March. He stated that he had been sober until he had a relapse at the end of April due to the voices being unbearable. Pt stated that he has not been on psychiatric medications since April because he was unable to obtain refills. He believes he was taking Seroquel, but did not find it helpful. He would like to get restarted on an antipsychotic medication.           Patient identified his stressors as difficulty keeping a job and depending on his mother and stepfather for housing. Pt stated that he started a new job about one month ago as a . He reported being sober on Sunday and Monday because he was scheduled to work. He was on-call on Tuesday, but showed up to work intoxicated and was consequently fired. He reported binge drinking whiskey everyday since.    Brief Psychosocial History  Family:  Single, Children no  Support System:  Parent(s)  Employment Status:  unemployed  Source of Income:     Financial Environmental Concerns:  unable to afford rent/mortgage, unemployed  Current Hobbies:  social media/computer activities, television/movies/videos  Barriers in Personal Life:  mental health concerns, behavioral concerns, lack of companionship, lack of motivation    Significant Clinical History  Current Anxiety Symptoms:  excessive worry, anxious, racing thoughts, obsessions/compulsions  Current Depression/Trauma:  irritable, excessive guilt, impaired decision making, negativistic, low self esteem, helplessness, sadness, thoughts of death/suicide,  "avoidance, difficulty concentrating, crying or feels like crying  Current Somatic Symptoms:     Current Psychosis/Thought Disturbance:  forgetful, impulsive, displaces blame, auditory hallucinations  Current Eating Symptoms:   (none reported)  Chemical Use History:  Alcohol: Binge  Last Use:: 07/18/25  Benzodiazepines: None  Opiates: None  Cocaine: None  Marijuana: None  Other Use: None  Withdrawal Symptoms: Hallucinations, Seizures  Addictions:  (none reported)   Past diagnosis:  Anxiety Disorder, Depression, Bipolar Disorder, Substance Use Disorder  Family history:  Substance Use Disorder  Past treatment:  Individual therapy, Primary Care, Psychiatric Medication Management, Inpatient Hospitalization  Details of most recent treatment:  Medications were managed by PCP. He stated that he has been off medications since April. Patient was admitted IP for alcohol withdrawal 1/31-2/5/25.      Have there been any medication changes in the past two weeks:  patient is not on psychiatric meds            Collateral Information  Is there collateral information: Yes     Collateral information name, relationship, phone number:  Julia Jones (Mother)  [phone]    What happened today: Julia reported that when she came home from work, she head pounding on the wall from patient's room. It went on for 20 minutes and it was so loud that it sounded like construction. When she asked him what he was doing, he said he was banging his head to get rid of the voices. Julia called a nurse triage line, who recommended the ED.     What is different about patient's functioning: Julia stated that patient struggles with alcoholism. When he got a job a month or two ago, we were all very happy. She thinks he was drinking during his days off. However it seems like he was \"drinking gallons\" everyday this week since he was fired.  All of last week, he held a wash cloth in his mouth or was screaming into a pillow.  Julia stated that patient " hears voices when he is sober too. She is not certain about his drinking habits as he stays in his room most of the time.       Has patient made comments about wanting to kill themselves/others: yes    If d/c is recommended, can they take part in safety/aftercare planning:  yes (She wants patient to go to IP for psychiatry, not CD treatment.)         Risk Assessment  [place] Suicide Severity Rating Scale Full Clinical Version:  Suicidal Ideation  Q1 Wish to be Dead (Lifetime): Yes  Q2 Non-Specific Active Suicidal Thoughts (Lifetime): Yes  3. Active Suicidal Ideation with any Methods (Not Plan) Without Intent to Act (Lifetime): No  4. Active Suicidal Ideation with Some Intent to Act, Without Specific Plan (Lifetime): Yes  5. Active Suicidal Ideation with Specific Plan and Intent (Lifetime): No  Q6 Suicide Behavior (Lifetime): no  Intensity of Ideation (Lifetime)  Most Severe Ideation Rating (Lifetime): 4  Description of Most Severe Ideation (Lifetime): Pt reported having SI, due to wanting the voices to stop.  Frequency (Lifetime): Daily or almost daily  Duration (Lifetime): 4-8 hours/most of day  Controllability (Lifetime): Unable to control thoughts  Deterrents (Lifetime): Deterrents probably stopped you  Reasons for Ideation (Lifetime): Mostly to end or stop the pain (You couldn't go on living with the pain or how you were feeling) (to stop the voices)  Suicidal Behavior (Lifetime)  Actual Attempt (Lifetime): No  Has subject engaged in non-suicidal self-injurious behavior? (Lifetime): No  Interrupted Attempts (Lifetime): No  Aborted or Self-Interrupted Attempt (Lifetime): Yes  Total Number of Aborted or Self-Interrupted Attempts (Lifetime): 1  Aborted or Self-Interrupted Attempt Description (Lifetime): history of overdose and alcohol intoxication  Preparatory Acts or Behavior (Lifetime): No    [place] Suicide Severity Rating Scale Recent:   Suicidal Ideation (Recent)  Q1 Wished to be Dead (Past Month): yes  Q2  Suicidal Thoughts (Past Month): yes  Q3 Suicidal Thought Method: yes  Q4 Suicidal Intent without Specific Plan: no  Q5 Suicide Intent with Specific Plan: no  Level of Risk per Screen: moderate risk  Intensity of Ideation (Recent)  Most Severe Ideation Rating (Past 1 Month): 3  Description of Most Severe Ideation (Past 1 Month): Pt endorsed SI with thoughts to take pills.  Frequency (Past 1 Month): Daily or almost daily  Duration (Past 1 Month): 1-4 hours/a lot of time  Controllability (Past 1 Month): Unable to control thoughts  Deterrents (Past 1 Month): Deterrents probably stopped you  Reasons for Ideation (Past 1 Month): Mostly to end or stop the pain (You couldn't go on living with the pain or how you were feeling)  Suicidal Behavior (Recent)  Actual Attempt (Past 3 Months): No  Has subject engaged in non-suicidal self-injurious behavior? (Past 3 Months): No  Interrupted Attempts (Past 3 Months): No  Aborted or Self-Interrupted Attempt (Past 3 Months): No  Preparatory Acts or Behavior (Past 3 Months): No    Environmental or Psychosocial Events: work or task failure, impulsivity/recklessness, unemployment/underemployment, other life stressors, ongoing abuse of substances, neither working nor attending school, social isolation, loss of status/respect/rank, helplessness/hopelessness, recent life events (see comment) (recently fired from job)  Protective Factors: Protective Factors: strong bond to family unit, community support, or employment, responsibilities and duties to others, including pets and children, lives in a responsibly safe and stable environment, help seeking, able to access care without barriers    Does the patient have thoughts of harming others? Feels Like Hurting Others: no  Previous Attempt to Hurt Others: no  Current presentation:  (sad)  Is the patient engaging in sexually inappropriate behavior?: no  Does Patient have a known history of aggressive behavior: No  Has aggression occurred as a  result of  concerns/diagnosis: no  Does patient have history of aggression in hospital: no    Is the patient engaging in sexually inappropriate behavior?  no        Mental Status Exam   Affect: Appropriate  Appearance: Disheveled  Attention Span/Concentration: Attentive  Eye Contact: Engaged    Fund of Knowledge: Appropriate   Language /Speech Content: Fluent  Language /Speech Volume: Normal  Language /Speech Rate/Productions: Normal  Recent Memory: Intact  Remote Memory: Intact  Mood: Depressed  Orientation to Person: Yes   Orientation to Place: Yes  Orientation to Time of Day: Yes  Orientation to Date: Yes     Situation (Do they understand why they are here?): Yes  Psychomotor Behavior: Normal  Thought Content: Hallucinations  Thought Form: Goal Directed         Medication  Psychotropic medications:   Medication Orders - Psychiatric (From admission, onward)      Start     Dose/Rate Route Frequency Ordered Stop    07/18/25 2000  OLANZapine (zyPREXA) tablet 10 mg         10 mg Oral 2 TIMES DAILY 07/18/25 1950 07/18/25 1950  hydrOXYzine HCl (ATARAX) tablet 25 mg         25 mg Oral AT BEDTIME PRN 07/18/25 1950               Current Care Team  Patient Care Team:  Mayte Hill MD as PCP - General (Internal Medicine)  Mayte Hill MD as Assigned PCP  Mayte Vickers as Diabetes Educator (Dietitian, Registered)    Diagnosis  Patient Active Problem List   Diagnosis Code    Alcohol withdrawal seizure (H) F10.939, R56.9    Type 2 diabetes mellitus (H) E11.9    Alcohol use disorder F10.90    Bipolar disorder (H) F31.9    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    Obesity (BMI 30-39.9) E66.9    Suicidal ideation R45.851    Psychosis, unspecified psychosis type (H) F29    Alcohol use disorder, severe, dependence (H) F10.20       Primary Problem This Admission  Active Hospital Problems    *Psychosis, unspecified psychosis type (H)      Alcohol use disorder, severe, dependence (H)        Clinical  Summary and Substantiation of Recommendations   Clinical Substantiation:  Patient is a 35 year old male with a psychiatric history notable for alcohol use disorder, alcohol withdrawal with previous withdrawal seizures and hallucinations, depression, and anxiety who presented to the ED due to worsening auditory hallucinations and depression further complicated by substance use. Pt mother called EMS after pt was reportedly banging his head for over twenty minutes to drown the voices. Pt's TC was 0.38 at 21:05. Pt stated that he drinks to drown the voices that are negative and commanding in nature, often telling him to kill himself by taking pills.  Pt stated he hears the voices constantly, all day and everyday, even when he is sober. Pt expressed passive suicidal thinking currently, wishing he was dead. He denied having a plan or intent to act on these thoughts. Pt stated that he has not been on psychiatric medications since April of this year because he was unable to obtain refills. He would like to get restarted on an antipsychotic medication. Patient becomes overwhelmed when discussing IP MH admission. He is agreeable to remain in observation and meet with a psychiatrist tomorrow. Pending medical clearance for [facility] transfer.    Goals for crisis stabilization:  Stablization, safety planning    Next steps for Care Team:  Pending psych consult    Treatment Objectives Addressed:  rapport building, processing feelings, assessing safety, identifying treatment goals    Therapeutic Interventions:  Reviewed healthy living that supports positive mental health, including looking at sleep hygiene, regular movement, nutrition, and regular socialization., Engaged in cognitive restructuring/ reframing, looked at common cognitive distortions and challenged negative thoughts., Provided positive reinforcement for progress towards goals, gains in knowledge, and application of skills previously taught., Worked on relapse prevention  planning (review of stressors, early warning signs, written plan to respond to signs, and rehearse plan)., Explored motivation for behavioral change.    Has a specific means been identified for suicidal/homicide actions: No (Patient denied SI currently, but has had thoughts to overdose on medications. Pt is not on prescribed medications at this time.)      Patient coping skills attempted to reduce the crisis:  Patient is help seeking    Disposition  Recommended referrals: Individual Therapy, Medication Management, Programmatic Care, [facility] Comprehensive Assessment        Reviewed case and recommendations with attending provider. Attending Name: Dr. Farah       Attending concurs with disposition: yes       Patient and/or validated legal guardian concurs with disposition: yes      Final disposition:  observation       Legal status: Voluntary/Patient has signed consent for treatment                                                             Reviewed court records: yes       Assessment Details   Total duration spent with the patient: 20 min     CPT code(s) utilized: 99069 - Psychotherapy (with patient) - 30 (16-37*) min    ROBERT Cabrales, Psychotherapist  [facility] - Triage & Transition Services  Callback: [phone]

## 2025-07-19 NOTE — PHARMACY-ADMISSION MEDICATION HISTORY
Pharmacist Admission Medication History    Admission medication history is complete. The information provided in this note is only as accurate as the sources available at the time of the update.    Information Source(s): Patient and CareEverywhere/SureScripts via in-person    Pertinent Information:   - Patient reports that the pharmacy stopped filling meds in April, and stated that he needs to have a doctors appt but never followed up and so hasn't really had meds since April.   - He doesn't take any insulin currently but was using 30u nightly and he doesn't currently check his sugars.     Changes made to PTA medication list:  Added:   Metformin, Pantoprazole, QTP  Deleted:   Olanzapine PRN  Changed:   Sertraline 50mg daily --> 100mg daily    Allergies reviewed with patient and updates made in EHR: no    Medication History Completed By: Karin Alaniz [facility] 7/18/2025 9:25 PM    PTA Med List   Medication Sig Last Dose/Taking    albuterol (PROAIR HFA/PROVENTIL HFA/VENTOLIN HFA) 108 (90 Base) MCG/ACT inhaler Inhale 2 puffs into the lungs every 4 hours as needed for wheezing Taking As Needed    benztropine (COGENTIN) 1 MG tablet Take 1 tablet (1 mg) by mouth 2 times daily. More than a month    glucose (BD GLUCOSE) 4 g chewable tablet Take 1 tablet by mouth every hour as needed for low blood sugar Taking As Needed    insulin glargine (LANTUS PEN) 100 UNIT/ML pen Inject 1 Units subcutaneously at bedtime. (Patient taking differently: Inject 30 Units subcutaneously at bedtime.) More than a month    lisinopril (ZESTRIL) 10 MG tablet Take 1 tablet (10 mg) by mouth daily. More than a month    metFORMIN (GLUCOPHAGE XR) 500 MG 24 hr tablet Take 2,000 mg by mouth daily (with dinner). More than a month    naltrexone (DEPADE/REVIA) 50 MG tablet Take 1 tablet (50 mg) by mouth daily. More than a month    OLANZapine (ZYPREXA) 10 MG tablet Take 1 tablet (10 mg) by mouth every morning AND 2 tablets (20 mg) at bedtime. More than a  month    pantoprazole (PROTONIX) 40 MG EC tablet Take 40 mg by mouth daily. More than a month    QUEtiapine (SEROQUEL) 25 MG tablet Take 25 mg by mouth 4 times daily as needed. Taking As Needed    sertraline (ZOLOFT) 100 MG tablet Take 100 mg by mouth daily. More than a month    traZODone (DESYREL) 50 MG tablet Take 1 tablet (50 mg) by mouth nightly as needed for sleep. More than a month    Vitamin D3 (CHOLECALCIFEROL) 25 mcg (1000 units) tablet Take 1 tablet (25 mcg) by mouth daily More than a month

## 2025-07-19 NOTE — DISCHARGE INSTRUCTIONS
Upcoming Appointments    Type: Teletherapy  Date: Tuesday, 7/22/2025  Time: 10:00 am - 10:59 am  Provider: Antoinette GONZALEZ  LICSW  Location: [facility], [facility], [address] [place] [address], [facility] [address] [address]  Phone: [phone]    Patient instructions  All intake appointments are conducted via telehealth. Please check your inbox for a link to your [facility] client portal and complete your intake paperwork at least 24 hours in advance. Please call our clinic at [phone] within the next business day to confirm your appointment.    *~*~*~*~*~*~*~*~*~*~*~*~*~*~*~*~*~*~*~*~*~*~*~*~*~*~*~*~*~*~*~*~*~*~*~*~*~*~*~*~*~*~*    Type: Telepsychiatry  Date: Wednesday, 7/23/2025  Time: 10:00 am - 11:00 am  Provider: Viola Pitts  MSN  CNP,PMHNP  Location: [address], [place] [address], [place] [address]  Phone: [phone]    Patient instructions  Before your appointment, you must speak with our Intake Department. Our intake team will attempt to contact you. If you do not hear from them within 24 hours, please call them at [phone] and tell them you are a [facility] referral. If you do not speak with our Intake Department and complete the necessary paperwork they send you, we cannot see you at your scheduled appointment time.

## 2025-07-19 NOTE — PLAN OF CARE
Ravi Ahmadi  July 18, 2025  Plan of Care Hand-off Note     Patient Recommended Care Path: observation    Clinical Substantiation:  Patient is a 35 year old male with a psychiatric history notable for alcohol use disorder, alcohol withdrawal with previous withdrawal seizures and hallucinations, depression, and anxiety who presented to the ED due to worsening auditory hallucinations and depression further complicated by substance use. Pt mother called EMS after pt was reportedly banging his head for over twenty minutes to drown the voices. Pt's TC was 0.38 at 21:05. Pt stated that he drinks to drown the voices that are negative and commanding in nature, often telling him to kill himself by taking pills.  Pt stated he hears the voices constantly, all day and everyday, even when he is sober. Pt expressed passive suicidal thinking currently, wishing he was dead. He denied having a plan or intent to act on these thoughts. Pt stated that he has not been on psychiatric medications since April of this year because he was unable to obtain refills. He would like to get restarted on an antipsychotic medication. Patient becomes overwhelmed when discussing IP MH admission. He is agreeable to remain in observation and meet with a psychiatrist tomorrow. Pending medical clearance for [facility] transfer.      Goals for crisis stabilization:  Stablization, safety planning    Next steps for Care Team:  Pending psych consult    Treatment Objectives Addressed:  rapport building, processing feelings, assessing safety, identifying treatment goals    Therapeutic Interventions:  Reviewed healthy living that supports positive mental health, including looking at sleep hygiene, regular movement, nutrition, and regular socialization., Engaged in cognitive restructuring/ reframing, looked at common cognitive distortions and challenged negative thoughts., Provided positive reinforcement for progress towards goals, gains in knowledge, and  application of skills previously taught., Worked on relapse prevention planning (review of stressors, early warning signs, written plan to respond to signs, and rehearse plan)., Explored motivation for behavioral change.    Has a specific means been identified for suicidal.homicide actions: No (Patient denied SI currently, but has had thoughts to overdose on medications. Pt is not on prescribed medications at this time.)        Patient coping skills attempted to reduce the crisis:  Patient is help seeking            Collateral contact information:  Julia Jones (Mother)  [phone]    Legal Status: Voluntary/Patient has signed consent for treatment                                                                                                                                 Reviewed court records: yes     Psychiatry Consult: Patient has Psychiatry Consult Order    ROBERT Cabrales

## 2025-07-19 NOTE — ED NOTES
35 year old male with Hx  ETOH abuse, Hx seizures, DM Type 2,  received from ED due to command Auditory Hallucinations, SI with no plan, worsening of depression, and anxiety.   Pt's  Mother called EMS after Pt was reportedly banging his head for over 20 minutes to drown the voices. Pt stated he hears the voices constantly, even when he is sober. Pt stated that he has not been on psychiatric medications since April of this year because he was unable to obtain refills.     Pt's ETOH level  0.38 at 21:05 on 07/18/2025.    On admission Pt denies SI/HI, [facility], contracted for safety.   Nursing and risk assessments completed. Admission information reviewed with patient. Patient given a tour of [facility] and instructions on using the facility. Questions regarding [facility] addressed. Pt safety search completed. Fluids provided, snacks offered but Pt declined the offer.   Will continue to monitor Pt for safety and any changes in mood and behavior.

## 2025-07-19 NOTE — ED TRIAGE NOTES
GUILHERME lives with mom, neighbors called 911 because he has been screaming all week. Lost job recently. Hx of ETOH, was sober for six month until today. Hx of schizophrenia, having auditory hallucinations. Some voices telling him to hurt self but pt denies SI. Glucose 194, pt is diabetic. Vomited last couple of days. Zofran given in route. Pt is out of his meds.

## 2025-07-20 VITALS
HEIGHT: 72 IN | TEMPERATURE: 98.4 F | WEIGHT: 209.8 LBS | HEART RATE: 78 BPM | RESPIRATION RATE: 18 BRPM | OXYGEN SATURATION: 98 % | SYSTOLIC BLOOD PRESSURE: 147 MMHG | DIASTOLIC BLOOD PRESSURE: 84 MMHG | BODY MASS INDEX: 28.42 KG/M2

## 2025-07-20 LAB
GLUCOSE BLDC GLUCOMTR-MCNC: 174 MG/DL (ref 70–99)
GLUCOSE BLDC GLUCOMTR-MCNC: 175 MG/DL (ref 70–99)
GLUCOSE BLDC GLUCOMTR-MCNC: 190 MG/DL (ref 70–99)

## 2025-07-20 PROCEDURE — 250N000013 HC RX MED GY IP 250 OP 250 PS 637: Performed by: PSYCHIATRY & NEUROLOGY

## 2025-07-20 PROCEDURE — 250N000013 HC RX MED GY IP 250 OP 250 PS 637: Performed by: EMERGENCY MEDICINE

## 2025-07-20 PROCEDURE — 250N000013 HC RX MED GY IP 250 OP 250 PS 637

## 2025-07-20 PROCEDURE — 82962 GLUCOSE BLOOD TEST: CPT

## 2025-07-20 PROCEDURE — G0378 HOSPITAL OBSERVATION PER HR: HCPCS

## 2025-07-20 RX ORDER — OLANZAPINE 10 MG/1
10 TABLET, FILM COATED ORAL 2 TIMES DAILY
Qty: 60 TABLET | Refills: 0 | Status: SHIPPED | OUTPATIENT
Start: 2025-07-20

## 2025-07-20 RX ORDER — TRAZODONE HYDROCHLORIDE 50 MG/1
50 TABLET ORAL
Qty: 30 TABLET | Refills: 0 | Status: SHIPPED | OUTPATIENT
Start: 2025-07-20

## 2025-07-20 RX ORDER — BENZTROPINE MESYLATE 1 MG/1
1 TABLET ORAL 2 TIMES DAILY
Qty: 60 TABLET | Refills: 0 | Status: SHIPPED | OUTPATIENT
Start: 2025-07-20

## 2025-07-20 RX ADMIN — CLONIDINE HYDROCHLORIDE 0.1 MG: 0.1 TABLET ORAL at 03:43

## 2025-07-20 RX ADMIN — SERTRALINE HYDROCHLORIDE 50 MG: 50 TABLET ORAL at 08:07

## 2025-07-20 RX ADMIN — FOLIC ACID 1 MG: 1 TABLET ORAL at 08:07

## 2025-07-20 RX ADMIN — OLANZAPINE 10 MG: 10 TABLET, FILM COATED ORAL at 08:07

## 2025-07-20 RX ADMIN — THIAMINE HCL TAB 100 MG 100 MG: 100 TAB at 08:07

## 2025-07-20 RX ADMIN — CLONIDINE HYDROCHLORIDE 0.1 MG: 0.1 TABLET ORAL at 13:55

## 2025-07-20 RX ADMIN — BENZTROPINE MESYLATE 1 MG: 1 TABLET ORAL at 08:07

## 2025-07-20 RX ADMIN — Medication 1 TABLET: at 08:07

## 2025-07-20 RX ADMIN — NICOTINE 1 PATCH: 21 PATCH, EXTENDED RELEASE TRANSDERMAL at 08:07

## 2025-07-20 ASSESSMENT — ACTIVITIES OF DAILY LIVING (ADL)
ADLS_ACUITY_SCORE: 58

## 2025-07-20 ASSESSMENT — LIFESTYLE VARIABLES
PAROXYSMAL SWEATS: NO SWEAT VISIBLE
ANXIETY: NO ANXIETY, AT EASE
AGITATION: NORMAL ACTIVITY
TOTAL SCORE: 3
TOTAL SCORE: 2
TREMOR: NO TREMOR
AGITATION: NORMAL ACTIVITY
NAUSEA AND VOMITING: NO NAUSEA AND NO VOMITING
ANXIETY: 2
AUDITORY DISTURBANCES: NOT PRESENT
TREMOR: 3
VISUAL DISTURBANCES: NOT PRESENT
VISUAL DISTURBANCES: NOT PRESENT
ORIENTATION AND CLOUDING OF SENSORIUM: ORIENTED AND CAN DO SERIAL ADDITIONS
HEADACHE, FULLNESS IN HEAD: NOT PRESENT
NAUSEA AND VOMITING: NO NAUSEA AND NO VOMITING
AUDITORY DISTURBANCES: NOT PRESENT
PAROXYSMAL SWEATS: NO SWEAT VISIBLE
ORIENTATION AND CLOUDING OF SENSORIUM: ORIENTED AND CAN DO SERIAL ADDITIONS
HEADACHE, FULLNESS IN HEAD: NOT PRESENT

## 2025-07-20 ASSESSMENT — COLUMBIA-SUICIDE SEVERITY RATING SCALE - C-SSRS
SUICIDE, SINCE LAST CONTACT: NO
6. HAVE YOU EVER DONE ANYTHING, STARTED TO DO ANYTHING, OR PREPARED TO DO ANYTHING TO END YOUR LIFE?: NO
TOTAL  NUMBER OF ABORTED OR SELF INTERRUPTED ATTEMPTS SINCE LAST CONTACT: NO
ATTEMPT SINCE LAST CONTACT: NO
1. SINCE LAST CONTACT, HAVE YOU WISHED YOU WERE DEAD OR WISHED YOU COULD GO TO SLEEP AND NOT WAKE UP?: NO
TOTAL  NUMBER OF INTERRUPTED ATTEMPTS SINCE LAST CONTACT: NO
2. HAVE YOU ACTUALLY HAD ANY THOUGHTS OF KILLING YOURSELF?: NO

## 2025-07-20 NOTE — ED NOTES
Patient currently denies SI. Spent majority of evening resting in recliner, except for getting up to the bathroom and meeting with provider. Blood glucoses were 186 mg/dL at supper and 146 mg/dL at HS. Sliding scale insulin coverage was provided as ordered with carb counting. Patient scored 3 on the CIWA scale at 1700 and 1 at 2100. Nursing will continue to assess for safety.

## 2025-07-20 NOTE — ED NOTES
Obtained pts blood glucose, vitals, and CIWA. Pts BG was 190. VS WDL. Scored a 3 on CIWA. No signs of distress. Respirations even and unlabored.

## 2025-07-20 NOTE — ED NOTES
Reviewed discharge information with patient. He denies any auditory hallucination, he is better. He denies any suicidal ideation.  Plans to go to [facility] tomorrow.

## 2025-07-20 NOTE — ED NOTES
Pt observed sleeping in recliner at beginning of shift. No signs of distress. Respirations even and unlabored.

## 2025-07-20 NOTE — PROGRESS NOTES
"Triage and Transition Services Extended Care Reassessment     Patient: Ravi goes by \"Ravi,\" uses he/him pronouns  Date of Service: July 20, 2025  Site of Service: [facility] Emergency Dept                             [id number]  Patient was seen yes  Mode of Assessment: In person     Reason for Reassessment: other (see comment) (therapeutic check-in, disposition planning.)    History of Patient's Original Emergency Room Encounter: Patient has previous mental health diagnoses of alcohol use disorder,  alcohol withdrawal with previous withdrawal seizures and hallucinations, depression, and anxiety. Pt stated that he started having auditory hallucinations two years ago. Per chart review, pt developed both AH and VH in the summer of 2023 in the context of alcohol withdrawal. Pt stated that he was in CD treatment in February of this year, but discharged himself in March. He stated that he had been sober until he had a relapse at the end of April due to the voices being unbearable. Pt stated that he has not been on psychiatric medications since April because he was unable to obtain refills. He believes he was taking Seroquel, but did not find it helpful. He would like to get restarted on an antipsychotic medication.        Patient identified his stressors as difficulty keeping a job and depending on his mother and stepfather for housing. Pt stated that he started a new job about one month ago as a . He reported being sober on Sunday and Monday because he was scheduled to work. He was on-call on Tuesday, but showed up to work intoxicated and was consequently fired. He reported binge drinking whiskey everyday since.    Current Patient Presentation: Pt is calm, cooperative, and engaged with Writer during therapeutic check-in.    Presentation Summary: Writer approached Pt to engage in therapeutic check-in. Writer introduced himself and stated purpose of interaction and Pt was " agreeable to meet. When prompted by Writer when discussing plan of care, Pt identifies desire to discharge to collect belongings at his residence and then pursue CD treatment through [facility]. When discussing current mental health symptoms, Pt denies any SI, HI, or VH. Pt does continue to endorse auditory hallucinations, yet reports the intensity at a 2/10 compared to the 9/10 when he initially presented to the ED. Pt does endorse experiencing an increase in intensity of the AH while withdrawing from alcohol use, yet reports he started drinking to assist with quieting the voices. Writer asked what prompted Pt's visit to the ED, Pt reports he was drinking after losing his employment and next thing he knew was waking up to the police being brought to the ED. Pt voices frustration with his mother for getting the police involved and does not want Writer communicating with her. Pt identifies he has utilized [facility] in the past and understands the steps needed to pursue CD treatment through them. Pt identifies future goals of finding employment. Writer and Pt engaged in and completed an aftercare/safety plan and Pt understands the care path of meeting with the attending psychiatric provider prior to anticipated discharge. Pt reports he will need to order a cab or be cabbed home.    Changes Observed Since Initial Assessment: decrease in presenting symptoms, patient/family request    Therapeutic Interventions Provided: Engaged in safety planning, Engaged in guided discovery, explored patient's perspectives and helped expand them through socratic dialogue.    Current Symptoms: anxious   sweating, flushing, shaking auditory hallucinations  (Pt reports being able to eat his breakfast prior to meeting with Writer.)    Mental Status Exam   Affect: Appropriate  Appearance: Appropriate  Attention Span/Concentration: Attentive  Eye Contact: Engaged    Fund of Knowledge: Appropriate   Language /Speech Content:  Fluent  Language /Speech Volume: Normal  Language /Speech Rate/Productions: Normal  Recent Memory: Intact  Remote Memory: Intact  Mood: Normal  Orientation to Person: Yes   Orientation to Place: Yes  Orientation to Time of Day: Yes  Orientation to Date: Yes     Situation (Do they understand why they are here?): Yes  Psychomotor Behavior: Normal  Thought Content: Clear, Hallucinations  Thought Form: Intact, Goal Directed    Treatment Objective(s) Addressed: rapport building, orienting the patient to therapy, identifying an appropriate aftercare plan, safety planning, processing feelings, assessing safety, identifying treatment goals    Patient Response to Interventions: acceptance expressed, verbalizes understanding    Progress Towards Goals:  Patient Reports Symptoms Are: improving  Patient Progress Toward Goals: is making progress  Comment: Pt has been open to ED interventions while at [facility].  Next Step to Work Toward Discharge: patient ability to engage in safety planning  Symptom Stabilization Comment: AH is improving but still present.  Ability to Engage Comment: Have Pt engage in and complete an aftercare/safety plan.    Case Management: Case Management Included: collaborating with patient's support system  Details on Collaborating with Patient's Support System: NA  Summary of Interaction: Pt stated he did not want us communicating with his mother at this time.    C-SSRS Since Last Contact:   1. Wish to be Dead (Since Last Contact): No  2. Non-Specific Active Suicidal Thoughts (Since Last Contact): No     Actual Attempt (Since Last Contact): No  Has subject engaged in non-suicidal self-injurious behavior? (Since Last Contact): No  Interrupted Attempts (Since Last Contact): No  Aborted or Self-Interrupted Attempt (Since Last Contact): No  Preparatory Acts or Behavior (Since Last Contact): No  Suicide (Since Last Contact): No     Calculated C-SSRS Risk Score (Since Last Contact): No Risk Indicated    Plan: Final  Disposition / Recommended Care Path: discharge  Plan for Care reviewed with assigned Medical Provider: yes  Plan for Care Team Review: provider, RN  Comments: Frannie Alcala NP  Patient and/or validated legal guardian concurs: yes  Clinical Substantiation:     When prompted by Writer when discussing plan of care, Pt identifies desire to discharge to collect belongings at his residence and then pursue CD treatment through [facility]. When discussing current mental health symptoms, Pt denies any SI, HI, or VH. Pt does continue to endorse auditory hallucinations, yet reports the intensity at a 2/10 compared to the 9/10 when he initially presented to the ED. Pt does endorse experiencing an increase in intensity of the AH while withdrawing from alcohol use, yet reports he started drinking to assist with quieting the voices. Writer asked what prompted Pt's visit to the ED, Pt reports he was drinking after losing his employment and next thing he knew was waking up to the police being brought to the ED. Pt voices frustration with his mother for getting the police involved and does not want Writer communicating with her. Pt identifies he has utilized [facility] in the past and understands the steps needed to pursue CD treatment through them. Pt identifies future goals of finding employment. Writer and Pt engaged in and completed an aftercare/safety plan and Pt understands the care path of meeting with the attending psychiatric provider prior to anticipated discharge. Pt reports he will need to order a cab or be cabbed home.    At this time IP MH admission is not being recommended due to denial of active SI and no overt displays of psychosis, do, or disorganized thoughts. Pt was able to safety plan with Writer. Pt's current presentation appears to be chronic in nature and Pt's current symptoms appear to be at Pt's baseline. Pt does appear to be at higher risk of death by suicide accidental or intentional due to  mental health history and substance use history.  At this time IP MH admission does not appear to be the most therapeutically beneficial intervention/ level of care for Pt. Pt appears to be able to use and motivated to engage in supportive mental health/ community resources.       Legal Status: Legal Status: Voluntary/Patient has signed consent for treatment    Session Status: Time session started: 0947  Time session ended: 1003  Session Duration (minutes): 16 minutes  Session Number: 2  Anticipated number of sessions or this episode of care: 2    Session Start Time: 0947  Session Stop Time: 1003  CPT codes: 11767 - Psychotherapy (with patient) - 30 (16-37*) min  Time Spent: 16 minutes      CPT code(s) utilized: 97516 - Psychotherapy (with patient) - 30 (16-37*) min    Diagnosis:   Patient Active Problem List   Diagnosis Code    Alcohol withdrawal seizure (H) F10.939, R56.9    Type 2 diabetes mellitus (H) E11.9    Alcohol use disorder F10.90    Bipolar disorder (H) F31.9    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    Obesity (BMI 30-39.9) E66.9    Suicidal ideation R45.851    Psychosis, unspecified psychosis type (H) F29    Alcohol use disorder, severe, dependence (H) F10.20    Major depressive disorder, recurrent severe without psychotic features (H) F33.2       Primary Problem This Admission: Active Hospital Problems    Major depressive disorder, recurrent severe without psychotic features (H)      *Psychosis, unspecified psychosis type (H)      Alcohol use disorder, severe, dependence (H)        Tushar Riley [facility]   Licensed Mental Health Professional (LMHP), [facility]  [phone]

## 2025-07-20 NOTE — ED NOTES
3:48 Am patient states he feels fine no issues to report does have slight cough on and off productive noted.

## 2025-07-20 NOTE — ED PROVIDER NOTES
[facility]  [facility] Emergency Department  Discharge Date: 7/20/2025    Ravi Ahmadi MRN: [id number]   Age: 35 year old YOB: 1990     Brief HPI & Initial ED Course     Chief Complaint   Patient presents with    Hallucinations     HPI  Ravi Ahmadi is a 35 year old male with history notable for schizoaffective disorder, anxiety, and alcohol use disorder who presented to the ED with concerns for worsening auditory hallucinations. Presentation further exacerbated by recent return to alcohol use and noncompliance with medication regimen. Ethanol blood level collected on 7/18/25 at 2105 noted to be 0.38. Record review indicates hx of withdrawal seizures. He has currently been in the emergency department for 44 hours. On approach, patient was resting in a recliner. He walked to the interview room with the clinician.He reports sleeping ok last night compared to before coming in.  Had no voices this morning then returned a couple of hours ago. These are not distressing. He denies SI/HI and denies any visual hallucinations.  He has not been able to take his medications for at least 2 months given that he was not able to follow-up with his previous provider for refills.  He denies side effects to resuming these. He notes significant life stressors including the recent loss of his job last Thursday. Denies current withdrawal symptoms.  When taking his medications and sober, he perceives his medication regimen to be beneficial.  Denies other substance use. He would like to discharge today with a plan to self refer to [facility] treatment program tomorrow. He was last admitted there on February and found this to be beneficial.        Physical Examination   BP: (!) 147/84  Pulse: 78  Temp: 98.4  F (36.9  C)  Resp: 18  Height: 182.9 cm (6')  Weight: 95.2 kg (209 lb 12.8 oz)  SpO2: 98 %    Physical Exam  General: Appears stated age.   Neuro: Alert and fully  oriented. Extremities appear to demonstrate normal strength on visual inspection.   Integumentary/Skin: no rash visualized, normal color    Psychiatric Examination   Appearance: awake, alert and poorly groomed  Attitude:  cooperative  Eye Contact:  good  Mood:  better  Affect:  appropriate and in normal range  Speech:  clear, coherent  Psychomotor Behavior:  no evidence of tardive dyskinesia, dystonia, or tics  Thought Process:  logical, linear, and goal oriented  Associations:  no loose associations  Thought Content:  no evidence of suicidal ideation or homicidal ideation, endorses auditory hallucinations- denies that these are commanding. Denies visual hallucinations   Insight:  good  Judgement:  fair  Oriented to:  time, person, and place  Attention Span and Concentration:  fair  Recent and Remote Memory:  intact  Language: able to name/identify objects without impairment  Fund of Knowledge: intact with awareness of current and past events    Results     ED Course as of 07/20/25 1504   Fri Jul 18, 2025 1945 I obtained history and examined the patient as noted above.        Labs Ordered and Resulted from Time of ED Arrival to Time of ED Departure   COMPREHENSIVE METABOLIC PANEL - Abnormal       Result Value    Sodium 139      Potassium 3.5      Carbon Dioxide (CO2) 22      Anion Gap 21 (*)     Urea Nitrogen 6.6      Creatinine 0.74      GFR Estimate >90      Calcium 9.1      Chloride 96 (*)     Glucose 223 (*)     Alkaline Phosphatase 86      AST 29      ALT 15      Protein Total 8.0      Albumin 4.7      Bilirubin Total 0.7     ETHANOL LEVEL BLOOD - Abnormal    Ethanol Level Blood 0.38 (*)    GLUCOSE BY METER - Abnormal    GLUCOSE BY METER POCT 193 (*)    GLUCOSE BY METER - Abnormal    GLUCOSE BY METER POCT 157 (*)    GLUCOSE BY METER - Abnormal    GLUCOSE BY METER POCT 186 (*)    GLUCOSE BY METER - Abnormal    GLUCOSE BY METER POCT 146 (*)    GLUCOSE BY METER - Abnormal    GLUCOSE BY METER POCT 190 (*)     GLUCOSE BY METER - Abnormal    GLUCOSE BY METER POCT 175 (*)    GLUCOSE BY METER - Abnormal    GLUCOSE BY METER POCT 174 (*)    MAGNESIUM - Normal    Magnesium 2.3     CBC WITH PLATELETS AND DIFFERENTIAL    WBC Count 8.0      RBC Count 5.23      Hemoglobin 16.6      Hematocrit 47.1      MCV 90      MCH 31.7      MCHC 35.2      RDW 13.0      Platelet Count 207      % Neutrophils 49      % Lymphocytes 43      % Monocytes 7      % Eosinophils 0      % Basophils 1      % Immature Granulocytes 0      NRBCs per 100 WBC 0      Absolute Neutrophils 3.9      Absolute Lymphocytes 3.5      Absolute Monocytes 0.6      Absolute Eosinophils 0.0      Absolute Basophils 0.1      Absolute Immature Granulocytes 0.0      Absolute NRBCs 0.0     GLUCOSE MONITOR NURSING POCT   GLUCOSE MONITOR NURSING POCT   GLUCOSE MONITOR NURSING POCT   GLUCOSE MONITOR NURSING POCT   GLUCOSE MONITOR NURSING POCT   GLUCOSE MONITOR NURSING POCT   GLUCOSE MONITOR NURSING POCT   GLUCOSE MONITOR NURSING POCT   GLUCOSE MONITOR NURSING POCT   GLUCOSE MONITOR NURSING POCT   GLUCOSE MONITOR NURSING POCT   GLUCOSE MONITOR NURSING POCT   GLUCOSE MONITOR NURSING POCT   GLUCOSE MONITOR NURSING POCT   GLUCOSE MONITOR NURSING POCT   GLUCOSE MONITOR NURSING POCT   GLUCOSE MONITOR NURSING POCT       Observation Course   The patient was found to have a psychiatric condition that would benefit from an observation stay in the emergency department for further psychiatric stabilization and/or coordination of a safe disposition. The plan upon observation admission included serial assessments of psychiatric condition, potential administration of medications if indicated, further disposition pending the patient's psychiatric course during the monitoring period.     Serial assessments of the patient's psychiatric condition were performed. Nursing notes were reviewed. During the observation period, the patient did not require medications for agitation, and did not require  restraints/seclusion for patient and/or provider safety.     After a period of working with the treatment team on the [facility] unit, the patient's mental state improved to allow a safe transition to outpatient care. After counseling on the diagnosis, work-up, and treatment plan, the patient was discharged. Close follow-up with a psychiatrist and/or therapist was recommended and community psychiatric resources were provided. Patient is to return to the ED if any urgent or potentially life-threatening concerns.     Discharge Diagnoses:   Final diagnoses:   Auditory hallucinations   Alcohol use disorder   Schizoaffective disorder, bipolar type (H)       Treatment Plan:  - Discharge home today with a plan to follow up with [facility] tomorrow for additional JHNO supports   - Continue medications and follow up with outpatient providers- medications including:  - Olanzapine 10mg twice daily targeting affect stabilization and psychosis, patient has previously tolerated 10mg every morning and 20mg at bedtime. Consider additional dose optimization pending tolerability   - Cogentin 1mg twice daily for antipsychotic side effect management   - Sertraline 50mg daily targeting depressive symptoms  - Trazodone 50mg at bedtime as needed for sleep   - Referral to outpatient therapist and medication provider   - Recommendation to abstain from substance use  - Discharge from [facility] with OP and crisis supports     At the time of discharge, the patient's acute suicide risk was determined to be low due to the following factors: Reduction in the intensity of mood/anxiety symptoms that preceded the admission, denial of suicidal thoughts, denies feeling helpless or hopeless, not currently under the influence of alcohol or illicit substances, denies experiencing command hallucinations, no immediate access to firearms. The patient's acute risk could be higher if noncompliant with their treatment plan, medications, follow-up appointments or using  illicit substances or alcohol. Protective factors include: social supports and stable housing.     I spent more than 31 minutes on discharge day activities.    --  LOUIS BURNHAM  [facility] EMERGENCY DEPT  [facility] Unit      Physician Attestation   I, MAKENNA Medrano CNP, was present with the nurse practitioner student who participated in the service and in the documentation of the note. I have verified the history and personally performed the MSE and medical decision making.  I have reviewed all vitals and laboratory findings. I agree with the assessment, have added relevant comments and adjustments to plan of care as documented in the note. MAKENNA Medrano, CNP, July 20, 2025        Frannie Alcala APRN CNP  07/20/25 1540

## 2025-07-20 NOTE — ED NOTES
Pt slept through the night with no issues or signs of distress. Respirations even and unlabored. Pts blood glucose was 190 at 2am and scored a 3 on CIWA. Pts mom did call requesting the pt to call her back when he wakes up this morning.

## 2025-07-20 NOTE — PROGRESS NOTES
"Triage & Transition Services, Extended Care     Therapy Progress Note    Patient: Ravi goes by \"Ravi,\" uses he/him pronouns  Date of Service: July 19, 2025  Site of Service: [facility] Emergency Dept                             [id number]  Patient was seen yes  Mode of Assessment: In person    Presentation Summary: Pt reported that he still has AH, but they are not as loud in volume and that he is unable to decipher what they are saying. Pt processed how he has been drinking to quite the voices. He is motivated for treatment supports, but also wants to focus on getting a job and does not want programming to get in the way of this. He accepted the offer for OP therapy and medication management as well as JHON assessment that will likely be completed while at [facility]. He reported that his anxiety is improved and his mood is \"fine\". Pt denied SI in this moment, but reported that he last experienced SI this morning after being in the ED all night. He is willing to stay on observation for the night for continued stabilization and medication management.    Therapeutic Intervention(s) Provided: Engaged in safety planning, Engaged in guided discovery, explored patient's perspectives and helped expand them through socratic dialogue., Explored motivation for treatment., Explored barriers to programmatic care.    Current Symptoms: anxious   sweating, flushing, shaking auditory hallucinations      Mental Status Exam   Affect: Appropriate  Appearance: Appropriate  Attention Span/Concentration: Attentive  Eye Contact: Engaged    Fund of Knowledge: Appropriate   Language /Speech Content: Fluent  Language /Speech Volume: Normal  Language /Speech Rate/Productions: Normal  Recent Memory: Intact  Remote Memory: Intact  Mood: Normal  Orientation to Person: Yes   Orientation to Place: Yes  Orientation to Time of Day: Yes  Orientation to Date: Yes     Situation (Do they understand why they are here?): Yes  Psychomotor " "Behavior: Normal  Thought Content: Hallucinations  Thought Form: Goal Directed    Treatment Objective(s) Addressed: rapport building, orienting the patient to therapy, identifying an appropriate aftercare plan, safety planning, processing feelings, assessing safety    Patient Response to Interventions: eager to participate    Progress Towards Goals: Patient Reports Symptoms Are: improving  Patient Progress Toward Goals: is making progress  Next Step to Work Toward Discharge: symptom stabilization  Symptom Stabilization Comment: AH is improving but still present.    Case Management: Summary of Interaction: Apts made and JHON assessment ordered by Frannie Alcala.    Plan: observation  yes provider, DOV Alcala  yes    Clinical Substantiation: Pt reported that he still has AH, but they are not as loud in volume and that he is unable to decipher what they are saying. Pt processed how he has been drinking to quite the voices. He is motivated for treatment supports, but also wants to focus on getting a job and does not want programming to get in the way of this. He accepted the offer for OP therapy and medication management as well as as JHON assessment that will likely be completed while at [facility]. He reported that his anxiety is improved and his mood is \"fine\". Pt denied SI in this moment, but reported that he last experienced SI this morning after being in the ED all night. He is willing to stay on observation for the night for continued stabilization and medication management. Possible IP MP was discussed, and pt appeared more open to consider this if needed.    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    Obesity (BMI 30-39.9) E66.9    Suicidal ideation R45.851    Psychosis, unspecified psychosis type (H) F29    Alcohol use disorder, severe, dependence (H) F10.20    Major depressive disorder, recurrent severe without psychotic features (H) F33.2       Primary Problem This Admission: Active Hospital Problems    Major depressive disorder, recurrent severe without psychotic features (H)      *Psychosis, unspecified psychosis type (H)      Alcohol use disorder, severe, dependence (H)        BIBI Youssef   Licensed Mental Health Professional (LMHP), [facility]  [phone]

## 2025-07-20 NOTE — ED NOTES
Patient is feeling better today, he scored 2 in the CIWA Scale, he is slightly femorous.  He denies any suicidal ideation,  he would like to go to [facility] for alcohol treatment  and he is going to check in tomorrow.   Today he wants to discharge and go to his parents house to get his belongings , denies any SI.

## 2025-07-20 NOTE — ED NOTES
23:50 pm talked with patient states he doing ok feels fine went over plan for BS check and CIWA at 2 am.

## 2025-07-22 ENCOUNTER — PATIENT OUTREACH (OUTPATIENT)
Dept: CARE COORDINATION | Facility: CLINIC | Age: 35
End: 2025-07-22
Payer: COMMERCIAL
